# Patient Record
Sex: FEMALE | Race: OTHER | Employment: OTHER | URBAN - NONMETROPOLITAN AREA
[De-identification: names, ages, dates, MRNs, and addresses within clinical notes are randomized per-mention and may not be internally consistent; named-entity substitution may affect disease eponyms.]

---

## 2017-01-24 ENCOUNTER — TELEPHONE (OUTPATIENT)
Dept: FAMILY MEDICINE CLINIC | Age: 57
End: 2017-01-24

## 2017-01-24 DIAGNOSIS — K08.89 TOOTH PAIN: Primary | ICD-10-CM

## 2017-01-24 RX ORDER — HYDROCODONE BITARTRATE AND ACETAMINOPHEN 5; 325 MG/1; MG/1
1 TABLET ORAL EVERY 4 HOURS PRN
Qty: 20 TABLET | Refills: 0 | Status: SHIPPED | OUTPATIENT
Start: 2017-01-24 | End: 2017-06-21 | Stop reason: ALTCHOICE

## 2017-01-25 DIAGNOSIS — K62.89 CHRONIC RECTAL PAIN: ICD-10-CM

## 2017-01-25 DIAGNOSIS — G89.29 CHRONIC RECTAL PAIN: ICD-10-CM

## 2017-01-25 RX ORDER — TRAMADOL HYDROCHLORIDE 50 MG/1
50 TABLET ORAL EVERY 8 HOURS PRN
Qty: 90 TABLET | Refills: 0 | OUTPATIENT
Start: 2017-01-25

## 2017-01-27 DIAGNOSIS — K62.89 CHRONIC RECTAL PAIN: ICD-10-CM

## 2017-01-27 DIAGNOSIS — G89.29 CHRONIC RECTAL PAIN: ICD-10-CM

## 2017-01-27 RX ORDER — TRAMADOL HYDROCHLORIDE 50 MG/1
TABLET ORAL
Qty: 90 TABLET | Refills: 0 | OUTPATIENT
Start: 2017-01-27

## 2017-01-31 ENCOUNTER — OFFICE VISIT (OUTPATIENT)
Dept: FAMILY MEDICINE CLINIC | Age: 57
End: 2017-01-31

## 2017-01-31 VITALS
OXYGEN SATURATION: 16 % | HEIGHT: 62 IN | BODY MASS INDEX: 32.94 KG/M2 | SYSTOLIC BLOOD PRESSURE: 116 MMHG | RESPIRATION RATE: 14 BRPM | WEIGHT: 179 LBS | TEMPERATURE: 99.1 F | HEART RATE: 78 BPM | DIASTOLIC BLOOD PRESSURE: 82 MMHG

## 2017-01-31 DIAGNOSIS — G89.29 CHRONIC RIGHT SHOULDER PAIN: ICD-10-CM

## 2017-01-31 DIAGNOSIS — E11.40 TYPE 2 DIABETES MELLITUS WITH DIABETIC NEUROPATHY, WITH LONG-TERM CURRENT USE OF INSULIN (HCC): Primary | ICD-10-CM

## 2017-01-31 DIAGNOSIS — R21 RASH: ICD-10-CM

## 2017-01-31 DIAGNOSIS — Z87.42 HISTORY OF VAGINITIS: ICD-10-CM

## 2017-01-31 DIAGNOSIS — E78.2 MIXED HYPERLIPIDEMIA: ICD-10-CM

## 2017-01-31 DIAGNOSIS — J44.9 CHRONIC OBSTRUCTIVE PULMONARY DISEASE, UNSPECIFIED COPD TYPE (HCC): ICD-10-CM

## 2017-01-31 DIAGNOSIS — J01.40 ACUTE PANSINUSITIS, RECURRENCE NOT SPECIFIED: ICD-10-CM

## 2017-01-31 DIAGNOSIS — I10 ESSENTIAL HYPERTENSION: ICD-10-CM

## 2017-01-31 DIAGNOSIS — M25.511 CHRONIC RIGHT SHOULDER PAIN: ICD-10-CM

## 2017-01-31 DIAGNOSIS — K62.89 CHRONIC RECTAL PAIN: ICD-10-CM

## 2017-01-31 DIAGNOSIS — G89.29 CHRONIC RECTAL PAIN: ICD-10-CM

## 2017-01-31 DIAGNOSIS — Z79.4 TYPE 2 DIABETES MELLITUS WITH DIABETIC NEUROPATHY, WITH LONG-TERM CURRENT USE OF INSULIN (HCC): Primary | ICD-10-CM

## 2017-01-31 PROCEDURE — 99214 OFFICE O/P EST MOD 30 MIN: CPT | Performed by: FAMILY MEDICINE

## 2017-01-31 RX ORDER — TRAMADOL HYDROCHLORIDE 50 MG/1
50 TABLET ORAL EVERY 8 HOURS PRN
Qty: 90 TABLET | Refills: 0 | Status: SHIPPED | OUTPATIENT
Start: 2017-01-31 | End: 2017-02-28 | Stop reason: SDUPTHER

## 2017-01-31 RX ORDER — FLUCONAZOLE 150 MG/1
TABLET ORAL
Qty: 2 TABLET | Refills: 0 | Status: SHIPPED | OUTPATIENT
Start: 2017-01-31 | End: 2017-06-16 | Stop reason: SDUPTHER

## 2017-01-31 RX ORDER — ALBUTEROL SULFATE 90 UG/1
AEROSOL, METERED RESPIRATORY (INHALATION)
Qty: 1 INHALER | Refills: 6 | Status: SHIPPED | OUTPATIENT
Start: 2017-01-31 | End: 2018-03-23 | Stop reason: SDUPTHER

## 2017-01-31 RX ORDER — CELECOXIB 200 MG/1
CAPSULE ORAL
Qty: 60 CAPSULE | Refills: 5 | Status: SHIPPED | OUTPATIENT
Start: 2017-01-31 | End: 2017-12-18 | Stop reason: SDUPTHER

## 2017-01-31 RX ORDER — AMOXICILLIN 875 MG/1
875 TABLET, COATED ORAL 2 TIMES DAILY
Qty: 20 TABLET | Refills: 0 | Status: SHIPPED | OUTPATIENT
Start: 2017-01-31 | End: 2017-02-10

## 2017-01-31 RX ORDER — MULTIVITAMIN WITH FOLIC ACID 400 MCG
1 TABLET ORAL DAILY
Qty: 30 TABLET | Refills: 6 | Status: SHIPPED | OUTPATIENT
Start: 2017-01-31 | End: 2017-09-05 | Stop reason: SDUPTHER

## 2017-01-31 RX ORDER — TRIAMCINOLONE ACETONIDE 5 MG/G
CREAM TOPICAL
Qty: 1 TUBE | Refills: 0 | Status: SHIPPED | OUTPATIENT
Start: 2017-01-31 | End: 2018-02-08

## 2017-02-08 ENCOUNTER — TELEPHONE (OUTPATIENT)
Dept: FAMILY MEDICINE CLINIC | Age: 57
End: 2017-02-08

## 2017-02-08 DIAGNOSIS — N76.0 ACUTE VAGINITIS: Primary | ICD-10-CM

## 2017-02-08 RX ORDER — CLOTRIMAZOLE 1 %
CREAM WITH APPLICATOR VAGINAL
Qty: 1 TUBE | Refills: 0 | Status: SHIPPED | OUTPATIENT
Start: 2017-02-08 | End: 2017-02-15

## 2017-02-13 RX ORDER — GABAPENTIN 300 MG/1
CAPSULE ORAL
Qty: 120 CAPSULE | Refills: 2 | OUTPATIENT
Start: 2017-02-13

## 2017-02-14 ENCOUNTER — OFFICE VISIT (OUTPATIENT)
Dept: NEUROLOGY | Age: 57
End: 2017-02-14

## 2017-02-14 VITALS
BODY MASS INDEX: 32.94 KG/M2 | HEART RATE: 76 BPM | WEIGHT: 179 LBS | HEIGHT: 62 IN | SYSTOLIC BLOOD PRESSURE: 140 MMHG | DIASTOLIC BLOOD PRESSURE: 78 MMHG

## 2017-02-14 DIAGNOSIS — F25.9 SCHIZOAFFECTIVE DISORDER, UNSPECIFIED TYPE (HCC): ICD-10-CM

## 2017-02-14 DIAGNOSIS — M79.604 PAIN IN BOTH LOWER EXTREMITIES: ICD-10-CM

## 2017-02-14 DIAGNOSIS — E78.5 HYPERLIPIDEMIA, UNSPECIFIED HYPERLIPIDEMIA TYPE: ICD-10-CM

## 2017-02-14 DIAGNOSIS — G56.03 BILATERAL CARPAL TUNNEL SYNDROME: ICD-10-CM

## 2017-02-14 DIAGNOSIS — E11.42 TYPE 2 DIABETES MELLITUS WITH DIABETIC POLYNEUROPATHY, WITHOUT LONG-TERM CURRENT USE OF INSULIN (HCC): Primary | ICD-10-CM

## 2017-02-14 DIAGNOSIS — G89.29 CHRONIC RIGHT SHOULDER PAIN: ICD-10-CM

## 2017-02-14 DIAGNOSIS — E66.9 OBESITY (BMI 30.0-34.9): ICD-10-CM

## 2017-02-14 DIAGNOSIS — G60.9 IDIOPATHIC PERIPHERAL NEUROPATHY: ICD-10-CM

## 2017-02-14 DIAGNOSIS — F41.9 ANXIETY: ICD-10-CM

## 2017-02-14 DIAGNOSIS — F32.A FATIGUE DUE TO DEPRESSION: ICD-10-CM

## 2017-02-14 DIAGNOSIS — M25.511 CHRONIC RIGHT SHOULDER PAIN: ICD-10-CM

## 2017-02-14 DIAGNOSIS — E11.40 TYPE 2 DIABETES MELLITUS WITH DIABETIC NEUROPATHY, WITH LONG-TERM CURRENT USE OF INSULIN (HCC): ICD-10-CM

## 2017-02-14 DIAGNOSIS — J44.9 CHRONIC OBSTRUCTIVE PULMONARY DISEASE, UNSPECIFIED COPD TYPE (HCC): ICD-10-CM

## 2017-02-14 DIAGNOSIS — F31.32 BIPOLAR AFFECTIVE DISORDER, CURRENTLY DEPRESSED, MODERATE (HCC): ICD-10-CM

## 2017-02-14 DIAGNOSIS — G25.81 RESTLESS LEG SYNDROME: ICD-10-CM

## 2017-02-14 DIAGNOSIS — F32.A DEPRESSION, UNSPECIFIED DEPRESSION TYPE: ICD-10-CM

## 2017-02-14 DIAGNOSIS — E78.2 MIXED HYPERLIPIDEMIA: ICD-10-CM

## 2017-02-14 DIAGNOSIS — F50.89 PICA: ICD-10-CM

## 2017-02-14 DIAGNOSIS — R53.83 FATIGUE DUE TO DEPRESSION: ICD-10-CM

## 2017-02-14 DIAGNOSIS — R26.89 BALANCE PROBLEM: ICD-10-CM

## 2017-02-14 DIAGNOSIS — Z79.4 TYPE 2 DIABETES MELLITUS WITH DIABETIC NEUROPATHY, WITH LONG-TERM CURRENT USE OF INSULIN (HCC): ICD-10-CM

## 2017-02-14 DIAGNOSIS — G47.9 SLEEPING DIFFICULTY: ICD-10-CM

## 2017-02-14 DIAGNOSIS — F10.20 ALCOHOLISM (HCC): ICD-10-CM

## 2017-02-14 DIAGNOSIS — M54.16 CHRONIC LUMBAR RADICULOPATHY: ICD-10-CM

## 2017-02-14 DIAGNOSIS — Z72.0 TOBACCO ABUSE: ICD-10-CM

## 2017-02-14 DIAGNOSIS — I10 ESSENTIAL HYPERTENSION: ICD-10-CM

## 2017-02-14 DIAGNOSIS — J42 CHRONIC BRONCHITIS, UNSPECIFIED CHRONIC BRONCHITIS TYPE (HCC): ICD-10-CM

## 2017-02-14 DIAGNOSIS — M79.605 PAIN IN BOTH LOWER EXTREMITIES: ICD-10-CM

## 2017-02-14 DIAGNOSIS — K21.9 GASTROESOPHAGEAL REFLUX DISEASE WITHOUT ESOPHAGITIS: ICD-10-CM

## 2017-02-14 PROCEDURE — 99215 OFFICE O/P EST HI 40 MIN: CPT | Performed by: PSYCHIATRY & NEUROLOGY

## 2017-02-14 RX ORDER — GABAPENTIN 300 MG/1
CAPSULE ORAL
Qty: 120 CAPSULE | Refills: 2 | Status: SHIPPED | OUTPATIENT
Start: 2017-02-14 | End: 2017-05-16 | Stop reason: SDUPTHER

## 2017-02-14 ASSESSMENT — ENCOUNTER SYMPTOMS
EYE ITCHING: 0
DIARRHEA: 0
VOICE CHANGE: 0
CONSTIPATION: 0
EYE DISCHARGE: 0
SHORTNESS OF BREATH: 0
EYE PAIN: 0
CHEST TIGHTNESS: 0
BLOOD IN STOOL: 0
FACIAL SWELLING: 0
CHOKING: 0
EYE REDNESS: 0
APNEA: 0
SINUS PRESSURE: 0
COLOR CHANGE: 0
TROUBLE SWALLOWING: 0
PHOTOPHOBIA: 0
ABDOMINAL DISTENTION: 0
WHEEZING: 0
BACK PAIN: 0

## 2017-02-28 DIAGNOSIS — K62.89 CHRONIC RECTAL PAIN: Primary | ICD-10-CM

## 2017-02-28 DIAGNOSIS — L29.9 PRURITUS: ICD-10-CM

## 2017-02-28 DIAGNOSIS — G89.29 CHRONIC RECTAL PAIN: Primary | ICD-10-CM

## 2017-02-28 RX ORDER — HYDROXYZINE HYDROCHLORIDE 25 MG/1
25 TABLET, FILM COATED ORAL 3 TIMES DAILY PRN
Qty: 30 TABLET | Refills: 0 | Status: SHIPPED | OUTPATIENT
Start: 2017-02-28 | End: 2017-04-26 | Stop reason: SDUPTHER

## 2017-02-28 RX ORDER — TRAMADOL HYDROCHLORIDE 50 MG/1
50 TABLET ORAL EVERY 8 HOURS PRN
Qty: 90 TABLET | Refills: 0 | Status: SHIPPED | OUTPATIENT
Start: 2017-02-28 | End: 2017-03-27 | Stop reason: SDUPTHER

## 2017-03-07 ENCOUNTER — OFFICE VISIT (OUTPATIENT)
Dept: OPTOMETRY | Age: 57
End: 2017-03-07

## 2017-03-07 DIAGNOSIS — E11.9 NON-INSULIN DEPENDENT TYPE 2 DIABETES MELLITUS (HCC): ICD-10-CM

## 2017-03-07 DIAGNOSIS — H52.203 ASTIGMATISM OF BOTH EYES WITH PRESBYOPIA: Primary | ICD-10-CM

## 2017-03-07 DIAGNOSIS — H52.4 ASTIGMATISM OF BOTH EYES WITH PRESBYOPIA: Primary | ICD-10-CM

## 2017-03-07 PROCEDURE — 92014 COMPRE OPH EXAM EST PT 1/>: CPT | Performed by: OPTOMETRIST

## 2017-03-07 PROCEDURE — 2022F DILAT RTA XM EVC RTNOPTHY: CPT | Performed by: OPTOMETRIST

## 2017-03-07 RX ORDER — BENOXINATE HCL/FLUORESCEIN SOD 0.4%-0.25%
1 DROPS OPHTHALMIC (EYE) ONCE
Status: COMPLETED | OUTPATIENT
Start: 2017-03-07 | End: 2017-03-07

## 2017-03-07 RX ORDER — TROPICAMIDE 10 MG/ML
1 SOLUTION/ DROPS OPHTHALMIC ONCE
Status: COMPLETED | OUTPATIENT
Start: 2017-03-07 | End: 2017-03-07

## 2017-03-07 RX ORDER — PHENYLEPHRINE HCL 2.5 %
1 DROPS OPHTHALMIC (EYE) ONCE
Status: COMPLETED | OUTPATIENT
Start: 2017-03-07 | End: 2017-03-07

## 2017-03-07 RX ADMIN — TROPICAMIDE 1 DROP: 10 SOLUTION/ DROPS OPHTHALMIC at 13:15

## 2017-03-07 RX ADMIN — Medication 1 DROP: at 13:15

## 2017-03-07 ASSESSMENT — TONOMETRY
OS_IOP_MMHG: 16
OD_IOP_MMHG: 15
IOP_METHOD: APPLANATION W FLURESS DROP

## 2017-03-07 ASSESSMENT — REFRACTION_WEARINGRX
OD_CYLINDER: -0.25
OS_CYLINDER: -0.25
OS_ADD: +2.25
OD_AXIS: 140
OD_SPHERE: +0.25
OS_AXIS: 150
OS_SPHERE: PLANO
SPECS_TYPE: BIFOCAL
OD_ADD: +2.25

## 2017-03-07 ASSESSMENT — REFRACTION_MANIFEST
OD_SPHERE: PLANO
OS_AXIS: 140
OS_SPHERE: +0.50
OS_CYLINDER: -0.25
OS_ADD: +2.25
OD_AXIS: 140
OD_ADD: +2.25
OD_CYLINDER: -0.25

## 2017-03-07 ASSESSMENT — VISUAL ACUITY
CORRECTION_TYPE: GLASSES
OD_CC: 20/20 OU
OS_CC: 20/20
OS_CC+: -1
METHOD: SNELLEN - LINEAR

## 2017-03-07 ASSESSMENT — SLIT LAMP EXAM - LIDS
COMMENTS: NORMAL
COMMENTS: NORMAL

## 2017-03-27 DIAGNOSIS — G89.29 CHRONIC RECTAL PAIN: ICD-10-CM

## 2017-03-27 DIAGNOSIS — K62.89 CHRONIC RECTAL PAIN: ICD-10-CM

## 2017-03-28 RX ORDER — TRAMADOL HYDROCHLORIDE 50 MG/1
50 TABLET ORAL EVERY 8 HOURS PRN
Qty: 90 TABLET | Refills: 0 | Status: SHIPPED | OUTPATIENT
Start: 2017-03-28 | End: 2017-04-26 | Stop reason: SDUPTHER

## 2017-04-26 ENCOUNTER — OFFICE VISIT (OUTPATIENT)
Dept: FAMILY MEDICINE CLINIC | Age: 57
End: 2017-04-26
Payer: MEDICAID

## 2017-04-26 VITALS
BODY MASS INDEX: 33.31 KG/M2 | RESPIRATION RATE: 16 BRPM | SYSTOLIC BLOOD PRESSURE: 116 MMHG | WEIGHT: 181 LBS | HEIGHT: 62 IN | HEART RATE: 84 BPM | DIASTOLIC BLOOD PRESSURE: 64 MMHG

## 2017-04-26 DIAGNOSIS — G89.29 CHRONIC RECTAL PAIN: ICD-10-CM

## 2017-04-26 DIAGNOSIS — L29.9 PRURITUS: ICD-10-CM

## 2017-04-26 DIAGNOSIS — L30.9 DERMATITIS: Primary | ICD-10-CM

## 2017-04-26 DIAGNOSIS — K62.89 CHRONIC RECTAL PAIN: ICD-10-CM

## 2017-04-26 DIAGNOSIS — E11.40 TYPE 2 DIABETES MELLITUS WITH DIABETIC NEUROPATHY, WITH LONG-TERM CURRENT USE OF INSULIN (HCC): ICD-10-CM

## 2017-04-26 DIAGNOSIS — Z12.11 SCREENING FOR COLON CANCER: ICD-10-CM

## 2017-04-26 DIAGNOSIS — Z79.4 TYPE 2 DIABETES MELLITUS WITH DIABETIC NEUROPATHY, WITH LONG-TERM CURRENT USE OF INSULIN (HCC): ICD-10-CM

## 2017-04-26 PROCEDURE — 99213 OFFICE O/P EST LOW 20 MIN: CPT | Performed by: FAMILY MEDICINE

## 2017-04-26 RX ORDER — TRAMADOL HYDROCHLORIDE 50 MG/1
50 TABLET ORAL EVERY 8 HOURS PRN
Qty: 90 TABLET | Refills: 0 | Status: SHIPPED | OUTPATIENT
Start: 2017-04-26 | End: 2017-05-24 | Stop reason: SDUPTHER

## 2017-04-26 RX ORDER — HYDROXYZINE HYDROCHLORIDE 25 MG/1
25 TABLET, FILM COATED ORAL 3 TIMES DAILY PRN
Qty: 30 TABLET | Refills: 0 | Status: SHIPPED | OUTPATIENT
Start: 2017-04-26 | End: 2017-06-29 | Stop reason: SDUPTHER

## 2017-05-16 ENCOUNTER — OFFICE VISIT (OUTPATIENT)
Dept: NEUROLOGY | Age: 57
End: 2017-05-16
Payer: MEDICAID

## 2017-05-16 VITALS
WEIGHT: 180 LBS | SYSTOLIC BLOOD PRESSURE: 128 MMHG | DIASTOLIC BLOOD PRESSURE: 78 MMHG | HEIGHT: 62 IN | HEART RATE: 76 BPM | BODY MASS INDEX: 33.13 KG/M2

## 2017-05-16 DIAGNOSIS — F25.9 SCHIZOAFFECTIVE DISORDER, UNSPECIFIED TYPE (HCC): ICD-10-CM

## 2017-05-16 DIAGNOSIS — J44.9 CHRONIC OBSTRUCTIVE PULMONARY DISEASE, UNSPECIFIED COPD TYPE (HCC): ICD-10-CM

## 2017-05-16 DIAGNOSIS — F31.32 BIPOLAR AFFECTIVE DISORDER, CURRENTLY DEPRESSED, MODERATE (HCC): ICD-10-CM

## 2017-05-16 DIAGNOSIS — E66.9 OBESITY (BMI 30.0-34.9): ICD-10-CM

## 2017-05-16 DIAGNOSIS — K21.9 GASTROESOPHAGEAL REFLUX DISEASE WITHOUT ESOPHAGITIS: ICD-10-CM

## 2017-05-16 DIAGNOSIS — F41.9 ANXIETY: ICD-10-CM

## 2017-05-16 DIAGNOSIS — G25.81 RESTLESS LEG SYNDROME: ICD-10-CM

## 2017-05-16 DIAGNOSIS — G56.03 BILATERAL CARPAL TUNNEL SYNDROME: ICD-10-CM

## 2017-05-16 DIAGNOSIS — E11.40 TYPE 2 DIABETES MELLITUS WITH DIABETIC NEUROPATHY, WITH LONG-TERM CURRENT USE OF INSULIN (HCC): ICD-10-CM

## 2017-05-16 DIAGNOSIS — G47.9 SLEEPING DIFFICULTY: ICD-10-CM

## 2017-05-16 DIAGNOSIS — R53.83 FATIGUE DUE TO DEPRESSION: ICD-10-CM

## 2017-05-16 DIAGNOSIS — G89.29 CHRONIC RECTAL PAIN: ICD-10-CM

## 2017-05-16 DIAGNOSIS — F32.A FATIGUE DUE TO DEPRESSION: ICD-10-CM

## 2017-05-16 DIAGNOSIS — M54.16 CHRONIC LUMBAR RADICULOPATHY: Primary | ICD-10-CM

## 2017-05-16 DIAGNOSIS — F10.20 ALCOHOLISM (HCC): ICD-10-CM

## 2017-05-16 DIAGNOSIS — Z79.4 TYPE 2 DIABETES MELLITUS WITH DIABETIC NEUROPATHY, WITH LONG-TERM CURRENT USE OF INSULIN (HCC): ICD-10-CM

## 2017-05-16 DIAGNOSIS — R26.89 BALANCE PROBLEM: ICD-10-CM

## 2017-05-16 DIAGNOSIS — E78.2 MIXED HYPERLIPIDEMIA: ICD-10-CM

## 2017-05-16 DIAGNOSIS — K62.89 CHRONIC RECTAL PAIN: ICD-10-CM

## 2017-05-16 DIAGNOSIS — G60.9 IDIOPATHIC PERIPHERAL NEUROPATHY: ICD-10-CM

## 2017-05-16 DIAGNOSIS — G89.29 CHRONIC RIGHT SHOULDER PAIN: ICD-10-CM

## 2017-05-16 DIAGNOSIS — M25.511 CHRONIC RIGHT SHOULDER PAIN: ICD-10-CM

## 2017-05-16 DIAGNOSIS — I10 ESSENTIAL HYPERTENSION: ICD-10-CM

## 2017-05-16 DIAGNOSIS — F32.A DEPRESSION, UNSPECIFIED DEPRESSION TYPE: ICD-10-CM

## 2017-05-16 PROCEDURE — 99214 OFFICE O/P EST MOD 30 MIN: CPT | Performed by: PSYCHIATRY & NEUROLOGY

## 2017-05-16 RX ORDER — MELOXICAM 7.5 MG/1
TABLET ORAL
Refills: 0 | COMMUNITY
Start: 2017-03-22 | End: 2017-06-21 | Stop reason: ALTCHOICE

## 2017-05-16 RX ORDER — GABAPENTIN 300 MG/1
CAPSULE ORAL
Qty: 120 CAPSULE | Refills: 2 | Status: SHIPPED | OUTPATIENT
Start: 2017-05-16 | End: 2017-08-22 | Stop reason: SDUPTHER

## 2017-05-16 ASSESSMENT — ENCOUNTER SYMPTOMS
EYE PAIN: 0
EYE DISCHARGE: 0
SHORTNESS OF BREATH: 0
COLOR CHANGE: 0
CONSTIPATION: 0
TROUBLE SWALLOWING: 0
CHEST TIGHTNESS: 0
ABDOMINAL DISTENTION: 0
APNEA: 0
CHOKING: 0
SINUS PRESSURE: 0
FACIAL SWELLING: 0
BACK PAIN: 0
EYE ITCHING: 0
EYE REDNESS: 0
VOICE CHANGE: 0
BLOOD IN STOOL: 0
WHEEZING: 0
DIARRHEA: 0
PHOTOPHOBIA: 0

## 2017-05-17 ENCOUNTER — TELEPHONE (OUTPATIENT)
Dept: FAMILY MEDICINE CLINIC | Age: 57
End: 2017-05-17

## 2017-05-17 DIAGNOSIS — L30.9 DERMATITIS: Primary | ICD-10-CM

## 2017-05-18 DIAGNOSIS — Z79.4 TYPE 2 DIABETES MELLITUS WITH DIABETIC NEUROPATHY, WITH LONG-TERM CURRENT USE OF INSULIN (HCC): Primary | ICD-10-CM

## 2017-05-18 DIAGNOSIS — E11.40 TYPE 2 DIABETES MELLITUS WITH DIABETIC NEUROPATHY, WITH LONG-TERM CURRENT USE OF INSULIN (HCC): Primary | ICD-10-CM

## 2017-05-18 RX ORDER — GLUCOSAMINE HCL/CHONDROITIN SU 500-400 MG
CAPSULE ORAL
Qty: 100 STRIP | Refills: 6 | Status: SHIPPED | OUTPATIENT
Start: 2017-05-18 | End: 2018-09-06 | Stop reason: SDUPTHER

## 2017-05-24 DIAGNOSIS — Z79.4 TYPE 2 DIABETES MELLITUS WITH DIABETIC NEUROPATHY, WITH LONG-TERM CURRENT USE OF INSULIN (HCC): Primary | ICD-10-CM

## 2017-05-24 DIAGNOSIS — K62.89 CHRONIC RECTAL PAIN: ICD-10-CM

## 2017-05-24 DIAGNOSIS — E11.40 TYPE 2 DIABETES MELLITUS WITH DIABETIC NEUROPATHY, WITH LONG-TERM CURRENT USE OF INSULIN (HCC): Primary | ICD-10-CM

## 2017-05-24 DIAGNOSIS — G89.29 CHRONIC RECTAL PAIN: ICD-10-CM

## 2017-05-24 RX ORDER — TRAMADOL HYDROCHLORIDE 50 MG/1
50 TABLET ORAL EVERY 8 HOURS PRN
Qty: 90 TABLET | Refills: 0 | Status: SHIPPED | OUTPATIENT
Start: 2017-05-24 | End: 2017-06-21 | Stop reason: SDUPTHER

## 2017-06-15 ENCOUNTER — TELEPHONE (OUTPATIENT)
Dept: FAMILY MEDICINE CLINIC | Age: 57
End: 2017-06-15

## 2017-06-15 DIAGNOSIS — R30.0 DYSURIA: Primary | ICD-10-CM

## 2017-06-15 RX ORDER — SULFAMETHOXAZOLE AND TRIMETHOPRIM 800; 160 MG/1; MG/1
1 TABLET ORAL 2 TIMES DAILY
Qty: 6 TABLET | Refills: 0 | Status: SHIPPED | OUTPATIENT
Start: 2017-06-15 | End: 2017-06-18

## 2017-06-16 DIAGNOSIS — Z87.42 HISTORY OF VAGINITIS: ICD-10-CM

## 2017-06-16 RX ORDER — FLUCONAZOLE 150 MG/1
TABLET ORAL
Qty: 2 TABLET | Refills: 0 | Status: SHIPPED | OUTPATIENT
Start: 2017-06-16 | End: 2017-06-19

## 2017-06-20 DIAGNOSIS — K21.9 GASTROESOPHAGEAL REFLUX DISEASE, ESOPHAGITIS PRESENCE NOT SPECIFIED: ICD-10-CM

## 2017-06-21 ENCOUNTER — HOSPITAL ENCOUNTER (OUTPATIENT)
Age: 57
Setting detail: SPECIMEN
Discharge: HOME | End: 2017-06-21
Payer: MEDICAID

## 2017-06-21 ENCOUNTER — OFFICE VISIT (OUTPATIENT)
Dept: FAMILY MEDICINE CLINIC | Age: 57
End: 2017-06-21
Payer: MEDICAID

## 2017-06-21 VITALS
BODY MASS INDEX: 33.05 KG/M2 | HEIGHT: 62 IN | RESPIRATION RATE: 16 BRPM | WEIGHT: 179.6 LBS | HEART RATE: 76 BPM | SYSTOLIC BLOOD PRESSURE: 118 MMHG | DIASTOLIC BLOOD PRESSURE: 72 MMHG

## 2017-06-21 DIAGNOSIS — Z01.419 WELL FEMALE EXAM WITH ROUTINE GYNECOLOGICAL EXAM: Primary | ICD-10-CM

## 2017-06-21 DIAGNOSIS — K21.9 GASTROESOPHAGEAL REFLUX DISEASE, ESOPHAGITIS PRESENCE NOT SPECIFIED: ICD-10-CM

## 2017-06-21 DIAGNOSIS — E11.40 TYPE 2 DIABETES MELLITUS WITH DIABETIC NEUROPATHY, WITHOUT LONG-TERM CURRENT USE OF INSULIN (HCC): ICD-10-CM

## 2017-06-21 DIAGNOSIS — K62.89 CHRONIC RECTAL PAIN: ICD-10-CM

## 2017-06-21 DIAGNOSIS — H61.23 CERUMEN DEBRIS ON TYMPANIC MEMBRANE, BILATERAL: ICD-10-CM

## 2017-06-21 DIAGNOSIS — G89.29 CHRONIC RECTAL PAIN: ICD-10-CM

## 2017-06-21 DIAGNOSIS — E78.2 MIXED HYPERLIPIDEMIA: ICD-10-CM

## 2017-06-21 DIAGNOSIS — E53.8 VITAMIN B12 DEFICIENCY: ICD-10-CM

## 2017-06-21 PROCEDURE — 99396 PREV VISIT EST AGE 40-64: CPT | Performed by: FAMILY MEDICINE

## 2017-06-21 PROCEDURE — 69210 REMOVE IMPACTED EAR WAX UNI: CPT | Performed by: FAMILY MEDICINE

## 2017-06-21 PROCEDURE — 99214 OFFICE O/P EST MOD 30 MIN: CPT | Performed by: FAMILY MEDICINE

## 2017-06-21 RX ORDER — PRAVASTATIN SODIUM 20 MG
20 TABLET ORAL DAILY
Qty: 30 TABLET | Refills: 6 | Status: SHIPPED | OUTPATIENT
Start: 2017-06-21 | End: 2018-01-28 | Stop reason: SDUPTHER

## 2017-06-21 RX ORDER — OMEPRAZOLE 20 MG/1
20 CAPSULE, DELAYED RELEASE ORAL DAILY
Qty: 30 CAPSULE | Refills: 6 | Status: SHIPPED | OUTPATIENT
Start: 2017-06-21 | End: 2018-01-02 | Stop reason: SDUPTHER

## 2017-06-21 RX ORDER — OMEPRAZOLE 20 MG/1
20 CAPSULE, DELAYED RELEASE ORAL DAILY
Qty: 30 CAPSULE | Refills: 0 | OUTPATIENT
Start: 2017-06-21

## 2017-06-21 RX ORDER — ASPIRIN 325 MG
TABLET, DELAYED RELEASE (ENTERIC COATED) ORAL
Qty: 30 TABLET | Refills: 6 | Status: SHIPPED | OUTPATIENT
Start: 2017-06-21 | End: 2017-07-03 | Stop reason: SDUPTHER

## 2017-06-21 RX ORDER — TRAMADOL HYDROCHLORIDE 50 MG/1
50 TABLET ORAL EVERY 8 HOURS PRN
Qty: 90 TABLET | Refills: 0 | Status: SHIPPED | OUTPATIENT
Start: 2017-06-21 | End: 2017-07-17 | Stop reason: SDUPTHER

## 2017-06-22 ENCOUNTER — TELEPHONE (OUTPATIENT)
Dept: FAMILY MEDICINE CLINIC | Age: 57
End: 2017-06-22

## 2017-06-23 LAB — CYTOLOGY REPORT: NORMAL

## 2017-06-29 DIAGNOSIS — L29.9 PRURITUS: ICD-10-CM

## 2017-06-29 RX ORDER — HYDROXYZINE HYDROCHLORIDE 25 MG/1
25 TABLET, FILM COATED ORAL 3 TIMES DAILY PRN
Qty: 30 TABLET | Refills: 3 | Status: SHIPPED | OUTPATIENT
Start: 2017-06-29 | End: 2018-02-08 | Stop reason: SDUPTHER

## 2017-07-03 DIAGNOSIS — I10 ESSENTIAL HYPERTENSION: ICD-10-CM

## 2017-07-03 DIAGNOSIS — E11.40 TYPE 2 DIABETES MELLITUS WITH DIABETIC NEUROPATHY, WITHOUT LONG-TERM CURRENT USE OF INSULIN (HCC): ICD-10-CM

## 2017-07-03 RX ORDER — LOSARTAN POTASSIUM 100 MG/1
TABLET ORAL
Qty: 30 TABLET | Refills: 2 | Status: SHIPPED | OUTPATIENT
Start: 2017-07-03 | End: 2017-09-21 | Stop reason: SDUPTHER

## 2017-07-03 RX ORDER — ASPIRIN 325 MG
TABLET, DELAYED RELEASE (ENTERIC COATED) ORAL
Qty: 30 TABLET | Refills: 2 | Status: SHIPPED | OUTPATIENT
Start: 2017-07-03 | End: 2017-11-22 | Stop reason: SDUPTHER

## 2017-07-17 DIAGNOSIS — K62.89 CHRONIC RECTAL PAIN: Primary | ICD-10-CM

## 2017-07-17 DIAGNOSIS — G89.29 CHRONIC RECTAL PAIN: Primary | ICD-10-CM

## 2017-07-18 RX ORDER — TRAMADOL HYDROCHLORIDE 50 MG/1
50 TABLET ORAL EVERY 8 HOURS PRN
Qty: 90 TABLET | Refills: 0 | Status: SHIPPED | OUTPATIENT
Start: 2017-07-18 | End: 2017-08-15 | Stop reason: SDUPTHER

## 2017-07-31 ENCOUNTER — OFFICE VISIT (OUTPATIENT)
Dept: PRIMARY CARE CLINIC | Age: 57
End: 2017-07-31
Payer: MEDICAID

## 2017-07-31 VITALS
DIASTOLIC BLOOD PRESSURE: 70 MMHG | WEIGHT: 185 LBS | SYSTOLIC BLOOD PRESSURE: 120 MMHG | HEART RATE: 85 BPM | OXYGEN SATURATION: 97 % | BODY MASS INDEX: 33.84 KG/M2 | TEMPERATURE: 98.5 F

## 2017-07-31 DIAGNOSIS — J01.00 ACUTE NON-RECURRENT MAXILLARY SINUSITIS: Primary | ICD-10-CM

## 2017-07-31 DIAGNOSIS — R05.9 COUGH: ICD-10-CM

## 2017-07-31 PROCEDURE — 99213 OFFICE O/P EST LOW 20 MIN: CPT | Performed by: PHYSICIAN ASSISTANT

## 2017-07-31 RX ORDER — FLUCONAZOLE 150 MG/1
TABLET ORAL
Qty: 2 TABLET | Refills: 0 | Status: SHIPPED | OUTPATIENT
Start: 2017-07-31 | End: 2017-08-22 | Stop reason: ALTCHOICE

## 2017-07-31 RX ORDER — ASPIRIN 325 MG
TABLET ORAL
COMMUNITY
Start: 2017-07-18 | End: 2017-08-22 | Stop reason: SDUPTHER

## 2017-07-31 RX ORDER — CEFUROXIME AXETIL 250 MG/1
250 TABLET ORAL 2 TIMES DAILY
Qty: 20 TABLET | Refills: 0 | Status: SHIPPED | OUTPATIENT
Start: 2017-07-31 | End: 2017-08-10

## 2017-07-31 RX ORDER — DEXTROMETHORPHAN HYDROBROMIDE AND PROMETHAZINE HYDROCHLORIDE 15; 6.25 MG/5ML; MG/5ML
5 SYRUP ORAL 4 TIMES DAILY PRN
Qty: 120 ML | Refills: 0 | Status: SHIPPED | OUTPATIENT
Start: 2017-07-31 | End: 2017-08-07

## 2017-07-31 ASSESSMENT — ENCOUNTER SYMPTOMS
SWOLLEN GLANDS: 0
SORE THROAT: 1
COUGH: 1

## 2017-08-15 DIAGNOSIS — G89.29 CHRONIC RECTAL PAIN: ICD-10-CM

## 2017-08-15 DIAGNOSIS — K62.89 CHRONIC RECTAL PAIN: ICD-10-CM

## 2017-08-15 RX ORDER — TRAMADOL HYDROCHLORIDE 50 MG/1
TABLET ORAL
Qty: 90 TABLET | Refills: 1 | Status: SHIPPED | OUTPATIENT
Start: 2017-08-15 | End: 2017-09-21 | Stop reason: SDUPTHER

## 2017-08-22 ENCOUNTER — OFFICE VISIT (OUTPATIENT)
Dept: NEUROLOGY | Age: 57
End: 2017-08-22
Payer: MEDICAID

## 2017-08-22 VITALS
HEART RATE: 84 BPM | SYSTOLIC BLOOD PRESSURE: 122 MMHG | WEIGHT: 181.4 LBS | RESPIRATION RATE: 16 BRPM | DIASTOLIC BLOOD PRESSURE: 70 MMHG | BODY MASS INDEX: 33.38 KG/M2 | HEIGHT: 62 IN

## 2017-08-22 DIAGNOSIS — F32.A DEPRESSION, UNSPECIFIED DEPRESSION TYPE: ICD-10-CM

## 2017-08-22 DIAGNOSIS — F41.9 ANXIETY: ICD-10-CM

## 2017-08-22 DIAGNOSIS — E66.9 OBESITY (BMI 30.0-34.9): ICD-10-CM

## 2017-08-22 DIAGNOSIS — R26.89 BALANCE PROBLEM: ICD-10-CM

## 2017-08-22 DIAGNOSIS — M54.16 CHRONIC LUMBAR RADICULOPATHY: ICD-10-CM

## 2017-08-22 DIAGNOSIS — M79.604 PAIN IN BOTH LOWER EXTREMITIES: ICD-10-CM

## 2017-08-22 DIAGNOSIS — F32.A FATIGUE DUE TO DEPRESSION: ICD-10-CM

## 2017-08-22 DIAGNOSIS — G60.9 IDIOPATHIC PERIPHERAL NEUROPATHY: ICD-10-CM

## 2017-08-22 DIAGNOSIS — G56.03 BILATERAL CARPAL TUNNEL SYNDROME: ICD-10-CM

## 2017-08-22 DIAGNOSIS — G63 POLYNEUROPATHY ASSOCIATED WITH UNDERLYING DISEASE (HCC): ICD-10-CM

## 2017-08-22 DIAGNOSIS — M25.511 CHRONIC RIGHT SHOULDER PAIN: ICD-10-CM

## 2017-08-22 DIAGNOSIS — G89.29 CHRONIC RIGHT SHOULDER PAIN: ICD-10-CM

## 2017-08-22 DIAGNOSIS — F31.0 BIPOLAR AFFECTIVE DISORDER, CURRENT EPISODE HYPOMANIC (HCC): ICD-10-CM

## 2017-08-22 DIAGNOSIS — E78.5 HYPERLIPIDEMIA, UNSPECIFIED HYPERLIPIDEMIA TYPE: ICD-10-CM

## 2017-08-22 DIAGNOSIS — G25.81 RESTLESS LEG SYNDROME: ICD-10-CM

## 2017-08-22 DIAGNOSIS — F10.20 ALCOHOLISM (HCC): ICD-10-CM

## 2017-08-22 DIAGNOSIS — G47.9 SLEEPING DIFFICULTY: ICD-10-CM

## 2017-08-22 DIAGNOSIS — R53.83 FATIGUE DUE TO DEPRESSION: ICD-10-CM

## 2017-08-22 DIAGNOSIS — E11.40 TYPE 2 DIABETES MELLITUS WITH DIABETIC NEUROPATHY, WITHOUT LONG-TERM CURRENT USE OF INSULIN (HCC): Primary | ICD-10-CM

## 2017-08-22 DIAGNOSIS — F50.89 PICA: ICD-10-CM

## 2017-08-22 DIAGNOSIS — Z72.0 TOBACCO ABUSE: ICD-10-CM

## 2017-08-22 DIAGNOSIS — I10 ESSENTIAL HYPERTENSION: ICD-10-CM

## 2017-08-22 DIAGNOSIS — K21.9 GASTROESOPHAGEAL REFLUX DISEASE, ESOPHAGITIS PRESENCE NOT SPECIFIED: ICD-10-CM

## 2017-08-22 DIAGNOSIS — M79.605 PAIN IN BOTH LOWER EXTREMITIES: ICD-10-CM

## 2017-08-22 DIAGNOSIS — G56.00 CARPAL TUNNEL SYNDROME, UNSPECIFIED LATERALITY: ICD-10-CM

## 2017-08-22 DIAGNOSIS — F25.0 SCHIZOAFFECTIVE DISORDER, BIPOLAR TYPE (HCC): ICD-10-CM

## 2017-08-22 PROCEDURE — 99215 OFFICE O/P EST HI 40 MIN: CPT | Performed by: PSYCHIATRY & NEUROLOGY

## 2017-08-22 RX ORDER — GABAPENTIN 300 MG/1
CAPSULE ORAL
Qty: 120 CAPSULE | Refills: 2 | Status: SHIPPED | OUTPATIENT
Start: 2017-08-22 | End: 2017-11-22 | Stop reason: SDUPTHER

## 2017-08-22 ASSESSMENT — ENCOUNTER SYMPTOMS
EYE ITCHING: 0
PHOTOPHOBIA: 0
TROUBLE SWALLOWING: 0
BACK PAIN: 0
ABDOMINAL DISTENTION: 0
SINUS PRESSURE: 0
EYE DISCHARGE: 0
FACIAL SWELLING: 0
COLOR CHANGE: 0
BLOOD IN STOOL: 0
DIARRHEA: 0
EYE PAIN: 0
WHEEZING: 0
EYE REDNESS: 0
VOMITING: 0
NAUSEA: 0
ABDOMINAL PAIN: 0
SHORTNESS OF BREATH: 0
CONSTIPATION: 0
BOWEL INCONTINENCE: 0
APNEA: 0
CHEST TIGHTNESS: 0
CHOKING: 0
VOICE CHANGE: 0

## 2017-09-05 DIAGNOSIS — Z79.4 TYPE 2 DIABETES MELLITUS WITH DIABETIC NEUROPATHY, WITH LONG-TERM CURRENT USE OF INSULIN (HCC): ICD-10-CM

## 2017-09-05 DIAGNOSIS — E11.40 TYPE 2 DIABETES MELLITUS WITH DIABETIC NEUROPATHY, WITH LONG-TERM CURRENT USE OF INSULIN (HCC): ICD-10-CM

## 2017-09-05 RX ORDER — MULTIVITAMIN WITH FOLIC ACID 400 MCG
TABLET ORAL
Qty: 30 TABLET | Refills: 0 | Status: SHIPPED | OUTPATIENT
Start: 2017-09-05 | End: 2017-09-21 | Stop reason: SDUPTHER

## 2017-09-06 ENCOUNTER — OFFICE VISIT (OUTPATIENT)
Dept: PRIMARY CARE CLINIC | Age: 57
End: 2017-09-06
Payer: MEDICAID

## 2017-09-06 VITALS
SYSTOLIC BLOOD PRESSURE: 112 MMHG | HEIGHT: 62 IN | HEART RATE: 84 BPM | DIASTOLIC BLOOD PRESSURE: 62 MMHG | BODY MASS INDEX: 33.86 KG/M2 | WEIGHT: 184 LBS | OXYGEN SATURATION: 90 % | TEMPERATURE: 98.6 F

## 2017-09-06 DIAGNOSIS — J44.1 COPD EXACERBATION (HCC): Primary | ICD-10-CM

## 2017-09-06 PROCEDURE — 99213 OFFICE O/P EST LOW 20 MIN: CPT | Performed by: FAMILY MEDICINE

## 2017-09-06 RX ORDER — PREDNISONE 20 MG/1
20 TABLET ORAL 2 TIMES DAILY
Qty: 6 TABLET | Refills: 0 | Status: SHIPPED | OUTPATIENT
Start: 2017-09-06 | End: 2017-09-09

## 2017-09-06 RX ORDER — DOXYCYCLINE HYCLATE 100 MG
100 TABLET ORAL 2 TIMES DAILY
Qty: 20 TABLET | Refills: 0 | Status: SHIPPED | OUTPATIENT
Start: 2017-09-06 | End: 2017-09-06 | Stop reason: SDUPTHER

## 2017-09-06 RX ORDER — DOXYCYCLINE HYCLATE 100 MG
100 TABLET ORAL 2 TIMES DAILY
Qty: 20 TABLET | Refills: 0 | Status: SHIPPED | OUTPATIENT
Start: 2017-09-06 | End: 2017-09-16

## 2017-09-11 DIAGNOSIS — Z79.4 TYPE 2 DIABETES MELLITUS WITH DIABETIC NEUROPATHY, WITH LONG-TERM CURRENT USE OF INSULIN (HCC): ICD-10-CM

## 2017-09-11 DIAGNOSIS — E11.40 TYPE 2 DIABETES MELLITUS WITH DIABETIC NEUROPATHY, WITH LONG-TERM CURRENT USE OF INSULIN (HCC): ICD-10-CM

## 2017-09-21 ENCOUNTER — OFFICE VISIT (OUTPATIENT)
Dept: FAMILY MEDICINE CLINIC | Age: 57
End: 2017-09-21
Payer: MEDICAID

## 2017-09-21 VITALS
DIASTOLIC BLOOD PRESSURE: 80 MMHG | RESPIRATION RATE: 16 BRPM | BODY MASS INDEX: 33.75 KG/M2 | SYSTOLIC BLOOD PRESSURE: 130 MMHG | HEART RATE: 82 BPM | WEIGHT: 183.4 LBS | HEIGHT: 62 IN

## 2017-09-21 DIAGNOSIS — Z79.4 TYPE 2 DIABETES MELLITUS WITH DIABETIC NEUROPATHY, WITH LONG-TERM CURRENT USE OF INSULIN (HCC): Primary | ICD-10-CM

## 2017-09-21 DIAGNOSIS — R19.7 DIARRHEA, UNSPECIFIED TYPE: ICD-10-CM

## 2017-09-21 DIAGNOSIS — F10.20 ALCOHOLISM (HCC): ICD-10-CM

## 2017-09-21 DIAGNOSIS — Z23 NEED FOR INFLUENZA VACCINATION: ICD-10-CM

## 2017-09-21 DIAGNOSIS — I10 ESSENTIAL HYPERTENSION: ICD-10-CM

## 2017-09-21 DIAGNOSIS — E11.40 TYPE 2 DIABETES MELLITUS WITH DIABETIC NEUROPATHY, WITH LONG-TERM CURRENT USE OF INSULIN (HCC): Primary | ICD-10-CM

## 2017-09-21 DIAGNOSIS — G89.29 CHRONIC RECTAL PAIN: ICD-10-CM

## 2017-09-21 DIAGNOSIS — K62.89 CHRONIC RECTAL PAIN: ICD-10-CM

## 2017-09-21 PROCEDURE — 99213 OFFICE O/P EST LOW 20 MIN: CPT | Performed by: FAMILY MEDICINE

## 2017-09-21 PROCEDURE — 90471 IMMUNIZATION ADMIN: CPT | Performed by: FAMILY MEDICINE

## 2017-09-21 PROCEDURE — 90686 IIV4 VACC NO PRSV 0.5 ML IM: CPT | Performed by: FAMILY MEDICINE

## 2017-09-21 RX ORDER — CALCIUM POLYCARBOPHIL 625 MG
1250 TABLET ORAL 4 TIMES DAILY PRN
Qty: 240 TABLET | Refills: 1 | Status: SHIPPED | OUTPATIENT
Start: 2017-09-21 | End: 2018-09-13

## 2017-09-21 RX ORDER — MULTIVITAMIN WITH FOLIC ACID 400 MCG
1 TABLET ORAL DAILY
Qty: 30 TABLET | Refills: 6 | Status: SHIPPED | OUTPATIENT
Start: 2017-09-21 | End: 2018-02-08 | Stop reason: SDUPTHER

## 2017-09-21 RX ORDER — MULTIVITAMIN WITH FOLIC ACID 400 MCG
TABLET ORAL
Qty: 30 TABLET | Refills: 6 | Status: CANCELLED | OUTPATIENT
Start: 2017-09-21

## 2017-09-21 RX ORDER — LOSARTAN POTASSIUM 100 MG/1
TABLET ORAL
Qty: 30 TABLET | Refills: 6 | Status: SHIPPED | OUTPATIENT
Start: 2017-09-21 | End: 2018-02-08 | Stop reason: SDUPTHER

## 2017-09-21 RX ORDER — TRAMADOL HYDROCHLORIDE 50 MG/1
TABLET ORAL
Qty: 90 TABLET | Refills: 1 | Status: SHIPPED | OUTPATIENT
Start: 2017-09-21 | End: 2017-12-05 | Stop reason: SDUPTHER

## 2017-09-21 ASSESSMENT — PATIENT HEALTH QUESTIONNAIRE - PHQ9
1. LITTLE INTEREST OR PLEASURE IN DOING THINGS: 0
2. FEELING DOWN, DEPRESSED OR HOPELESS: 0
SUM OF ALL RESPONSES TO PHQ QUESTIONS 1-9: 0
SUM OF ALL RESPONSES TO PHQ9 QUESTIONS 1 & 2: 0

## 2017-11-22 DIAGNOSIS — E11.40 TYPE 2 DIABETES MELLITUS WITH DIABETIC NEUROPATHY, WITHOUT LONG-TERM CURRENT USE OF INSULIN (HCC): ICD-10-CM

## 2017-11-22 RX ORDER — GABAPENTIN 300 MG/1
CAPSULE ORAL
Qty: 120 CAPSULE | Refills: 2 | Status: SHIPPED | OUTPATIENT
Start: 2017-11-22 | End: 2018-02-15 | Stop reason: SDUPTHER

## 2017-12-05 DIAGNOSIS — G89.29 CHRONIC RECTAL PAIN: ICD-10-CM

## 2017-12-05 DIAGNOSIS — K62.89 CHRONIC RECTAL PAIN: ICD-10-CM

## 2017-12-05 RX ORDER — TRAMADOL HYDROCHLORIDE 50 MG/1
TABLET ORAL
Qty: 90 TABLET | Refills: 0 | Status: SHIPPED | OUTPATIENT
Start: 2017-12-05 | End: 2018-01-02 | Stop reason: SDUPTHER

## 2017-12-05 NOTE — TELEPHONE ENCOUNTER
[facility] from [place] reviewed, last filled 11/8/17 #90 for a 30 day supply. Report available for your review. Last OV 9/21/17; next OV 1/4/18.

## 2017-12-18 DIAGNOSIS — G89.29 CHRONIC RIGHT SHOULDER PAIN: ICD-10-CM

## 2017-12-18 DIAGNOSIS — M25.511 CHRONIC RIGHT SHOULDER PAIN: ICD-10-CM

## 2017-12-18 RX ORDER — CELECOXIB 200 MG/1
CAPSULE ORAL
Qty: 60 CAPSULE | Refills: 0 | Status: SHIPPED | OUTPATIENT
Start: 2017-12-18 | End: 2018-02-08 | Stop reason: SDUPTHER

## 2017-12-20 ENCOUNTER — OFFICE VISIT (OUTPATIENT)
Dept: PRIMARY CARE CLINIC | Age: 57
End: 2017-12-20
Payer: MEDICAID

## 2017-12-20 VITALS
BODY MASS INDEX: 33.68 KG/M2 | WEIGHT: 183 LBS | SYSTOLIC BLOOD PRESSURE: 130 MMHG | OXYGEN SATURATION: 95 % | HEIGHT: 62 IN | TEMPERATURE: 98.7 F | DIASTOLIC BLOOD PRESSURE: 88 MMHG | HEART RATE: 75 BPM | RESPIRATION RATE: 16 BRPM

## 2017-12-20 DIAGNOSIS — H00.015 HORDEOLUM EXTERNUM OF LEFT LOWER EYELID: Primary | ICD-10-CM

## 2017-12-20 DIAGNOSIS — J40 BRONCHITIS: ICD-10-CM

## 2017-12-20 DIAGNOSIS — B37.31 VAGINAL YEAST INFECTION: ICD-10-CM

## 2017-12-20 PROCEDURE — 3014F SCREEN MAMMO DOC REV: CPT | Performed by: NURSE PRACTITIONER

## 2017-12-20 PROCEDURE — 4004F PT TOBACCO SCREEN RCVD TLK: CPT | Performed by: NURSE PRACTITIONER

## 2017-12-20 PROCEDURE — G8427 DOCREV CUR MEDS BY ELIG CLIN: HCPCS | Performed by: NURSE PRACTITIONER

## 2017-12-20 PROCEDURE — G8417 CALC BMI ABV UP PARAM F/U: HCPCS | Performed by: NURSE PRACTITIONER

## 2017-12-20 PROCEDURE — G8484 FLU IMMUNIZE NO ADMIN: HCPCS | Performed by: NURSE PRACTITIONER

## 2017-12-20 PROCEDURE — 99202 OFFICE O/P NEW SF 15 MIN: CPT | Performed by: NURSE PRACTITIONER

## 2017-12-20 PROCEDURE — 3017F COLORECTAL CA SCREEN DOC REV: CPT | Performed by: NURSE PRACTITIONER

## 2017-12-20 RX ORDER — FLUCONAZOLE 150 MG/1
TABLET ORAL
Qty: 1 TABLET | Refills: 0 | Status: SHIPPED | OUTPATIENT
Start: 2017-12-20 | End: 2018-02-08 | Stop reason: ALTCHOICE

## 2017-12-20 RX ORDER — ERYTHROMYCIN 5 MG/G
OINTMENT OPHTHALMIC
Qty: 1 TUBE | Refills: 0 | Status: SHIPPED | OUTPATIENT
Start: 2017-12-20 | End: 2017-12-30

## 2017-12-20 RX ORDER — AZITHROMYCIN 250 MG/1
TABLET, FILM COATED ORAL
Qty: 1 PACKET | Refills: 0 | Status: SHIPPED | OUTPATIENT
Start: 2017-12-20 | End: 2017-12-30

## 2017-12-20 ASSESSMENT — ENCOUNTER SYMPTOMS
EYE REDNESS: 1
RHINORRHEA: 1
COUGH: 1
SORE THROAT: 1
EYE PAIN: 1

## 2017-12-20 NOTE — PROGRESS NOTES
Subjective:      Patient ID: Sharyle Meadows is a 65 y.o. female. Eye Pain    The left eye is affected. This is a new problem. The current episode started in the past 7 days. The problem occurs constantly. The problem has been unchanged. The pain is at a severity of 4/10. The pain is moderate. There is no known exposure to pink eye. She does not wear contacts. Associated symptoms include eye redness and a recent URI. She has tried nothing for the symptoms. Cough   This is a new problem. The current episode started today. The problem has been unchanged. Associated symptoms include eye redness, nasal congestion, postnasal drip, rhinorrhea and a sore throat. She has tried nothing for the symptoms. The treatment provided no relief. Review of Systems   Constitutional: Negative. HENT: Positive for postnasal drip, rhinorrhea and sore throat. Eyes: Positive for pain and redness. Respiratory: Positive for cough. Cardiovascular: Negative. Musculoskeletal: Negative. Skin: Negative. Objective:   Physical Exam   Constitutional: She is oriented to person, place, and time. She appears well-developed. HENT:   Head: Normocephalic and atraumatic. Right Ear: Tympanic membrane, external ear and ear canal normal.   Left Ear: Tympanic membrane, external ear and ear canal normal.   Nose: Nose normal.   Mouth/Throat: Uvula is midline, oropharynx is clear and moist and mucous membranes are normal.   Eyes: Conjunctivae, EOM and lids are normal. Pupils are equal, round, and reactive to light. Left eye exhibits hordeolum. Neck: Trachea normal and normal range of motion. Cardiovascular: Normal rate, regular rhythm, normal heart sounds and normal pulses. Pulmonary/Chest: Effort normal. She has wheezes in the right upper field, the right middle field, the right lower field, the left upper field and the left lower field. Musculoskeletal: Normal range of motion.    Neurological: She is alert and mouth once daily 30 tablet 5    metFORMIN (GLUCOPHAGE) 1000 MG tablet take 1 tablet by mouth twice a day 60 tablet 6    losartan (COZAAR) 100 MG tablet take 1 tablet by mouth once daily 30 tablet 6    Calcium Polycarbophil (FIBER) 625 MG TABS Take 2 tablets by mouth 4 times daily as needed (diarrhea) 240 tablet 1    Multiple Vitamin (MULTIVITAMIN) tablet Take 1 tablet by mouth daily 30 tablet 6    carbidopa-levodopa (SINEMET)  MG per tablet take 1 tablet by mouth at bedtime 30 tablet 5    ONETOUCH DELICA LANCETS FINE MISC TEST three times a day 100 each 5    hydrOXYzine (ATARAX) 25 MG tablet Take 1 tablet by mouth 3 times daily as needed for Itching 30 tablet 3    pravastatin (PRAVACHOL) 20 MG tablet Take 1 tablet by mouth daily 30 tablet 6    Cyanocobalamin ER (RA VITAMIN B-12 TR) 1000 MCG TBCR Take 1 tablet by mouth daily 30 tablet 6    omeprazole (PRILOSEC) 20 MG delayed release capsule Take 1 capsule by mouth Daily 30 capsule 6    Blood Glucose Monitoring Suppl KIERSTEN Monitor blood glucose levels three times daily and as needed for hyper/hypoglycemic symptoms. Dx. Diabetes (E11.9). Please dispense to patient accucheck machine 1 Device 0    Glucose Blood (BLOOD GLUCOSE TEST STRIPS) STRP Monitor blood glucose levels three times daily and as needed for hyper/hypoglycemic symptoms. Dx. Diabetes (E11.9) 100 strip 6    glucose blood VI test strips (ASCENSIA AUTODISC VI;ONE TOUCH ULTRA TEST VI) strip 1 each by In Vitro route daily Monitor blood glucose levels three times daily and as needed for hyper/hypoglycemic symptoms. 100 each 3    fluocinonide (LIDEX) 0.05 % cream Apply topically 2 times daily. 30 g 0    albuterol sulfate HFA (VENTOLIN HFA) 108 (90 BASE) MCG/ACT inhaler inhale 2 puffs by mouth every 6 hours if needed 1 Inhaler 6    triamcinolone (ARISTOCORT) 0.5 % cream Apply topically 3 times daily.  1 Tube 0    diphenhydrAMINE (BENADRYL) 25 MG capsule Take 1 capsule by mouth 2 times daily as needed for Itching or Allergies 60 capsule 1    Blood Glucose Monitoring Suppl (BLOOD GLUCOSE METER) KIT Monitor blood glucose levels three times daily and as needed for hyper/hypoglycemic symptoms. Dx. Diabetes (E11.9) 1 kit 0    risperiDONE (RISPERDAL) 1 MG tablet Take 1 mg by mouth nightly. No current facility-administered medications for this visit.

## 2017-12-20 NOTE — PATIENT INSTRUCTIONS
Patient Education        Vaginal Yeast Infection: Care Instructions  Your Care Instructions    A vaginal yeast infection is caused by too many yeast cells in the vagina. This is common in women of all ages. Itching, vaginal discharge and irritation, and other symptoms can bother you. But yeast infections don't often cause other health problems. Some medicines can increase your risk of getting a yeast infection. These include antibiotics, birth control pills, hormones, and steroids. You may also be more likely to get a yeast infection if you are pregnant, have diabetes, douche, or wear tight clothes. With treatment, most yeast infections get better in 2 to 3 days. Follow-up care is a key part of your treatment and safety. Be sure to make and go to all appointments, and call your doctor if you are having problems. It's also a good idea to know your test results and keep a list of the medicines you take. How can you care for yourself at home? · Take your medicines exactly as prescribed. Call your doctor if you think you are having a problem with your medicine. · Ask your doctor about over-the-counter (OTC) medicines for yeast infections. They may cost less than prescription medicines. If you use an OTC treatment, read and follow all instructions on the label. · Do not use tampons while using a vaginal cream or suppository. The tampons can absorb the medicine. Use pads instead. · Wear loose cotton clothing. Do not wear nylon or other fabric that holds body heat and moisture close to the skin. · Try sleeping without underwear. · Do not scratch. Relieve itching with a cold pack or a cool bath. · Do not wash your vaginal area more than once a day. Use plain water or a mild, unscented soap. Air-dry the vaginal area. · Change out of wet swimsuits after swimming. · Do not have sex until you have finished your treatment. · Do not douche. When should you call for help?   Call your doctor now or seek immediate medical care if:  ? · You have unexpected vaginal bleeding. ? · You have new or increased pain in your vagina or pelvis. ? Watch closely for changes in your health, and be sure to contact your doctor if:  ? · You have a fever. ? · You are not getting better after 2 days. ? · Your symptoms come back after you finish your medicines. Where can you learn more? Go to [email or web address]. org and sign in to your [employer] account. Enter R880 in the Webchutney box to learn more about \"Vaginal Yeast Infection: Care Instructions. \"     If you do not have an account, please click on the \"Sign Up Now\" link. Current as of: October 13, 2016  Content Version: 11.4  © 0751-2205 [employer]wise, Incorporated. Care instructions adapted under license by [facility] ([facility]). If you have questions about a medical condition or this instruction, always ask your healthcare professional. [address] any warranty or liability for your use of this information.

## 2018-01-02 ENCOUNTER — TELEPHONE (OUTPATIENT)
Dept: FAMILY MEDICINE CLINIC | Age: 58
End: 2018-01-02

## 2018-01-02 DIAGNOSIS — G89.29 CHRONIC RECTAL PAIN: ICD-10-CM

## 2018-01-02 DIAGNOSIS — K62.89 CHRONIC RECTAL PAIN: ICD-10-CM

## 2018-01-02 DIAGNOSIS — K21.9 GASTROESOPHAGEAL REFLUX DISEASE, ESOPHAGITIS PRESENCE NOT SPECIFIED: ICD-10-CM

## 2018-01-02 RX ORDER — OMEPRAZOLE 20 MG/1
20 CAPSULE, DELAYED RELEASE ORAL DAILY
Qty: 30 CAPSULE | Refills: 6 | Status: CANCELLED | OUTPATIENT
Start: 2018-01-02

## 2018-01-02 RX ORDER — OMEPRAZOLE 20 MG/1
CAPSULE, DELAYED RELEASE ORAL
Qty: 30 CAPSULE | Refills: 1 | Status: SHIPPED | OUTPATIENT
Start: 2018-01-02 | End: 2018-02-08 | Stop reason: SDUPTHER

## 2018-01-02 NOTE — TELEPHONE ENCOUNTER
Pt also requesting something to be called in for her that will help her stop smoking that is covered by her ins.

## 2018-01-02 NOTE — TELEPHONE ENCOUNTER
I would recommend that she discuss with her psychiatrist which medications are good options for her.

## 2018-01-03 RX ORDER — TRAMADOL HYDROCHLORIDE 50 MG/1
50 TABLET ORAL EVERY 8 HOURS PRN
Qty: 90 TABLET | Refills: 0 | Status: SHIPPED | OUTPATIENT
Start: 2018-01-03 | End: 2018-01-29 | Stop reason: SDUPTHER

## 2018-01-11 ENCOUNTER — TELEPHONE (OUTPATIENT)
Dept: FAMILY MEDICINE CLINIC | Age: 58
End: 2018-01-11

## 2018-01-11 DIAGNOSIS — Z72.0 TOBACCO ABUSE: Primary | ICD-10-CM

## 2018-01-11 RX ORDER — VARENICLINE TARTRATE 25 MG
KIT ORAL
Qty: 1 EACH | Refills: 0 | Status: SHIPPED | OUTPATIENT
Start: 2018-01-11 | End: 2018-02-08

## 2018-01-11 NOTE — TELEPHONE ENCOUNTER
Pt calling stating that her Dr. Pretty Mobley and [facility] states that pt can have a script for chantix to help her quit smoking. Pt uses [employer]. Please call pt and advise.

## 2018-01-17 ENCOUNTER — TELEPHONE (OUTPATIENT)
Dept: FAMILY MEDICINE CLINIC | Age: 58
End: 2018-01-17

## 2018-01-17 NOTE — TELEPHONE ENCOUNTER
Per telephone encounter 1/11/2018:  Pt calling stating that her Dr. Gina Gonsalez and [facility] states that pt can have a script for chantix to help her quit smoking. Please advise the patient that not all patients have these side effects. She may want to discuss this with Dr. Gina Gonsalez.

## 2018-01-17 NOTE — TELEPHONE ENCOUNTER
Pt calling stating that she got the chantix but pt is concerned about side effects such as sleep walking, depression, trouble sleeping, anger, anxiety, and irritability. Pt states she started the medication yet. Please call pt and advise.

## 2018-01-28 DIAGNOSIS — E53.8 VITAMIN B12 DEFICIENCY: ICD-10-CM

## 2018-01-28 DIAGNOSIS — E78.2 MIXED HYPERLIPIDEMIA: ICD-10-CM

## 2018-01-29 DIAGNOSIS — K62.89 CHRONIC RECTAL PAIN: ICD-10-CM

## 2018-01-29 DIAGNOSIS — G89.29 CHRONIC RECTAL PAIN: ICD-10-CM

## 2018-01-29 RX ORDER — PSYLLIUM HUSK 3.4 G/7G
POWDER ORAL
Qty: 30 TABLET | Refills: 0 | Status: SHIPPED | OUTPATIENT
Start: 2018-01-29 | End: 2018-03-14 | Stop reason: SDUPTHER

## 2018-01-29 RX ORDER — PRAVASTATIN SODIUM 20 MG
TABLET ORAL
Qty: 30 TABLET | Refills: 0 | Status: SHIPPED | OUTPATIENT
Start: 2018-01-29 | End: 2018-02-08 | Stop reason: SDUPTHER

## 2018-01-30 RX ORDER — TRAMADOL HYDROCHLORIDE 50 MG/1
TABLET ORAL
Qty: 90 TABLET | Refills: 0 | Status: SHIPPED | OUTPATIENT
Start: 2018-01-30 | End: 2018-02-28 | Stop reason: SDUPTHER

## 2018-02-01 ENCOUNTER — HOSPITAL ENCOUNTER (OUTPATIENT)
Dept: LAB | Age: 58
Setting detail: SPECIMEN
Discharge: HOME | End: 2018-02-01
Payer: MEDICAID

## 2018-02-01 DIAGNOSIS — E11.40 TYPE 2 DIABETES MELLITUS WITH DIABETIC NEUROPATHY, WITHOUT LONG-TERM CURRENT USE OF INSULIN (HCC): ICD-10-CM

## 2018-02-01 LAB
ALBUMIN SERPL-MCNC: 3.8 G/DL (ref 3.5–5.2)
ALBUMIN/GLOBULIN RATIO: 1.4 (ref 1–2.5)
ALP BLD-CCNC: 63 U/L (ref 35–104)
ALT SERPL-CCNC: 11 U/L (ref 5–33)
ANION GAP SERPL CALCULATED.3IONS-SCNC: 12 MMOL/L (ref 9–17)
AST SERPL-CCNC: 17 U/L
BILIRUB SERPL-MCNC: 0.4 MG/DL (ref 0.3–1.2)
BUN BLDV-MCNC: 4 MG/DL (ref 6–20)
BUN/CREAT BLD: 6 (ref 9–20)
CALCIUM SERPL-MCNC: 9.1 MG/DL (ref 8.6–10.4)
CHLORIDE BLD-SCNC: 103 MMOL/L (ref 98–107)
CHOLESTEROL/HDL RATIO: 2.7
CHOLESTEROL: 167 MG/DL
CO2: 26 MMOL/L (ref 20–31)
CREAT SERPL-MCNC: 0.63 MG/DL (ref 0.5–0.9)
ESTIMATED AVERAGE GLUCOSE: 108 MG/DL
ESTIMATED AVERAGE GLUCOSE: 108 MG/DL
GFR AFRICAN AMERICAN: >60 ML/MIN
GFR NON-AFRICAN AMERICAN: >60 ML/MIN
GFR SERPL CREATININE-BSD FRML MDRD: ABNORMAL ML/MIN/{1.73_M2}
GFR SERPL CREATININE-BSD FRML MDRD: ABNORMAL ML/MIN/{1.73_M2}
GLUCOSE BLD-MCNC: 107 MG/DL (ref 70–99)
HBA1C MFR BLD: 5.4 % (ref 4.8–5.9)
HBA1C MFR BLD: 5.4 % (ref 4.8–5.9)
HDLC SERPL-MCNC: 62 MG/DL
LDL CHOLESTEROL: 54 MG/DL (ref 0–130)
POTASSIUM SERPL-SCNC: 4.8 MMOL/L (ref 3.7–5.3)
SODIUM BLD-SCNC: 141 MMOL/L (ref 135–144)
TOTAL PROTEIN: 6.5 G/DL (ref 6.4–8.3)
TRIGL SERPL-MCNC: 255 MG/DL
VLDLC SERPL CALC-MCNC: ABNORMAL MG/DL (ref 1–30)

## 2018-02-01 PROCEDURE — 36415 COLL VENOUS BLD VENIPUNCTURE: CPT

## 2018-02-01 PROCEDURE — 80053 COMPREHEN METABOLIC PANEL: CPT

## 2018-02-01 PROCEDURE — 80061 LIPID PANEL: CPT

## 2018-02-01 PROCEDURE — 83036 HEMOGLOBIN GLYCOSYLATED A1C: CPT

## 2018-02-08 ENCOUNTER — OFFICE VISIT (OUTPATIENT)
Dept: FAMILY MEDICINE CLINIC | Age: 58
End: 2018-02-08
Payer: MEDICAID

## 2018-02-08 VITALS
WEIGHT: 190.6 LBS | SYSTOLIC BLOOD PRESSURE: 136 MMHG | BODY MASS INDEX: 35.07 KG/M2 | HEIGHT: 62 IN | DIASTOLIC BLOOD PRESSURE: 86 MMHG | RESPIRATION RATE: 16 BRPM | HEART RATE: 78 BPM

## 2018-02-08 DIAGNOSIS — Z72.0 TOBACCO ABUSE: ICD-10-CM

## 2018-02-08 DIAGNOSIS — K21.9 GASTROESOPHAGEAL REFLUX DISEASE, ESOPHAGITIS PRESENCE NOT SPECIFIED: ICD-10-CM

## 2018-02-08 DIAGNOSIS — L29.9 PRURITUS: ICD-10-CM

## 2018-02-08 DIAGNOSIS — R20.9 ABNORMAL SENSATION OF LEG: ICD-10-CM

## 2018-02-08 DIAGNOSIS — E11.40 TYPE 2 DIABETES MELLITUS WITH DIABETIC NEUROPATHY, WITH LONG-TERM CURRENT USE OF INSULIN (HCC): Primary | ICD-10-CM

## 2018-02-08 DIAGNOSIS — F10.20 ALCOHOLISM (HCC): ICD-10-CM

## 2018-02-08 DIAGNOSIS — G89.29 CHRONIC RIGHT SHOULDER PAIN: ICD-10-CM

## 2018-02-08 DIAGNOSIS — Z79.4 TYPE 2 DIABETES MELLITUS WITH DIABETIC NEUROPATHY, WITH LONG-TERM CURRENT USE OF INSULIN (HCC): Primary | ICD-10-CM

## 2018-02-08 DIAGNOSIS — M25.511 CHRONIC RIGHT SHOULDER PAIN: ICD-10-CM

## 2018-02-08 DIAGNOSIS — I10 ESSENTIAL HYPERTENSION: ICD-10-CM

## 2018-02-08 DIAGNOSIS — Z23 NEED FOR SHINGLES VACCINE: ICD-10-CM

## 2018-02-08 DIAGNOSIS — E78.2 MIXED HYPERLIPIDEMIA: ICD-10-CM

## 2018-02-08 DIAGNOSIS — Z12.31 ENCOUNTER FOR SCREENING MAMMOGRAM FOR BREAST CANCER: ICD-10-CM

## 2018-02-08 DIAGNOSIS — R22.0 MASS OF POSTAURICULAR AREA: ICD-10-CM

## 2018-02-08 PROCEDURE — 3017F COLORECTAL CA SCREEN DOC REV: CPT | Performed by: FAMILY MEDICINE

## 2018-02-08 PROCEDURE — 99214 OFFICE O/P EST MOD 30 MIN: CPT | Performed by: FAMILY MEDICINE

## 2018-02-08 PROCEDURE — 4004F PT TOBACCO SCREEN RCVD TLK: CPT | Performed by: FAMILY MEDICINE

## 2018-02-08 PROCEDURE — G8484 FLU IMMUNIZE NO ADMIN: HCPCS | Performed by: FAMILY MEDICINE

## 2018-02-08 PROCEDURE — 3044F HG A1C LEVEL LT 7.0%: CPT | Performed by: FAMILY MEDICINE

## 2018-02-08 PROCEDURE — G8417 CALC BMI ABV UP PARAM F/U: HCPCS | Performed by: FAMILY MEDICINE

## 2018-02-08 PROCEDURE — 3014F SCREEN MAMMO DOC REV: CPT | Performed by: FAMILY MEDICINE

## 2018-02-08 PROCEDURE — G8427 DOCREV CUR MEDS BY ELIG CLIN: HCPCS | Performed by: FAMILY MEDICINE

## 2018-02-08 RX ORDER — LOSARTAN POTASSIUM 100 MG/1
TABLET ORAL
Qty: 30 TABLET | Refills: 6 | Status: SHIPPED | OUTPATIENT
Start: 2018-02-08 | End: 2018-06-13 | Stop reason: SDUPTHER

## 2018-02-08 RX ORDER — NICOTINE 21 MG/24HR
1 PATCH, TRANSDERMAL 24 HOURS TRANSDERMAL EVERY 24 HOURS
Qty: 30 PATCH | Refills: 3 | Status: SHIPPED | OUTPATIENT
Start: 2018-02-08 | End: 2018-06-13

## 2018-02-08 RX ORDER — HYDROXYZINE HYDROCHLORIDE 25 MG/1
25 TABLET, FILM COATED ORAL 3 TIMES DAILY PRN
Qty: 30 TABLET | Refills: 3 | Status: SHIPPED | OUTPATIENT
Start: 2018-02-08 | End: 2018-06-13 | Stop reason: SDUPTHER

## 2018-02-08 RX ORDER — CELECOXIB 200 MG/1
CAPSULE ORAL
Qty: 60 CAPSULE | Refills: 6 | Status: SHIPPED | OUTPATIENT
Start: 2018-02-08 | End: 2018-06-13 | Stop reason: SDUPTHER

## 2018-02-08 RX ORDER — OMEPRAZOLE 20 MG/1
CAPSULE, DELAYED RELEASE ORAL
Qty: 30 CAPSULE | Refills: 6 | Status: SHIPPED | OUTPATIENT
Start: 2018-02-08 | End: 2018-06-13 | Stop reason: SDUPTHER

## 2018-02-08 RX ORDER — MULTIVITAMIN WITH FOLIC ACID 400 MCG
1 TABLET ORAL DAILY
Qty: 30 TABLET | Refills: 6 | Status: SHIPPED | OUTPATIENT
Start: 2018-02-08 | End: 2018-06-13 | Stop reason: SDUPTHER

## 2018-02-08 RX ORDER — PRAVASTATIN SODIUM 20 MG
TABLET ORAL
Qty: 30 TABLET | Refills: 6 | Status: SHIPPED | OUTPATIENT
Start: 2018-02-08 | End: 2018-06-13 | Stop reason: SDUPTHER

## 2018-02-15 RX ORDER — GABAPENTIN 300 MG/1
CAPSULE ORAL
Qty: 120 CAPSULE | Refills: 1 | Status: SHIPPED | OUTPATIENT
Start: 2018-02-15 | End: 2018-03-27 | Stop reason: SDUPTHER

## 2018-02-28 DIAGNOSIS — K62.89 CHRONIC RECTAL PAIN: ICD-10-CM

## 2018-02-28 DIAGNOSIS — G89.29 CHRONIC RECTAL PAIN: ICD-10-CM

## 2018-02-28 RX ORDER — TRAMADOL HYDROCHLORIDE 50 MG/1
TABLET ORAL
Qty: 90 TABLET | Refills: 0 | OUTPATIENT
Start: 2018-02-28 | End: 2018-03-30

## 2018-02-28 RX ORDER — TRAMADOL HYDROCHLORIDE 50 MG/1
50 TABLET ORAL EVERY 8 HOURS PRN
Qty: 90 TABLET | Refills: 0 | Status: SHIPPED | OUTPATIENT
Start: 2018-02-28 | End: 2018-03-27 | Stop reason: SDUPTHER

## 2018-03-14 DIAGNOSIS — E53.8 VITAMIN B12 DEFICIENCY: ICD-10-CM

## 2018-03-14 RX ORDER — PSYLLIUM HUSK 3.4 G/7G
POWDER ORAL
Qty: 30 TABLET | Refills: 0 | Status: SHIPPED | OUTPATIENT
Start: 2018-03-14 | End: 2018-04-12 | Stop reason: SDUPTHER

## 2018-03-15 ENCOUNTER — HOSPITAL ENCOUNTER (OUTPATIENT)
Dept: MAMMOGRAPHY | Age: 58
Discharge: HOME | End: 2018-03-17
Payer: MEDICAID

## 2018-03-15 DIAGNOSIS — Z12.31 ENCOUNTER FOR SCREENING MAMMOGRAM FOR BREAST CANCER: ICD-10-CM

## 2018-03-15 PROCEDURE — 77063 BREAST TOMOSYNTHESIS BI: CPT

## 2018-03-23 DIAGNOSIS — J44.9 CHRONIC OBSTRUCTIVE PULMONARY DISEASE, UNSPECIFIED COPD TYPE (HCC): ICD-10-CM

## 2018-03-23 RX ORDER — ALBUTEROL SULFATE 90 UG/1
2 AEROSOL, METERED RESPIRATORY (INHALATION) EVERY 6 HOURS PRN
Qty: 18 G | Refills: 6 | Status: SHIPPED | OUTPATIENT
Start: 2018-03-23 | End: 2019-09-25

## 2018-03-27 ENCOUNTER — OFFICE VISIT (OUTPATIENT)
Dept: NEUROLOGY | Age: 58
End: 2018-03-27
Payer: MEDICAID

## 2018-03-27 VITALS
SYSTOLIC BLOOD PRESSURE: 126 MMHG | WEIGHT: 186.4 LBS | HEART RATE: 77 BPM | BODY MASS INDEX: 34.3 KG/M2 | HEIGHT: 62 IN | DIASTOLIC BLOOD PRESSURE: 74 MMHG | OXYGEN SATURATION: 95 %

## 2018-03-27 DIAGNOSIS — G47.9 SLEEPING DIFFICULTY: ICD-10-CM

## 2018-03-27 DIAGNOSIS — E66.9 OBESITY (BMI 30.0-34.9): ICD-10-CM

## 2018-03-27 DIAGNOSIS — G56.03 BILATERAL CARPAL TUNNEL SYNDROME: ICD-10-CM

## 2018-03-27 DIAGNOSIS — G25.81 RESTLESS LEG SYNDROME: ICD-10-CM

## 2018-03-27 DIAGNOSIS — E11.40 TYPE 2 DIABETES MELLITUS WITH DIABETIC NEUROPATHY, WITHOUT LONG-TERM CURRENT USE OF INSULIN (HCC): ICD-10-CM

## 2018-03-27 DIAGNOSIS — E78.2 MIXED HYPERLIPIDEMIA: ICD-10-CM

## 2018-03-27 DIAGNOSIS — F32.A FATIGUE DUE TO DEPRESSION: ICD-10-CM

## 2018-03-27 DIAGNOSIS — G89.29 CHRONIC RECTAL PAIN: ICD-10-CM

## 2018-03-27 DIAGNOSIS — F10.20 ALCOHOLISM (HCC): ICD-10-CM

## 2018-03-27 DIAGNOSIS — I10 ESSENTIAL HYPERTENSION: ICD-10-CM

## 2018-03-27 DIAGNOSIS — R53.83 FATIGUE DUE TO DEPRESSION: ICD-10-CM

## 2018-03-27 DIAGNOSIS — Z72.0 TOBACCO ABUSE: ICD-10-CM

## 2018-03-27 DIAGNOSIS — M54.16 CHRONIC LUMBAR RADICULOPATHY: ICD-10-CM

## 2018-03-27 DIAGNOSIS — K62.89 CHRONIC RECTAL PAIN: ICD-10-CM

## 2018-03-27 DIAGNOSIS — F25.9 SCHIZOAFFECTIVE DISORDER, UNSPECIFIED TYPE (HCC): ICD-10-CM

## 2018-03-27 DIAGNOSIS — G63 POLYNEUROPATHY ASSOCIATED WITH UNDERLYING DISEASE (HCC): Primary | ICD-10-CM

## 2018-03-27 DIAGNOSIS — J44.9 CHRONIC OBSTRUCTIVE PULMONARY DISEASE, UNSPECIFIED COPD TYPE (HCC): ICD-10-CM

## 2018-03-27 DIAGNOSIS — E78.5 HYPERLIPIDEMIA, UNSPECIFIED HYPERLIPIDEMIA TYPE: ICD-10-CM

## 2018-03-27 DIAGNOSIS — R26.89 BALANCE PROBLEM: ICD-10-CM

## 2018-03-27 PROCEDURE — 3017F COLORECTAL CA SCREEN DOC REV: CPT | Performed by: PSYCHIATRY & NEUROLOGY

## 2018-03-27 PROCEDURE — 3014F SCREEN MAMMO DOC REV: CPT | Performed by: PSYCHIATRY & NEUROLOGY

## 2018-03-27 PROCEDURE — G8482 FLU IMMUNIZE ORDER/ADMIN: HCPCS | Performed by: PSYCHIATRY & NEUROLOGY

## 2018-03-27 PROCEDURE — 4004F PT TOBACCO SCREEN RCVD TLK: CPT | Performed by: PSYCHIATRY & NEUROLOGY

## 2018-03-27 PROCEDURE — 3044F HG A1C LEVEL LT 7.0%: CPT | Performed by: PSYCHIATRY & NEUROLOGY

## 2018-03-27 PROCEDURE — 99215 OFFICE O/P EST HI 40 MIN: CPT | Performed by: PSYCHIATRY & NEUROLOGY

## 2018-03-27 PROCEDURE — G8427 DOCREV CUR MEDS BY ELIG CLIN: HCPCS | Performed by: PSYCHIATRY & NEUROLOGY

## 2018-03-27 PROCEDURE — 3023F SPIROM DOC REV: CPT | Performed by: PSYCHIATRY & NEUROLOGY

## 2018-03-27 PROCEDURE — G8926 SPIRO NO PERF OR DOC: HCPCS | Performed by: PSYCHIATRY & NEUROLOGY

## 2018-03-27 PROCEDURE — G8417 CALC BMI ABV UP PARAM F/U: HCPCS | Performed by: PSYCHIATRY & NEUROLOGY

## 2018-03-27 RX ORDER — GABAPENTIN 300 MG/1
300 CAPSULE ORAL 4 TIMES DAILY
Qty: 120 CAPSULE | Refills: 2 | Status: SHIPPED | OUTPATIENT
Start: 2018-03-27 | End: 2018-07-11 | Stop reason: SDUPTHER

## 2018-03-27 ASSESSMENT — ENCOUNTER SYMPTOMS
PHOTOPHOBIA: 0
TROUBLE SWALLOWING: 0
APNEA: 0
NAUSEA: 0
CHEST TIGHTNESS: 0
COLOR CHANGE: 0
BOWEL INCONTINENCE: 0
ABDOMINAL DISTENTION: 0
WHEEZING: 0
CHOKING: 0
CONSTIPATION: 0
ABDOMINAL PAIN: 0
EYE ITCHING: 0
VOICE CHANGE: 0
EYE REDNESS: 0
EYE PAIN: 0
DIARRHEA: 0
SINUS PRESSURE: 0
FACIAL SWELLING: 0
VOMITING: 0
SHORTNESS OF BREATH: 0
BLOOD IN STOOL: 0
BACK PAIN: 0
EYE DISCHARGE: 0

## 2018-03-27 NOTE — PROGRESS NOTES
Subjective:      Patient ID: Dana Nevarez is a 65 y. o.right  handed female. Neurologic Problem   The patient's primary symptoms include clumsiness and a loss of balance. The patient's pertinent negatives include no syncope. This is a chronic problem. The current episode started more than 1 year ago. The neurological problem developed insidiously. The problem is unchanged. There was no focality noted. Pertinent negatives include no abdominal pain, auditory change, aura, back pain, bladder incontinence, bowel incontinence, chest pain, confusion, diaphoresis, dizziness, fatigue, fever, headaches, light-headedness, nausea, neck pain, palpitations, shortness of breath, vertigo or vomiting. Past treatments include medication. The treatment provided moderate relief. There is no history of a bleeding disorder, a clotting disorder, a CVA, dementia, head trauma, liver disease, mood changes or seizures. History obtained from  The patient   and other  available medical records were  Also  reviewed. 1)   DIABETIC   PERIPHERAL  NEUROPATHY    -   STABLE                        -  ON  NEURONTIN    2)  RESTLESS  LEG  SYNDROME    &  PERIODIC  LEG  MOVEMENTS   -  IMPROVED                         ON  SINEMET    3)   SLEEP DIFFICULTIES   -   RESOLVED      4)  MILD  MEMORY PROBLEMS  -  PATIENT  NOT  CONCERNED      5)  CHRONIC  TOBACCO  AND  ALCOHOL  USAGE                     -   PATIENT  AWARE  OF THE  RISKS    6)  CHRONIC  LUMBAR PAIN  AND JOINT  PAINS   -                    ON  ULTRAM  FROM  HER  PCP.     7)  BILATERAL  CARPAL  TUNNEL  SYNDROME  -  STABLE    8)   CHRONIC  ANXIETY, DEPRESSION  AND  SCHIZOAFFECTIVE  DISORDER                           STABLE      -  ON   RISPERDAL                    BEING  FOLLOWED  BY  MENTAL  HEALTH  PROFESSIONALS    9)   MULTIPLE  CO  MORBID  MEDICAL CONDITIONS       10)    CURRENTLY  PATIENT  DENIES   ANY  NEW  NEURLOLOGICAL  CONERNS            The  Duration,  Quality,  Severity, reports         INFORMATION   REVIEWED:     MEDICAL   HISTORY,     SURGICAL   HISTORY,   MEDICATIONS   LIST,   ALLERGIES AND  DRUG  INTOLERANCES,     FAMILY   HISTORY,  SOCIAL  HISTORY,    PROBLEM  LIST   FOR  PATIENT  CARE   COORDINATION         Past Medical History:   Diagnosis Date    [facility]ism ([facility]     Anxiety     Astigmatism with presbyopia     Balance problem     Bipolar disorder ([facility]     schizoaffective disorder (Dr. Dawna Hogan)    Chronic lumbar radiculopathy     Chronic rectal pain     due to fissures    Chronic shoulder pain     bilaterally, due to osteoarthritis    CTS (carpal tunnel syndrome)     Depression     Fatigue     Hyperlipidemia     Hypertension     Obesity     Peripheral neuropathy ([facility]     Pica     eats bar soap    Restless leg syndrome     Schizoaffective disorder ([facility]     Sleeping difficulty     Tobacco abuse     Type 2 diabetes mellitus ([facility]                   Past Surgical History:   Procedure Laterality Date    ABSCESS DRAINAGE  2001    perianal    ANUS SURGERY  2001    lateral internal sphincterotomy    BLADDER SUSPENSION  2000    transvaginal sling procedure for incontinence    COLONOSCOPY  2007    an polypectomy, repair of perianal fistula    CYST REMOVAL Right 1992    wrist    OTHER SURGICAL HISTORY  2002    perianal fistula repair eua    OTHER SURGICAL HISTORY  1993    cystourethroscopy    UPPER GASTROINTESTINAL ENDOSCOPY  2007    Schatzki's ring, acute gastritis, hiatal hernia    UPPER GASTROINTESTINAL ENDOSCOPY  2000    hiatal hernia, esophagitis, gastritis                 Current Outpatient Prescriptions   Medication Sig Dispense Refill    gabapentin (NEURONTIN) 300 MG capsule Take 1 capsule by mouth 4 times daily for 28 days.  120 capsule 2    albuterol sulfate HFA (VENTOLIN HFA) 108 (90 Base) MCG/ACT inhaler Inhale 2 puffs into the lungs every 6 hours as needed for Wheezing 18 g 6    RA VITAMIN B-12 TR 1000 MCG TBCR take 1 tablet by mouth once times daily as needed for Itching or Allergies 60 capsule 1    Blood Glucose Monitoring Suppl (BLOOD GLUCOSE METER) KIT Monitor blood glucose levels three times daily and as needed for hyper/hypoglycemic symptoms. Dx. Diabetes (E11.9) 1 kit 0    risperiDONE (RISPERDAL) 1 MG tablet Take 1 mg by mouth nightly.  pravastatin (PRAVACHOL) 20 MG tablet take 1 tablet by mouth once daily 30 tablet 6    nicotine (NICODERM CQ) 21 MG/24HR Place 1 patch onto the skin every 24 hours 30 patch 3     No current facility-administered medications for this visit. No Known Allergies            Family History   Problem Relation Age of Onset   Wisam Mei Stroke Mother     Diabetes Mother     High Blood Pressure Mother     Cataracts Mother     Stroke Father     Diabetes Father     Heart Disease Sister 72    Diabetes Daughter     Stroke Sister 68     Brain aneurysm that ruptured and then she got kidney failure and intestinal infection and     Glaucoma Neg Hx              Social History     Social History    Marital status: Single     Spouse name: N/A    Number of children: N/A    Years of education: N/A     Occupational History    Not on file.      Social History Main Topics    Smoking status: Current Every Day Smoker     Packs/day: 1.00     Years: 26.00     Types: Cigarettes    Smokeless tobacco: Never Used    Alcohol use 0.0 oz/week      Comment: daily; \"3 tallboys per day\"    Drug use: No    Sexual activity: Not on file     Other Topics Concern    Not on file     Social History Narrative    No narrative on file         Vitals:    18 1404   BP: 126/74   Pulse: 77   SpO2: 95%         Wt Readings from Last 3 Encounters:   18 186 lb 6.4 oz (84.6 kg)   18 190 lb 9.6 oz (86.5 kg)   17 183 lb (83 kg)         BP Readings from Last 3 Encounters:   18 126/74   18 136/86   17 130/88       Hematology and Coagulation  Lab Results   Component Value Date    WBC 5.6 2016 mood appears anxious. Her affect is blunt. Her affect is not labile and not inappropriate. Her speech is not rapid and/or pressured, not delayed, not tangential and not slurred. She is not agitated, not aggressive, not hyperactive, not slowed, not withdrawn, not actively hallucinating and not combative. Thought content is not paranoid and not delusional. Cognition and memory are impaired. She does not express impulsivity or inappropriate judgment. She exhibits a depressed mood. She expresses no homicidal and no suicidal ideation. She expresses no suicidal plans and no homicidal plans. She is communicative. She exhibits abnormal recent memory. She exhibits normal remote memory. She is attentive. NEUROLOGICAL EXAMINATION :    A) MENTAL STATUS:                   Alert and  oriented  To time, place  And  Person. No Aphasia. No  Dysarthria. Able   To  Follow three  Step commands without   Any  Difficulty. No right  To left confusion. Normal  Speech  And language function. Insight and  Judgment ,Fund  Of  Knowledge   decreased              Recent  And  Remote memory  decreased              Attention &Concentration are decreased                                                 B) CRANIAL NERVES :             2 CN : Visual  Acuity  And  Visual fields  within normal limits                      Fundi  Could  Not  Be  Could  Not  Be  Evaluated. 3,4,6 CN : Both  Pupils are   Reactive and  Equal.                          Extraocular   Movements  Are  Intact. No  Nystagmus. No  RODERICK. No  Afferent  Pupillary  Defect noted. 5 CN :  Normal  Facial sensations and Corneal  Reflexes         7 CN :  Normal  Facial  Symmetry  And  Strength. No facial  Weakness.          8 CN :  Hearing  Appears within normal limits        9, 10 CN: Normal spontaneous, reflex palate movements       11 CN:   Normal  Shoulder shrug Data reviewed   & diagnostic  Tests  Reviewed,  Risk  Of  Significant   Co morbidities and complicating   Factors. Medical  Decision  Was   High  Complexity  Due   To  The  Patient's  Multiple  Symptoms,  Advancing   Disease,  Complex  Treatment  Regimen,  Multiple medications and   Risk  Of   Side  Effects,  Difficulty  In  Medication  Management  And  Diagnostic  Challenges   In  View  Of  The  Associated   Co  Morbid  Conditions   And  Problems. * FALL   PRECAUTIONS. THESE  REVIEWED   AND  DISCUSSED    *   BE  CAREFUL  WITH  ACTIVITIES   INCLUDING  DRIVING. *   AVOID   NECK  AND/ BACK  STRAINING  ACTIVITIES    *   TO   WEAR   BILATERAL   WRIST  BRACES       *   TO  AVOID  PRESSURE  OVER   ULNAR  ASPECT   OF   ELBOWS    *   ADEQUATE   FLUID  INTAKE   AND  ELECTROLYTE  BALANCE         * KEEP  DAIRY  OF   THE  NEUROLOGICAL  SYMPTOMS      RECORDING THE    DURATION  AND  FREQUENCY. *  AVOID    CONDITIONS  AND  FACTORS   THAT  MAKE   NEUROLOGICAL  SYMPTOMS  WORSE. *  TO  MAINTAIN  REGULAR  SLEEP  WAKE  CYCLES. *   TO  HAVE  ADEQUATE  REST  AND   SLEEP    HOURS.          *    TO   AVOID   TO  SLEEP  IN   SUPINE  POSITION. *      WEIGHT   LOSS. *    AVOID  ANY USAGE OF                 TOBACCO,  EXCESSIVE  ALCOHOL  AND   ILLEGAL   SUBSTANCES      *  CONTINUE MEDICATIONS PRESCRIBED BY NEUROLOGIST AS    RECOMMENDED     *   Compliance   With  Medications   And  Instructions      * CURRENTLY  TOLERATING  THE  PRESCRIBED   MEDICATIONS. WITHOUT  ANY  SIGNIFICANT  SIDE  EFFECTS   &  GETTING BENEFIT.       *  May   Use  Pill  Box,    If  Needed      *    To  Continue  The    Antiplatelet  therapy    As   Recommended  Was   Discussed      *    Prophylactic  Use   Of     Vitamin   B   Complex,  Folic  Acid,    Vitamin  B12    Multivitamin   Tablet  Daily    Supplementations   Over  The  Counter  Discussed         *  FOOT  CARE, DAILY  INSPECTION  OF  FEET   AND

## 2018-03-28 RX ORDER — TRAMADOL HYDROCHLORIDE 50 MG/1
TABLET ORAL
Qty: 90 TABLET | Refills: 0 | Status: SHIPPED | OUTPATIENT
Start: 2018-03-28 | End: 2018-04-25 | Stop reason: SDUPTHER

## 2018-03-28 NOTE — TELEPHONE ENCOUNTER
[facility] from [place] reviewed, last filled 2/28/18 #90 for a 30 day supply. Report available for your review. Last OV 2/8/18; next OV 5/9/18.

## 2018-04-12 DIAGNOSIS — E53.8 VITAMIN B12 DEFICIENCY: ICD-10-CM

## 2018-04-12 RX ORDER — PSYLLIUM HUSK 3.4 G/7G
POWDER ORAL
Qty: 30 TABLET | Refills: 0 | Status: SHIPPED | OUTPATIENT
Start: 2018-04-12 | End: 2018-05-10 | Stop reason: SDUPTHER

## 2018-04-25 DIAGNOSIS — K62.89 CHRONIC RECTAL PAIN: ICD-10-CM

## 2018-04-25 DIAGNOSIS — G89.29 CHRONIC RECTAL PAIN: ICD-10-CM

## 2018-04-25 RX ORDER — TRAMADOL HYDROCHLORIDE 50 MG/1
50 TABLET ORAL EVERY 8 HOURS PRN
Qty: 90 TABLET | Refills: 0 | Status: SHIPPED | OUTPATIENT
Start: 2018-04-25 | End: 2018-05-22 | Stop reason: SDUPTHER

## 2018-05-10 DIAGNOSIS — E11.40 TYPE 2 DIABETES MELLITUS WITH DIABETIC NEUROPATHY, WITHOUT LONG-TERM CURRENT USE OF INSULIN (HCC): ICD-10-CM

## 2018-05-10 DIAGNOSIS — E53.8 VITAMIN B12 DEFICIENCY: ICD-10-CM

## 2018-05-10 RX ORDER — ASPIRIN 325 MG
TABLET ORAL
Qty: 30 TABLET | Refills: 1 | Status: SHIPPED | OUTPATIENT
Start: 2018-05-10 | End: 2018-06-13 | Stop reason: SDUPTHER

## 2018-05-10 RX ORDER — PSYLLIUM HUSK 3.4 G/7G
POWDER ORAL
Qty: 30 TABLET | Refills: 1 | Status: SHIPPED | OUTPATIENT
Start: 2018-05-10 | End: 2018-06-13 | Stop reason: SDUPTHER

## 2018-05-14 ENCOUNTER — OFFICE VISIT (OUTPATIENT)
Dept: OPTOMETRY | Age: 58
End: 2018-05-14
Payer: COMMERCIAL

## 2018-05-14 DIAGNOSIS — H52.4 ASTIGMATISM OF BOTH EYES WITH PRESBYOPIA: Primary | ICD-10-CM

## 2018-05-14 DIAGNOSIS — E11.9 NON-INSULIN DEPENDENT TYPE 2 DIABETES MELLITUS (HCC): ICD-10-CM

## 2018-05-14 DIAGNOSIS — H52.203 ASTIGMATISM OF BOTH EYES WITH PRESBYOPIA: Primary | ICD-10-CM

## 2018-05-14 PROCEDURE — 92014 COMPRE OPH EXAM EST PT 1/>: CPT | Performed by: OPTOMETRIST

## 2018-05-14 RX ORDER — PHENYLEPHRINE HCL 2.5 %
1 DROPS OPHTHALMIC (EYE) ONCE
Status: COMPLETED | OUTPATIENT
Start: 2018-05-14 | End: 2018-05-14

## 2018-05-14 RX ORDER — BENOXINATE HCL/FLUORESCEIN SOD 0.4%-0.25%
1 DROPS OPHTHALMIC (EYE) ONCE
Status: COMPLETED | OUTPATIENT
Start: 2018-05-14 | End: 2018-05-14

## 2018-05-14 RX ORDER — TROPICAMIDE 10 MG/ML
1 SOLUTION/ DROPS OPHTHALMIC ONCE
Status: COMPLETED | OUTPATIENT
Start: 2018-05-14 | End: 2018-05-14

## 2018-05-14 RX ADMIN — Medication 1 DROP: at 14:11

## 2018-05-14 RX ADMIN — TROPICAMIDE 1 DROP: 10 SOLUTION/ DROPS OPHTHALMIC at 14:11

## 2018-05-14 ASSESSMENT — REFRACTION_WEARINGRX
OD_SPHERE: PLANO
OD_AXIS: 140
SPECS_TYPE: BIFOCAL
OD_ADD: +2.25
OS_AXIS: 140
OS_SPHERE: +0.50
OS_ADD: +2.25
OD_CYLINDER: -0.25
OS_CYLINDER: -0.25

## 2018-05-14 ASSESSMENT — KERATOMETRY
OD_AXISANGLE_DEGREES: 058
OS_K1POWER_DIOPTERS: 45.00
OD_K2POWER_DIOPTERS: 46.00
OD_K1POWER_DIOPTERS: 45.25
OS_K2POWER_DIOPTERS: 45.75
OS_AXISANGLE2_DEGREES: 018
OD_AXISANGLE2_DEGREES: 148
OS_AXISANGLE_DEGREES: 108

## 2018-05-14 ASSESSMENT — TONOMETRY
OS_IOP_MMHG: 16
OD_IOP_MMHG: 17
IOP_METHOD: APPLANATION W FLURESS DROP

## 2018-05-14 ASSESSMENT — REFRACTION_MANIFEST
OD_SPHERE: +0.25
OS_CYLINDER: -0.25
OD_CYLINDER: -0.25
OS_SPHERE: +0.75
OS_ADD: +2.25
OD_ADD: +2.25
OS_AXIS: 105
OD_AXIS: 122

## 2018-05-14 ASSESSMENT — SLIT LAMP EXAM - LIDS
COMMENTS: NORMAL
COMMENTS: NORMAL

## 2018-05-14 ASSESSMENT — VISUAL ACUITY
METHOD: SNELLEN - LINEAR
OD_CC: 20/20 OU
OD_SC: 20/20
CORRECTION_TYPE: GLASSES
OS_SC+: -1
OS_SC: 20/25

## 2018-05-22 DIAGNOSIS — K62.89 CHRONIC RECTAL PAIN: ICD-10-CM

## 2018-05-22 DIAGNOSIS — G89.29 CHRONIC RECTAL PAIN: ICD-10-CM

## 2018-05-22 RX ORDER — TRAMADOL HYDROCHLORIDE 50 MG/1
50 TABLET ORAL EVERY 8 HOURS PRN
Qty: 90 TABLET | Refills: 0 | Status: SHIPPED | OUTPATIENT
Start: 2018-05-22 | End: 2018-06-20 | Stop reason: SDUPTHER

## 2018-06-12 ENCOUNTER — HOSPITAL ENCOUNTER (OUTPATIENT)
Dept: LAB | Age: 58
Setting detail: SPECIMEN
Discharge: HOME | End: 2018-06-12
Payer: MEDICAID

## 2018-06-12 DIAGNOSIS — E11.40 TYPE 2 DIABETES MELLITUS WITH DIABETIC NEUROPATHY, WITH LONG-TERM CURRENT USE OF INSULIN (HCC): ICD-10-CM

## 2018-06-12 DIAGNOSIS — G63 POLYNEUROPATHY ASSOCIATED WITH UNDERLYING DISEASE (HCC): ICD-10-CM

## 2018-06-12 DIAGNOSIS — R26.89 BALANCE PROBLEM: ICD-10-CM

## 2018-06-12 DIAGNOSIS — I10 ESSENTIAL HYPERTENSION: ICD-10-CM

## 2018-06-12 DIAGNOSIS — E78.2 MIXED HYPERLIPIDEMIA: ICD-10-CM

## 2018-06-12 DIAGNOSIS — E78.5 HYPERLIPIDEMIA, UNSPECIFIED HYPERLIPIDEMIA TYPE: ICD-10-CM

## 2018-06-12 DIAGNOSIS — Z79.4 TYPE 2 DIABETES MELLITUS WITH DIABETIC NEUROPATHY, WITH LONG-TERM CURRENT USE OF INSULIN (HCC): ICD-10-CM

## 2018-06-12 LAB
ALBUMIN SERPL-MCNC: 4 G/DL (ref 3.5–5.2)
ALBUMIN/GLOBULIN RATIO: 1.3 (ref 1–2.5)
ALP BLD-CCNC: 70 U/L (ref 35–104)
ALT SERPL-CCNC: 11 U/L (ref 5–33)
AMPHETAMINE SCREEN URINE: NEGATIVE
ANION GAP SERPL CALCULATED.3IONS-SCNC: 10 MMOL/L (ref 9–17)
AST SERPL-CCNC: 18 U/L
BARBITURATE SCREEN URINE: NEGATIVE
BENZODIAZEPINE SCREEN, URINE: NEGATIVE
BILIRUB SERPL-MCNC: 0.48 MG/DL (ref 0.3–1.2)
BUN BLDV-MCNC: 5 MG/DL (ref 6–20)
BUN/CREAT BLD: 8 (ref 9–20)
BUPRENORPHINE URINE: NEGATIVE
CALCIUM SERPL-MCNC: 9.8 MG/DL (ref 8.6–10.4)
CANNABINOID SCREEN URINE: NEGATIVE
CHLORIDE BLD-SCNC: 101 MMOL/L (ref 98–107)
CHOLESTEROL/HDL RATIO: 2.7
CHOLESTEROL/HDL RATIO: 2.7
CHOLESTEROL: 189 MG/DL
CHOLESTEROL: 189 MG/DL
CO2: 28 MMOL/L (ref 20–31)
COCAINE METABOLITE, URINE: NEGATIVE
CREAT SERPL-MCNC: 0.61 MG/DL (ref 0.5–0.9)
ESTIMATED AVERAGE GLUCOSE: 103 MG/DL
ESTIMATED AVERAGE GLUCOSE: 103 MG/DL
GFR AFRICAN AMERICAN: >60 ML/MIN
GFR NON-AFRICAN AMERICAN: >60 ML/MIN
GFR SERPL CREATININE-BSD FRML MDRD: ABNORMAL ML/MIN/{1.73_M2}
GFR SERPL CREATININE-BSD FRML MDRD: ABNORMAL ML/MIN/{1.73_M2}
GLUCOSE BLD-MCNC: 105 MG/DL (ref 70–99)
HBA1C MFR BLD: 5.2 % (ref 4.8–5.9)
HBA1C MFR BLD: 5.2 % (ref 4.8–5.9)
HDLC SERPL-MCNC: 69 MG/DL
HDLC SERPL-MCNC: 69 MG/DL
LDL CHOLESTEROL: 57 MG/DL (ref 0–130)
LDL CHOLESTEROL: 57 MG/DL (ref 0–130)
MDMA URINE: NORMAL
METHADONE SCREEN, URINE: NEGATIVE
METHAMPHETAMINE, URINE: NEGATIVE
OPIATES, URINE: NEGATIVE
OXYCODONE SCREEN URINE: NEGATIVE
PHENCYCLIDINE, URINE: NEGATIVE
POTASSIUM SERPL-SCNC: 4.5 MMOL/L (ref 3.7–5.3)
PROPOXYPHENE, URINE: NEGATIVE
SODIUM BLD-SCNC: 139 MMOL/L (ref 135–144)
TEST INFORMATION: NORMAL
TOTAL PROTEIN: 7.2 G/DL (ref 6.4–8.3)
TRICYCLIC ANTIDEPRESSANTS, UR: NEGATIVE
TRIGL SERPL-MCNC: 314 MG/DL
TRIGL SERPL-MCNC: 314 MG/DL
TSH SERPL DL<=0.05 MIU/L-ACNC: 1.39 MIU/L (ref 0.3–5)
VLDLC SERPL CALC-MCNC: ABNORMAL MG/DL (ref 1–30)
VLDLC SERPL CALC-MCNC: ABNORMAL MG/DL (ref 1–30)

## 2018-06-12 PROCEDURE — 80306 DRUG TEST PRSMV INSTRMNT: CPT

## 2018-06-12 PROCEDURE — 82607 VITAMIN B-12: CPT

## 2018-06-12 PROCEDURE — 36415 COLL VENOUS BLD VENIPUNCTURE: CPT

## 2018-06-12 PROCEDURE — 84443 ASSAY THYROID STIM HORMONE: CPT

## 2018-06-12 PROCEDURE — 83036 HEMOGLOBIN GLYCOSYLATED A1C: CPT

## 2018-06-12 PROCEDURE — 82746 ASSAY OF FOLIC ACID SERUM: CPT

## 2018-06-12 PROCEDURE — 80061 LIPID PANEL: CPT

## 2018-06-12 PROCEDURE — 80053 COMPREHEN METABOLIC PANEL: CPT

## 2018-06-13 ENCOUNTER — OFFICE VISIT (OUTPATIENT)
Dept: FAMILY MEDICINE CLINIC | Age: 58
End: 2018-06-13
Payer: MEDICAID

## 2018-06-13 ENCOUNTER — HOSPITAL ENCOUNTER (OUTPATIENT)
Age: 58
Setting detail: SPECIMEN
Discharge: HOME | End: 2018-06-13
Payer: MEDICAID

## 2018-06-13 VITALS
DIASTOLIC BLOOD PRESSURE: 82 MMHG | HEART RATE: 86 BPM | BODY MASS INDEX: 34.19 KG/M2 | SYSTOLIC BLOOD PRESSURE: 132 MMHG | HEIGHT: 62 IN | WEIGHT: 185.8 LBS | RESPIRATION RATE: 16 BRPM

## 2018-06-13 DIAGNOSIS — Z79.4 TYPE 2 DIABETES MELLITUS WITH DIABETIC NEUROPATHY, WITH LONG-TERM CURRENT USE OF INSULIN (HCC): ICD-10-CM

## 2018-06-13 DIAGNOSIS — E11.40 TYPE 2 DIABETES MELLITUS WITH DIABETIC NEUROPATHY, WITHOUT LONG-TERM CURRENT USE OF INSULIN (HCC): Primary | ICD-10-CM

## 2018-06-13 DIAGNOSIS — E11.40 TYPE 2 DIABETES MELLITUS WITH DIABETIC NEUROPATHY, WITH LONG-TERM CURRENT USE OF INSULIN (HCC): ICD-10-CM

## 2018-06-13 DIAGNOSIS — E78.2 MIXED HYPERLIPIDEMIA: ICD-10-CM

## 2018-06-13 DIAGNOSIS — L29.9 PRURITUS: ICD-10-CM

## 2018-06-13 DIAGNOSIS — K62.89 CHRONIC RECTAL PAIN: ICD-10-CM

## 2018-06-13 DIAGNOSIS — F10.20 ALCOHOLISM (HCC): ICD-10-CM

## 2018-06-13 DIAGNOSIS — R35.0 URINARY FREQUENCY: ICD-10-CM

## 2018-06-13 DIAGNOSIS — E11.40 TYPE 2 DIABETES MELLITUS WITH DIABETIC NEUROPATHY, WITHOUT LONG-TERM CURRENT USE OF INSULIN (HCC): ICD-10-CM

## 2018-06-13 DIAGNOSIS — M25.511 CHRONIC RIGHT SHOULDER PAIN: ICD-10-CM

## 2018-06-13 DIAGNOSIS — I10 ESSENTIAL HYPERTENSION: ICD-10-CM

## 2018-06-13 DIAGNOSIS — G89.29 CHRONIC RIGHT SHOULDER PAIN: ICD-10-CM

## 2018-06-13 DIAGNOSIS — K21.9 GASTROESOPHAGEAL REFLUX DISEASE, ESOPHAGITIS PRESENCE NOT SPECIFIED: ICD-10-CM

## 2018-06-13 DIAGNOSIS — G89.29 CHRONIC RECTAL PAIN: ICD-10-CM

## 2018-06-13 DIAGNOSIS — E53.8 VITAMIN B12 DEFICIENCY: ICD-10-CM

## 2018-06-13 DIAGNOSIS — Z12.11 SCREENING FOR COLON CANCER: ICD-10-CM

## 2018-06-13 LAB
-: NORMAL
AMORPHOUS: NORMAL
BACTERIA: NORMAL
BILIRUBIN URINE: NEGATIVE
CASTS UA: NORMAL /LPF (ref 0–2)
COLOR: ABNORMAL
COMMENT UA: ABNORMAL
CREATININE URINE: 40 MG/DL (ref 28–217)
CRYSTALS, UA: NORMAL /HPF
EPITHELIAL CELLS UA: NORMAL /HPF (ref 0–5)
FOLATE: >20 NG/ML
GLUCOSE URINE: NEGATIVE
KETONES, URINE: NEGATIVE
LEUKOCYTE ESTERASE, URINE: ABNORMAL
MICROALBUMIN/CREAT 24H UR: <12 MG/L
MICROALBUMIN/CREAT UR-RTO: NORMAL MCG/MG CREAT
MUCUS: NORMAL
NITRITE, URINE: NEGATIVE
OTHER OBSERVATIONS UA: NORMAL
PH UA: 5.5 (ref 5–6)
PROTEIN UA: NEGATIVE
RBC UA: NORMAL /HPF (ref 0–4)
RENAL EPITHELIAL, UA: NORMAL /HPF
SPECIFIC GRAVITY UA: 1.01 (ref 1.01–1.02)
TRICHOMONAS: NORMAL
TURBIDITY: ABNORMAL
URINE HGB: NEGATIVE
UROBILINOGEN, URINE: NORMAL
VITAMIN B-12: 1000 PG/ML (ref 232–1245)
WBC UA: NORMAL /HPF (ref 0–4)
YEAST: NORMAL

## 2018-06-13 PROCEDURE — 82570 ASSAY OF URINE CREATININE: CPT

## 2018-06-13 PROCEDURE — G8417 CALC BMI ABV UP PARAM F/U: HCPCS | Performed by: FAMILY MEDICINE

## 2018-06-13 PROCEDURE — 99214 OFFICE O/P EST MOD 30 MIN: CPT | Performed by: FAMILY MEDICINE

## 2018-06-13 PROCEDURE — 81001 URINALYSIS AUTO W/SCOPE: CPT

## 2018-06-13 PROCEDURE — 3044F HG A1C LEVEL LT 7.0%: CPT | Performed by: FAMILY MEDICINE

## 2018-06-13 PROCEDURE — 82043 UR ALBUMIN QUANTITATIVE: CPT

## 2018-06-13 PROCEDURE — 4004F PT TOBACCO SCREEN RCVD TLK: CPT | Performed by: FAMILY MEDICINE

## 2018-06-13 PROCEDURE — 3017F COLORECTAL CA SCREEN DOC REV: CPT | Performed by: FAMILY MEDICINE

## 2018-06-13 PROCEDURE — G8427 DOCREV CUR MEDS BY ELIG CLIN: HCPCS | Performed by: FAMILY MEDICINE

## 2018-06-13 PROCEDURE — 2022F DILAT RTA XM EVC RTNOPTHY: CPT | Performed by: FAMILY MEDICINE

## 2018-06-13 RX ORDER — TOLTERODINE 2 MG/1
2 CAPSULE, EXTENDED RELEASE ORAL DAILY
Qty: 30 CAPSULE | Refills: 3 | Status: SHIPPED | OUTPATIENT
Start: 2018-06-13 | End: 2018-06-14 | Stop reason: ALTCHOICE

## 2018-06-13 RX ORDER — PRAVASTATIN SODIUM 20 MG
TABLET ORAL
Qty: 30 TABLET | Refills: 6 | Status: SHIPPED | OUTPATIENT
Start: 2018-06-13 | End: 2019-01-15 | Stop reason: SDUPTHER

## 2018-06-13 RX ORDER — MULTIVITAMIN WITH FOLIC ACID 400 MCG
1 TABLET ORAL DAILY
Qty: 30 TABLET | Refills: 6 | Status: SHIPPED | OUTPATIENT
Start: 2018-06-13 | End: 2019-01-09 | Stop reason: SDUPTHER

## 2018-06-13 RX ORDER — HYDROXYZINE HYDROCHLORIDE 25 MG/1
25 TABLET, FILM COATED ORAL 3 TIMES DAILY PRN
Qty: 30 TABLET | Refills: 3 | Status: SHIPPED | OUTPATIENT
Start: 2018-06-13 | End: 2019-07-10 | Stop reason: SDUPTHER

## 2018-06-13 RX ORDER — OMEPRAZOLE 20 MG/1
CAPSULE, DELAYED RELEASE ORAL
Qty: 30 CAPSULE | Refills: 6 | Status: SHIPPED | OUTPATIENT
Start: 2018-06-13 | End: 2019-01-15 | Stop reason: SDUPTHER

## 2018-06-13 RX ORDER — CELECOXIB 200 MG/1
CAPSULE ORAL
Qty: 60 CAPSULE | Refills: 6 | Status: SHIPPED | OUTPATIENT
Start: 2018-06-13 | End: 2019-01-15 | Stop reason: SDUPTHER

## 2018-06-13 RX ORDER — TRAMADOL HYDROCHLORIDE 50 MG/1
50 TABLET ORAL EVERY 8 HOURS PRN
Qty: 90 TABLET | Refills: 0 | Status: CANCELLED | OUTPATIENT
Start: 2018-06-13 | End: 2018-07-13

## 2018-06-13 RX ORDER — LOSARTAN POTASSIUM 100 MG/1
TABLET ORAL
Qty: 30 TABLET | Refills: 6 | Status: SHIPPED | OUTPATIENT
Start: 2018-06-13 | End: 2019-01-09 | Stop reason: SDUPTHER

## 2018-06-13 RX ORDER — ASPIRIN 325 MG
TABLET ORAL
Qty: 30 TABLET | Refills: 6 | Status: SHIPPED | OUTPATIENT
Start: 2018-06-13 | End: 2019-01-15 | Stop reason: SDUPTHER

## 2018-06-13 ASSESSMENT — PATIENT HEALTH QUESTIONNAIRE - PHQ9
1. LITTLE INTEREST OR PLEASURE IN DOING THINGS: 0
SUM OF ALL RESPONSES TO PHQ9 QUESTIONS 1 & 2: 1
2. FEELING DOWN, DEPRESSED OR HOPELESS: 1
SUM OF ALL RESPONSES TO PHQ QUESTIONS 1-9: 1

## 2018-06-14 ENCOUNTER — TELEPHONE (OUTPATIENT)
Dept: FAMILY MEDICINE CLINIC | Age: 58
End: 2018-06-14

## 2018-06-14 DIAGNOSIS — N32.81 OVERACTIVE BLADDER: Primary | ICD-10-CM

## 2018-06-14 RX ORDER — OXYBUTYNIN CHLORIDE 5 MG/1
5 TABLET ORAL 2 TIMES DAILY
Qty: 60 TABLET | Refills: 3 | Status: SHIPPED | OUTPATIENT
Start: 2018-06-14 | End: 2018-10-13

## 2018-06-20 DIAGNOSIS — K62.89 CHRONIC RECTAL PAIN: ICD-10-CM

## 2018-06-20 DIAGNOSIS — G89.29 CHRONIC RECTAL PAIN: ICD-10-CM

## 2018-06-20 RX ORDER — TRAMADOL HYDROCHLORIDE 50 MG/1
TABLET ORAL
Qty: 90 TABLET | Refills: 0 | OUTPATIENT
Start: 2018-06-20

## 2018-06-21 DIAGNOSIS — K62.89 CHRONIC RECTAL PAIN: ICD-10-CM

## 2018-06-21 DIAGNOSIS — G89.29 CHRONIC RECTAL PAIN: ICD-10-CM

## 2018-06-21 RX ORDER — TRAMADOL HYDROCHLORIDE 50 MG/1
TABLET ORAL
Qty: 90 TABLET | Refills: 0 | OUTPATIENT
Start: 2018-06-21

## 2018-06-21 RX ORDER — TRAMADOL HYDROCHLORIDE 50 MG/1
50 TABLET ORAL EVERY 8 HOURS PRN
Qty: 90 TABLET | Refills: 0 | Status: SHIPPED | OUTPATIENT
Start: 2018-06-21 | End: 2018-07-17 | Stop reason: SDUPTHER

## 2018-07-07 DIAGNOSIS — E53.8 VITAMIN B12 DEFICIENCY: ICD-10-CM

## 2018-07-09 RX ORDER — PSYLLIUM HUSK 3.4 G/7G
POWDER ORAL
Qty: 30 TABLET | Refills: 2 | OUTPATIENT
Start: 2018-07-09

## 2018-07-17 DIAGNOSIS — K62.89 CHRONIC RECTAL PAIN: ICD-10-CM

## 2018-07-17 DIAGNOSIS — G89.29 CHRONIC RECTAL PAIN: ICD-10-CM

## 2018-07-17 RX ORDER — TRAMADOL HYDROCHLORIDE 50 MG/1
50 TABLET ORAL EVERY 8 HOURS PRN
Qty: 90 TABLET | Refills: 0 | Status: SHIPPED | OUTPATIENT
Start: 2018-07-17 | End: 2018-08-15 | Stop reason: SDUPTHER

## 2018-08-02 ENCOUNTER — OFFICE VISIT (OUTPATIENT)
Dept: NEUROLOGY | Age: 58
End: 2018-08-02
Payer: MEDICAID

## 2018-08-02 VITALS
SYSTOLIC BLOOD PRESSURE: 126 MMHG | WEIGHT: 190.4 LBS | BODY MASS INDEX: 35.04 KG/M2 | DIASTOLIC BLOOD PRESSURE: 74 MMHG | OXYGEN SATURATION: 96 % | HEART RATE: 86 BPM | HEIGHT: 62 IN

## 2018-08-02 DIAGNOSIS — I10 ESSENTIAL HYPERTENSION: ICD-10-CM

## 2018-08-02 DIAGNOSIS — Z72.0 TOBACCO ABUSE: ICD-10-CM

## 2018-08-02 DIAGNOSIS — G25.81 RESTLESS LEG SYNDROME: ICD-10-CM

## 2018-08-02 DIAGNOSIS — R26.89 BALANCE PROBLEM: ICD-10-CM

## 2018-08-02 DIAGNOSIS — F50.89 PICA: ICD-10-CM

## 2018-08-02 DIAGNOSIS — F32.A FATIGUE DUE TO DEPRESSION: ICD-10-CM

## 2018-08-02 DIAGNOSIS — E78.2 MIXED HYPERLIPIDEMIA: ICD-10-CM

## 2018-08-02 DIAGNOSIS — F31.0 BIPOLAR AFFECTIVE DISORDER, CURRENT EPISODE HYPOMANIC (HCC): ICD-10-CM

## 2018-08-02 DIAGNOSIS — F25.0 SCHIZOAFFECTIVE DISORDER, BIPOLAR TYPE (HCC): ICD-10-CM

## 2018-08-02 DIAGNOSIS — G63 POLYNEUROPATHY ASSOCIATED WITH UNDERLYING DISEASE (HCC): Primary | ICD-10-CM

## 2018-08-02 DIAGNOSIS — F41.9 ANXIETY: ICD-10-CM

## 2018-08-02 DIAGNOSIS — G47.9 SLEEPING DIFFICULTY: ICD-10-CM

## 2018-08-02 DIAGNOSIS — R53.83 FATIGUE DUE TO DEPRESSION: ICD-10-CM

## 2018-08-02 DIAGNOSIS — F34.1 DYSTHYMIA: ICD-10-CM

## 2018-08-02 DIAGNOSIS — E11.40 TYPE 2 DIABETES MELLITUS WITH DIABETIC NEUROPATHY, WITHOUT LONG-TERM CURRENT USE OF INSULIN (HCC): ICD-10-CM

## 2018-08-02 DIAGNOSIS — E66.9 OBESITY (BMI 30.0-34.9): ICD-10-CM

## 2018-08-02 DIAGNOSIS — F10.20 ALCOHOLISM (HCC): ICD-10-CM

## 2018-08-02 DIAGNOSIS — J42 CHRONIC BRONCHITIS, UNSPECIFIED CHRONIC BRONCHITIS TYPE (HCC): ICD-10-CM

## 2018-08-02 DIAGNOSIS — G56.03 BILATERAL CARPAL TUNNEL SYNDROME: ICD-10-CM

## 2018-08-02 DIAGNOSIS — M54.16 CHRONIC LUMBAR RADICULOPATHY: ICD-10-CM

## 2018-08-02 PROCEDURE — G8417 CALC BMI ABV UP PARAM F/U: HCPCS | Performed by: PSYCHIATRY & NEUROLOGY

## 2018-08-02 PROCEDURE — G8926 SPIRO NO PERF OR DOC: HCPCS | Performed by: PSYCHIATRY & NEUROLOGY

## 2018-08-02 PROCEDURE — 2022F DILAT RTA XM EVC RTNOPTHY: CPT | Performed by: PSYCHIATRY & NEUROLOGY

## 2018-08-02 PROCEDURE — 3017F COLORECTAL CA SCREEN DOC REV: CPT | Performed by: PSYCHIATRY & NEUROLOGY

## 2018-08-02 PROCEDURE — 3023F SPIROM DOC REV: CPT | Performed by: PSYCHIATRY & NEUROLOGY

## 2018-08-02 PROCEDURE — 4004F PT TOBACCO SCREEN RCVD TLK: CPT | Performed by: PSYCHIATRY & NEUROLOGY

## 2018-08-02 PROCEDURE — 3044F HG A1C LEVEL LT 7.0%: CPT | Performed by: PSYCHIATRY & NEUROLOGY

## 2018-08-02 PROCEDURE — 99215 OFFICE O/P EST HI 40 MIN: CPT | Performed by: PSYCHIATRY & NEUROLOGY

## 2018-08-02 PROCEDURE — G8427 DOCREV CUR MEDS BY ELIG CLIN: HCPCS | Performed by: PSYCHIATRY & NEUROLOGY

## 2018-08-02 RX ORDER — GABAPENTIN 300 MG/1
CAPSULE ORAL
Qty: 120 CAPSULE | Refills: 2 | Status: SHIPPED | OUTPATIENT
Start: 2018-08-02 | End: 2018-11-08 | Stop reason: SDUPTHER

## 2018-08-02 ASSESSMENT — ENCOUNTER SYMPTOMS
APNEA: 0
VOMITING: 0
BACK PAIN: 0
SINUS PRESSURE: 0
COLOR CHANGE: 0
CONSTIPATION: 0
ABDOMINAL PAIN: 0
NAUSEA: 0
DIARRHEA: 0
EYE ITCHING: 0
EYE DISCHARGE: 0
SHORTNESS OF BREATH: 0
EYE REDNESS: 0
PHOTOPHOBIA: 0
BOWEL INCONTINENCE: 0
EYE PAIN: 0
VISUAL CHANGE: 0
WHEEZING: 0
VOICE CHANGE: 0
ABDOMINAL DISTENTION: 0
CHEST TIGHTNESS: 0
FACIAL SWELLING: 0
CHOKING: 0
BLOOD IN STOOL: 0
TROUBLE SWALLOWING: 0

## 2018-08-02 NOTE — PROGRESS NOTES
Monitor blood glucose levels three times daily and as needed for hyper/hypoglycemic symptoms. Dx. Diabetes (E11.9) 100 strip 6    glucose blood VI test strips (ASCENSIA AUTODISC VI;ONE TOUCH ULTRA TEST VI) strip 1 each by In Vitro route daily Monitor blood glucose levels three times daily and as needed for hyper/hypoglycemic symptoms. 100 each 3    diphenhydrAMINE (BENADRYL) 25 MG capsule Take 1 capsule by mouth 2 times daily as needed for Itching or Allergies 60 capsule 1    Blood Glucose Monitoring Suppl (BLOOD GLUCOSE METER) KIT Monitor blood glucose levels three times daily and as needed for hyper/hypoglycemic symptoms. Dx. Diabetes (E11.9) 1 kit 0    risperiDONE (RISPERDAL) 1 MG tablet Take 1 mg by mouth nightly. No current facility-administered medications for this visit. No Known Allergies            Family History   Problem Relation Age of Onset   [facility] Stroke Mother     Diabetes Mother     High Blood Pressure Mother     Cataracts Mother     Stroke Father     Diabetes Father     Heart Disease Sister 72    Diabetes Daughter     Stroke Sister 68        Brain aneurysm that ruptured and then she got kidney failure and intestinal infection and     Glaucoma Neg Hx              Social History     Social History    Marital status: Single     Spouse name: N/A    Number of children: N/A    Years of education: N/A     Occupational History    Not on file.      Social History Main Topics    Smoking status: Current Every Day Smoker     Packs/day: 1.00     Years: 26.00     Types: Cigarettes    Smokeless tobacco: Never Used    Alcohol use 0.0 oz/week      Comment: daily; \"3 tallboys per day\"    Drug use: No    Sexual activity: Not on file     Other Topics Concern    Not on file     Social History Narrative    No narrative on file         Vitals:    18 1350   BP: 126/74   Pulse: 86   SpO2: 96%         Wt Readings from Last 3 Encounters:   18 190 lb 6.4 oz (86.4 kg) Negative for abdominal distention, abdominal pain, blood in stool, bowel incontinence, constipation, diarrhea, nausea and vomiting. Endocrine: Negative for cold intolerance, heat intolerance, polydipsia, polyphagia and polyuria. Genitourinary: Negative for bladder incontinence. Musculoskeletal: Positive for gait problem. Negative for back pain, neck pain and neck stiffness. Skin: Negative for color change, pallor and wound. Allergic/Immunologic: Negative for environmental allergies, food allergies and immunocompromised state. Neurological: Positive for focal weakness, weakness and loss of balance. Negative for dizziness, vertigo, tremors, syncope, facial asymmetry, speech difficulty, light-headedness and headaches. Hematological: Negative for adenopathy. Does not bruise/bleed easily. Psychiatric/Behavioral: Positive for decreased concentration and memory loss. Negative for agitation, behavioral problems, confusion, dysphoric mood, hallucinations, self-injury, sleep disturbance and suicidal ideas. The patient is nervous/anxious. The patient is not hyperactive. Objective:   Physical Exam   Constitutional: She appears well-developed and well-nourished. She is cooperative. HENT:   Head: Normocephalic and atraumatic. Head is without raccoon's eyes and without Covington's sign. Right Ear: External ear normal.   Left Ear: External ear normal.   Nose: Nose normal.   Mouth/Throat: Oropharynx is clear and moist.   Eyes: Conjunctivae and EOM are normal. Pupils are equal, round, and reactive to light. Neck: Normal range of motion. Neck supple. No muscular tenderness present. Carotid bruit is not present. No neck rigidity. No tracheal deviation and normal range of motion present. No Brudzinski's sign and no Kernig's sign noted. No thyroid mass and no thyromegaly present. Cardiovascular: Normal rate and regular rhythm. Pulmonary/Chest: Effort normal and breath sounds normal. No respiratory distress. She has no wheezes. She has no rales. Musculoskeletal: Normal range of motion. She exhibits no edema or tenderness. Lymphadenopathy:     She has no cervical adenopathy. Skin: Skin is warm. No rash noted. No cyanosis or erythema. No pallor. Nails show no clubbing. Psychiatric: Thought content normal. Her mood appears anxious. Her affect is blunt. Her affect is not labile and not inappropriate. Her speech is not rapid and/or pressured, not delayed, not tangential and not slurred. She is not agitated, not aggressive, not hyperactive, not slowed, not withdrawn, not actively hallucinating and not combative. Thought content is not paranoid and not delusional. Cognition and memory are impaired. She does not express impulsivity or inappropriate judgment. She exhibits a depressed mood. She expresses no homicidal and no suicidal ideation. She expresses no suicidal plans and no homicidal plans. She is communicative. She exhibits abnormal recent memory. She exhibits normal remote memory. She is attentive. NEUROLOGICAL EXAMINATION :    A) MENTAL STATUS:                   Alert and  oriented  To time, place  And  Person. No Aphasia. No  Dysarthria. Able   To  Follow three  Step commands without   Any  Difficulty. No right  To left confusion. Normal  Speech  And language function. Insight and  Judgment ,Fund  Of  Knowledge   decreased              Recent  And  Remote memory  decreased              Attention &Concentration are decreased                                                 B) CRANIAL NERVES :             2 CN : Visual  Acuity  And  Visual fields  within normal limits                      Fundi  Could  Not  Be  Could  Not  Be  Evaluated. 3,4,6 CN : Both  Pupils are   Reactive and  Equal.                          Extraocular   Movements  Are  Intact. No  Nystagmus. No  RODERICK.   No  Afferent  Pupillary mellitus with diabetic neuropathy, without long-term current use of insulin ([facility])    Pruritus    Obesity (BMI 30.0-34. 9)    Mixed hyperlipidemia    Chronic right shoulder pain    Bilateral carpal tunnel syndrome               Plan:            VISITING DIAGNOSIS:      ICD-10-CM ICD-9-CM    1. Polyneuropathy associated with underlying disease ([facility] G63 357.4    2. Type 2 diabetes mellitus with diabetic neuropathy, without long-term current use of insulin ([facility]) E11.40 250.60      357.2    3. Bilateral carpal tunnel syndrome G56.03 354.0    4. Obesity (BMI 30.0-34. 9) E66.9 278.00    5. Chronic lumbar radiculopathy M54.16 724.4    6. Fatigue due to depression F32.9 311     R53.83 780.79    7. Bipolar affective disorder, current episode hypomanic ([facility] F31.0 296.40    8. Schizoaffective disorder, bipolar type ([facility] F25.0 295.70    9. Dysthymia F34.1 300.4    10. Essential hypertension I10 401.9    11. Anxiety F41.9 300.00    12. Balance problem R26.89 781.99    13. Sleeping difficulty G47.9 780.50    14. Restless leg syndrome G25.81 333.94    15. Tobacco abuse Z72.0 305.1    16. Alcoholism ([facility] F10.20 303.90    17. Mixed hyperlipidemia E78.2 272.2    18. Pica F50.89 307.52    19. Chronic bronchitis, unspecified chronic bronchitis type ([facility]) J42 491.9               CONCERNS   &   INCREASED   RISK   FOR         * TIA,  CEREBRO  VASCULAR  ISCHEMIA, STROKE      *   COGNITIVE  &   MEMORY PROBLEMS  AND  DEMENTIAS       *  MONONEUROPATHIES, RADICULOPATHY  AND  PLEXOPATHY      *   PERIPHERAL  NEUROPATHY,  MUSCULAR CONDITIONS                VARIOUS  RISK   FACTORS   WERE  REVIEWED   AND   DISCUSSED. *  PATIENT   HAS  MULTIPLE   MEDICAL,  NEUROLOGICAL      AND   MENTAL  HEALTH  PROBLEMS . PATIENT'S   MANAGEMENT  IS  CHALLENGING. PLAN:       Cordell Dyer  Of  The  Diagnoses,  The  Management & Treatment  Options           AND    Care  plan  Were        Reviewed and   Discussed   With  patient.          * Goals Opinion  And  Evaluations  In  [facility]  Setting  If  Patient  Is  Interested. *  If  The  Patient remains  Neurologically  Stable   Return   To  [facility] Neurology Department   IN  3 MONTHS  TIME   FOR  FURTHER  FOLLOW UP.                 *  If   There is  Any  Significant  Worsening   Of  Current  Symptoms  And  Or  If patient  Develops   Any additional  New  Neurological  Symptoms  Or  Significant  Concerns   Should  Call  911 or  Go  To  Emergency  Department  For  Further  Immediate  Evaluation. *   The  Neurological   Findings,  Possible  Diagnosis,  Differential diagnoses   And  Options  For    Further   Investigations   And  management   Are  Discussed  Comprehensively. Medications   And  Prescription   Risks  And  Side effects  Are   Also  Discussed. The  Above  Were  Reviewed  With  patient and   questions  Answered  In  Detail. More   Than   50% of face  To face Time   Was  Spent  On  Counseling   And   Coordination  Of  Care   Of   Patient's multiple   Neurological  Problems   And   Comorbid  Medical   Conditions. Electronically signed by Anna Marie Beatty MD   Board Certified in  Neurology &  In  [facility] and Neurology ([employer])      DISCLAIMER:   Although every effort was made to ensure the accuracy of this  electronic transcription, some errors in transcription may have occurred. GENERAL PATIENT INSTRUCTIONS:     A Healthy Lifestyle: Care Instructions  Your Care Instructions  A healthy lifestyle can help you feel good, stay at a healthy weight, and have plenty of energy for both work and play. A healthy lifestyle is something you can share with your whole family. A healthy lifestyle also can lower your risk for serious health problems, such as high blood pressure, heart disease, and diabetes.   You can follow a few steps listed below to improve your health

## 2018-08-15 DIAGNOSIS — G89.29 CHRONIC RECTAL PAIN: ICD-10-CM

## 2018-08-15 DIAGNOSIS — K62.89 CHRONIC RECTAL PAIN: ICD-10-CM

## 2018-08-15 RX ORDER — TRAMADOL HYDROCHLORIDE 50 MG/1
TABLET ORAL
Qty: 90 TABLET | Refills: 0 | Status: SHIPPED | OUTPATIENT
Start: 2018-08-15 | End: 2018-09-09 | Stop reason: SDUPTHER

## 2018-08-30 ENCOUNTER — TELEPHONE (OUTPATIENT)
Dept: FAMILY MEDICINE CLINIC | Age: 58
End: 2018-08-30

## 2018-09-06 ENCOUNTER — TELEPHONE (OUTPATIENT)
Dept: FAMILY MEDICINE CLINIC | Age: 58
End: 2018-09-06

## 2018-09-06 NOTE — TELEPHONE ENCOUNTER
Please advise the patient to check her glucose 2 or 3 times a day. She should write these down and bring them to her office visit with me next week (9/13/2018). I will review the readings and discuss medications with her at that visit. If she is having elevated glucose readings (above 180), please have her call before her visit.

## 2018-09-09 DIAGNOSIS — G89.29 CHRONIC RECTAL PAIN: ICD-10-CM

## 2018-09-09 DIAGNOSIS — K62.89 CHRONIC RECTAL PAIN: ICD-10-CM

## 2018-09-10 RX ORDER — TRAMADOL HYDROCHLORIDE 50 MG/1
TABLET ORAL
Qty: 90 TABLET | Refills: 0 | Status: SHIPPED | OUTPATIENT
Start: 2018-09-10 | End: 2018-10-08 | Stop reason: SDUPTHER

## 2018-09-10 NOTE — TELEPHONE ENCOUNTER
[facility] from [place] reviewed, last filled 8/15/18 #90 for a 30 day supply. Report available for your review. Last OV 6/13/18; next OV 9/13/18.

## 2018-09-11 ENCOUNTER — HOSPITAL ENCOUNTER (OUTPATIENT)
Dept: LAB | Age: 58
Setting detail: SPECIMEN
Discharge: HOME | End: 2018-09-11
Payer: MEDICAID

## 2018-09-11 DIAGNOSIS — I10 ESSENTIAL HYPERTENSION: ICD-10-CM

## 2018-09-11 DIAGNOSIS — Z79.4 TYPE 2 DIABETES MELLITUS WITH DIABETIC NEUROPATHY, WITH LONG-TERM CURRENT USE OF INSULIN (HCC): ICD-10-CM

## 2018-09-11 DIAGNOSIS — E11.40 TYPE 2 DIABETES MELLITUS WITH DIABETIC NEUROPATHY, WITH LONG-TERM CURRENT USE OF INSULIN (HCC): ICD-10-CM

## 2018-09-11 DIAGNOSIS — E11.40 TYPE 2 DIABETES MELLITUS WITH DIABETIC NEUROPATHY, WITHOUT LONG-TERM CURRENT USE OF INSULIN (HCC): ICD-10-CM

## 2018-09-11 LAB
ALBUMIN SERPL-MCNC: 3.8 G/DL (ref 3.5–5.2)
ALBUMIN/GLOBULIN RATIO: 1.2 (ref 1–2.5)
ALP BLD-CCNC: 69 U/L (ref 35–104)
ALT SERPL-CCNC: 10 U/L (ref 5–33)
ANION GAP SERPL CALCULATED.3IONS-SCNC: 10 MMOL/L (ref 9–17)
AST SERPL-CCNC: 19 U/L
BILIRUB SERPL-MCNC: 0.36 MG/DL (ref 0.3–1.2)
BUN BLDV-MCNC: 7 MG/DL (ref 6–20)
BUN/CREAT BLD: 9 (ref 9–20)
CALCIUM SERPL-MCNC: 9.6 MG/DL (ref 8.6–10.4)
CHLORIDE BLD-SCNC: 104 MMOL/L (ref 98–107)
CO2: 27 MMOL/L (ref 20–31)
CREAT SERPL-MCNC: 0.79 MG/DL (ref 0.5–0.9)
ESTIMATED AVERAGE GLUCOSE: 100 MG/DL
GFR AFRICAN AMERICAN: >60 ML/MIN
GFR NON-AFRICAN AMERICAN: >60 ML/MIN
GFR SERPL CREATININE-BSD FRML MDRD: ABNORMAL ML/MIN/{1.73_M2}
GFR SERPL CREATININE-BSD FRML MDRD: ABNORMAL ML/MIN/{1.73_M2}
GLUCOSE BLD-MCNC: 112 MG/DL (ref 70–99)
HBA1C MFR BLD: 5.1 % (ref 4.8–5.9)
POTASSIUM SERPL-SCNC: 4.6 MMOL/L (ref 3.7–5.3)
SODIUM BLD-SCNC: 141 MMOL/L (ref 135–144)
TOTAL PROTEIN: 6.9 G/DL (ref 6.4–8.3)

## 2018-09-11 PROCEDURE — 80053 COMPREHEN METABOLIC PANEL: CPT

## 2018-09-11 PROCEDURE — 36415 COLL VENOUS BLD VENIPUNCTURE: CPT

## 2018-09-11 PROCEDURE — 83036 HEMOGLOBIN GLYCOSYLATED A1C: CPT

## 2018-09-13 ENCOUNTER — OFFICE VISIT (OUTPATIENT)
Dept: FAMILY MEDICINE CLINIC | Age: 58
End: 2018-09-13
Payer: MEDICAID

## 2018-09-13 VITALS
HEART RATE: 74 BPM | SYSTOLIC BLOOD PRESSURE: 136 MMHG | HEIGHT: 62 IN | DIASTOLIC BLOOD PRESSURE: 78 MMHG | RESPIRATION RATE: 16 BRPM | BODY MASS INDEX: 35.19 KG/M2 | WEIGHT: 191.2 LBS

## 2018-09-13 DIAGNOSIS — Z23 NEED FOR SHINGLES VACCINE: ICD-10-CM

## 2018-09-13 DIAGNOSIS — K62.89 CHRONIC RECTAL PAIN: ICD-10-CM

## 2018-09-13 DIAGNOSIS — Z23 NEED FOR INFLUENZA VACCINATION: ICD-10-CM

## 2018-09-13 DIAGNOSIS — I10 ESSENTIAL HYPERTENSION: ICD-10-CM

## 2018-09-13 DIAGNOSIS — Z12.11 SCREENING FOR COLON CANCER: ICD-10-CM

## 2018-09-13 DIAGNOSIS — G89.29 CHRONIC RECTAL PAIN: ICD-10-CM

## 2018-09-13 DIAGNOSIS — E11.40 TYPE 2 DIABETES MELLITUS WITH DIABETIC NEUROPATHY, WITHOUT LONG-TERM CURRENT USE OF INSULIN (HCC): Primary | ICD-10-CM

## 2018-09-13 PROCEDURE — 4004F PT TOBACCO SCREEN RCVD TLK: CPT | Performed by: FAMILY MEDICINE

## 2018-09-13 PROCEDURE — G8417 CALC BMI ABV UP PARAM F/U: HCPCS | Performed by: FAMILY MEDICINE

## 2018-09-13 PROCEDURE — 99213 OFFICE O/P EST LOW 20 MIN: CPT | Performed by: FAMILY MEDICINE

## 2018-09-13 PROCEDURE — 2022F DILAT RTA XM EVC RTNOPTHY: CPT | Performed by: FAMILY MEDICINE

## 2018-09-13 PROCEDURE — 3044F HG A1C LEVEL LT 7.0%: CPT | Performed by: FAMILY MEDICINE

## 2018-09-13 PROCEDURE — G8427 DOCREV CUR MEDS BY ELIG CLIN: HCPCS | Performed by: FAMILY MEDICINE

## 2018-09-13 PROCEDURE — 3017F COLORECTAL CA SCREEN DOC REV: CPT | Performed by: FAMILY MEDICINE

## 2018-09-13 ASSESSMENT — PATIENT HEALTH QUESTIONNAIRE - PHQ9
SUM OF ALL RESPONSES TO PHQ QUESTIONS 1-9: 0
2. FEELING DOWN, DEPRESSED OR HOPELESS: 0
1. LITTLE INTEREST OR PLEASURE IN DOING THINGS: 0
SUM OF ALL RESPONSES TO PHQ QUESTIONS 1-9: 0
SUM OF ALL RESPONSES TO PHQ9 QUESTIONS 1 & 2: 0

## 2018-09-13 NOTE — PROGRESS NOTES
mL/min Final    GFR  09/11/2018 >60  >60 mL/min Final    GFR Comment 09/11/2018        Final    Comment: Average GFR for 52-63 years old:   80 mL/min/1.73sq m  Chronic Kidney Disease:   <60 mL/min/1.73sq m  Kidney failure:   <15 mL/min/1.73sq m              eGFR calculated using average adult body mass. Additional eGFR calculator   available at:        [email or web address]            GFR Staging 09/11/2018 NOT REPORTED   Final    Hemoglobin A1C 09/11/2018 5.1  4.8 - 5.9 % Final    Estimated Avg Glucose 09/11/2018 100  mg/dL Final         Assessment and Plan:    1. Type 2 diabetes mellitus with diabetic neuropathy, without long-term current use of insulin ([facility]CC)  Her HgbA1c was 5.1 %, which is excellent. She is doing well since she discontinued Metformin. Labs were ordered to be done prior to her return visit in 4 months:  - Hemoglobin A1C; Future  - Lipid Panel; Future     2. Essential hypertension  Her blood pressure is adequately-controlled. (BP: 136/78)   She was advised to continue current medications. - Comprehensive Metabolic Panel; Future prior to her return visit in 4 months. 3. Chronic rectal pain  Pain adequately controlled with current Tramadol dose. She does not need a refill today. 4.  Routine health maintenance  Health maintenance was reviewed with the patient. She has received an influenza vaccine this influenza season. Colonoscopy was recommended. She will not agree to schedule a colonoscopy, but she is interested in fecal DNA testing, and a request for a kit was faxed to [facility]. Shingrix was recommended, and a printed prescription for Shingrix was given to the patient. All other health maintenance, including Tdap and Pneumovax, is up to date at this time. There is no indication for Prevnar-13 at this time.      (Please note that portions of this note were completed with a voice-recognition program. Efforts were made to edit the dictation but occasionally words are mis-transcribed.)

## 2018-09-26 DIAGNOSIS — L29.9 PRURITUS: ICD-10-CM

## 2018-09-26 RX ORDER — DIPHENHYDRAMINE HYDROCHLORIDE 12.5 MG/5ML
LIQUID ORAL
Qty: 60 CAPSULE | Refills: 1 | Status: SHIPPED | OUTPATIENT
Start: 2018-09-26 | End: 2018-10-13

## 2018-10-08 DIAGNOSIS — K62.89 CHRONIC RECTAL PAIN: ICD-10-CM

## 2018-10-08 DIAGNOSIS — G89.29 CHRONIC RECTAL PAIN: ICD-10-CM

## 2018-10-09 RX ORDER — TRAMADOL HYDROCHLORIDE 50 MG/1
TABLET ORAL
Qty: 90 TABLET | Refills: 0 | Status: SHIPPED | OUTPATIENT
Start: 2018-10-09 | End: 2018-11-06 | Stop reason: SDUPTHER

## 2018-10-13 ENCOUNTER — OFFICE VISIT (OUTPATIENT)
Dept: PRIMARY CARE CLINIC | Age: 58
End: 2018-10-13
Payer: MEDICAID

## 2018-10-13 ENCOUNTER — HOSPITAL ENCOUNTER (OUTPATIENT)
Age: 58
Setting detail: SPECIMEN
Discharge: HOME | End: 2018-10-13
Payer: MEDICAID

## 2018-10-13 VITALS
WEIGHT: 194.8 LBS | BODY MASS INDEX: 35.85 KG/M2 | TEMPERATURE: 98.8 F | SYSTOLIC BLOOD PRESSURE: 120 MMHG | HEIGHT: 62 IN | OXYGEN SATURATION: 97 % | DIASTOLIC BLOOD PRESSURE: 70 MMHG | HEART RATE: 84 BPM

## 2018-10-13 DIAGNOSIS — R35.0 URINARY FREQUENCY: ICD-10-CM

## 2018-10-13 DIAGNOSIS — R10.9 FLANK PAIN: ICD-10-CM

## 2018-10-13 DIAGNOSIS — R10.9 FLANK PAIN: Primary | ICD-10-CM

## 2018-10-13 LAB
-: NORMAL
AMORPHOUS: NORMAL
BACTERIA: NORMAL
BILIRUBIN URINE: NEGATIVE
CASTS UA: NORMAL /LPF (ref 0–2)
COLOR: ABNORMAL
COMMENT UA: ABNORMAL
CRYSTALS, UA: NORMAL /HPF
EPITHELIAL CELLS UA: NORMAL /HPF (ref 0–5)
GLUCOSE URINE: NEGATIVE
KETONES, URINE: NEGATIVE
LEUKOCYTE ESTERASE, URINE: ABNORMAL
MUCUS: NORMAL
NITRITE, URINE: NEGATIVE
OTHER OBSERVATIONS UA: NORMAL
PH UA: 6 (ref 5–6)
PROTEIN UA: NEGATIVE
RBC UA: NORMAL /HPF (ref 0–4)
RENAL EPITHELIAL, UA: NORMAL /HPF
SPECIFIC GRAVITY UA: 1.01 (ref 1.01–1.02)
TRICHOMONAS: NORMAL
TURBIDITY: ABNORMAL
URINE HGB: NEGATIVE
UROBILINOGEN, URINE: NORMAL
WBC UA: NORMAL /HPF (ref 0–4)
YEAST: NORMAL

## 2018-10-13 PROCEDURE — 4004F PT TOBACCO SCREEN RCVD TLK: CPT | Performed by: FAMILY MEDICINE

## 2018-10-13 PROCEDURE — G8417 CALC BMI ABV UP PARAM F/U: HCPCS | Performed by: FAMILY MEDICINE

## 2018-10-13 PROCEDURE — G8427 DOCREV CUR MEDS BY ELIG CLIN: HCPCS | Performed by: FAMILY MEDICINE

## 2018-10-13 PROCEDURE — 87086 URINE CULTURE/COLONY COUNT: CPT

## 2018-10-13 PROCEDURE — 81001 URINALYSIS AUTO W/SCOPE: CPT

## 2018-10-13 PROCEDURE — 3017F COLORECTAL CA SCREEN DOC REV: CPT | Performed by: FAMILY MEDICINE

## 2018-10-13 PROCEDURE — 99214 OFFICE O/P EST MOD 30 MIN: CPT | Performed by: FAMILY MEDICINE

## 2018-10-13 PROCEDURE — G8484 FLU IMMUNIZE NO ADMIN: HCPCS | Performed by: FAMILY MEDICINE

## 2018-10-13 RX ORDER — DIPHENHYDRAMINE HCL 25 MG
1 CAPSULE ORAL PRN
COMMUNITY
End: 2019-05-15

## 2018-10-13 RX ORDER — PHENAZOPYRIDINE HYDROCHLORIDE 200 MG/1
200 TABLET, FILM COATED ORAL 3 TIMES DAILY PRN
Qty: 6 TABLET | Refills: 0 | Status: SHIPPED | OUTPATIENT
Start: 2018-10-13 | End: 2018-10-15 | Stop reason: SDUPTHER

## 2018-10-13 ASSESSMENT — ENCOUNTER SYMPTOMS
EYES NEGATIVE: 1
CONSTIPATION: 1
BOWEL INCONTINENCE: 0
ABDOMINAL PAIN: 1
VOMITING: 0
RESPIRATORY NEGATIVE: 1
DIARRHEA: 0
BACK PAIN: 1
NAUSEA: 0

## 2018-10-15 LAB
CULTURE: NORMAL
Lab: NORMAL
SPECIMEN DESCRIPTION: NORMAL
STATUS: NORMAL

## 2018-10-15 RX ORDER — PHENAZOPYRIDINE HYDROCHLORIDE 200 MG/1
200 TABLET, FILM COATED ORAL 3 TIMES DAILY PRN
Qty: 6 TABLET | Refills: 0 | Status: SHIPPED | OUTPATIENT
Start: 2018-10-15 | End: 2018-10-19 | Stop reason: SDUPTHER

## 2018-10-17 ENCOUNTER — TELEPHONE (OUTPATIENT)
Dept: FAMILY MEDICINE CLINIC | Age: 58
End: 2018-10-17

## 2018-10-17 DIAGNOSIS — R10.9 FLANK PAIN: Primary | ICD-10-CM

## 2018-10-19 ENCOUNTER — HOSPITAL ENCOUNTER (OUTPATIENT)
Dept: CT IMAGING | Age: 58
Discharge: HOME | End: 2018-10-21
Payer: MEDICAID

## 2018-10-19 DIAGNOSIS — R10.9 FLANK PAIN: ICD-10-CM

## 2018-10-19 PROCEDURE — 74176 CT ABD & PELVIS W/O CONTRAST: CPT

## 2018-10-19 RX ORDER — PHENAZOPYRIDINE HYDROCHLORIDE 200 MG/1
200 TABLET, FILM COATED ORAL 3 TIMES DAILY PRN
Qty: 6 TABLET | Refills: 0 | Status: SHIPPED | OUTPATIENT
Start: 2018-10-19 | End: 2018-10-21

## 2018-10-19 NOTE — TELEPHONE ENCOUNTER
Reviewed CT of abdomen and pelvis. Has a partially calcified splenic artery aneurysm which is not contributing to her pain, but should probably have a vascular referral for opinion. Also has bilateral renal stones, but these are in the kidney and not in the ureter, so not contributing to her pain. Is she having ongoing urinary symptoms? If yes, then would recommend urology referral as well for opinion.   I will send in additional pyridium for the weekend, but not sure that this is going to fix the problem

## 2018-10-22 DIAGNOSIS — I72.8 SPLENIC ARTERY ANEURYSM (HCC): Primary | ICD-10-CM

## 2018-10-22 DIAGNOSIS — N23 KIDNEY PAIN: Primary | ICD-10-CM

## 2018-10-25 ENCOUNTER — TELEPHONE (OUTPATIENT)
Dept: FAMILY MEDICINE CLINIC | Age: 58
End: 2018-10-25

## 2018-10-30 ENCOUNTER — TELEPHONE (OUTPATIENT)
Dept: OCCUPATIONAL MEDICINE | Age: 58
End: 2018-10-30

## 2018-11-06 DIAGNOSIS — G89.29 CHRONIC RECTAL PAIN: ICD-10-CM

## 2018-11-06 DIAGNOSIS — K62.89 CHRONIC RECTAL PAIN: ICD-10-CM

## 2018-11-06 RX ORDER — TRAMADOL HYDROCHLORIDE 50 MG/1
50 TABLET ORAL EVERY 8 HOURS PRN
Qty: 90 TABLET | Refills: 0 | Status: SHIPPED | OUTPATIENT
Start: 2018-11-06 | End: 2018-12-04 | Stop reason: SDUPTHER

## 2018-11-07 DIAGNOSIS — R21 RASH: ICD-10-CM

## 2018-11-07 RX ORDER — TRIAMCINOLONE ACETONIDE 5 MG/G
CREAM TOPICAL
Qty: 1 TUBE | Refills: 0 | Status: SHIPPED | OUTPATIENT
Start: 2018-11-07 | End: 2019-09-25

## 2018-11-07 NOTE — TELEPHONE ENCOUNTER
Fax received from [facility] requesting refill for Triamcinolone cream. Last ov 09/13/18,next ov 01/15/19

## 2018-11-08 ENCOUNTER — OFFICE VISIT (OUTPATIENT)
Dept: NEUROLOGY | Age: 58
End: 2018-11-08
Payer: MEDICAID

## 2018-11-08 VITALS
HEART RATE: 80 BPM | DIASTOLIC BLOOD PRESSURE: 68 MMHG | SYSTOLIC BLOOD PRESSURE: 130 MMHG | BODY MASS INDEX: 36.07 KG/M2 | HEIGHT: 62 IN | WEIGHT: 196 LBS | OXYGEN SATURATION: 96 %

## 2018-11-08 DIAGNOSIS — M54.16 CHRONIC LUMBAR RADICULOPATHY: ICD-10-CM

## 2018-11-08 DIAGNOSIS — F32.2 CURRENT SEVERE EPISODE OF MAJOR DEPRESSIVE DISORDER WITHOUT PSYCHOTIC FEATURES, UNSPECIFIED WHETHER RECURRENT (HCC): ICD-10-CM

## 2018-11-08 DIAGNOSIS — G25.81 RESTLESS LEG SYNDROME: ICD-10-CM

## 2018-11-08 DIAGNOSIS — F25.9 SCHIZOAFFECTIVE DISORDER, UNSPECIFIED TYPE (HCC): ICD-10-CM

## 2018-11-08 DIAGNOSIS — F31.0 BIPOLAR AFFECTIVE DISORDER, CURRENT EPISODE HYPOMANIC (HCC): ICD-10-CM

## 2018-11-08 DIAGNOSIS — F10.20 ALCOHOLISM (HCC): ICD-10-CM

## 2018-11-08 DIAGNOSIS — G56.03 BILATERAL CARPAL TUNNEL SYNDROME: ICD-10-CM

## 2018-11-08 DIAGNOSIS — G60.9 IDIOPATHIC PERIPHERAL NEUROPATHY: ICD-10-CM

## 2018-11-08 DIAGNOSIS — I10 ESSENTIAL HYPERTENSION: ICD-10-CM

## 2018-11-08 DIAGNOSIS — G89.29 CHRONIC RIGHT SHOULDER PAIN: ICD-10-CM

## 2018-11-08 DIAGNOSIS — E66.9 OBESITY (BMI 30.0-34.9): ICD-10-CM

## 2018-11-08 DIAGNOSIS — E11.40 TYPE 2 DIABETES MELLITUS WITH DIABETIC NEUROPATHY, WITHOUT LONG-TERM CURRENT USE OF INSULIN (HCC): Primary | ICD-10-CM

## 2018-11-08 DIAGNOSIS — E78.2 MIXED HYPERLIPIDEMIA: ICD-10-CM

## 2018-11-08 DIAGNOSIS — F31.31 BIPOLAR AFFECTIVE DISORDER, CURRENTLY DEPRESSED, MILD (HCC): ICD-10-CM

## 2018-11-08 DIAGNOSIS — G47.9 SLEEPING DIFFICULTY: ICD-10-CM

## 2018-11-08 DIAGNOSIS — K21.9 GASTROESOPHAGEAL REFLUX DISEASE, ESOPHAGITIS PRESENCE NOT SPECIFIED: ICD-10-CM

## 2018-11-08 DIAGNOSIS — F41.9 ANXIETY: ICD-10-CM

## 2018-11-08 DIAGNOSIS — R26.89 BALANCE PROBLEM: ICD-10-CM

## 2018-11-08 DIAGNOSIS — Z72.0 TOBACCO ABUSE: ICD-10-CM

## 2018-11-08 DIAGNOSIS — M25.511 CHRONIC RIGHT SHOULDER PAIN: ICD-10-CM

## 2018-11-08 PROCEDURE — 2022F DILAT RTA XM EVC RTNOPTHY: CPT | Performed by: PSYCHIATRY & NEUROLOGY

## 2018-11-08 PROCEDURE — 3044F HG A1C LEVEL LT 7.0%: CPT | Performed by: PSYCHIATRY & NEUROLOGY

## 2018-11-08 PROCEDURE — 4004F PT TOBACCO SCREEN RCVD TLK: CPT | Performed by: PSYCHIATRY & NEUROLOGY

## 2018-11-08 PROCEDURE — G8427 DOCREV CUR MEDS BY ELIG CLIN: HCPCS | Performed by: PSYCHIATRY & NEUROLOGY

## 2018-11-08 PROCEDURE — 3017F COLORECTAL CA SCREEN DOC REV: CPT | Performed by: PSYCHIATRY & NEUROLOGY

## 2018-11-08 PROCEDURE — G8484 FLU IMMUNIZE NO ADMIN: HCPCS | Performed by: PSYCHIATRY & NEUROLOGY

## 2018-11-08 PROCEDURE — 99214 OFFICE O/P EST MOD 30 MIN: CPT | Performed by: PSYCHIATRY & NEUROLOGY

## 2018-11-08 PROCEDURE — G8417 CALC BMI ABV UP PARAM F/U: HCPCS | Performed by: PSYCHIATRY & NEUROLOGY

## 2018-11-08 RX ORDER — GABAPENTIN 300 MG/1
CAPSULE ORAL
Qty: 120 CAPSULE | Refills: 2 | Status: SHIPPED | OUTPATIENT
Start: 2018-11-08 | End: 2019-03-06 | Stop reason: SDUPTHER

## 2018-11-08 ASSESSMENT — ENCOUNTER SYMPTOMS
EYE ITCHING: 0
APNEA: 0
BOWEL INCONTINENCE: 0
VOMITING: 0
PHOTOPHOBIA: 0
BLOOD IN STOOL: 0
SHORTNESS OF BREATH: 0
CHOKING: 0
EYE DISCHARGE: 0
BACK PAIN: 0
WHEEZING: 0
TROUBLE SWALLOWING: 0
COLOR CHANGE: 0
ABDOMINAL DISTENTION: 0
CHEST TIGHTNESS: 0
CONSTIPATION: 0
VISUAL CHANGE: 0
VOICE CHANGE: 0
EYE PAIN: 0
EYE REDNESS: 0
FACIAL SWELLING: 0
ABDOMINAL PAIN: 0
SINUS PRESSURE: 0
DIARRHEA: 0
NAUSEA: 0

## 2018-11-08 NOTE — PROGRESS NOTES
anxious. Her affect is blunt. Her affect is not labile and not inappropriate. Her speech is not rapid and/or pressured, not delayed, not tangential and not slurred. She is not agitated, not aggressive, not hyperactive, not slowed, not withdrawn, not actively hallucinating and not combative. Thought content is not paranoid and not delusional. Cognition and memory are impaired. She does not express impulsivity or inappropriate judgment. She exhibits a depressed mood. She expresses no homicidal and no suicidal ideation. She expresses no suicidal plans and no homicidal plans. She is communicative. She exhibits abnormal recent memory. She exhibits normal remote memory. She is attentive. NEUROLOGICAL EXAMINATION :    A) MENTAL STATUS:                   Alert and  oriented  To time, place  And  Person. No Aphasia. No  Dysarthria. Able   To  Follow three  Step commands without   Any  Difficulty. No right  To left confusion. Normal  Speech  And language function. Insight and  Judgment ,Fund  Of  Knowledge   decreased              Recent  And  Remote memory  decreased              Attention &Concentration are decreased                                                 B) CRANIAL NERVES :             2 CN : Visual  Acuity  And  Visual fields  within normal limits                      Fundi  Could  Not  Be  Could  Not  Be  Evaluated. 3,4,6 CN : Both  Pupils are   Reactive and  Equal.                          Extraocular   Movements  Are  Intact. No  Nystagmus. No  RODERICK. No  Afferent  Pupillary  Defect noted. 5 CN :  Normal  Facial sensations and Corneal  Reflexes         7 CN :  Normal  Facial  Symmetry  And  Strength. No facial  Weakness.          8 CN :  Hearing  Appears within normal limits        9, 10 CN: Normal spontaneous, reflex palate movements       11 CN:   Normal  Shoulder shrug and  strength discussed. * Results  Of  The  Previous  Diagnostic tests were reviewed and questions answered. patient  understand the same. Medical  Decision  Making  Was  Made  Based on the   Complexity  Of  Above  Mentioned  Diagnoses,    Data reviewed   & diagnostic  Tests  Reviewed,  Risk  Of  Significant   Co morbidities and complicating   Factors. Medical  Decision  Was   High  Complexity  Due   To  The  Patient's  Multiple  Symptoms,  Advancing   Disease,  Complex  Treatment  Regimen,  Multiple medications and   Risk  Of   Side  Effects,  Difficulty  In  Medication  Management  And  Diagnostic  Challenges   In  View  Of  The  Associated   Co  Morbid  Conditions   And  Problems. * FALL   PRECAUTIONS. THESE  REVIEWED   AND  DISCUSSED    *   BE  CAREFUL  WITH  ACTIVITIES   INCLUDING  DRIVING. *   AVOID   NECK  AND/ BACK  STRAINING  ACTIVITIES    *   TO   WEAR   BILATERAL   WRIST  BRACES       *   TO  AVOID  PRESSURE  OVER   ULNAR  ASPECT   OF   ELBOWS    *   ADEQUATE   FLUID  INTAKE   AND  ELECTROLYTE  BALANCE         * KEEP  DAIRY  OF   THE  NEUROLOGICAL  SYMPTOMS      RECORDING THE    DURATION  AND  FREQUENCY. *  AVOID    CONDITIONS  AND  FACTORS   THAT  MAKE   NEUROLOGICAL  SYMPTOMS  WORSE. *  TO  MAINTAIN  REGULAR  SLEEP  WAKE  CYCLES. *   TO  HAVE  ADEQUATE  REST  AND   SLEEP    HOURS.          *    TO   AVOID   TO  SLEEP  IN   SUPINE  POSITION. *      WEIGHT   LOSS. *    AVOID  ANY USAGE OF                 TOBACCO,  EXCESSIVE  ALCOHOL  AND   ILLEGAL   SUBSTANCES      *  CONTINUE MEDICATIONS PRESCRIBED BY NEUROLOGIST AS    RECOMMENDED     *   Compliance   With  Medications   And  Instructions      * CURRENTLY  TOLERATING  THE  PRESCRIBED   MEDICATIONS. WITHOUT  ANY  SIGNIFICANT  SIDE  EFFECTS   &  GETTING BENEFIT.       *  May   Use  Pill  Box,    If  Needed      *    To  Continue  The    Antiplatelet  therapy    As [facility] Neurology Department   IN  3 MONTHS  TIME   FOR  FURTHER  FOLLOW UP.                 *  If   There is  Any  Significant  Worsening   Of  Current  Symptoms  And  Or  If patient  Develops   Any additional  New  Neurological  Symptoms  Or  Significant  Concerns   Should  Call  911 or  Go  To  Emergency  Department  For  Further  Immediate  Evaluation. *   The  Neurological   Findings,  Possible  Diagnosis,  Differential diagnoses   And  Options  For    Further   Investigations   And  management   Are  Discussed  Comprehensively. Medications   And  Prescription   Risks  And  Side effects  Are   Also  Discussed. The  Above  Were  Reviewed  With  patient and   questions  Answered  In  Detail. More   Than   50% of face  To face Time   Was  Spent  On  Counseling   And   Coordination  Of  Care   Of   Patient's multiple   Neurological  Problems   And   Comorbid  Medical   Conditions. Electronically signed by Wing Watson MD   Board Certified in  Neurology &  In  [facility] of Psychiatry and Neurology ([facility])      DISCLAIMER:   Although every effort was made to ensure the accuracy of this  electronic transcription, some errors in transcription may have occurred. GENERAL PATIENT INSTRUCTIONS:     A Healthy Lifestyle: Care Instructions  Your Care Instructions  A healthy lifestyle can help you feel good, stay at a healthy weight, and have plenty of energy for both work and play. A healthy lifestyle is something you can share with your whole family. A healthy lifestyle also can lower your risk for serious health problems, such as high blood pressure, heart disease, and diabetes. You can follow a few steps listed below to improve your health and the health of your family. Follow-up care is a key part of your treatment and safety. Be sure to make and go to all appointments, and call your doctor if you are having problems. diseases in the future, you will notice some benefits soon after you stop using tobacco. If you have shortness of breath or asthma symptoms, they will likely get better within a few weeks after you quit. Limit how much alcohol you drink. Moderate amounts of alcohol (up to 2 drinks a day for men, 1 drink a day for women) are okay. But drinking too much can lead to liver problems, high blood pressure, and other health problems. Family health  If you have a family, there are many things you can do together to improve your health. Eat meals together as a family as often as possible. Eat healthy foods. This includes fruits, vegetables, lean meats and dairy, and whole grains. Include your family in your fitness plan. Most people think of activities such as jogging or tennis as the way to fitness, but there are many ways you and your family can be more active. Anything that makes you breathe hard and gets your heart pumping is exercise. Here are some tips:  Walk to do errands or to take your child to school or the bus. Go for a family bike ride after dinner instead of watching TV. Where can you learn more? Go to https://[employer]. org and sign in to your [employer] account. Enter M387 in the [place] box to learn more about \"A Healthy Lifestyle: Care Instructions. \"     If you do not have an account, please click on the \"Sign Up Now\" link. Current as of: July 26, 2016  Content Version: 11.2  © 7873-8059 [employer], [employer]. Care instructions adapted under license by [facility] ([facility]). If you have questions about a medical condition or this instruction, always ask your healthcare professional. [place] [address] any warranty or liability for your use of this information.

## 2018-11-29 PROBLEM — I72.8 SPLENIC ARTERY ANEURYSM (HCC): Status: ACTIVE | Noted: 2018-11-29

## 2018-12-04 DIAGNOSIS — G89.29 CHRONIC RECTAL PAIN: ICD-10-CM

## 2018-12-04 DIAGNOSIS — K62.89 CHRONIC RECTAL PAIN: ICD-10-CM

## 2018-12-05 RX ORDER — TRAMADOL HYDROCHLORIDE 50 MG/1
50 TABLET ORAL EVERY 8 HOURS PRN
Qty: 90 TABLET | Refills: 0 | Status: SHIPPED | OUTPATIENT
Start: 2018-12-05 | End: 2019-01-03 | Stop reason: SDUPTHER

## 2018-12-20 ENCOUNTER — TELEPHONE (OUTPATIENT)
Dept: FAMILY MEDICINE CLINIC | Age: 58
End: 2018-12-20

## 2018-12-20 NOTE — TELEPHONE ENCOUNTER
Pt calling stating she is lactose intolerant and RR recommended she take Lactaid OTC, pt states she's been on it a while and states she will take it prior to eating dairy and will still have diarrhea the next day, is this how this is supposed to work, or is they something else she can try, pt uses above loaded pharmacy, please advise at above number. [facility] ambulatory encounter  FAMILY PRACTICE OFFICE VISIT    CHIEF COMPLAINT:    Establish Care and Diabetes  .    HISTORY OF PRESENT ILLNESS:   Bernarda Vance is a 37 year old female history of DM-2, GERD w/erosive esophagitis, MDD/anxiety (f/u psych at [facility]), alcoholic cirrhosis with h/o ascites & bleeding esophageal varices (f/u GI at [facility]), pancreatic pseudocyst, thrombocytopenia, transaminitis, chronic back pain, and tobacco dependencewho presented to clinic to establish care and for the followin. DM-2.  Last A1c on 8/3/18 was 12  Present Treatment: jardiance 10 mg pod with lifestyle modifications, denies any medication side effects. Was previously on metformin but was admitted for multiple issues in 2018 including acidosis which was likely due to etoh +/- metformin thus metformin was d/c. Was then tried on glipizide however did not note any improvement thus it was discontinued and she was started on Jardiance.  Compliant with treatment: Yes    Associated Symptoms Include:  polyuria.   Denies Following Symptoms:foot ulcerations, hypoglycemia , nausea, paresthesia of the feet, polydipsia, vomiting and weight loss.      Last dilated eye exam: due  Last diabetic foot exam: 18    Patient is requesting referral to gynecology she has a history of premature ovarian insufficiency as well as abnormal Pap smears.  Pap: 10/4/17 WNL. Pap 10/3/16 - (+) LGSIL (+) HPV MAAME-1. Colposcopy 16 - mild sq dysplasia ECC neg    Also requesting referral to pain management.    Denies fever, chills, nausea, vomiting, headache, lethargy, chest pain, shortness of breath, palpitations, abdominal pain/swelling, changes in urination or bowel movement, motor/sensory deficits, bleeding from any orifices, jaundice, sclera icteric, malachi-colored stool, dark Coca-Cola colored urine, presyncope, or syncope.      PAST HISTORIES:   I have reviewed the past medical history, family history, social history, medications and allergies  listed in the medical record as obtained by my nursing staff and support staff and agree with their documentation.    Review of systems:   As documented in the history of present illness.    PHYSICAL EXAM:   Vitals:    12/19/18 1320   BP: 98/70   Pulse: 92   Resp: 16   Temp: 97.9 °F (36.6 °C)   TempSrc: Temporal   SpO2: 98%   Weight: 48.9 kg   Height: 5' 3\" (1.6 m)       Constitutional: well nourished well-developed, no acute distress, non-toxic appearing.   Eyes: PERRLA, conjunctiva, lids, & sclera normal  ENT: Hearing intact. Oropharynx normal, no oral ulcers, oral mucus membranes moist.   Neck: supple, trachea midline, full ROM.   Respiratory: Clear to auscultation, breath sounds normal, no wheezes, rhonchi, or rales  Cardio: normal sinus rate & rhythm, no murmurs, gallops, or rubs. Radial pulses 2+. (-) Lower extremity edema.   GI: soft, nontender, non-distended, normal bowel sounds. No masses,or guarding/rebound. (-) Ascites.   Musculoskeletal: No clubbing or cyanosis. Moves all 4 extremities. No abnormal movements noted. Gait normal.   Skin: no jaundice, no rashes/lesions/nodules/ulcers.   Lymphatics: no cervical, preauricular, postauricular, tonsilar, submandibular, or submental lymphadenopathy   Neurological: CN 2-12 grossly normal, sensation intact in all extremities.   Psychiatric: Awake, alert, oriented x 3. Cooperative. Appropriate mood and affect. Appropriate insight & judgment.       ASSESSMENT AND PLAN:  Bernarda was seen today for establish care and diabetes.    Diagnoses and all orders for this visit:    Type 2 diabetes mellitus without complication, without long-term current use of insulin (CMS/HCC)  -     SERVICE TO OPHTHALMOLOGY  -     GLYCOHEMOGLOBIN; Future  -     LIPID PANEL WITH REFLEX; Future  -     MICROALBUMIN URINE RANDOM; Future  -     Continue jaridance as previously prescribed. Pending results will make further recommendations.     Alcoholic cirrhosis of liver with ascites (CMS/HCC)  -      CBC & AUTO DIFFERENTIAL; Future  -     COMPREHENSIVE METABOLIC PANEL; Future  -     PROTHROMBIN TIME; Future  -     AMMONIA PLASMA; Future  -     Continue current medication regiment and follow-up with GI at [facility] as scheduled.     Low grade squamous intraepithelial lesion on cytologic smear of cervix (LGSIL)  -     SERVICE TO OB GYN    Anxiety and depression  -     SERVICE TO BEHAVIORAL HEALTH - requesting to transfer from [facility]. Continue following with behavioral health team at [place] until she establishes with a new psychiatrist. Continue current medication regimen as prescribed by psychiatrist.    Chronic back pain greater than 3 months duration  -     SERVICE TO PAIN MANAGEMENT    Need for vaccination  -     INFLUENZA QUADRIVALENT SPLIT 0.5 ML VACC, IM  -     TD TOXOID ADSORBED PRES FREE VACC, 7+ YRS      Return in about 1 month (around 1/19/2019) for DM f/u.    Treatment options discussed and explained in detail with patient. The risks, benefits and potential side effects of possible medications and treatment options were reviewed. Alternatives were discussed. Medication instructions and consequences of noncompliance with treatment plan was discussed. Patient's understanding was assessed and patient agreed with the plan. Monitoring parameters and expected course outlined. Instructed patient to seek medical attention immediately if any new/worsening or urgent symptoms as discussed during the encounter. Instructions provided as documented in the after visit summary.

## 2019-01-03 DIAGNOSIS — K62.89 CHRONIC RECTAL PAIN: ICD-10-CM

## 2019-01-03 DIAGNOSIS — G89.29 CHRONIC RECTAL PAIN: ICD-10-CM

## 2019-01-03 RX ORDER — TRAMADOL HYDROCHLORIDE 50 MG/1
50 TABLET ORAL EVERY 8 HOURS PRN
Qty: 90 TABLET | Refills: 0 | Status: SHIPPED | OUTPATIENT
Start: 2019-01-03 | End: 2019-02-06 | Stop reason: SDUPTHER

## 2019-01-08 ENCOUNTER — TELEPHONE (OUTPATIENT)
Dept: FAMILY MEDICINE CLINIC | Age: 59
End: 2019-01-08

## 2019-01-08 ENCOUNTER — HOSPITAL ENCOUNTER (OUTPATIENT)
Dept: LAB | Age: 59
Discharge: HOME | End: 2019-01-08
Payer: MEDICAID

## 2019-01-08 DIAGNOSIS — E11.40 TYPE 2 DIABETES MELLITUS WITH DIABETIC NEUROPATHY, WITHOUT LONG-TERM CURRENT USE OF INSULIN (HCC): ICD-10-CM

## 2019-01-08 DIAGNOSIS — I10 ESSENTIAL HYPERTENSION: ICD-10-CM

## 2019-01-08 LAB
ALBUMIN SERPL-MCNC: 4.5 G/DL (ref 3.5–5.2)
ALBUMIN/GLOBULIN RATIO: 1.5 (ref 1–2.5)
ALP BLD-CCNC: 80 U/L (ref 35–104)
ALT SERPL-CCNC: 12 U/L (ref 5–33)
ANION GAP SERPL CALCULATED.3IONS-SCNC: 14 MMOL/L (ref 9–17)
AST SERPL-CCNC: 20 U/L
BILIRUB SERPL-MCNC: 0.55 MG/DL (ref 0.3–1.2)
BUN BLDV-MCNC: 9 MG/DL (ref 6–20)
BUN/CREAT BLD: 10 (ref 9–20)
CALCIUM SERPL-MCNC: 9.9 MG/DL (ref 8.6–10.4)
CHLORIDE BLD-SCNC: 100 MMOL/L (ref 98–107)
CHOLESTEROL/HDL RATIO: 2.3
CHOLESTEROL: 230 MG/DL
CO2: 25 MMOL/L (ref 20–31)
CREAT SERPL-MCNC: 0.94 MG/DL (ref 0.5–0.9)
ESTIMATED AVERAGE GLUCOSE: 114 MG/DL
GFR AFRICAN AMERICAN: >60 ML/MIN
GFR NON-AFRICAN AMERICAN: >60 ML/MIN
GFR SERPL CREATININE-BSD FRML MDRD: ABNORMAL ML/MIN/{1.73_M2}
GFR SERPL CREATININE-BSD FRML MDRD: ABNORMAL ML/MIN/{1.73_M2}
GLUCOSE BLD-MCNC: 102 MG/DL (ref 70–99)
HBA1C MFR BLD: 5.6 % (ref 4.8–5.9)
HDLC SERPL-MCNC: 98 MG/DL
LDL CHOLESTEROL: 81 MG/DL (ref 0–130)
POTASSIUM SERPL-SCNC: 4.6 MMOL/L (ref 3.7–5.3)
SODIUM BLD-SCNC: 139 MMOL/L (ref 135–144)
TOTAL PROTEIN: 7.5 G/DL (ref 6.4–8.3)
TRIGL SERPL-MCNC: 257 MG/DL
VLDLC SERPL CALC-MCNC: ABNORMAL MG/DL (ref 1–30)

## 2019-01-08 PROCEDURE — 80061 LIPID PANEL: CPT

## 2019-01-08 PROCEDURE — 83036 HEMOGLOBIN GLYCOSYLATED A1C: CPT

## 2019-01-08 PROCEDURE — 80053 COMPREHEN METABOLIC PANEL: CPT

## 2019-01-08 PROCEDURE — 36415 COLL VENOUS BLD VENIPUNCTURE: CPT

## 2019-01-09 DIAGNOSIS — F10.20 ALCOHOLISM (HCC): ICD-10-CM

## 2019-01-09 DIAGNOSIS — I10 ESSENTIAL HYPERTENSION: ICD-10-CM

## 2019-01-09 RX ORDER — LOSARTAN POTASSIUM 100 MG/1
TABLET ORAL
Qty: 30 TABLET | Refills: 0 | Status: SHIPPED | OUTPATIENT
Start: 2019-01-09 | End: 2019-01-15 | Stop reason: SDUPTHER

## 2019-01-09 RX ORDER — MULTIVITAMIN WITH FOLIC ACID 400 MCG
1 TABLET ORAL DAILY
Qty: 30 TABLET | Refills: 0 | Status: SHIPPED | OUTPATIENT
Start: 2019-01-09 | End: 2019-01-15 | Stop reason: SDUPTHER

## 2019-01-15 ENCOUNTER — OFFICE VISIT (OUTPATIENT)
Dept: FAMILY MEDICINE CLINIC | Age: 59
End: 2019-01-15
Payer: MEDICAID

## 2019-01-15 ENCOUNTER — HOSPITAL ENCOUNTER (OUTPATIENT)
Dept: LAB | Age: 59
Discharge: HOME | End: 2019-01-15
Payer: MEDICAID

## 2019-01-15 VITALS
WEIGHT: 192.2 LBS | SYSTOLIC BLOOD PRESSURE: 126 MMHG | DIASTOLIC BLOOD PRESSURE: 68 MMHG | BODY MASS INDEX: 35.37 KG/M2 | HEART RATE: 88 BPM | HEIGHT: 62 IN

## 2019-01-15 DIAGNOSIS — Z12.31 ENCOUNTER FOR SCREENING MAMMOGRAM FOR BREAST CANCER: ICD-10-CM

## 2019-01-15 DIAGNOSIS — F25.9 SCHIZOAFFECTIVE DISORDER, UNSPECIFIED TYPE (HCC): ICD-10-CM

## 2019-01-15 DIAGNOSIS — R25.2 LEG CRAMPS: ICD-10-CM

## 2019-01-15 DIAGNOSIS — F31.31 BIPOLAR AFFECTIVE DISORDER, CURRENTLY DEPRESSED, MILD (HCC): ICD-10-CM

## 2019-01-15 DIAGNOSIS — K21.9 GASTROESOPHAGEAL REFLUX DISEASE, ESOPHAGITIS PRESENCE NOT SPECIFIED: ICD-10-CM

## 2019-01-15 DIAGNOSIS — M25.511 CHRONIC RIGHT SHOULDER PAIN: ICD-10-CM

## 2019-01-15 DIAGNOSIS — E78.2 MIXED HYPERLIPIDEMIA: ICD-10-CM

## 2019-01-15 DIAGNOSIS — G89.29 CHRONIC RIGHT SHOULDER PAIN: ICD-10-CM

## 2019-01-15 DIAGNOSIS — F32.2 CURRENT SEVERE EPISODE OF MAJOR DEPRESSIVE DISORDER WITHOUT PSYCHOTIC FEATURES, UNSPECIFIED WHETHER RECURRENT (HCC): ICD-10-CM

## 2019-01-15 DIAGNOSIS — E11.40 TYPE 2 DIABETES MELLITUS WITH DIABETIC NEUROPATHY, WITHOUT LONG-TERM CURRENT USE OF INSULIN (HCC): Primary | ICD-10-CM

## 2019-01-15 DIAGNOSIS — F10.20 ALCOHOLISM (HCC): ICD-10-CM

## 2019-01-15 DIAGNOSIS — J42 CHRONIC BRONCHITIS, UNSPECIFIED CHRONIC BRONCHITIS TYPE (HCC): ICD-10-CM

## 2019-01-15 DIAGNOSIS — I10 ESSENTIAL HYPERTENSION: ICD-10-CM

## 2019-01-15 DIAGNOSIS — E53.8 VITAMIN B12 DEFICIENCY: ICD-10-CM

## 2019-01-15 LAB
ABSOLUTE EOS #: 0.1 K/UL (ref 0–0.4)
ABSOLUTE IMMATURE GRANULOCYTE: NORMAL K/UL (ref 0–0.3)
ABSOLUTE LYMPH #: 1.5 K/UL (ref 1–4.8)
ABSOLUTE MONO #: 0.5 K/UL (ref 0.1–1.2)
BASOPHILS # BLD: 0 % (ref 0–1)
BASOPHILS ABSOLUTE: 0 K/UL (ref 0–0.2)
DIFFERENTIAL TYPE: NORMAL
EOSINOPHILS RELATIVE PERCENT: 1 % (ref 1–7)
HCT VFR BLD CALC: 39 % (ref 36–46)
HEMOGLOBIN: 12.9 G/DL (ref 12–16)
IMMATURE GRANULOCYTES: NORMAL %
LYMPHOCYTES # BLD: 23 % (ref 16–46)
MCH RBC QN AUTO: 30 PG (ref 26–34)
MCHC RBC AUTO-ENTMCNC: 33 G/DL (ref 31–37)
MCV RBC AUTO: 90.9 FL (ref 80–100)
MONOCYTES # BLD: 9 % (ref 4–11)
NRBC AUTOMATED: NORMAL PER 100 WBC
PDW BLD-RTO: 14.5 % (ref 11–14.5)
PLATELET # BLD: 254 K/UL (ref 140–450)
PLATELET ESTIMATE: NORMAL
PMV BLD AUTO: 8.2 FL (ref 6–12)
RBC # BLD: 4.29 M/UL (ref 4–5.2)
RBC # BLD: NORMAL 10*6/UL
SEG NEUTROPHILS: 67 % (ref 43–77)
SEGMENTED NEUTROPHILS ABSOLUTE COUNT: 4.3 K/UL (ref 1.8–7.7)
WBC # BLD: 6.4 K/UL (ref 3.5–11)
WBC # BLD: NORMAL 10*3/UL

## 2019-01-15 PROCEDURE — G8417 CALC BMI ABV UP PARAM F/U: HCPCS | Performed by: FAMILY MEDICINE

## 2019-01-15 PROCEDURE — 3023F SPIROM DOC REV: CPT | Performed by: FAMILY MEDICINE

## 2019-01-15 PROCEDURE — 85025 COMPLETE CBC W/AUTO DIFF WBC: CPT

## 2019-01-15 PROCEDURE — G8427 DOCREV CUR MEDS BY ELIG CLIN: HCPCS | Performed by: FAMILY MEDICINE

## 2019-01-15 PROCEDURE — G8926 SPIRO NO PERF OR DOC: HCPCS | Performed by: FAMILY MEDICINE

## 2019-01-15 PROCEDURE — 1036F TOBACCO NON-USER: CPT | Performed by: FAMILY MEDICINE

## 2019-01-15 PROCEDURE — 3044F HG A1C LEVEL LT 7.0%: CPT | Performed by: FAMILY MEDICINE

## 2019-01-15 PROCEDURE — 3017F COLORECTAL CA SCREEN DOC REV: CPT | Performed by: FAMILY MEDICINE

## 2019-01-15 PROCEDURE — 99214 OFFICE O/P EST MOD 30 MIN: CPT | Performed by: FAMILY MEDICINE

## 2019-01-15 PROCEDURE — 36415 COLL VENOUS BLD VENIPUNCTURE: CPT

## 2019-01-15 PROCEDURE — 2022F DILAT RTA XM EVC RTNOPTHY: CPT | Performed by: FAMILY MEDICINE

## 2019-01-15 PROCEDURE — G8484 FLU IMMUNIZE NO ADMIN: HCPCS | Performed by: FAMILY MEDICINE

## 2019-01-15 RX ORDER — PRAVASTATIN SODIUM 20 MG
TABLET ORAL
Qty: 30 TABLET | Refills: 5 | Status: SHIPPED | OUTPATIENT
Start: 2019-01-15 | End: 2019-07-20 | Stop reason: SDUPTHER

## 2019-01-15 RX ORDER — ASPIRIN 325 MG
TABLET ORAL
Qty: 30 TABLET | Refills: 5 | Status: SHIPPED | OUTPATIENT
Start: 2019-01-15 | End: 2020-02-03 | Stop reason: SDUPTHER

## 2019-01-15 RX ORDER — LOSARTAN POTASSIUM 100 MG/1
TABLET ORAL
Qty: 30 TABLET | Refills: 5 | Status: SHIPPED | OUTPATIENT
Start: 2019-01-15 | End: 2019-08-15 | Stop reason: SDUPTHER

## 2019-01-15 RX ORDER — MULTIVITAMIN WITH FOLIC ACID 400 MCG
1 TABLET ORAL DAILY
Qty: 30 TABLET | Refills: 5 | Status: SHIPPED | OUTPATIENT
Start: 2019-01-15 | End: 2019-08-15 | Stop reason: SDUPTHER

## 2019-01-15 RX ORDER — CELECOXIB 200 MG/1
CAPSULE ORAL
Qty: 60 CAPSULE | Refills: 5 | Status: SHIPPED | OUTPATIENT
Start: 2019-01-15 | End: 2019-09-25 | Stop reason: SDUPTHER

## 2019-01-15 RX ORDER — OMEPRAZOLE 20 MG/1
CAPSULE, DELAYED RELEASE ORAL
Qty: 30 CAPSULE | Refills: 5 | Status: SHIPPED | OUTPATIENT
Start: 2019-01-15 | End: 2019-07-20 | Stop reason: SDUPTHER

## 2019-01-16 DIAGNOSIS — R25.2 LEG CRAMPS: Primary | ICD-10-CM

## 2019-01-23 ENCOUNTER — TELEPHONE (OUTPATIENT)
Dept: FAMILY MEDICINE CLINIC | Age: 59
End: 2019-01-23

## 2019-01-24 ENCOUNTER — TELEPHONE (OUTPATIENT)
Dept: NEUROLOGY | Age: 59
End: 2019-01-24

## 2019-01-24 DIAGNOSIS — F10.20 ALCOHOLISM (HCC): ICD-10-CM

## 2019-01-24 DIAGNOSIS — E11.40 TYPE 2 DIABETES MELLITUS WITH DIABETIC NEUROPATHY, WITHOUT LONG-TERM CURRENT USE OF INSULIN (HCC): ICD-10-CM

## 2019-01-24 DIAGNOSIS — G25.81 RESTLESS LEG SYNDROME: ICD-10-CM

## 2019-01-24 DIAGNOSIS — R26.89 BALANCE PROBLEM: ICD-10-CM

## 2019-01-24 DIAGNOSIS — M79.661 BILATERAL CALF PAIN: ICD-10-CM

## 2019-01-24 DIAGNOSIS — M54.16 CHRONIC LUMBAR RADICULOPATHY: ICD-10-CM

## 2019-01-24 DIAGNOSIS — M79.662 BILATERAL CALF PAIN: ICD-10-CM

## 2019-01-24 DIAGNOSIS — G60.9 IDIOPATHIC PERIPHERAL NEUROPATHY: Primary | ICD-10-CM

## 2019-01-30 ENCOUNTER — TELEPHONE (OUTPATIENT)
Dept: PRIMARY CARE CLINIC | Age: 59
End: 2019-01-30

## 2019-02-06 DIAGNOSIS — K62.89 CHRONIC RECTAL PAIN: ICD-10-CM

## 2019-02-06 DIAGNOSIS — G89.29 CHRONIC RECTAL PAIN: ICD-10-CM

## 2019-02-06 RX ORDER — TRAMADOL HYDROCHLORIDE 50 MG/1
50 TABLET ORAL EVERY 8 HOURS PRN
Qty: 90 TABLET | Refills: 0 | Status: SHIPPED | OUTPATIENT
Start: 2019-02-06 | End: 2019-03-05 | Stop reason: SDUPTHER

## 2019-02-07 ENCOUNTER — TELEPHONE (OUTPATIENT)
Dept: FAMILY MEDICINE CLINIC | Age: 59
End: 2019-02-07

## 2019-02-07 DIAGNOSIS — L81.9 DISCOLORATION OF SKIN OF FACE: Primary | ICD-10-CM

## 2019-02-12 DIAGNOSIS — Z72.0 TOBACCO ABUSE: ICD-10-CM

## 2019-02-13 RX ORDER — NICOTINE 21 MG/24HR
1 PATCH, TRANSDERMAL 24 HOURS TRANSDERMAL EVERY 24 HOURS
Qty: 30 PATCH | Refills: 3 | OUTPATIENT
Start: 2019-02-13 | End: 2020-02-13

## 2019-02-19 DIAGNOSIS — N32.81 OVERACTIVE BLADDER: ICD-10-CM

## 2019-02-19 RX ORDER — OXYBUTYNIN CHLORIDE 5 MG/1
5 TABLET ORAL 2 TIMES DAILY
Qty: 60 TABLET | Refills: 3 | Status: SHIPPED | OUTPATIENT
Start: 2019-02-19 | End: 2019-06-20 | Stop reason: ALTCHOICE

## 2019-02-22 DIAGNOSIS — Z72.0 TOBACCO ABUSE: ICD-10-CM

## 2019-02-26 RX ORDER — NICOTINE 21 MG/24HR
1 PATCH, TRANSDERMAL 24 HOURS TRANSDERMAL EVERY 24 HOURS
Qty: 30 PATCH | Refills: 3 | Status: SHIPPED | OUTPATIENT
Start: 2019-02-26 | End: 2019-03-01 | Stop reason: DRUGHIGH

## 2019-02-27 ENCOUNTER — OFFICE VISIT (OUTPATIENT)
Dept: OTOLARYNGOLOGY | Age: 59
End: 2019-02-27
Payer: MEDICAID

## 2019-02-27 VITALS
BODY MASS INDEX: 35.33 KG/M2 | WEIGHT: 192 LBS | DIASTOLIC BLOOD PRESSURE: 78 MMHG | HEIGHT: 62 IN | HEART RATE: 72 BPM | RESPIRATION RATE: 14 BRPM | SYSTOLIC BLOOD PRESSURE: 124 MMHG

## 2019-02-27 DIAGNOSIS — H93.19 TINNITUS, UNSPECIFIED LATERALITY: Primary | ICD-10-CM

## 2019-02-27 PROCEDURE — G8484 FLU IMMUNIZE NO ADMIN: HCPCS | Performed by: NURSE PRACTITIONER

## 2019-02-27 PROCEDURE — G8427 DOCREV CUR MEDS BY ELIG CLIN: HCPCS | Performed by: NURSE PRACTITIONER

## 2019-02-27 PROCEDURE — 99203 OFFICE O/P NEW LOW 30 MIN: CPT | Performed by: NURSE PRACTITIONER

## 2019-02-27 PROCEDURE — G8417 CALC BMI ABV UP PARAM F/U: HCPCS | Performed by: NURSE PRACTITIONER

## 2019-02-27 PROCEDURE — 1036F TOBACCO NON-USER: CPT | Performed by: NURSE PRACTITIONER

## 2019-02-27 PROCEDURE — 3017F COLORECTAL CA SCREEN DOC REV: CPT | Performed by: NURSE PRACTITIONER

## 2019-02-27 ASSESSMENT — ENCOUNTER SYMPTOMS: RESPIRATORY NEGATIVE: 1

## 2019-02-28 ENCOUNTER — TELEPHONE (OUTPATIENT)
Dept: FAMILY MEDICINE CLINIC | Age: 59
End: 2019-02-28

## 2019-02-28 DIAGNOSIS — F17.219 CIGARETTE NICOTINE DEPENDENCE WITH NICOTINE-INDUCED DISORDER: Primary | ICD-10-CM

## 2019-02-28 DIAGNOSIS — Z72.0 TOBACCO ABUSE: ICD-10-CM

## 2019-03-01 RX ORDER — NICOTINE 21 MG/24HR
1 PATCH, TRANSDERMAL 24 HOURS TRANSDERMAL DAILY
Qty: 30 PATCH | Refills: 2 | Status: SHIPPED | OUTPATIENT
Start: 2019-03-01 | End: 2019-06-11 | Stop reason: SDUPTHER

## 2019-03-05 DIAGNOSIS — G89.29 CHRONIC RECTAL PAIN: ICD-10-CM

## 2019-03-05 DIAGNOSIS — K62.89 CHRONIC RECTAL PAIN: ICD-10-CM

## 2019-03-05 RX ORDER — TRAMADOL HYDROCHLORIDE 50 MG/1
50 TABLET ORAL EVERY 8 HOURS PRN
Qty: 90 TABLET | Refills: 0 | Status: SHIPPED | OUTPATIENT
Start: 2019-03-05 | End: 2019-04-02 | Stop reason: SDUPTHER

## 2019-03-06 RX ORDER — GABAPENTIN 300 MG/1
CAPSULE ORAL
Qty: 120 CAPSULE | Refills: 0 | Status: SHIPPED | OUTPATIENT
Start: 2019-03-06 | End: 2019-03-14 | Stop reason: SDUPTHER

## 2019-03-13 ENCOUNTER — HOSPITAL ENCOUNTER (OUTPATIENT)
Dept: NEUROLOGY | Age: 59
Discharge: HOME | End: 2019-03-13
Payer: MEDICAID

## 2019-03-13 DIAGNOSIS — R26.89 BALANCE PROBLEM: ICD-10-CM

## 2019-03-13 DIAGNOSIS — G60.9 IDIOPATHIC PERIPHERAL NEUROPATHY: ICD-10-CM

## 2019-03-13 DIAGNOSIS — M79.662 BILATERAL CALF PAIN: ICD-10-CM

## 2019-03-13 DIAGNOSIS — G25.81 RESTLESS LEG SYNDROME: ICD-10-CM

## 2019-03-13 DIAGNOSIS — F10.20 ALCOHOLISM (HCC): ICD-10-CM

## 2019-03-13 DIAGNOSIS — E11.40 TYPE 2 DIABETES MELLITUS WITH DIABETIC NEUROPATHY, WITHOUT LONG-TERM CURRENT USE OF INSULIN (HCC): ICD-10-CM

## 2019-03-13 DIAGNOSIS — M54.16 CHRONIC LUMBAR RADICULOPATHY: ICD-10-CM

## 2019-03-13 DIAGNOSIS — M79.661 BILATERAL CALF PAIN: ICD-10-CM

## 2019-03-13 PROCEDURE — 95886 MUSC TEST DONE W/N TEST COMP: CPT

## 2019-03-13 PROCEDURE — 95911 NRV CNDJ TEST 9-10 STUDIES: CPT

## 2019-03-14 ENCOUNTER — OFFICE VISIT (OUTPATIENT)
Dept: NEUROLOGY | Age: 59
End: 2019-03-14
Payer: MEDICAID

## 2019-03-14 VITALS
WEIGHT: 192.02 LBS | BODY MASS INDEX: 35.34 KG/M2 | OXYGEN SATURATION: 98 % | HEART RATE: 91 BPM | SYSTOLIC BLOOD PRESSURE: 132 MMHG | HEIGHT: 62 IN | DIASTOLIC BLOOD PRESSURE: 68 MMHG

## 2019-03-14 DIAGNOSIS — G47.9 SLEEPING DIFFICULTY: ICD-10-CM

## 2019-03-14 DIAGNOSIS — R53.83 FATIGUE DUE TO DEPRESSION: ICD-10-CM

## 2019-03-14 DIAGNOSIS — F31.31 BIPOLAR AFFECTIVE DISORDER, CURRENTLY DEPRESSED, MILD (HCC): ICD-10-CM

## 2019-03-14 DIAGNOSIS — G89.29 CHRONIC RIGHT SHOULDER PAIN: ICD-10-CM

## 2019-03-14 DIAGNOSIS — M25.511 CHRONIC RIGHT SHOULDER PAIN: ICD-10-CM

## 2019-03-14 DIAGNOSIS — G63 POLYNEUROPATHY ASSOCIATED WITH UNDERLYING DISEASE (HCC): ICD-10-CM

## 2019-03-14 DIAGNOSIS — G56.03 BILATERAL CARPAL TUNNEL SYNDROME: ICD-10-CM

## 2019-03-14 DIAGNOSIS — G25.81 RESTLESS LEG SYNDROME: ICD-10-CM

## 2019-03-14 DIAGNOSIS — F10.20 ALCOHOLISM (HCC): ICD-10-CM

## 2019-03-14 DIAGNOSIS — F50.89 PICA: ICD-10-CM

## 2019-03-14 DIAGNOSIS — G60.9 IDIOPATHIC PERIPHERAL NEUROPATHY: Primary | ICD-10-CM

## 2019-03-14 DIAGNOSIS — Z72.0 TOBACCO ABUSE: ICD-10-CM

## 2019-03-14 DIAGNOSIS — F41.9 ANXIETY: ICD-10-CM

## 2019-03-14 DIAGNOSIS — F32.2 CURRENT SEVERE EPISODE OF MAJOR DEPRESSIVE DISORDER WITHOUT PSYCHOTIC FEATURES, UNSPECIFIED WHETHER RECURRENT (HCC): ICD-10-CM

## 2019-03-14 DIAGNOSIS — F25.0 SCHIZOAFFECTIVE DISORDER, BIPOLAR TYPE (HCC): ICD-10-CM

## 2019-03-14 DIAGNOSIS — R26.89 BALANCE PROBLEM: ICD-10-CM

## 2019-03-14 DIAGNOSIS — M54.16 CHRONIC LUMBAR RADICULOPATHY: ICD-10-CM

## 2019-03-14 DIAGNOSIS — F25.9 SCHIZOAFFECTIVE DISORDER, UNSPECIFIED TYPE (HCC): ICD-10-CM

## 2019-03-14 DIAGNOSIS — F32.A FATIGUE DUE TO DEPRESSION: ICD-10-CM

## 2019-03-14 DIAGNOSIS — E11.40 TYPE 2 DIABETES MELLITUS WITH DIABETIC NEUROPATHY, WITHOUT LONG-TERM CURRENT USE OF INSULIN (HCC): ICD-10-CM

## 2019-03-14 DIAGNOSIS — E66.9 OBESITY (BMI 30.0-34.9): ICD-10-CM

## 2019-03-14 DIAGNOSIS — E78.2 MIXED HYPERLIPIDEMIA: ICD-10-CM

## 2019-03-14 DIAGNOSIS — F34.1 DYSTHYMIA: ICD-10-CM

## 2019-03-14 DIAGNOSIS — I10 ESSENTIAL HYPERTENSION: ICD-10-CM

## 2019-03-14 PROCEDURE — G8427 DOCREV CUR MEDS BY ELIG CLIN: HCPCS | Performed by: PSYCHIATRY & NEUROLOGY

## 2019-03-14 PROCEDURE — 99215 OFFICE O/P EST HI 40 MIN: CPT | Performed by: PSYCHIATRY & NEUROLOGY

## 2019-03-14 PROCEDURE — G8417 CALC BMI ABV UP PARAM F/U: HCPCS | Performed by: PSYCHIATRY & NEUROLOGY

## 2019-03-14 PROCEDURE — 3044F HG A1C LEVEL LT 7.0%: CPT | Performed by: PSYCHIATRY & NEUROLOGY

## 2019-03-14 PROCEDURE — 1036F TOBACCO NON-USER: CPT | Performed by: PSYCHIATRY & NEUROLOGY

## 2019-03-14 PROCEDURE — G8484 FLU IMMUNIZE NO ADMIN: HCPCS | Performed by: PSYCHIATRY & NEUROLOGY

## 2019-03-14 PROCEDURE — 3017F COLORECTAL CA SCREEN DOC REV: CPT | Performed by: PSYCHIATRY & NEUROLOGY

## 2019-03-14 PROCEDURE — 2022F DILAT RTA XM EVC RTNOPTHY: CPT | Performed by: PSYCHIATRY & NEUROLOGY

## 2019-03-14 RX ORDER — AMMONIUM LACTATE 12 G/100G
LOTION TOPICAL
Qty: 396 G | Refills: 1 | Status: SHIPPED | OUTPATIENT
Start: 2019-03-14 | End: 2019-09-25

## 2019-03-14 RX ORDER — FLUOXETINE 10 MG/1
10 CAPSULE ORAL DAILY
COMMUNITY
End: 2019-06-20 | Stop reason: ALTCHOICE

## 2019-03-14 RX ORDER — GABAPENTIN 300 MG/1
CAPSULE ORAL
Qty: 120 CAPSULE | Refills: 2 | Status: SHIPPED | OUTPATIENT
Start: 2019-03-14 | End: 2019-06-20 | Stop reason: SDUPTHER

## 2019-03-14 ASSESSMENT — ENCOUNTER SYMPTOMS
CONSTIPATION: 0
PHOTOPHOBIA: 0
EYE DISCHARGE: 0
VOICE CHANGE: 0
BOWEL INCONTINENCE: 0
EYE ITCHING: 0
BACK PAIN: 0
VISUAL CHANGE: 0
SINUS PRESSURE: 0
CHOKING: 0
TROUBLE SWALLOWING: 0
EYE PAIN: 0
BLOOD IN STOOL: 0
DIARRHEA: 0
CHEST TIGHTNESS: 0
ABDOMINAL DISTENTION: 0
VOMITING: 0
NAUSEA: 0
EYE REDNESS: 0
SHORTNESS OF BREATH: 0
ABDOMINAL PAIN: 0
WHEEZING: 0
APNEA: 0
COLOR CHANGE: 0
FACIAL SWELLING: 0

## 2019-03-26 ENCOUNTER — TELEPHONE (OUTPATIENT)
Dept: FAMILY MEDICINE CLINIC | Age: 59
End: 2019-03-26

## 2019-03-26 DIAGNOSIS — R19.7 DIARRHEA, UNSPECIFIED TYPE: Primary | ICD-10-CM

## 2019-04-02 ENCOUNTER — TELEPHONE (OUTPATIENT)
Dept: PRIMARY CARE CLINIC | Age: 59
End: 2019-04-02

## 2019-04-02 DIAGNOSIS — K62.89 CHRONIC RECTAL PAIN: ICD-10-CM

## 2019-04-02 DIAGNOSIS — G89.29 CHRONIC RECTAL PAIN: ICD-10-CM

## 2019-04-02 RX ORDER — TRAMADOL HYDROCHLORIDE 50 MG/1
50 TABLET ORAL EVERY 8 HOURS PRN
Qty: 90 TABLET | Refills: 0 | Status: SHIPPED | OUTPATIENT
Start: 2019-04-02 | End: 2019-04-27 | Stop reason: SDUPTHER

## 2019-04-02 NOTE — TELEPHONE ENCOUNTER
Last ov 01/15/19,next ov 05/15/19. Per [facility] requested medication last filled 03/05/19 #90,30 day supply.

## 2019-04-26 ENCOUNTER — HOSPITAL ENCOUNTER (OUTPATIENT)
Dept: GENERAL RADIOLOGY | Age: 59
Discharge: HOME | End: 2019-04-28
Payer: MEDICAID

## 2019-04-26 ENCOUNTER — OFFICE VISIT (OUTPATIENT)
Dept: PRIMARY CARE CLINIC | Age: 59
End: 2019-04-26
Payer: MEDICAID

## 2019-04-26 VITALS
HEART RATE: 77 BPM | WEIGHT: 193 LBS | OXYGEN SATURATION: 98 % | BODY MASS INDEX: 35.51 KG/M2 | RESPIRATION RATE: 14 BRPM | HEIGHT: 62 IN | TEMPERATURE: 98.9 F | SYSTOLIC BLOOD PRESSURE: 136 MMHG | DIASTOLIC BLOOD PRESSURE: 72 MMHG

## 2019-04-26 DIAGNOSIS — S83.411A SPRAIN OF MEDIAL COLLATERAL LIGAMENT OF RIGHT KNEE, INITIAL ENCOUNTER: ICD-10-CM

## 2019-04-26 DIAGNOSIS — M25.561 ACUTE PAIN OF RIGHT KNEE: Primary | ICD-10-CM

## 2019-04-26 DIAGNOSIS — M25.561 ACUTE PAIN OF RIGHT KNEE: ICD-10-CM

## 2019-04-26 PROCEDURE — 1036F TOBACCO NON-USER: CPT | Performed by: PHYSICIAN ASSISTANT

## 2019-04-26 PROCEDURE — 99213 OFFICE O/P EST LOW 20 MIN: CPT | Performed by: PHYSICIAN ASSISTANT

## 2019-04-26 PROCEDURE — G8427 DOCREV CUR MEDS BY ELIG CLIN: HCPCS | Performed by: PHYSICIAN ASSISTANT

## 2019-04-26 PROCEDURE — 3017F COLORECTAL CA SCREEN DOC REV: CPT | Performed by: PHYSICIAN ASSISTANT

## 2019-04-26 PROCEDURE — G8417 CALC BMI ABV UP PARAM F/U: HCPCS | Performed by: PHYSICIAN ASSISTANT

## 2019-04-26 PROCEDURE — 73562 X-RAY EXAM OF KNEE 3: CPT

## 2019-04-26 RX ORDER — METHYLPREDNISOLONE 4 MG/1
TABLET ORAL
Qty: 1 KIT | Refills: 0 | Status: SHIPPED | OUTPATIENT
Start: 2019-04-26 | End: 2019-05-02

## 2019-04-26 ASSESSMENT — PATIENT HEALTH QUESTIONNAIRE - PHQ9
SUM OF ALL RESPONSES TO PHQ QUESTIONS 1-9: 0
SUM OF ALL RESPONSES TO PHQ QUESTIONS 1-9: 0
1. LITTLE INTEREST OR PLEASURE IN DOING THINGS: 0
SUM OF ALL RESPONSES TO PHQ9 QUESTIONS 1 & 2: 0
2. FEELING DOWN, DEPRESSED OR HOPELESS: 0

## 2019-04-26 ASSESSMENT — ENCOUNTER SYMPTOMS: NAUSEA: 0

## 2019-04-26 NOTE — PROGRESS NOTES
Subjective:      Patient ID: Fred Ambrose is a 63 y.o. female. Patient is seen due to a painful right knee especially medially. It is bruised laterally. Noted this the past 3 days. Yesterday swelling started. Has a high bed and uses her knee to get into bed. Can not do that now due to pain. No treatment. No ice or heat. No falls or trauma. Review of Systems   Gastrointestinal: Negative for nausea. Musculoskeletal: Positive for arthralgias, gait problem and joint swelling. Does not always use cane is now. Feels like it could give out. Has not yet. Neurological: Positive for weakness. Psychiatric/Behavioral: Positive for sleep disturbance. Maybe feels if rolls over in bed does not keep her awake.      Past Medical History:   Diagnosis Date    Alcoholism ([address]     Anxiety     Astigmatism with presbyopia     Balance problem     Bipolar disorder ([facility])     schizoaffective disorder (Dr. Verna Henao)    Chronic lumbar radiculopathy     Chronic rectal pain     due to fissures    Chronic shoulder pain     bilaterally, due to osteoarthritis    CTS (carpal tunnel syndrome)     Depression     Fatigue     Hyperlipidemia     Hypertension     Obesity     Peripheral neuropathy     Pica     eats bar soap    Restless leg syndrome     Schizoaffective disorder ([address]     Sleeping difficulty     Tobacco abuse     Type 2 diabetes mellitus ([address]      Past Surgical History:   Procedure Laterality Date    ABSCESS DRAINAGE  2001    perianal    ANUS SURGERY  2001    lateral internal sphincterotomy    BLADDER SUSPENSION  2000    transvaginal sling procedure for incontinence    COLONOSCOPY  2007    polypectomy x 1 (pathology: hyperplastic polyp), repair of perianal fistula    CYST REMOVAL Right 1992    wrist    OTHER SURGICAL HISTORY  2002    perianal fistula repair eua    OTHER SURGICAL HISTORY  1993    cystourethroscopy    UPPER GASTROINTESTINAL ENDOSCOPY  2007    Schatzki's ring, acute gastritis, hiatal hernia    UPPER GASTROINTESTINAL ENDOSCOPY      hiatal hernia, esophagitis, gastritis     Social History     Socioeconomic History    Marital status: Single     Spouse name: Not on file    Number of children: Not on file    Years of education: Not on file    Highest education level: Not on file   Occupational History    Not on file   Social Needs    Financial resource strain: Not on file    Food insecurity:     Worry: Not on file     Inability: Not on file    Transportation needs:     Medical: Not on file     Non-medical: Not on file   Tobacco Use    Smoking status: Former Smoker     Packs/day: 1.00     Years: 26.00     Pack years: 26.00     Types: Cigarettes     Last attempt to quit: 2018     Years since quittin.3    Smokeless tobacco: Never Used   Substance and Sexual Activity    Alcohol use:  Yes     Alcohol/week: 0.0 oz     Comment: daily; \"2 tallboys per day\"    Drug use: No    Sexual activity: Not on file   Lifestyle    Physical activity:     Days per week: Not on file     Minutes per session: Not on file    Stress: Not on file   Relationships    Social connections:     Talks on phone: Not on file     Gets together: Not on file     Attends Zoroastrian service: Not on file     Active member of club or organization: Not on file     Attends meetings of clubs or organizations: Not on file     Relationship status: Not on file    Intimate partner violence:     Fear of current or ex partner: Not on file     Emotionally abused: Not on file     Physically abused: Not on file     Forced sexual activity: Not on file   Other Topics Concern    Not on file   Social History Narrative    Not on file     Family History   Problem Relation Age of Onset    Stroke Mother     Diabetes Mother     High Blood Pressure Mother     Cataracts Mother     Stroke Father     Diabetes Father     Heart Disease Sister 72    Diabetes Daughter     Stroke Sister 68        Brain aneurysm that ruptured and then she got kidney failure and intestinal infection and     Glaucoma Neg Hx        No Known Allergies    /72   Pulse 77   Temp 98.9 °F (37.2 °C)   Resp 14   Ht 5' 2\" (1.575 m)   Wt 193 lb (87.5 kg)   LMP 2011   SpO2 98%   BMI 35.30 kg/m²     Objective:   Physical Exam   Constitutional: She is oriented to person, place, and time. She appears well-developed and well-nourished. No distress. HENT:   Head: Normocephalic and atraumatic. Eyes: No scleral icterus. Cardiovascular: Intact distal pulses. Pulmonary/Chest: Effort normal.   Musculoskeletal: Normal range of motion. She exhibits tenderness. She has a lot of pain in the medial right knee. Some pain over the anterior knee with palpation not like the medial joint line. Mild swelling noted in the medial knee. No redness but warmth noted. No significant crepitus noted. Some pain with varus valgus maneuvers but no laxity. Drawer negative. Some pain with flexion extension of the knee. Negative grind test.  No significant fluid noted in the knee with ballottment. Neurological: She is alert and oriented to person, place, and time. She has a nl gait. Nl strength and sensation lower extremities bilaterally. Skin: Skin is warm and dry. No rash noted. No erythema. Psychiatric: She has a normal mood and affect. Nursing note and vitals reviewed. Assessment:       Diagnosis Orders   1. Acute pain of right knee  XR KNEE RIGHT (3 VIEWS)    Elastic Bandages & Supports (KNEE BRACE) MISC    methylPREDNISolone (MEDROL DOSEPACK) 4 MG tablet   2.  Sprain of medial collateral ligament of right knee, initial encounter  XR KNEE RIGHT (3 VIEWS)    Elastic Bandages & Supports (KNEE BRACE) MISC    methylPREDNISolone (MEDROL DOSEPACK) 4 MG tablet           Plan:      Ice heat to the knee  Answered her questions  She will be notified of xray findings  Follow up not improving or worsens  Fall prevention  Script for knee brace given to patient I think this will help a lot          ANABEL Rios

## 2019-04-27 DIAGNOSIS — K62.89 CHRONIC RECTAL PAIN: ICD-10-CM

## 2019-04-27 DIAGNOSIS — G89.29 CHRONIC RECTAL PAIN: ICD-10-CM

## 2019-04-30 DIAGNOSIS — G89.29 CHRONIC RECTAL PAIN: ICD-10-CM

## 2019-04-30 DIAGNOSIS — K62.89 CHRONIC RECTAL PAIN: ICD-10-CM

## 2019-04-30 RX ORDER — TRAMADOL HYDROCHLORIDE 50 MG/1
TABLET ORAL
Qty: 90 TABLET | Refills: 0 | Status: SHIPPED | OUTPATIENT
Start: 2019-04-30 | End: 2019-05-28 | Stop reason: SDUPTHER

## 2019-04-30 NOTE — TELEPHONE ENCOUNTER
Last ov 01/15/19,next ov 05/15/19. Per [facility] requested medication last filled 04/02/19 #90,23 day supply.

## 2019-05-01 RX ORDER — TRAMADOL HYDROCHLORIDE 50 MG/1
TABLET ORAL
Qty: 90 TABLET | Refills: 0 | OUTPATIENT
Start: 2019-05-01

## 2019-05-15 ENCOUNTER — OFFICE VISIT (OUTPATIENT)
Dept: FAMILY MEDICINE CLINIC | Age: 59
End: 2019-05-15
Payer: MEDICAID

## 2019-05-15 VITALS
RESPIRATION RATE: 16 BRPM | DIASTOLIC BLOOD PRESSURE: 84 MMHG | BODY MASS INDEX: 35.85 KG/M2 | HEIGHT: 62 IN | HEART RATE: 72 BPM | WEIGHT: 194.8 LBS | SYSTOLIC BLOOD PRESSURE: 128 MMHG

## 2019-05-15 DIAGNOSIS — E11.40 TYPE 2 DIABETES MELLITUS WITH DIABETIC NEUROPATHY, WITHOUT LONG-TERM CURRENT USE OF INSULIN (HCC): Primary | ICD-10-CM

## 2019-05-15 DIAGNOSIS — M25.561 RIGHT KNEE PAIN, UNSPECIFIED CHRONICITY: ICD-10-CM

## 2019-05-15 PROCEDURE — 1036F TOBACCO NON-USER: CPT | Performed by: FAMILY MEDICINE

## 2019-05-15 PROCEDURE — 3017F COLORECTAL CA SCREEN DOC REV: CPT | Performed by: FAMILY MEDICINE

## 2019-05-15 PROCEDURE — 2022F DILAT RTA XM EVC RTNOPTHY: CPT | Performed by: FAMILY MEDICINE

## 2019-05-15 PROCEDURE — G8427 DOCREV CUR MEDS BY ELIG CLIN: HCPCS | Performed by: FAMILY MEDICINE

## 2019-05-15 PROCEDURE — 99213 OFFICE O/P EST LOW 20 MIN: CPT | Performed by: FAMILY MEDICINE

## 2019-05-15 PROCEDURE — 3044F HG A1C LEVEL LT 7.0%: CPT | Performed by: FAMILY MEDICINE

## 2019-05-15 PROCEDURE — G8417 CALC BMI ABV UP PARAM F/U: HCPCS | Performed by: FAMILY MEDICINE

## 2019-05-15 SDOH — HEALTH STABILITY: MENTAL HEALTH: HOW MANY STANDARD DRINKS CONTAINING ALCOHOL DO YOU HAVE ON A TYPICAL DAY?: 5 OR 6

## 2019-05-15 SDOH — HEALTH STABILITY: MENTAL HEALTH: HOW OFTEN DO YOU HAVE A DRINK CONTAINING ALCOHOL?: 4 OR MORE TIMES A WEEK

## 2019-05-15 ASSESSMENT — PATIENT HEALTH QUESTIONNAIRE - PHQ9
SUM OF ALL RESPONSES TO PHQ QUESTIONS 1-9: 1
SUM OF ALL RESPONSES TO PHQ9 QUESTIONS 1 & 2: 1
SUM OF ALL RESPONSES TO PHQ QUESTIONS 1-9: 1
2. FEELING DOWN, DEPRESSED OR HOPELESS: 1
1. LITTLE INTEREST OR PLEASURE IN DOING THINGS: 0

## 2019-05-15 NOTE — PROGRESS NOTES
Dcobert Farah received counseling on the following healthy behaviors: medication adherence  Reviewed prior labs and health maintenance  Continue current medications, diet and exercise. Discussed use, benefit, and side effects of prescribed medications. Barriers to medication compliance addressed. Patient given educational materials - see patient instructions  Was a self-tracking handout given in paper form or via [employer]? Yes    Requested Prescriptions      No prescriptions requested or ordered in this encounter       All patient questions answered. Patient voiced understanding. Quality Measures    Body mass index is 35.63 kg/m². Elevated. Weight control planned discussed Healthy diet and regular exercise. BP: 128/84 Blood pressure is normal. Treatment plan consists of No treatment change needed.     Lab Results   Component Value Date    LDLCHOLESTEROL 81 01/08/2019    (goal LDL reduction with dx if diabetes is 50% LDL reduction)      PHQ Scores 5/15/2019 4/26/2019 9/13/2018 6/13/2018 9/21/2017   PHQ2 Score 1 0 0 1 0   PHQ9 Score 1 0 0 1 0     Interpretation of Total Score Depression Severity: 1-4 = Minimal depression, 5-9 = Mild depression, 10-14 = Moderate depression, 15-19 = Moderately severe depression, 20-27 = Severe depression

## 2019-05-15 NOTE — PROGRESS NOTES
psychotic features ([facility]    Splenic artery aneurysm ([facility]        Current medications are:  Outpatient Medications Marked as Taking for the 5/15/19 encounter (Office Visit) with Beckie Rader MD   Medication Sig Dispense Refill    traMADol (ULTRAM) 50 MG tablet take 1 tablet by mouth every 8 hours if needed for pain for up to 30 DAYS 90 tablet 0    Elastic Bandages & Supports (KNEE BRACE) MISC Neoprene brace use as directed right knee 1 each 0    gabapentin (NEURONTIN) 300 MG capsule ONE    CAPSULE  IN  MORNING,    NOON ,   EVENING  AND  BED  TIME   DAILY   AS  NEEDED 120 capsule 2    ammonium lactate (LAC-HYDRIN) 12 % lotion Apply topically daily. 396 g 1    carbidopa-levodopa (SINEMET)  MG per tablet TAKE 1 TABLET EVERY EVENING AND 1 TABLET AT BEDTIME AS NEEDED 60 tablet 2    nicotine (NICODERM CQ) 14 MG/24HR Place 1 patch onto the skin daily 30 patch 2    diphenhydrAMINE (BENYLIN) 12.5 MG/5ML syrup Take 5 mLs by mouth nightly as needed (leg cramps) 237 mL 1    Multiple Vitamin (MULTIVITAMIN) tablet Take 1 tablet by mouth daily 30 tablet 5    losartan (COZAAR) 100 MG tablet take 1 tablet by mouth once daily 30 tablet 5    aspirin (RA ASPIRIN) 325 MG tablet take 1 tablet by mouth once daily 30 tablet 5    Cyanocobalamin ER (RA VITAMIN B-12 TR) 1000 MCG TBCR take 1 tablet by mouth once daily 30 tablet 5    omeprazole (PRILOSEC) 20 MG delayed release capsule take 1 capsule by mouth once daily 30 capsule 5    celecoxib (CELEBREX) 200 MG capsule take 1 capsule by mouth twice a day 60 capsule 5    pravastatin (PRAVACHOL) 20 MG tablet take 1 tablet by mouth once daily 30 tablet 5    triamcinolone (ARISTOCORT) 0.5 % cream Apply topically 3 times daily. 1 Tube 0    blood glucose test strips (ONE TOUCH ULTRA TEST) strip Monitor blood glucose levels three times daily and as needed for hyper/hypoglycemic symptoms.   Dx. Diabetes (E11.9) 100 strip 6    hydrOXYzine (ATARAX) 25 MG tablet Take 1 tablet by mouth 3 times daily as needed for Itching 30 tablet 3    albuterol sulfate HFA (VENTOLIN HFA) 108 (90 Base) MCG/ACT inhaler Inhale 2 puffs into the lungs every 6 hours as needed for Wheezing 18 g 6    ONETOUCH DELICA LANCETS FINE MISC TEST three times a day 100 each 5    Blood Glucose Monitoring Suppl (BLOOD GLUCOSE METER) KIT Monitor blood glucose levels three times daily and as needed for hyper/hypoglycemic symptoms. Dx. Diabetes (E11.9) 1 kit 0    risperiDONE (RISPERDAL) 1 MG tablet Take 1 mg by mouth nightly. She has No Known Allergies. She is not currently a smoker. She  reports that she quit smoking about 4 months ago. Her smoking use included cigarettes. She started smoking about 44 years ago. She has a 26.00 pack-year smoking history. She has never used smokeless tobacco.      Objective:    Vitals:    05/15/19 1502   BP: 128/84   Site: Left Upper Arm   Position: Sitting   Cuff Size: Large Adult   Pulse: 72   Resp: 16   Weight: 194 lb 12.8 oz (88.4 kg)   Height: 5' 2\" (1.575 m)     Body mass index is 35.63 kg/m². Obese  female, alert, cooperative and in no distress. Neck supple. No adenopathy. Thyroid symmetric, normal size. Chest:  Normal expansion. Clear to auscultation. No rales, rhonchi, or wheezing. Heart sounds are normal.  Regular rate and rhythm without murmur, gallop or rub. Lower extremities have no edema. There is good range of motion of the right knee. There is no significant swelling or deformity of the right knee. There are no skin changes or increased warmth of the right knee. There is tenderness to palpation of the right knee medially. Her feet were examined. There were no areas of redness, ulceration, or skin breakdown. There was decreased sensation to light touch of the feet bilaterally. The pulses in the feet and ankles were normal.     She has had no recent labs.   Results of the right knee x-ray done 4/26/2019 were reviewed with the patient:  Impression   1. No acute osseous abnormality seen of the right knee. 2. Minimal degenerative change of the patellofemoral compartment. 3. No joint effusion. Assessment and Plan:    1. Type 2 diabetes mellitus with diabetic neuropathy, without long-term current use of insulin (HCC)  Her HgbA1c was 5.6 % on 1/8/2019, which is excellent. She was advised to continue her current regimen. She was referred for an annual diabetic eye exam:  - Priscila Lama, OD, Optometry, [place]    2. Right knee pain, unspecified chronicity  She was referred to orthopedic surgery:  - [facility] Orthopedic Surgery  - Ambulatory referral to Orthopedic Surgery    3. Routine health maintenance  Health maintenance was reviewed with the patient. Colonoscopy was recommended. She declined. She was encouraged to schedule a mammogram.   All other health maintenance, including Tdap and Pneumovax, is up to date at this time. There is no indication for Prevnar-13 at this time.      (Please note that portions of this note were completed with a voice-recognition program. Efforts were made to edit the dictation but occasionally words are mis-transcribed.)

## 2019-05-22 ENCOUNTER — OFFICE VISIT (OUTPATIENT)
Dept: ORTHOPEDIC SURGERY | Age: 59
End: 2019-05-22
Payer: MEDICAID

## 2019-05-22 VITALS
BODY MASS INDEX: 35.7 KG/M2 | SYSTOLIC BLOOD PRESSURE: 130 MMHG | HEART RATE: 62 BPM | DIASTOLIC BLOOD PRESSURE: 66 MMHG | WEIGHT: 194 LBS | HEIGHT: 62 IN

## 2019-05-22 DIAGNOSIS — M25.561 PAIN OF PATELLOFEMORAL JOINT, RIGHT: Primary | ICD-10-CM

## 2019-05-22 PROCEDURE — G8417 CALC BMI ABV UP PARAM F/U: HCPCS | Performed by: FAMILY MEDICINE

## 2019-05-22 PROCEDURE — 99213 OFFICE O/P EST LOW 20 MIN: CPT | Performed by: FAMILY MEDICINE

## 2019-05-22 PROCEDURE — G8427 DOCREV CUR MEDS BY ELIG CLIN: HCPCS | Performed by: FAMILY MEDICINE

## 2019-05-22 PROCEDURE — 3017F COLORECTAL CA SCREEN DOC REV: CPT | Performed by: FAMILY MEDICINE

## 2019-05-22 PROCEDURE — L1810 KO ELASTIC WITH JOINTS: HCPCS | Performed by: FAMILY MEDICINE

## 2019-05-22 PROCEDURE — 1036F TOBACCO NON-USER: CPT | Performed by: FAMILY MEDICINE

## 2019-05-22 NOTE — PROGRESS NOTES
 ammonium lactate (LAC-HYDRIN) 12 % lotion Apply topically daily. 396 g 1    carbidopa-levodopa (SINEMET)  MG per tablet TAKE 1 TABLET EVERY EVENING AND 1 TABLET AT BEDTIME AS NEEDED 60 tablet 2    nicotine (NICODERM CQ) 14 MG/24HR Place 1 patch onto the skin daily 30 patch 2    oxybutynin (DITROPAN) 5 MG tablet Take 1 tablet by mouth 2 times daily 60 tablet 3    nicotine polacrilex (NICORETTE) 2 MG gum Take 1 each by mouth as needed for Smoking cessation 110 each 3    diphenhydrAMINE (BENYLIN) 12.5 MG/5ML syrup Take 5 mLs by mouth nightly as needed (leg cramps) 237 mL 1    Multiple Vitamin (MULTIVITAMIN) tablet Take 1 tablet by mouth daily 30 tablet 5    losartan (COZAAR) 100 MG tablet take 1 tablet by mouth once daily 30 tablet 5    aspirin (RA ASPIRIN) 325 MG tablet take 1 tablet by mouth once daily 30 tablet 5    Cyanocobalamin ER (RA VITAMIN B-12 TR) 1000 MCG TBCR take 1 tablet by mouth once daily 30 tablet 5    omeprazole (PRILOSEC) 20 MG delayed release capsule take 1 capsule by mouth once daily 30 capsule 5    celecoxib (CELEBREX) 200 MG capsule take 1 capsule by mouth twice a day 60 capsule 5    pravastatin (PRAVACHOL) 20 MG tablet take 1 tablet by mouth once daily 30 tablet 5    triamcinolone (ARISTOCORT) 0.5 % cream Apply topically 3 times daily. 1 Tube 0    blood glucose test strips (ONE TOUCH ULTRA TEST) strip Monitor blood glucose levels three times daily and as needed for hyper/hypoglycemic symptoms.   Dx. Diabetes (E11.9) 100 strip 6    hydrOXYzine (ATARAX) 25 MG tablet Take 1 tablet by mouth 3 times daily as needed for Itching 30 tablet 3    albuterol sulfate HFA (VENTOLIN HFA) 108 (90 Base) MCG/ACT inhaler Inhale 2 puffs into the lungs every 6 hours as needed for Wheezing 18 g 6    ONETOUCH DELICA LANCETS FINE MISC TEST three times a day 100 each 5    Blood Glucose Monitoring Suppl (BLOOD GLUCOSE METER) KIT Monitor blood glucose levels three times daily and as needed for demonstrate minimal degenerative changes primarily in the patellofemoral compartment with no acute osseous abnormalities    IMPRESSION:     1. Pain of patellofemoral joint, right          PLAN:   We discussed some of the etiologies and natural histories of     ICD-10-CM    1. Pain of patellofemoral joint, right M25.561 Amb External Referral To Physical Therapy     OTS PTO Breg/True Pull DJO   . We discussed the various treatment alternatives including anti-inflammatory medications, physical therapy, injections, further imaging studies and as a last resort surgery. This point her pain is patellofemoral in nature we will have her trial a course of formal physical therapy working on her mechanics she'll follow-up with me otherwise as needed. She was understanding and agreement with plan if she feels improvement in the same weeks we will consider cortisone injection    Return to clinic in No follow-ups on file. Bernice Fagan Please be aware portions of this note were completed using voice recognition software and unforeseen errors may have occurred    Electronically signed by Juliette Reeves DO, FAOASM  on 5/22/19 at 10:19 AM            Procedures    OTS PTO Breg/True Pull DJO     Patient was prescribed a Laura Johnson Umesh-Pull knee brace. The right knee will require stabilization / immobilization from this semi-rigid / rigid orthosis to improve their function. The orthosis will assist in protecting the affected area, provide functional support and facilitate healing. The patient was educated and fit by a healthcare professional with expert knowledge and specialization in brace application while under the direct supervision of the physician. Verbal and written instructions for the use of and application of this item were provided. They were instructed to contact the office immediately should the brace result in increased pain, decreased sensation, increased swelling or worsening of the condition.

## 2019-05-23 ENCOUNTER — NURSE ONLY (OUTPATIENT)
Dept: ORTHOPEDIC SURGERY | Age: 59
End: 2019-05-23

## 2019-05-23 DIAGNOSIS — M25.561 PAIN OF PATELLOFEMORAL JOINT, RIGHT: Primary | ICD-10-CM

## 2019-05-23 NOTE — PROGRESS NOTES
Patient came in for a brace check. She was given a true pull brace on 5-22-19, and once she got home she stated it felt tight. So we did exchange the large out for an extra large. Patient stated that it felt better.

## 2019-05-28 DIAGNOSIS — G89.29 CHRONIC RECTAL PAIN: ICD-10-CM

## 2019-05-28 DIAGNOSIS — K62.89 CHRONIC RECTAL PAIN: ICD-10-CM

## 2019-05-28 RX ORDER — TRAMADOL HYDROCHLORIDE 50 MG/1
50 TABLET ORAL EVERY 8 HOURS PRN
Qty: 90 TABLET | Refills: 0 | Status: SHIPPED | OUTPATIENT
Start: 2019-05-28 | End: 2019-06-27 | Stop reason: SDUPTHER

## 2019-06-11 DIAGNOSIS — F17.219 CIGARETTE NICOTINE DEPENDENCE WITH NICOTINE-INDUCED DISORDER: ICD-10-CM

## 2019-06-11 DIAGNOSIS — Z72.0 TOBACCO ABUSE: ICD-10-CM

## 2019-06-11 RX ORDER — NICOTINE 21 MG/24HR
1 PATCH, TRANSDERMAL 24 HOURS TRANSDERMAL DAILY
Qty: 30 PATCH | Refills: 2 | Status: SHIPPED | OUTPATIENT
Start: 2019-06-11 | End: 2019-08-27 | Stop reason: SDUPTHER

## 2019-06-11 NOTE — TELEPHONE ENCOUNTER
Kathy Lane called requesting a refill of the below medication which has been pended for you:     Requested Prescriptions     Pending Prescriptions Disp Refills    nicotine (NICODERM CQ) 14 MG/24HR 30 patch 2     Sig: Place 1 patch onto the skin daily       Last Appointment Date: 5/15/2019  Next Appointment Date: 9/25/2019    No Known Allergies

## 2019-06-20 ENCOUNTER — OFFICE VISIT (OUTPATIENT)
Dept: NEUROLOGY | Age: 59
End: 2019-06-20
Payer: MEDICAID

## 2019-06-20 VITALS
BODY MASS INDEX: 35.7 KG/M2 | SYSTOLIC BLOOD PRESSURE: 138 MMHG | HEIGHT: 62 IN | OXYGEN SATURATION: 98 % | DIASTOLIC BLOOD PRESSURE: 70 MMHG | HEART RATE: 84 BPM | WEIGHT: 194 LBS

## 2019-06-20 DIAGNOSIS — F50.89 PICA: ICD-10-CM

## 2019-06-20 DIAGNOSIS — Z72.0 TOBACCO ABUSE: ICD-10-CM

## 2019-06-20 DIAGNOSIS — E11.40 TYPE 2 DIABETES MELLITUS WITH DIABETIC NEUROPATHY, WITHOUT LONG-TERM CURRENT USE OF INSULIN (HCC): ICD-10-CM

## 2019-06-20 DIAGNOSIS — R53.83 FATIGUE DUE TO DEPRESSION: ICD-10-CM

## 2019-06-20 DIAGNOSIS — F32.A FATIGUE DUE TO DEPRESSION: ICD-10-CM

## 2019-06-20 DIAGNOSIS — G63 POLYNEUROPATHY ASSOCIATED WITH UNDERLYING DISEASE (HCC): Primary | ICD-10-CM

## 2019-06-20 DIAGNOSIS — I10 ESSENTIAL HYPERTENSION: ICD-10-CM

## 2019-06-20 DIAGNOSIS — G60.9 IDIOPATHIC PERIPHERAL NEUROPATHY: ICD-10-CM

## 2019-06-20 DIAGNOSIS — F32.2 CURRENT SEVERE EPISODE OF MAJOR DEPRESSIVE DISORDER WITHOUT PSYCHOTIC FEATURES, UNSPECIFIED WHETHER RECURRENT (HCC): ICD-10-CM

## 2019-06-20 DIAGNOSIS — F31.31 BIPOLAR AFFECTIVE DISORDER, CURRENTLY DEPRESSED, MILD (HCC): ICD-10-CM

## 2019-06-20 DIAGNOSIS — R26.89 BALANCE PROBLEM: ICD-10-CM

## 2019-06-20 DIAGNOSIS — E78.2 MIXED HYPERLIPIDEMIA: ICD-10-CM

## 2019-06-20 DIAGNOSIS — G25.81 RESTLESS LEG SYNDROME: ICD-10-CM

## 2019-06-20 DIAGNOSIS — F10.20 ALCOHOLISM (HCC): ICD-10-CM

## 2019-06-20 DIAGNOSIS — G47.9 SLEEPING DIFFICULTY: ICD-10-CM

## 2019-06-20 DIAGNOSIS — F34.1 DYSTHYMIA: ICD-10-CM

## 2019-06-20 DIAGNOSIS — E66.9 OBESITY (BMI 30.0-34.9): ICD-10-CM

## 2019-06-20 DIAGNOSIS — F25.0 SCHIZOAFFECTIVE DISORDER, BIPOLAR TYPE (HCC): ICD-10-CM

## 2019-06-20 DIAGNOSIS — F41.9 ANXIETY: ICD-10-CM

## 2019-06-20 DIAGNOSIS — M54.16 CHRONIC LUMBAR RADICULOPATHY: ICD-10-CM

## 2019-06-20 DIAGNOSIS — F25.9 SCHIZOAFFECTIVE DISORDER, UNSPECIFIED TYPE (HCC): ICD-10-CM

## 2019-06-20 DIAGNOSIS — G89.29 CHRONIC RIGHT SHOULDER PAIN: ICD-10-CM

## 2019-06-20 DIAGNOSIS — M25.511 CHRONIC RIGHT SHOULDER PAIN: ICD-10-CM

## 2019-06-20 DIAGNOSIS — G56.03 BILATERAL CARPAL TUNNEL SYNDROME: ICD-10-CM

## 2019-06-20 PROCEDURE — 1036F TOBACCO NON-USER: CPT | Performed by: PSYCHIATRY & NEUROLOGY

## 2019-06-20 PROCEDURE — 99214 OFFICE O/P EST MOD 30 MIN: CPT | Performed by: PSYCHIATRY & NEUROLOGY

## 2019-06-20 PROCEDURE — 2022F DILAT RTA XM EVC RTNOPTHY: CPT | Performed by: PSYCHIATRY & NEUROLOGY

## 2019-06-20 PROCEDURE — 3044F HG A1C LEVEL LT 7.0%: CPT | Performed by: PSYCHIATRY & NEUROLOGY

## 2019-06-20 PROCEDURE — G8417 CALC BMI ABV UP PARAM F/U: HCPCS | Performed by: PSYCHIATRY & NEUROLOGY

## 2019-06-20 PROCEDURE — 3017F COLORECTAL CA SCREEN DOC REV: CPT | Performed by: PSYCHIATRY & NEUROLOGY

## 2019-06-20 PROCEDURE — G8427 DOCREV CUR MEDS BY ELIG CLIN: HCPCS | Performed by: PSYCHIATRY & NEUROLOGY

## 2019-06-20 RX ORDER — GABAPENTIN 300 MG/1
CAPSULE ORAL
Qty: 120 CAPSULE | Refills: 2 | Status: SHIPPED | OUTPATIENT
Start: 2019-06-20 | End: 2019-09-11 | Stop reason: SDUPTHER

## 2019-06-20 ASSESSMENT — ENCOUNTER SYMPTOMS
CHOKING: 0
BLOOD IN STOOL: 0
WHEEZING: 0
ABDOMINAL PAIN: 0
FACIAL SWELLING: 0
EYE ITCHING: 0
EYE DISCHARGE: 0
SINUS PRESSURE: 0
BOWEL INCONTINENCE: 0
COLOR CHANGE: 0
VISUAL CHANGE: 0
APNEA: 0
BACK PAIN: 0
CHEST TIGHTNESS: 0
EYE REDNESS: 0
VOICE CHANGE: 0
DIARRHEA: 0
TROUBLE SWALLOWING: 0
NAUSEA: 0
CONSTIPATION: 0
VOMITING: 0
EYE PAIN: 0
SHORTNESS OF BREATH: 0
ABDOMINAL DISTENTION: 0
PHOTOPHOBIA: 0

## 2019-06-20 NOTE — PROGRESS NOTES
Subjective:      Patient ID: Elton Barron is a 63 y. o.right  handed female. Neurologic Problem   The patient's primary symptoms include clumsiness, focal sensory loss, focal weakness, a loss of balance, memory loss and weakness. The patient's pertinent negatives include no altered mental status, near-syncope, slurred speech, syncope or visual change. Primary symptoms comment: RESTLESS LEG  SYNDROME. This is a chronic problem. Episode onset: MORE   THAN    5-6 YEARS. The neurological problem developed insidiously. The problem is unchanged. There was left-sided, lower extremity and right-sided focality noted. Pertinent negatives include no abdominal pain, auditory change, aura, back pain, bladder incontinence, bowel incontinence, chest pain, confusion, diaphoresis, dizziness, fatigue, fever, headaches, light-headedness, nausea, neck pain, palpitations, shortness of breath, vertigo or vomiting. Past treatments include medication. The treatment provided moderate relief. Her past medical history is significant for mood changes. There is no history of a bleeding disorder, a clotting disorder, a CVA, dementia, head trauma, liver disease or seizures. History obtained from  The patient     and other  available medical records were  Also  reviewed.         1)   H/O    CHRONIC    DIABETIC   PERIPHERAL  NEUROPATHY                                 -   STABLE                        -  ON  NEURONTIN                          TOLERATING  THE  SAME     &   GETTING  HELP                      2)  RESTLESS  LEG  SYNDROME                 &  PERIODIC  LEG  MOVEMENTS                              -  IMPROVED                            -       ON  SINEMET          3)    H/O    CHRONIC     SLEEP DIFFICULTIES                        -   RESOLVED          4)   H/O   CHRONIC   MILD  MEMORY PROBLEMS                     -    STABLE      -  PATIENT  NOT  CONCERNED            5)  CHRONIC  TOBACCO  AND  ALCOHOL  USAGE -   PATIENT  AWARE  OF THE  RISKS               CURRENTLY    ON     NICOTINE  PATCHES                         AND   STOPPED  SMOKING   SINCE  JAN 2019                6)  CHRONIC  LUMBAR PAIN  AND JOINT  PAINS   -                           -    STABLE                      ON  ULTRAM  FROM  HER  PCP. 7)    H/O    BILATERAL  CARPAL  TUNNEL  SYNDROME                               -  STABLE            8)       CHRONIC  ANXIETY, DEPRESSION                         AND  SCHIZOAFFECTIVE  DISORDER                           STABLE      -  ON   RISPERDAL                      BEING  FOLLOWED  BY  MENTAL  HEALTH  PROFESSIONALS              9)      MULTIPLE  CO  MORBID  MEDICAL CONDITIONS                    BEING  FOLLOWED  BY   HER  PCP. 10)      PATIENT  DENIES    ANY  SEIZURE  ACTIVITY  OR   MIGRAINES . 11)      VARIOUS  RISK   FACTORS   WERE  REVIEWED   AND   DISCUSSED. PATIENT   HAS  MULTIPLE   MEDICAL,  NEUROLOGICAL                               AND   MENTAL  HEALTH  PROBLEMS . PATIENT'S   MANAGEMENT  IS  CHALLENGING.                             12)    CURRENTLY  PATIENT  DENIES   ANY  NEW  NEURLOLOGICAL  CONCERNS                    PATIENT  REQUEST  REFILLS  FOR  HER  MEDICATION. The  Duration,  Quality,  Severity,  Location,  Timing,  Context,  Modifying  Factors   Of   The   Chief   Complaint   And  Present  Illness   Was   Reviewed   In   Chronological   [facility].              Patient   Indicates   The  Presence   And  The  Absence  Of  The  Following  Associated  And   Additional  Neurological    Symptoms:                                Balance  And coordination problems  present           Gait problems     present            Headaches      absent              Migraines           absent           Memory problems        present           Confusion        absent            Paresthesia numbness          present Seizures  And  Starring  Episodes           absent           Syncope,  Near  syncopal episodes         absent           Speech problems           absent             Swallowing  Problems      absent            Dizziness,  Light headedness           present                        Vertigo        absent             Generalized   Weakness    absent              focal  Weakness     absent             Tremors         absent              Sleep  Problems     present             History  Of   Recent   Head  Injury     absent             History  Of   Recent  TIA     absent             History  Of   Recent    Stroke     absent             Neck  Pain and  Neck muscle  Spasms  present             Radiating  down   And   Weakness           absent            Lower back   Pain  And     Spasms  present            Radiating    Down   And   Weakness          absent                H/O   FALLS        absent               History  Of   Visual  Symptoms    absent                Associated   Diplopia       absent                                                              Also   Additional   Symptoms   Present    As  Documented    In   The detailed    Review  Of  Systems   And    Please   Refer   To    Them for   Additional  Information.       RECORDS   REVIEWED:  historical medical records, lab reports, office notes and radiology reports         INFORMATION   REVIEWED:     MEDICAL   HISTORY,     SURGICAL   HISTORY,   MEDICATIONS   LIST,   ALLERGIES AND  DRUG  INTOLERANCES,     FAMILY   HISTORY,  SOCIAL  HISTORY,    PROBLEM  LIST   FOR  PATIENT  CARE   COORDINATION         Past Medical History:   Diagnosis Date    [facility]     Anxiety     Astigmatism with presbyopia     Balance problem     Bipolar disorder ([facility]     schizoaffective disorder (Dr. Addie Marinelli)    Chronic lumbar radiculopathy     Chronic rectal pain     due to fissures    Chronic shoulder pain     bilaterally, due to osteoarthritis    CTS (carpal tunnel syndrome) 30 tablet 5    aspirin (RA ASPIRIN) 325 MG tablet take 1 tablet by mouth once daily 30 tablet 5    Cyanocobalamin ER (RA VITAMIN B-12 TR) 1000 MCG TBCR take 1 tablet by mouth once daily 30 tablet 5    omeprazole (PRILOSEC) 20 MG delayed release capsule take 1 capsule by mouth once daily 30 capsule 5    celecoxib (CELEBREX) 200 MG capsule take 1 capsule by mouth twice a day 60 capsule 5    pravastatin (PRAVACHOL) 20 MG tablet take 1 tablet by mouth once daily 30 tablet 5    triamcinolone (ARISTOCORT) 0.5 % cream Apply topically 3 times daily. 1 Tube 0    blood glucose test strips (ONE TOUCH ULTRA TEST) strip Monitor blood glucose levels three times daily and as needed for hyper/hypoglycemic symptoms. Dx. Diabetes (E11.9) 100 strip 6    hydrOXYzine (ATARAX) 25 MG tablet Take 1 tablet by mouth 3 times daily as needed for Itching 30 tablet 3    albuterol sulfate HFA (VENTOLIN HFA) 108 (90 Base) MCG/ACT inhaler Inhale 2 puffs into the lungs every 6 hours as needed for Wheezing 18 g 6    ONETOUCH DELICA LANCETS FINE MISC TEST three times a day 100 each 5    Blood Glucose Monitoring Suppl (BLOOD GLUCOSE METER) KIT Monitor blood glucose levels three times daily and as needed for hyper/hypoglycemic symptoms. Dx. Diabetes (E11.9) 1 kit 0    risperiDONE (RISPERDAL) 1 MG tablet Take 1 mg by mouth nightly.  nicotine polacrilex (NICORETTE) 2 MG gum Take 1 each by mouth as needed for Smoking cessation 110 each 3     No current facility-administered medications for this visit.             No Known Allergies            Family History   Problem Relation Age of Onset   Susan Lynn Stroke Mother     Diabetes Mother     High Blood Pressure Mother     Cataracts Mother     Stroke Father     Diabetes Father     Heart Disease Sister 72    Diabetes Daughter     Stroke Sister 68        Brain aneurysm that ruptured and then she got kidney failure and intestinal infection and     Glaucoma Neg Hx              Social History Socioeconomic History    Marital status: Single     Spouse name: Not on file    Number of children: Not on file    Years of education: Not on file    Highest education level: Not on file   Occupational History    Not on file   Social Needs    Financial resource strain: Not on file    Food insecurity:     Worry: Not on file     Inability: Not on file    Transportation needs:     Medical: Not on file     Non-medical: Not on file   Tobacco Use    Smoking status: Former Smoker     Packs/day: 1.00     Years: 26.00     Pack years: 26.00     Types: Cigarettes     Start date: 1975     Last attempt to quit: 2018     Years since quittin.5    Smokeless tobacco: Never Used   Substance and Sexual Activity    Alcohol use:  Yes     Alcohol/week: 0.0 oz     Frequency: 4 or more times a week     Drinks per session: 5 or 6     Binge frequency: Never     Comment: daily; \"2 tallboys per day\"    Drug use: No    Sexual activity: Not on file   Lifestyle    Physical activity:     Days per week: Not on file     Minutes per session: Not on file    Stress: Not on file   Relationships    Social connections:     Talks on phone: Not on file     Gets together: Not on file     Attends Shinto service: Not on file     Active member of club or organization: Not on file     Attends meetings of clubs or organizations: Not on file     Relationship status: Not on file    Intimate partner violence:     Fear of current or ex partner: Not on file     Emotionally abused: Not on file     Physically abused: Not on file     Forced sexual activity: Not on file   Other Topics Concern    Not on file   Social History Narrative    Not on file         Vitals:    19 1446   BP: 138/70   Pulse: 84   SpO2: 98%         Wt Readings from Last 3 Encounters:   19 194 lb 0.1 oz (88 kg)   19 194 lb (88 kg)   05/15/19 194 lb 12.8 oz (88.4 kg)         BP Readings from Last 3 Encounters:   19 138/70   19 130/66 05/15/19 128/84       Hematology and Coagulation  Lab Results   Component Value Date    WBC 6.4 01/15/2019    RBC 4.29 01/15/2019    HGB 12.9 01/15/2019    HCT 39.0 01/15/2019    MCV 90.9 01/15/2019    MCH 30.0 01/15/2019    MCHC 33.0 01/15/2019    RDW 14.5 01/15/2019     01/15/2019    MPV 8.2 01/15/2019       Chemistries  Lab Results   Component Value Date     01/08/2019    K 4.6 01/08/2019     01/08/2019    CO2 25 01/08/2019    BUN 9 01/08/2019    CREATININE 0.94 01/08/2019    CALCIUM 9.9 01/08/2019    PROT 7.5 01/08/2019    LABALBU 4.5 01/08/2019    BILITOT 0.55 01/08/2019    ALKPHOS 80 01/08/2019    AST 20 01/08/2019    ALT 12 01/08/2019     Lab Results   Component Value Date    ALKPHOS 80 01/08/2019    ALT 12 01/08/2019    AST 20 01/08/2019    PROT 7.5 01/08/2019    BILITOT 0.55 01/08/2019    LABALBU 4.5 01/08/2019     Lab Results   Component Value Date    BUN 9 01/08/2019    CREATININE 0.94 01/08/2019     Lab Results   Component Value Date    CALCIUM 9.9 01/08/2019     Lab Results   Component Value Date    AST 20 01/08/2019    ALT 12 01/08/2019       Lab Results   Component Value Date    FGAXLYTD29 1000 06/12/2018             Review of Systems   Constitutional: Negative for appetite change, diaphoresis, fatigue, fever and unexpected weight change. HENT: Negative for dental problem, drooling, ear discharge, ear pain, facial swelling, hearing loss, mouth sores, nosebleeds, postnasal drip, sinus pressure, tinnitus, trouble swallowing and voice change. Eyes: Negative for photophobia, pain, discharge, redness, itching and visual disturbance. Respiratory: Negative for apnea, choking, chest tightness, shortness of breath and wheezing. Cardiovascular: Negative for chest pain, palpitations, leg swelling and near-syncope. Gastrointestinal: Negative for abdominal distention, abdominal pain, blood in stool, bowel incontinence, constipation, diarrhea, nausea and vomiting.    Endocrine: Negative for cold intolerance, heat intolerance, polydipsia, polyphagia and polyuria. Genitourinary: Negative for bladder incontinence. Musculoskeletal: Positive for gait problem. Negative for back pain, neck pain and neck stiffness. Skin: Negative for color change, pallor and wound. Allergic/Immunologic: Negative for environmental allergies, food allergies and immunocompromised state. Neurological: Positive for focal weakness, weakness and loss of balance. Negative for dizziness, vertigo, tremors, syncope, facial asymmetry, speech difficulty, light-headedness and headaches. Hematological: Negative for adenopathy. Does not bruise/bleed easily. Psychiatric/Behavioral: Positive for decreased concentration and memory loss. Negative for agitation, behavioral problems, confusion, dysphoric mood, hallucinations, self-injury, sleep disturbance and suicidal ideas. The patient is nervous/anxious. The patient is not hyperactive. Objective:   Physical Exam   Constitutional: She appears well-developed and well-nourished. She is cooperative. HENT:   Head: Normocephalic and atraumatic. Head is without raccoon's eyes and without Covington's sign. Right Ear: External ear normal.   Left Ear: External ear normal.   Nose: Nose normal.   Mouth/Throat: Oropharynx is clear and moist.   Eyes: Pupils are equal, round, and reactive to light. Conjunctivae and EOM are normal.   Neck: Normal range of motion. Neck supple. No muscular tenderness present. Carotid bruit is not present. No neck rigidity. No tracheal deviation and normal range of motion present. No Brudzinski's sign and no Kernig's sign noted. No thyroid mass and no thyromegaly present. Cardiovascular: Normal rate and regular rhythm. Pulmonary/Chest: Effort normal and breath sounds normal. No respiratory distress. She has no wheezes. She has no rales. Musculoskeletal: Normal range of motion. She exhibits no edema or tenderness.    Lymphadenopathy:     She has no cervical adenopathy. Skin: Skin is warm. No rash noted. No cyanosis or erythema. No pallor. Nails show no clubbing. Psychiatric: Thought content normal. Her mood appears anxious. Her affect is blunt. Her affect is not labile and not inappropriate. Her speech is not rapid and/or pressured, not delayed, not tangential and not slurred. She is not agitated, not aggressive, not hyperactive, not slowed, not withdrawn, not actively hallucinating and not combative. Thought content is not paranoid and not delusional. Cognition and memory are impaired. She does not express impulsivity or inappropriate judgment. She exhibits a depressed mood. She expresses no homicidal and no suicidal ideation. She expresses no suicidal plans and no homicidal plans. She is communicative. She exhibits abnormal recent memory. She exhibits normal remote memory. She is attentive. NEUROLOGICAL EXAMINATION :    A) MENTAL STATUS:                   Alert and  oriented  To time, place  And  Person. No Aphasia. No  Dysarthria. Able   To  Follow three  Step commands without   Any  Difficulty. No right  To left confusion. Normal  Speech  And language function. Insight and  Judgment ,Fund  Of  Knowledge   Decreased                Recent  And  Remote memory  Decreased                Attention &Concentration are decreased                                                 B) CRANIAL NERVES :             2 CN : Visual  Acuity  And  Visual fields  within normal limits                      Fundi  Could  Not  Be  Could  Not  Be  Evaluated. 3,4,6 CN : Both  Pupils are   Reactive and  Equal.                          Extraocular   Movements  Are  Intact. No  Nystagmus. No  RODERICK. No  Afferent  Pupillary  Defect noted. 5 CN :  Normal  Facial sensations and Corneal  Reflexes         7 CN :  Normal  Facial  Symmetry  And  Strength. No facial  Weakness. 8 CN :  Hearing  Appears within normal limits        9, 10 CN: Normal spontaneous, reflex palate movements       11 CN:   Normal  Shoulder shrug and  strength       12 CN :   Normal  Tongue movements and  Tongue  In midline                      No tongue   Fasciculations or atrophy         C) MOTOR  EXAM:                 Strength  In upper  And  Lower extremities   within normal limits             No  Drift. No  Atrophy             Rapid alternating  And  repetitions  Movements  within normal limits               Muscle  Tone  In upper  And  Lower  Extremities  within normal limits              No rigidity. No  Spasticity. Bradykinesia   absent               No  Asterixis. Sustention  Tremor , Resting  Tremor   absent                  No other  Abnormal  Movements noted             D) SENSORY :               light touch, pinprick, position  And  Vibration  decreased        E) REFLEXES:                   Deep  Tendon  Reflexes decreased                    No pathological  Reflexes  Bilaterally.                                   F) COORDINATION  AND  GAIT :                                Station and  Gait   SLOW                                    Romberg's test positive                          Ataxia negative      Assessment:       Patient Active Problem List   Diagnosis    Anxiety    Depression    Schizoaffective disorder ([address]    Tobacco abuse    Restless leg syndrome    Pica    Essential hypertension    B[facility]    Alcoholism ([facility]    Sleeping difficulty    Peripheral neuropathy    Balance problem    CTS (carpal tunnel syndrome)    Chronic lumbar radiculopathy    GERD (gastroesophageal reflux disease)    [facility]ch[facility] obstructive [facility]) [facility]    Fatigue    Chronic rectal pain    Type 2 diabetes mellitus with diabetic neuropathy, without long-term current use of insulin ([facility])    Pruritus    Obesity (BMI 30.0-34. 9)    Mixed hyperlipidemia    Chronic right shoulder pain    Bilateral carpal tunnel syndrome    Current severe episode of major depressive disorder without psychotic features ([facility]    Splenic artery aneurysm ([facility]               Plan:            VISITING DIAGNOSIS:      ICD-10-CM    1. Polyneuropathy associated with underlying disease ([facility] G63    2. Restless leg syndrome G25.81 carbidopa-levodopa (SINEMET)  MG per tablet   3. Idiopathic peripheral neuropathy G60.9 gabapentin (NEURONTIN) 300 MG capsule   4. Bilateral carpal tunnel syndrome G56.03 gabapentin (NEURONTIN) 300 MG capsule   5. Type 2 diabetes mellitus with diabetic neuropathy, without long-term current use of insulin ([facility]) E11.40    6. Chronic lumbar radiculopathy M54.16    7. Anxiety F41.9    8. Tobacco abuse Z72.0    9. Essential hypertension I10    10. Sleeping difficulty G47.9    11. Chronic right shoulder pain M25.511     G89.29    12. Mixed hyperlipidemia E78.2    13. Current severe episode of major depressive disorder without psychotic features, unspecified whether recurrent ([place] [address] F32.2    14. Obesity (BMI 30.0-34. 9) E66.9    15. Balance problem R26.89    16. Bipolar affective disorder, currently depressed, mild ([facility]) F31.31    17. Schizoaffective disorder, bipolar type ([facility] F25.0    18. Pica F50.89    19. Alcoholism ([place] [address] F10.20    20. Schizoaffective disorder, unspecified type ([place] [address] F25.9    21. Fatigue due to depression F32.9     R53.83    22. Dysthymia F34.1               CONCERNS   &   INCREASED   RISK   FOR         * TIA,  CEREBRO  VASCULAR  ISCHEMIA, STROKE      *   COGNITIVE  &   MEMORY PROBLEMS  AND  DEMENTIAS       *  MONONEUROPATHIES, RADICULOPATHY  AND  PLEXOPATHY      *   PERIPHERAL  NEUROPATHY,  MUSCULAR CONDITIONS                      VARIOUS  RISK   FACTORS   WERE  REVIEWED   AND   DISCUSSED. *  PATIENT   HAS  MULTIPLE   MEDICAL,  NEUROLOGICAL      AND   MENTAL  HEALTH  PROBLEMS .           PATIENT'S MANAGEMENT  IS  CHALLENGING. PLAN:       [place]  Of  The  Diagnoses,  The  Management & Treatment  Options           AND    Care  plan  Were        Reviewed and   Discussed   With  patient. * Goals  And  Expectations  Of  The  Therapy  Discussed   And  Reviewed. *   Benefits   And   Side  Effect  Profile  Of  Medication/s   Were   Discussed             * Need   For  Further   Follow up For  The  Various  Problems  Were discussed. * Results  Of  The  Previous  Diagnostic tests were reviewed and questions answered. patient  understand the same. Medical  Decision  Making  Was  Made  Based on the   Complexity  Of  Above  Mentioned  Diagnoses,    Data reviewed   & diagnostic  Tests  Reviewed,  Risk  Of  Significant   Co morbidities and complicating   Factors. Medical  Decision  Was   High  Complexity  Due   To  The  Patient's  Multiple  Symptoms,  Advancing   Disease,  Complex  Treatment  Regimen,  Multiple medications and   Risk  Of   Side  Effects,  Difficulty  In  Medication  Management  And  Diagnostic  Challenges   In  View  Of  The  Associated   Co  Morbid  Conditions   And  Problems. * FALL   PRECAUTIONS. THESE  REVIEWED   AND  DISCUSSED    *   BE  CAREFUL  WITH  ACTIVITIES   INCLUDING  DRIVING. *   AVOID   NECK  AND/ BACK  STRAINING  ACTIVITIES      *   TO   WEAR   BILATERAL   WRIST  BRACES       *   TO  AVOID  PRESSURE  OVER   ULNAR  ASPECT   OF   ELBOWS        *   ADEQUATE   FLUID  INTAKE   AND  ELECTROLYTE  BALANCE           * KEEP  DAIRY  OF   THE  NEUROLOGICAL  SYMPTOMS        RECORDING THE    DURATION  AND  FREQUENCY. *  AVOID    CONDITIONS  AND  FACTORS   THAT  MAKE   NEUROLOGICAL  SYMPTOMS  WORSE. *  TO  MAINTAIN  REGULAR  SLEEP  WAKE  CYCLES. *   TO  HAVE  ADEQUATE  REST  AND   SLEEP    HOURS.          *    TO   AVOID   TO  SLEEP  IN   SUPINE  POSITION. *      WEIGHT   LOSS.              *    AVOID ANY USAGE OF                   TOBACCO,  EXCESSIVE  ALCOHOL  AND   ILLEGAL   SUBSTANCES          *  CONTINUE MEDICATIONS PRESCRIBED BY NEUROLOGIST AS    RECOMMENDED     *   Compliance   With  Medications   And  Instructions        * CURRENTLY  TOLERATING  THE  PRESCRIBED   MEDICATIONS. WITHOUT  ANY  SIGNIFICANT  SIDE  EFFECTS   &  GETTING BENEFIT. *  May   Use  Pill  Box,    If  Needed      *    To  Continue  The    Antiplatelet  therapy    As   Recommended  Was   Discussed        *    Prophylactic  Use   Of     Vitamin   B   Complex,  Folic  Acid,    Vitamin  B12        Multivitamin   Tablet  Daily    Supplementations   Over  The  Counter  Discussed           *  FOOT  CARE, DAILY  INSPECTION  OF  FEET   AND         PERIODIC  PODIATRY EVALUATIONS . *  PATIENT  IS  ALSO   COUNSELED   TO  KEEP    ACTIVITIES         A)   SIMPLE      B)  ORGANIZED      C)  WRITE   DOWN                Controlled Substances Monitoring: Periodic Controlled Substance Monitoring: Possible medication side effects, risk of tolerance/dependence & alternative treatments discussed. , Assessed functional status. , No signs of potential drug abuse or diversion identified. Brandy Roman MD)              Orders Placed This Encounter   Medications    gabapentin (NEURONTIN) 300 MG capsule     Sig: ONE    CAPSULE  IN  MORNING,    NOON ,   EVENING  AND  BED  TIME   DAILY   AS  NEEDED     Dispense:  120 capsule     Refill:  2    carbidopa-levodopa (SINEMET)  MG per tablet     Sig: TAKE 1 TABLET EVERY EVENING AND 1 TABLET AT BEDTIME AS NEEDED     Dispense:  60 tablet     Refill:  2                            *    EXPECTATIONS,   GOALS   AND  SIDE  EFFECTS  MEDICATIONS    WERE                                 REVIEWED     AND   DISCUSSED    IN    DETAIL.                       *PATIENT   TO  FOLLOW  UP  WITH   PRIMARY  CARE   AND   OTHER  CONSULTANTS  AS  BEFORE.               *TO  [facility] PROFESSIONALS ,  INCLUDING            PSYCHOLOGICAL  COUNSELING   AND  PSYCHIATRIC  EVALUTIONS        *  Maintain   Healthy  Life Style    With   Periodic  Monitoring  Of    Any  Medical  Conditions  Including   Elevated  Blood  Pressure,  Lipid  Profile,   Blood  Sugar levels  And   Heart  Disease. *   Period   Screening  For  Cancers  Involving  Breast,  Colon,  Prostate, lungs  And  Other  Organs  As  Applicable,  In consultation   With  Your  Primary Care Providers. * Second  Neurological  Opinion  And  Evaluations  In  [facility]  Setting  If  Patient  Is  Interested. *  If  The  Patient remains  Neurologically  Stable   Return   To  [facility] [facility] Neurology Department       IN   3 MONTHS  TIME   FOR  FURTHER  FOLLOW UP.                 *  If   There is  Any  Significant  Worsening   Of  Current  Symptoms  And  Or  If patient  Develops   Any additional  New      Neurological  Symptoms  Or  Significant  Concerns   Should  Call  911 or  Go  To  Emergency  Department  For  Further      Immediate  Evaluation. *   The  Neurological   Findings,  Possible  Diagnosis,  Differential diagnoses   And  Options  For    Further   Investigations   And  management   Are  Discussed  Comprehensively. Medications   And  Prescription   Risks  And  Side effects  Are   Also  Discussed. The  Above  Were  Reviewed  With  patient and   questions  Answered  In  Detail. More   Than   50% of face  To face Time   Was  Spent  On  Counseling   And   Coordination  Of  Care   Of   Patient's multiple   Neurological  Problems   And   Comorbid  Medical   Conditions.         Electronically signed by Keira Chatman MD     Board Certified in  Neurology &  In  [facility] and Neurology (ABPN)      DISCLAIMER:   Although every effort was made to ensure the accuracy of this  electronic transcription, to do all 30 to 60 minutes all at once. For example, you can exercise 3 times a day for 10 or 20 minutes. Moderate exercise is safe for most people, but it is always a good idea to talk to your doctor before starting an exercise program.  Keep moving. Robinson Minor the lawn, work in the garden, or Contrib. Take the stairs instead of the elevator at work. If you smoke, quit. People who smoke have an increased risk for heart attack, stroke, cancer, and other lung illnesses. Quitting is hard, but there are ways to boost your chance of quitting tobacco for good. Use nicotine gum, patches, or lozenges. Ask your doctor about stop-smoking programs and medicines. Keep trying. In addition to reducing your risk of diseases in the future, you will notice some benefits soon after you stop using tobacco. If you have shortness of breath or asthma symptoms, they will likely get better within a few weeks after you quit. Limit how much alcohol you drink. Moderate amounts of alcohol (up to 2 drinks a day for men, 1 drink a day for women) are okay. But drinking too much can lead to liver problems, high blood pressure, and other health problems. Family health  If you have a family, there are many things you can do together to improve your health. Eat meals together as a family as often as possible. Eat healthy foods. This includes fruits, vegetables, lean meats and dairy, and whole grains. Include your family in your fitness plan. Most people think of activities such as jogging or tennis as the way to fitness, but there are many ways you and your family can be more active. Anything that makes you breathe hard and gets your heart pumping is exercise. Here are some tips:  Walk to do errands or to take your child to school or the bus. Go for a family bike ride after dinner instead of watching TV. Where can you learn more? Go to [email or web address] and sign in to your ShutterCal account.  Enter W712 in the Search Health Information box to learn more about \"A Healthy Lifestyle: Care Instructions. \"     If you do not have an account, please click on the \"Sign Up Now\" link. Current as of: July 26, 2016  Content Version: 11.2  © 3742-3537 [employer]orporated. Care instructions adapted under license by [facility] ([facility]). If you have questions about a medical condition or this instruction, always ask your healthcare professional. [address] any warranty or liability for your use of this information.

## 2019-06-20 NOTE — PATIENT INSTRUCTIONS
[facility] NEUROLOGY    Due to the complex nature of our neurological testing, It is the policy of the Neurology Department not to release the results of your testing over the phone. Once all testing is completed and we have all the results back, Dr. Rashida Jorgensen will then personally go over all the results with you and answer any questions that you may have during a follow up appointment. If you are unable to keep this appointment, please notify the [address] [place] [address] St @ [phone], as soon as possible. Please bring your prescription bottles to all appointments. * FALL   PRECAUTIONS. *  USE   WALKING  ASSISTANCE  DEVICES     QUAD  CANE        *      WEIGHT   LOSS. *   ADEQUATE   FLUID  INTAKE   AND  ELECTROLYTE  BALANCE           * KEEP  DAIRY  OF   THE  NEUROLOGICAL  SYMPTOMS          *  TO  MAINTAIN  REGULAR  SLEEP  WAKE  CYCLES. *   TO  HAVE  ADEQUATE  REST  AND   SLEEP    HOURS.        *    AVOID  USAGE OF   TOBACCO,  EXCESSIVE  ALCOHOL                AND   ILLEGAL   SUBSTANCES,  IF  ANY          *  Maintain   Healthy  Life Style    With   Periodic  Monitoring  Of      Any  Medical  Conditions  Including   Elevated  Blood  Pressure,  Lipid  Profile,     Blood  Sugar levels  And   Heart  Disease. *   Period   Screening  For  Cancers  Involving  Breast,  Colon,   lungs  And  Other  Organs  As  Applicable,  In consultation   With  Your  Primary Care Providers. *  If   There is  Any  Significant  Worsening   Of  Current  Symptoms  And  Or  If    Any additional  New  Neurological  Symptoms                 Or  Significant  Concerns   Should  Call  911 or  Go  To  Emergency  Department  For  Further  Immediate  Evaluation.

## 2019-06-24 ENCOUNTER — OFFICE VISIT (OUTPATIENT)
Dept: DERMATOLOGY | Age: 59
End: 2019-06-24
Payer: MEDICAID

## 2019-06-24 VITALS
HEART RATE: 82 BPM | BODY MASS INDEX: 35.55 KG/M2 | WEIGHT: 193.2 LBS | DIASTOLIC BLOOD PRESSURE: 66 MMHG | SYSTOLIC BLOOD PRESSURE: 130 MMHG | HEIGHT: 62 IN

## 2019-06-24 DIAGNOSIS — L81.9 HYPERPIGMENTATION: Primary | ICD-10-CM

## 2019-06-24 DIAGNOSIS — L21.9 SEBORRHEIC DERMATITIS: ICD-10-CM

## 2019-06-24 PROCEDURE — G8427 DOCREV CUR MEDS BY ELIG CLIN: HCPCS | Performed by: DERMATOLOGY

## 2019-06-24 PROCEDURE — 99202 OFFICE O/P NEW SF 15 MIN: CPT | Performed by: DERMATOLOGY

## 2019-06-24 PROCEDURE — G8417 CALC BMI ABV UP PARAM F/U: HCPCS | Performed by: DERMATOLOGY

## 2019-06-24 PROCEDURE — 3017F COLORECTAL CA SCREEN DOC REV: CPT | Performed by: DERMATOLOGY

## 2019-06-24 PROCEDURE — 1036F TOBACCO NON-USER: CPT | Performed by: DERMATOLOGY

## 2019-06-24 RX ORDER — KETOCONAZOLE 20 MG/ML
SHAMPOO TOPICAL
Qty: 120 ML | Refills: 2 | Status: SHIPPED | OUTPATIENT
Start: 2019-06-24 | End: 2020-02-20

## 2019-06-24 RX ORDER — HYDROQUINONE 40 MG/G
CREAM TOPICAL
Qty: 28.35 G | Refills: 5 | Status: SHIPPED | OUTPATIENT
Start: 2019-06-24 | End: 2020-06-17

## 2019-06-24 RX ORDER — PIMECROLIMUS 10 MG/G
CREAM TOPICAL
Qty: 30 G | Refills: 5 | Status: SHIPPED | OUTPATIENT
Start: 2019-06-24 | End: 2020-07-15 | Stop reason: SDUPTHER

## 2019-06-24 NOTE — PROGRESS NOTES
Dermatology Patient Note  [address]  [address] [address]  Dept: [phone]  Dept Fax: [phone]: [phone]      VISITDATE: 6/24/2019   REFERRING PROVIDER: Sabine Walters MD      Jessica Bob is a 63 y.o. female  who presents today in the office for:    Other (dark circles); Acne; and Eczema      HISTORY OF PRESENT ILLNESS:  63 y.o. female presenting for dark circles  Location: under eyes  Duration: years  Symptoms: none  Course: improving since she quit smoking  Exacerbating factors: smoking and rubbing her eyes  Prior treatments: none  Also complains of itchy rash of scalp, sides of nose, and forehead. Uses triamcinolone 0.5 which is only briefly effective (and too strong for face)      CURRENT MEDICATIONS:   Current Outpatient Medications   Medication Sig Dispense Refill    hydroquinone 4 % cream Apply once daily to dark spots 28.35 g 5    ketoconazole (NIZORAL) 2 % shampoo Apply 3-4 times weekly to scalp, leave on for five minutes prior to washing off 120 mL 2    pimecrolimus (ELIDEL) 1 % cream Apply topically 2 times daily. 30 g 5    gabapentin (NEURONTIN) 300 MG capsule ONE    CAPSULE  IN  MORNING,    NOON ,   EVENING  AND  BED  TIME   DAILY   AS  NEEDED 120 capsule 2    carbidopa-levodopa (SINEMET)  MG per tablet TAKE 1 TABLET EVERY EVENING AND 1 TABLET AT BEDTIME AS NEEDED 60 tablet 2    nicotine (NICODERM CQ) 14 MG/24HR Place 1 patch onto the skin daily 30 patch 2    traMADol (ULTRAM) 50 MG tablet Take 1 tablet by mouth every 8 hours as needed for Pain for up to 30 days.  90 tablet 0    Elastic Bandages & Supports (KNEE BRACE) MISC Neoprene brace use as directed right knee 1 each 0    nicotine polacrilex (NICORETTE) 2 MG gum Take 1 each by mouth as needed for Smoking cessation 110 each 3    diphenhydrAMINE (BENYLIN) 12.5 MG/5ML syrup Take 5 mLs by mouth nightly as needed (leg cramps) 237 mL 1    Multiple Vitamin (MULTIVITAMIN) tablet Take 1 tablet by mouth daily 30 tablet 5    losartan (COZAAR) 100 MG tablet take 1 tablet by mouth once daily 30 tablet 5    aspirin (RA ASPIRIN) 325 MG tablet take 1 tablet by mouth once daily 30 tablet 5    Cyanocobalamin ER (RA VITAMIN B-12 TR) 1000 MCG TBCR take 1 tablet by mouth once daily 30 tablet 5    omeprazole (PRILOSEC) 20 MG delayed release capsule take 1 capsule by mouth once daily 30 capsule 5    celecoxib (CELEBREX) 200 MG capsule take 1 capsule by mouth twice a day 60 capsule 5    pravastatin (PRAVACHOL) 20 MG tablet take 1 tablet by mouth once daily 30 tablet 5    triamcinolone (ARISTOCORT) 0.5 % cream Apply topically 3 times daily. 1 Tube 0    blood glucose test strips (ONE TOUCH ULTRA TEST) strip Monitor blood glucose levels three times daily and as needed for hyper/hypoglycemic symptoms. Dx. Diabetes (E11.9) 100 strip 6    ONETOUCH DELICA LANCETS FINE MISC TEST three times a day 100 each 5    Blood Glucose Monitoring Suppl (BLOOD GLUCOSE METER) KIT Monitor blood glucose levels three times daily and as needed for hyper/hypoglycemic symptoms. Dx. Diabetes (E11.9) 1 kit 0    risperiDONE (RISPERDAL) 1 MG tablet Take 1 mg by mouth nightly.  ammonium lactate (LAC-HYDRIN) 12 % lotion Apply topically daily. 396 g 1    hydrOXYzine (ATARAX) 25 MG tablet Take 1 tablet by mouth 3 times daily as needed for Itching 30 tablet 3    albuterol sulfate HFA (VENTOLIN HFA) 108 (90 Base) MCG/ACT inhaler Inhale 2 puffs into the lungs every 6 hours as needed for Wheezing 18 g 6     No current facility-administered medications for this visit.         ALLERGIES:   No Known Allergies    SOCIAL HISTORY:  Social History     Tobacco Use    Smoking status: Former Smoker     Packs/day: 1.00     Years: 26.00     Pack years: 26.00     Types: Cigarettes     Start date: 1975     Last attempt to quit: 2018     Years since quittin.5    Smokeless tobacco: Never Used   Substance Use Topics    Alcohol use: Yes     Alcohol/week: 0.0 oz     Frequency: 4 or more times a week     Drinks per session: 5 or 6     Binge frequency: Never     Comment: daily; \"2 tallboys per day\"       REVIEW OF SYSTEMS:  Review of Systems  Skin: Denies any new changing, growing orbleeding lesions or rashes except as described in the HPI   Constitutional: Denies fevers, chills, and malaise. PHYSICAL EXAM:   /66   Pulse 82   Ht 5' 2.01\" (1.575 m)   Wt 193 lb 3.2 oz (87.6 kg)   LMP 06/06/2011   BMI 35.33 kg/m²     General Exam:  General Appearance: No acute distress, Well nourished     Neuro: Alert and oriented to person, place and time  Psych: Normal affect   Lymph Node: Not performed    Cutaneous Exam: Performed as documented in clinic note below. Sun-exposed skin, which includes the head/face, neck, both arms, digits and/or nails was examined. Pertinent Physical Exam Findings:  Physical Exam  Thin scaly pink plaques on scalp, forehead and in nasal crease  Lower lids with redundant skin and hyperpigmented patches    Photo surveillance performed: No    Medical Necessity of Exam Performed:   Distribution of patient concerns    Additional Diagnostic Testing performed during exam: Not performed ,  Not performed    ASSESSMENT:   Diagnosis Orders   1. Hyperpigmentation  hydroquinone 4 % cream   2. Seborrheic dermatitis  ketoconazole (NIZORAL) 2 % shampoo    pimecrolimus (ELIDEL) 1 % cream       Plan of Action is as Follows:  Assessment   1. Hyperpigmentation under eyes  - discussed that hydroquinone not covered by insurance  - hydroquinone 4 % cream; Apply once daily to dark spots  Dispense: 28.35 g; Refill: 5    2. Seborrheic dermatitis  - discussed diagnosis, etiology, natural course, and treatment options  - has failed triamcinolone 0.5% cream (also too strong for face)  - ketoconazole (NIZORAL) 2 % shampoo;  Apply 3-4 times weekly to scalp, leave on for five minutes prior to washing off  Dispense: 120 mL; Refill: 2  - pimecrolimus (ELIDEL) 1 % cream; Apply topically 2 times daily. Dispense: 30 g; Refill: 5            There are no Patient Instructions on file for this visit. Follow-up: No follow-ups on file. This note was created with the assistance of a speech-recognition program.  Although the intention is to generate a document that actually reflects the content of the visit, no guarantees can be provided that every mistake has been identified and corrected byediting.     Electronically signed by Omar Schwartz MD on 6/24/19 at 3:00 PM

## 2019-06-26 DIAGNOSIS — K62.89 CHRONIC RECTAL PAIN: ICD-10-CM

## 2019-06-26 DIAGNOSIS — G89.29 CHRONIC RECTAL PAIN: ICD-10-CM

## 2019-06-26 NOTE — TELEPHONE ENCOUNTER
Chayito Brizuela called requesting a refill of the below medication which has been pended for you: [place] from [place], [place], and [address] [place] [address] reviewed. Tramadol 50mg last filled 05/28/19 #90/30days. Report available for your review    Requested Prescriptions     Pending Prescriptions Disp Refills    traMADol (ULTRAM) 50 MG tablet 90 tablet 0     Sig: Take 1 tablet by mouth every 8 hours as needed for Pain for up to 30 days.        Last Appointment Date: 5/15/2019  Next Appointment Date: 9/25/2019    No Known Allergies

## 2019-06-27 DIAGNOSIS — M79.644 PAIN OF RIGHT THUMB: Primary | ICD-10-CM

## 2019-06-27 RX ORDER — TRAMADOL HYDROCHLORIDE 50 MG/1
50 TABLET ORAL EVERY 8 HOURS PRN
Qty: 90 TABLET | Refills: 0 | Status: SHIPPED | OUTPATIENT
Start: 2019-06-27 | End: 2019-07-21 | Stop reason: SDUPTHER

## 2019-07-01 ENCOUNTER — TELEPHONE (OUTPATIENT)
Dept: DERMATOLOGY | Age: 59
End: 2019-07-01

## 2019-07-02 ENCOUNTER — OFFICE VISIT (OUTPATIENT)
Dept: ORTHOPEDIC SURGERY | Age: 59
End: 2019-07-02
Payer: MEDICAID

## 2019-07-02 VITALS
HEART RATE: 84 BPM | HEIGHT: 62 IN | DIASTOLIC BLOOD PRESSURE: 80 MMHG | OXYGEN SATURATION: 98 % | SYSTOLIC BLOOD PRESSURE: 138 MMHG | BODY MASS INDEX: 35.51 KG/M2 | WEIGHT: 193 LBS

## 2019-07-02 DIAGNOSIS — M65.311 TRIGGER THUMB, RIGHT THUMB: Primary | ICD-10-CM

## 2019-07-02 PROCEDURE — L3908 WHO COCK-UP NONMOLDE PRE OTS: HCPCS | Performed by: PHYSICIAN ASSISTANT

## 2019-07-02 PROCEDURE — 1036F TOBACCO NON-USER: CPT | Performed by: PHYSICIAN ASSISTANT

## 2019-07-02 PROCEDURE — G8427 DOCREV CUR MEDS BY ELIG CLIN: HCPCS | Performed by: PHYSICIAN ASSISTANT

## 2019-07-02 PROCEDURE — G8417 CALC BMI ABV UP PARAM F/U: HCPCS | Performed by: PHYSICIAN ASSISTANT

## 2019-07-02 PROCEDURE — 99212 OFFICE O/P EST SF 10 MIN: CPT | Performed by: PHYSICIAN ASSISTANT

## 2019-07-02 PROCEDURE — 3017F COLORECTAL CA SCREEN DOC REV: CPT | Performed by: PHYSICIAN ASSISTANT

## 2019-07-03 DIAGNOSIS — M79.644 PAIN OF RIGHT THUMB: ICD-10-CM

## 2019-07-03 DIAGNOSIS — M65.311 TRIGGER THUMB, RIGHT THUMB: Primary | ICD-10-CM

## 2019-07-03 DIAGNOSIS — Z72.0 TOBACCO ABUSE: ICD-10-CM

## 2019-07-03 DIAGNOSIS — F17.219 CIGARETTE NICOTINE DEPENDENCE WITH NICOTINE-INDUCED DISORDER: ICD-10-CM

## 2019-07-09 DIAGNOSIS — R25.2 LEG CRAMPS: ICD-10-CM

## 2019-07-09 NOTE — TELEPHONE ENCOUNTER
Dimitri rPoctor called requesting a refill of the below medication which has been pended for you:     Requested Prescriptions     Pending Prescriptions Disp Refills    diphenhydrAMINE (BENYLIN) 12.5 MG/5ML syrup 237 mL 1     Sig: Take 5 mLs by mouth nightly as needed (leg cramps)       Last Appointment Date: 5/15/2019  Next Appointment Date: 9/25/2019    No Known Allergies

## 2019-07-10 DIAGNOSIS — L29.9 PRURITUS: ICD-10-CM

## 2019-07-10 RX ORDER — HYDROXYZINE HYDROCHLORIDE 25 MG/1
25 TABLET, FILM COATED ORAL EVERY 8 HOURS PRN
Qty: 30 TABLET | Refills: 3 | Status: SHIPPED | OUTPATIENT
Start: 2019-07-10 | End: 2019-10-21 | Stop reason: SDUPTHER

## 2019-07-12 ENCOUNTER — TELEPHONE (OUTPATIENT)
Dept: DERMATOLOGY | Age: 59
End: 2019-07-12

## 2019-07-12 NOTE — TELEPHONE ENCOUNTER
Patient called. States the Elidel cream that was prescribed by Dr. Nathanael Sanches was unavailable at the pharmacy and is requesting an alternative. Please address. Patient uses [facility].

## 2019-07-20 DIAGNOSIS — K21.9 GASTROESOPHAGEAL REFLUX DISEASE, ESOPHAGITIS PRESENCE NOT SPECIFIED: ICD-10-CM

## 2019-07-20 DIAGNOSIS — E78.2 MIXED HYPERLIPIDEMIA: ICD-10-CM

## 2019-07-20 DIAGNOSIS — E53.8 VITAMIN B12 DEFICIENCY: ICD-10-CM

## 2019-07-21 DIAGNOSIS — G89.29 CHRONIC RECTAL PAIN: Primary | ICD-10-CM

## 2019-07-21 DIAGNOSIS — K62.89 CHRONIC RECTAL PAIN: Primary | ICD-10-CM

## 2019-07-22 ENCOUNTER — TELEPHONE (OUTPATIENT)
Dept: FAMILY MEDICINE CLINIC | Age: 59
End: 2019-07-22

## 2019-07-22 RX ORDER — OMEPRAZOLE 20 MG/1
CAPSULE, DELAYED RELEASE ORAL
Qty: 30 CAPSULE | Refills: 1 | Status: SHIPPED | OUTPATIENT
Start: 2019-07-22 | End: 2019-09-25 | Stop reason: SDUPTHER

## 2019-07-22 RX ORDER — PRAVASTATIN SODIUM 20 MG
TABLET ORAL
Qty: 30 TABLET | Refills: 1 | Status: SHIPPED | OUTPATIENT
Start: 2019-07-22 | End: 2019-09-25 | Stop reason: SDUPTHER

## 2019-07-22 RX ORDER — TRAMADOL HYDROCHLORIDE 50 MG/1
TABLET ORAL
Qty: 90 TABLET | Refills: 0 | Status: SHIPPED | OUTPATIENT
Start: 2019-07-22 | End: 2019-08-19 | Stop reason: SDUPTHER

## 2019-07-23 DIAGNOSIS — K62.89 CHRONIC RECTAL PAIN: ICD-10-CM

## 2019-07-23 DIAGNOSIS — G89.29 CHRONIC RECTAL PAIN: ICD-10-CM

## 2019-07-23 RX ORDER — TRAMADOL HYDROCHLORIDE 50 MG/1
TABLET ORAL
Qty: 90 TABLET | Refills: 0 | OUTPATIENT
Start: 2019-07-23 | End: 2019-08-22

## 2019-08-15 DIAGNOSIS — I10 ESSENTIAL HYPERTENSION: ICD-10-CM

## 2019-08-15 DIAGNOSIS — F10.20 ALCOHOLISM (HCC): ICD-10-CM

## 2019-08-15 RX ORDER — MULTIVITAMIN WITH FOLIC ACID 400 MCG
TABLET ORAL
Qty: 30 TABLET | Refills: 0 | Status: SHIPPED | OUTPATIENT
Start: 2019-08-15 | End: 2019-09-12 | Stop reason: SDUPTHER

## 2019-08-15 RX ORDER — LOSARTAN POTASSIUM 100 MG/1
TABLET ORAL
Qty: 30 TABLET | Refills: 0 | Status: SHIPPED | OUTPATIENT
Start: 2019-08-15 | End: 2019-09-12 | Stop reason: SDUPTHER

## 2019-08-15 NOTE — TELEPHONE ENCOUNTER
Shane Sanchez called requesting a refill of the below medication which has been pended for you:     Requested Prescriptions     Pending Prescriptions Disp Refills    Multiple Vitamin (MULTIVITAMIN) tablet [Pharmacy Med Name: TAB-A-BETTY TABLET] 30 tablet 0     Sig: take 1 tablet by mouth once daily    losartan (COZAAR) 100 MG tablet [Pharmacy Med Name: LOSARTAN POTASSIUM 100 MG TAB] 30 tablet 0     Sig: take 1 tablet by mouth once daily       Last Appointment Date: 5/15/2019  Next Appointment Date: 9/25/2019    No Known Allergies

## 2019-08-19 DIAGNOSIS — K62.89 CHRONIC RECTAL PAIN: ICD-10-CM

## 2019-08-19 DIAGNOSIS — G89.29 CHRONIC RECTAL PAIN: ICD-10-CM

## 2019-08-20 ENCOUNTER — OFFICE VISIT (OUTPATIENT)
Dept: OPTOMETRY | Age: 59
End: 2019-08-20
Payer: MEDICAID

## 2019-08-20 DIAGNOSIS — K62.89 CHRONIC RECTAL PAIN: ICD-10-CM

## 2019-08-20 DIAGNOSIS — H25.13 NUCLEAR SCLEROSIS OF BOTH EYES: ICD-10-CM

## 2019-08-20 DIAGNOSIS — H52.203 HYPEROPIA OF BOTH EYES WITH ASTIGMATISM AND PRESBYOPIA: ICD-10-CM

## 2019-08-20 DIAGNOSIS — E11.9 NON-INSULIN DEPENDENT TYPE 2 DIABETES MELLITUS (HCC): Primary | ICD-10-CM

## 2019-08-20 DIAGNOSIS — H52.4 HYPEROPIA OF BOTH EYES WITH ASTIGMATISM AND PRESBYOPIA: ICD-10-CM

## 2019-08-20 DIAGNOSIS — H53.8 BLURRED VISION, BILATERAL: ICD-10-CM

## 2019-08-20 DIAGNOSIS — G89.29 CHRONIC RECTAL PAIN: ICD-10-CM

## 2019-08-20 DIAGNOSIS — H52.03 HYPEROPIA OF BOTH EYES WITH ASTIGMATISM AND PRESBYOPIA: ICD-10-CM

## 2019-08-20 PROCEDURE — 3017F COLORECTAL CA SCREEN DOC REV: CPT | Performed by: OPTOMETRIST

## 2019-08-20 PROCEDURE — G8417 CALC BMI ABV UP PARAM F/U: HCPCS | Performed by: OPTOMETRIST

## 2019-08-20 PROCEDURE — 3044F HG A1C LEVEL LT 7.0%: CPT | Performed by: OPTOMETRIST

## 2019-08-20 PROCEDURE — 92250 FUNDUS PHOTOGRAPHY W/I&R: CPT | Performed by: OPTOMETRIST

## 2019-08-20 PROCEDURE — G8427 DOCREV CUR MEDS BY ELIG CLIN: HCPCS | Performed by: OPTOMETRIST

## 2019-08-20 PROCEDURE — 1036F TOBACCO NON-USER: CPT | Performed by: OPTOMETRIST

## 2019-08-20 PROCEDURE — 2024F 7 FLD RTA PHOTO EVC RTNOPTHY: CPT | Performed by: OPTOMETRIST

## 2019-08-20 PROCEDURE — 99214 OFFICE O/P EST MOD 30 MIN: CPT | Performed by: OPTOMETRIST

## 2019-08-20 RX ORDER — TRAMADOL HYDROCHLORIDE 50 MG/1
50 TABLET ORAL
Qty: 90 TABLET | Refills: 0 | Status: CANCELLED | OUTPATIENT
Start: 2019-08-20 | End: 2019-09-19

## 2019-08-20 RX ORDER — TRAMADOL HYDROCHLORIDE 50 MG/1
50 TABLET ORAL EVERY 8 HOURS PRN
Qty: 90 TABLET | Refills: 0 | Status: SHIPPED | OUTPATIENT
Start: 2019-08-20 | End: 2019-09-18 | Stop reason: SDUPTHER

## 2019-08-20 RX ORDER — TROPICAMIDE 10 MG/ML
1 SOLUTION/ DROPS OPHTHALMIC ONCE
Status: COMPLETED | OUTPATIENT
Start: 2019-08-20 | End: 2019-08-20

## 2019-08-20 RX ORDER — PHENYLEPHRINE HCL 2.5 %
1 DROPS OPHTHALMIC (EYE) ONCE
Status: COMPLETED | OUTPATIENT
Start: 2019-08-20 | End: 2019-08-20

## 2019-08-20 RX ADMIN — TROPICAMIDE 1 DROP: 10 SOLUTION/ DROPS OPHTHALMIC at 13:51

## 2019-08-20 RX ADMIN — Medication 1 DROP: at 13:51

## 2019-08-20 ASSESSMENT — REFRACTION_WEARINGRX
OD_CYLINDER: -0.25
OS_AXIS: 105
OS_ADD: +2.25
OD_ADD: +2.25
OD_SPHERE: +0.25
SPECS_TYPE: BIFOCAL
OS_SPHERE: +0.75
OS_CYLINDER: -0.25
OD_AXIS: 122

## 2019-08-20 ASSESSMENT — VISUAL ACUITY
CORRECTION_TYPE: GLASSES
OD_CC: 20/20 OU
METHOD: SNELLEN - LINEAR
OS_CC: 20/20

## 2019-08-20 ASSESSMENT — SLIT LAMP EXAM - LIDS
COMMENTS: NORMAL
COMMENTS: NORMAL

## 2019-08-20 ASSESSMENT — ENCOUNTER SYMPTOMS
ALLERGIC/IMMUNOLOGIC NEGATIVE: 0
RESPIRATORY NEGATIVE: 0
EYES NEGATIVE: 0
GASTROINTESTINAL NEGATIVE: 0

## 2019-08-20 ASSESSMENT — REFRACTION_MANIFEST
OS_ADD: +2.25
OD_CYLINDER: -0.50
OD_ADD: +2.25
OD_SPHERE: +0.50
OS_AXIS: 103
OS_SPHERE: +0.75
OD_AXIS: 095
OS_CYLINDER: -0.25

## 2019-08-20 ASSESSMENT — KERATOMETRY
OS_AXISANGLE_DEGREES: 108
OS_AXISANGLE2_DEGREES: 018
OS_K2POWER_DIOPTERS: 45.75
OS_K1POWER_DIOPTERS: 45.00

## 2019-08-20 ASSESSMENT — TONOMETRY
OD_IOP_MMHG: 17
IOP_METHOD: NON-CONTACT AIR PUFF
OS_IOP_MMHG: 19

## 2019-08-20 NOTE — PROGRESS NOTES
that ruptured and then she got kidney failure and intestinal infection and     Glaucoma Neg Hx      Social History     Socioeconomic History    Marital status: Single     Spouse name: None    Number of children: None    Years of education: None    Highest education level: None   Occupational History    None   Social Needs    Financial resource strain: None    Food insecurity:     Worry: None     Inability: None    Transportation needs:     Medical: None     Non-medical: None   Tobacco Use    Smoking status: Former Smoker     Packs/day: 1.00     Years: 26.00     Pack years: 26.00     Types: Cigarettes     Start date: 1975     Last attempt to quit: 2018     Years since quittin.6    Smokeless tobacco: Never Used   Substance and Sexual Activity    Alcohol use:  Yes     Alcohol/week: 0.0 standard drinks     Frequency: 4 or more times a week     Drinks per session: 5 or 6     Binge frequency: Never     Comment: daily; \"2 tallboys per day\"    Drug use: No    Sexual activity: None   Lifestyle    Physical activity:     Days per week: None     Minutes per session: None    Stress: None   Relationships    Social connections:     Talks on phone: None     Gets together: None     Attends Oriental orthodox service: None     Active member of club or organization: None     Attends meetings of clubs or organizations: None     Relationship status: None    Intimate partner violence:     Fear of current or ex partner: None     Emotionally abused: None     Physically abused: None     Forced sexual activity: None   Other Topics Concern    None   Social History Narrative    None       Past Medical History:   Diagnosis Date    [facility]     Anxiety     Astigmatism with presbyopia     Balance problem     Bipolar disorder ([facility]     schizoaffective disorder (Dr. Aguilar Murillo)    Chronic lumbar radiculopathy     Chronic rectal pain     due to fissures    Chronic shoulder pain     bilaterally, due to osteoarthritis    CTS (carpal tunnel syndrome)     Depression     Fatigue     Hyperlipidemia     Hypertension     Obesity     Peripheral neuropathy     Pica     eats bar soap    Restless leg syndrome     Schizoaffective disorder (HCC)     Sleeping difficulty     Tobacco abuse     Type 2 diabetes mellitus ([address]          Main Ophthalmology Exam     External Exam       Right Left    External Normal Normal          Slit Lamp Exam       Right Left    Lids/Lashes Normal Normal    Conjunctiva/Sclera White and quiet White and quiet    Cornea Clear Clear    Anterior Chamber Deep and quiet Deep and quiet    Iris Round and reactive Round and reactive    Lens 1+ Nuclear sclerosis 1+ Nuclear sclerosis    Vitreous Normal Normal          Fundus Exam       Right Left    Disc Normal Normal    C/D Ratio 0.25-.3 0.25-.3    Macula Normal Normal    Vessels Normal Normal    Periphery Normal Normal    NO diabetic retinopathy                   Tonometry     Tonometry (Non-contact air puff, 1:43 PM)       Right Left    Pressure 17 19   IOP.0             16.7  CH:  10.3          8.9  WS: 8.8          5.5                   Visual Acuity (Snellen - Linear)       Right Left    Dist cc 20/20 20/20    Near cc 20/20 OU     Correction:  Glasses        Keratometry     Keratometry       K1 Axis K2 Axis    Right        Left 45.00 018 45.75 108              Pupils     Pupils       Pupils    Right PERRL    Left PERRL               Not recorded         Not recorded          Ophthalmology Exam     Wearing Rx       Sphere Cylinder Axis Add    Right +0.25 -0.25 122 +2.25    Left +0.75 -0.25 105 +2.25    Age:  1yr    Type:  Bifocal                Manifest Refraction     Manifest Refraction       Sphere Cylinder Axis Dist VA Add    Right +0.50 -0.50 095 20/20 +2.25    Left +0.75 -0.25 103 20/20 +2.25          Manifest Refraction #2 (Auto)       Sphere Cylinder Axis Dist VA Add    Right +0.50 -0.50 092      Left +1.00 -0.50 090

## 2019-08-20 NOTE — TELEPHONE ENCOUNTER
Rosalba Carrion called requesting a refill of the below medication which has been pended for you: OARRS from [facility], [address] [place] [address], and [place] reviewed. Tramadol 50 mg last filled 07/23/19 #120/30days. Report available for your review. Requested Prescriptions     Pending Prescriptions Disp Refills    traMADol (ULTRAM) 50 MG tablet 90 tablet 0     Sig: Take 1 tablet by mouth every 8 hours as needed for Pain for up to 30 days.        Last Appointment Date: 5/15/2019  Next Appointment Date: 9/25/2019    No Known Allergies

## 2019-08-20 NOTE — TELEPHONE ENCOUNTER
Lily Mcguire is calling to request a refill on the following medication(s):  Requested Prescriptions      No prescriptions requested or ordered in this encounter       Last Visit Date (If Applicable):  7/47/7539    Next Visit Date:    9/25/2019

## 2019-08-27 ENCOUNTER — OFFICE VISIT (OUTPATIENT)
Dept: ORTHOPEDIC SURGERY | Age: 59
End: 2019-08-27
Payer: MEDICAID

## 2019-08-27 VITALS
WEIGHT: 193 LBS | HEIGHT: 62 IN | DIASTOLIC BLOOD PRESSURE: 68 MMHG | SYSTOLIC BLOOD PRESSURE: 116 MMHG | BODY MASS INDEX: 35.51 KG/M2 | HEART RATE: 86 BPM | OXYGEN SATURATION: 97 %

## 2019-08-27 DIAGNOSIS — M65.311 TRIGGER THUMB, RIGHT THUMB: Primary | ICD-10-CM

## 2019-08-27 DIAGNOSIS — F17.219 CIGARETTE NICOTINE DEPENDENCE WITH NICOTINE-INDUCED DISORDER: ICD-10-CM

## 2019-08-27 DIAGNOSIS — Z72.0 TOBACCO ABUSE: ICD-10-CM

## 2019-08-27 PROCEDURE — G8427 DOCREV CUR MEDS BY ELIG CLIN: HCPCS | Performed by: PHYSICIAN ASSISTANT

## 2019-08-27 PROCEDURE — 1036F TOBACCO NON-USER: CPT | Performed by: PHYSICIAN ASSISTANT

## 2019-08-27 PROCEDURE — 99212 OFFICE O/P EST SF 10 MIN: CPT | Performed by: PHYSICIAN ASSISTANT

## 2019-08-27 PROCEDURE — G8417 CALC BMI ABV UP PARAM F/U: HCPCS | Performed by: PHYSICIAN ASSISTANT

## 2019-08-27 PROCEDURE — 3017F COLORECTAL CA SCREEN DOC REV: CPT | Performed by: PHYSICIAN ASSISTANT

## 2019-08-27 PROCEDURE — 20550 NJX 1 TENDON SHEATH/LIGAMENT: CPT | Performed by: PHYSICIAN ASSISTANT

## 2019-08-27 RX ORDER — NICOTINE 21 MG/24HR
1 PATCH, TRANSDERMAL 24 HOURS TRANSDERMAL DAILY
Qty: 30 PATCH | Refills: 1 | Status: SHIPPED | OUTPATIENT
Start: 2019-08-27 | End: 2019-11-04 | Stop reason: SDUPTHER

## 2019-08-27 NOTE — PROGRESS NOTES
[facility] [address]  [address]  [place] [address] [place] Agu[address]era  Dept: Jameel Villanueva: [phone]    Date of Service:  8/27/2019    Moe Mishra  YOB: 1960  MRN: [id number]      SUBJECTIVE:  Moe Mishra is a 63 y.o. who comes to us today for followup of right thumb pain. I have seen her for this problem several months ago. We actually talked about conservative versus surgical intervention. She did not want to proceed with any surgery. She did not want an injection at that time either, so we put her into therapy. We put her in a thumb spica brace to help with this triggering at night which she still reports. She comes in today stating that she still has pain in that thumb. It still triggers at night. It gets locked up. She needs to use her other hand to reduce it. She is still not interested in any type of surgery at this point but here today for repeat evaluation. On exam, this is a 63 y.o. in no acute distress, alert, oriented, pleasant, cooperative. Right thumb was inspected. Skin is in good repair. She does have tenderness to palpation over the A1 pulley. There is active triggering in that right thumb here today. It is spontaneously reducible, however. X-RAYS:  None obtained today. IMPRESSION:  Right thumb trigger thumb. PLAN:  I talked to her about conservative versus surgical intervention again today. She would like to proceed with an injection. I think that is reasonable. PROCEDURE NOTE:  After verbal consent was obtained, utilizing sterile technique, the volar aspect of the right thumb was washed free with an alcohol pad. A 25-gauge needle was used to introduce 0.5 mL of 0.5% Marcaine and 0.5 mL of 40 mg/mL of Kenalog into and around the left thumb trigger thumb over the A1 pulley. This was well tolerated. Good hemostasis noted. Dry sterile bandage was applied. Activities as tolerated. Weightbearing as tolerated.

## 2019-08-28 RX ORDER — BUPIVACAINE HYDROCHLORIDE 5 MG/ML
0.5 INJECTION, SOLUTION PERINEURAL ONCE
Status: COMPLETED | OUTPATIENT
Start: 2019-08-28 | End: 2019-08-29

## 2019-08-28 RX ORDER — TRIAMCINOLONE ACETONIDE 40 MG/ML
20 INJECTION, SUSPENSION INTRA-ARTICULAR; INTRAMUSCULAR ONCE
Status: COMPLETED | OUTPATIENT
Start: 2019-08-28 | End: 2019-08-29

## 2019-08-29 RX ADMIN — TRIAMCINOLONE ACETONIDE 20 MG: 40 INJECTION, SUSPENSION INTRA-ARTICULAR; INTRAMUSCULAR at 14:53

## 2019-08-29 RX ADMIN — BUPIVACAINE HYDROCHLORIDE 2.5 MG: 5 INJECTION, SOLUTION PERINEURAL at 14:53

## 2019-09-11 DIAGNOSIS — G56.03 BILATERAL CARPAL TUNNEL SYNDROME: ICD-10-CM

## 2019-09-11 DIAGNOSIS — G60.9 IDIOPATHIC PERIPHERAL NEUROPATHY: ICD-10-CM

## 2019-09-11 RX ORDER — GABAPENTIN 300 MG/1
CAPSULE ORAL
Qty: 120 CAPSULE | Refills: 2 | Status: SHIPPED | OUTPATIENT
Start: 2019-09-11 | End: 2019-09-23 | Stop reason: SDUPTHER

## 2019-09-12 DIAGNOSIS — I10 ESSENTIAL HYPERTENSION: ICD-10-CM

## 2019-09-12 DIAGNOSIS — F10.20 ALCOHOLISM (HCC): ICD-10-CM

## 2019-09-13 RX ORDER — MULTIVITAMIN WITH FOLIC ACID 400 MCG
TABLET ORAL
Qty: 30 TABLET | Refills: 0 | Status: SHIPPED | OUTPATIENT
Start: 2019-09-13 | End: 2020-02-12

## 2019-09-13 RX ORDER — LOSARTAN POTASSIUM 100 MG/1
TABLET ORAL
Qty: 30 TABLET | Refills: 0 | Status: SHIPPED | OUTPATIENT
Start: 2019-09-13 | End: 2019-09-25 | Stop reason: SDUPTHER

## 2019-09-13 NOTE — TELEPHONE ENCOUNTER
Joyce Robinson called requesting a refill of the below medication which has been pended for you:     Requested Prescriptions     Pending Prescriptions Disp Refills    losartan (COZAAR) 100 MG tablet [Pharmacy Med Name: LOSARTAN POTASSIUM 100 MG TAB] 30 tablet 0     Sig: take 1 tablet by mouth once daily    Multiple Vitamin (MULTIVITAMIN) tablet [Pharmacy Med Name: TAB-A-BETTY TABLET] 30 tablet 0     Sig: take 1 tablet by mouth once daily       Last Appointment Date: Visit date not found  Next Appointment Date: Visit date not found    No Known Allergies

## 2019-09-16 ENCOUNTER — OFFICE VISIT (OUTPATIENT)
Dept: PRIMARY CARE CLINIC | Age: 59
End: 2019-09-16
Payer: MEDICAID

## 2019-09-16 VITALS
TEMPERATURE: 99 F | OXYGEN SATURATION: 97 % | DIASTOLIC BLOOD PRESSURE: 80 MMHG | SYSTOLIC BLOOD PRESSURE: 158 MMHG | BODY MASS INDEX: 35.3 KG/M2 | WEIGHT: 193 LBS | HEART RATE: 96 BPM

## 2019-09-16 DIAGNOSIS — S91.102A OPEN WOUND OF GREAT TOE, LEFT, INITIAL ENCOUNTER: Primary | ICD-10-CM

## 2019-09-16 DIAGNOSIS — G25.81 RESTLESS LEG SYNDROME: ICD-10-CM

## 2019-09-16 PROCEDURE — G8417 CALC BMI ABV UP PARAM F/U: HCPCS | Performed by: NURSE PRACTITIONER

## 2019-09-16 PROCEDURE — G8427 DOCREV CUR MEDS BY ELIG CLIN: HCPCS | Performed by: NURSE PRACTITIONER

## 2019-09-16 PROCEDURE — 1036F TOBACCO NON-USER: CPT | Performed by: NURSE PRACTITIONER

## 2019-09-16 PROCEDURE — 3017F COLORECTAL CA SCREEN DOC REV: CPT | Performed by: NURSE PRACTITIONER

## 2019-09-16 PROCEDURE — 99213 OFFICE O/P EST LOW 20 MIN: CPT | Performed by: NURSE PRACTITIONER

## 2019-09-16 NOTE — TELEPHONE ENCOUNTER
Last Appt:  6/20/2019  Next Appt:   9/23/2019  Med verified in Epic    Pt needs refill on Carbidopa-Levodopa

## 2019-09-17 DIAGNOSIS — K62.89 CHRONIC RECTAL PAIN: ICD-10-CM

## 2019-09-17 DIAGNOSIS — G89.29 CHRONIC RECTAL PAIN: ICD-10-CM

## 2019-09-18 RX ORDER — TRAMADOL HYDROCHLORIDE 50 MG/1
50 TABLET ORAL EVERY 8 HOURS PRN
Qty: 90 TABLET | Refills: 0 | Status: SHIPPED | OUTPATIENT
Start: 2019-09-18 | End: 2019-10-17 | Stop reason: SDUPTHER

## 2019-09-23 ENCOUNTER — OFFICE VISIT (OUTPATIENT)
Dept: NEUROLOGY | Age: 59
End: 2019-09-23
Payer: MEDICAID

## 2019-09-23 VITALS
WEIGHT: 194.4 LBS | BODY MASS INDEX: 35.77 KG/M2 | HEART RATE: 84 BPM | HEIGHT: 62 IN | DIASTOLIC BLOOD PRESSURE: 66 MMHG | SYSTOLIC BLOOD PRESSURE: 128 MMHG | RESPIRATION RATE: 18 BRPM

## 2019-09-23 DIAGNOSIS — M79.662 BILATERAL CALF PAIN: ICD-10-CM

## 2019-09-23 DIAGNOSIS — G25.81 RESTLESS LEG SYNDROME: ICD-10-CM

## 2019-09-23 DIAGNOSIS — G47.9 SLEEPING DIFFICULTY: ICD-10-CM

## 2019-09-23 DIAGNOSIS — I72.8 SPLENIC ARTERY ANEURYSM (HCC): ICD-10-CM

## 2019-09-23 DIAGNOSIS — R53.83 FATIGUE DUE TO DEPRESSION: ICD-10-CM

## 2019-09-23 DIAGNOSIS — M54.16 CHRONIC LUMBAR RADICULOPATHY: ICD-10-CM

## 2019-09-23 DIAGNOSIS — E11.40 TYPE 2 DIABETES MELLITUS WITH DIABETIC NEUROPATHY, WITHOUT LONG-TERM CURRENT USE OF INSULIN (HCC): Primary | ICD-10-CM

## 2019-09-23 DIAGNOSIS — F10.20 ALCOHOLISM (HCC): ICD-10-CM

## 2019-09-23 DIAGNOSIS — E66.9 OBESITY (BMI 30.0-34.9): ICD-10-CM

## 2019-09-23 DIAGNOSIS — F31.31 BIPOLAR AFFECTIVE DISORDER, CURRENTLY DEPRESSED, MILD (HCC): ICD-10-CM

## 2019-09-23 DIAGNOSIS — E78.2 MIXED HYPERLIPIDEMIA: ICD-10-CM

## 2019-09-23 DIAGNOSIS — R26.89 BALANCE PROBLEM: ICD-10-CM

## 2019-09-23 DIAGNOSIS — I10 ESSENTIAL HYPERTENSION: ICD-10-CM

## 2019-09-23 DIAGNOSIS — G60.9 IDIOPATHIC PERIPHERAL NEUROPATHY: ICD-10-CM

## 2019-09-23 DIAGNOSIS — F34.1 DYSTHYMIA: ICD-10-CM

## 2019-09-23 DIAGNOSIS — M25.511 CHRONIC RIGHT SHOULDER PAIN: ICD-10-CM

## 2019-09-23 DIAGNOSIS — M79.661 BILATERAL CALF PAIN: ICD-10-CM

## 2019-09-23 DIAGNOSIS — F50.89 PICA: ICD-10-CM

## 2019-09-23 DIAGNOSIS — Z72.0 TOBACCO ABUSE: ICD-10-CM

## 2019-09-23 DIAGNOSIS — G56.03 BILATERAL CARPAL TUNNEL SYNDROME: ICD-10-CM

## 2019-09-23 DIAGNOSIS — F41.9 ANXIETY: ICD-10-CM

## 2019-09-23 DIAGNOSIS — F25.9 SCHIZOAFFECTIVE DISORDER, UNSPECIFIED TYPE (HCC): ICD-10-CM

## 2019-09-23 DIAGNOSIS — G89.29 CHRONIC RIGHT SHOULDER PAIN: ICD-10-CM

## 2019-09-23 DIAGNOSIS — F25.0 SCHIZOAFFECTIVE DISORDER, BIPOLAR TYPE (HCC): ICD-10-CM

## 2019-09-23 DIAGNOSIS — F32.A FATIGUE DUE TO DEPRESSION: ICD-10-CM

## 2019-09-23 PROCEDURE — G8417 CALC BMI ABV UP PARAM F/U: HCPCS | Performed by: PSYCHIATRY & NEUROLOGY

## 2019-09-23 PROCEDURE — 99214 OFFICE O/P EST MOD 30 MIN: CPT | Performed by: PSYCHIATRY & NEUROLOGY

## 2019-09-23 PROCEDURE — G8427 DOCREV CUR MEDS BY ELIG CLIN: HCPCS | Performed by: PSYCHIATRY & NEUROLOGY

## 2019-09-23 PROCEDURE — 3044F HG A1C LEVEL LT 7.0%: CPT | Performed by: PSYCHIATRY & NEUROLOGY

## 2019-09-23 PROCEDURE — 2024F 7 FLD RTA PHOTO EVC RTNOPTHY: CPT | Performed by: PSYCHIATRY & NEUROLOGY

## 2019-09-23 PROCEDURE — 1036F TOBACCO NON-USER: CPT | Performed by: PSYCHIATRY & NEUROLOGY

## 2019-09-23 PROCEDURE — 3017F COLORECTAL CA SCREEN DOC REV: CPT | Performed by: PSYCHIATRY & NEUROLOGY

## 2019-09-23 RX ORDER — GABAPENTIN 300 MG/1
CAPSULE ORAL
Qty: 120 CAPSULE | Refills: 2 | Status: SHIPPED | OUTPATIENT
Start: 2019-09-23 | End: 2020-01-09 | Stop reason: SDUPTHER

## 2019-09-23 ASSESSMENT — ENCOUNTER SYMPTOMS
ABDOMINAL PAIN: 0
EYE DISCHARGE: 0
BOWEL INCONTINENCE: 0
FACIAL SWELLING: 0
BACK PAIN: 0
VOICE CHANGE: 0
CHEST TIGHTNESS: 0
EYE REDNESS: 0
TROUBLE SWALLOWING: 0
VISUAL CHANGE: 0
EYE ITCHING: 0
NAUSEA: 0
CHOKING: 0
PHOTOPHOBIA: 0
BLOOD IN STOOL: 0
COLOR CHANGE: 0
ABDOMINAL DISTENTION: 0
WHEEZING: 0
SINUS PRESSURE: 0
VOMITING: 0
CONSTIPATION: 0
EYE PAIN: 0
SHORTNESS OF BREATH: 0
APNEA: 0
DIARRHEA: 0

## 2019-09-23 NOTE — PROGRESS NOTES
Subjective:      Patient ID: Jennifer Dillard is a 63 y. o.right  handed female. Neurologic Problem   The patient's primary symptoms include clumsiness, focal sensory loss, focal weakness, a loss of balance, memory loss and weakness. The patient's pertinent negatives include no altered mental status, near-syncope, slurred speech, syncope or visual change. Primary symptoms comment: RESTLESS LEG  SYNDROME. This is a chronic problem. Episode onset: MORE   THAN    5-6 YEARS. The neurological problem developed insidiously. The problem is unchanged. There was left-sided, lower extremity and right-sided focality noted. Pertinent negatives include no abdominal pain, auditory change, aura, back pain, bladder incontinence, bowel incontinence, chest pain, confusion, diaphoresis, dizziness, fatigue, fever, headaches, light-headedness, nausea, neck pain, palpitations, shortness of breath, vertigo or vomiting. Past treatments include medication. The treatment provided moderate relief. Her past medical history is significant for mood changes. There is no history of a bleeding disorder, a clotting disorder, a CVA, dementia, head trauma, liver disease or seizures. History obtained from  The patient     and other  available medical records were  Also  reviewed.                 1)   H/O    CHRONIC    DIABETIC   PERIPHERAL  NEUROPATHY                                 -   STABLE                        -  ON  NEURONTIN                          TOLERATING  THE  SAME     &   GETTING  HELP                      2)     H/O      RESTLESS  LEG  SYNDROME                 &  PERIODIC  LEG  MOVEMENTS                              -  IMPROVED                            -       ON  SINEMET              3)    H/O    CHRONIC     SLEEP DIFFICULTIES                        -   RESOLVED              4)   H/O   CHRONIC   MILD  MEMORY PROBLEMS                     -    STABLE                   -  PATIENT  NOT  CONCERNED            5) learn more? Go to [email or web address] and sign in to your Jixee account. Enter F991 in the [facility] box to learn more about \"A Healthy Lifestyle: Care Instructions. \"     If you do not have an account, please click on the \"Sign Up Now\" link. Current as of: July 26, 2016  Content Version: 11.2  © 0554-3925 [employer]. Care instructions adapted under license by [facility] ([facility]). If you have questions about a medical condition or this instruction, always ask your healthcare professional. [address] any warranty or liability for your use of this information.

## 2019-09-25 ENCOUNTER — HOSPITAL ENCOUNTER (OUTPATIENT)
Dept: MAMMOGRAPHY | Age: 59
Discharge: HOME | End: 2019-09-27
Payer: MEDICAID

## 2019-09-25 ENCOUNTER — OFFICE VISIT (OUTPATIENT)
Dept: FAMILY MEDICINE CLINIC | Age: 59
End: 2019-09-25
Payer: MEDICAID

## 2019-09-25 VITALS
WEIGHT: 192.8 LBS | SYSTOLIC BLOOD PRESSURE: 132 MMHG | BODY MASS INDEX: 35.48 KG/M2 | HEIGHT: 62 IN | DIASTOLIC BLOOD PRESSURE: 84 MMHG | RESPIRATION RATE: 16 BRPM | HEART RATE: 84 BPM

## 2019-09-25 DIAGNOSIS — G89.29 CHRONIC RIGHT SHOULDER PAIN: ICD-10-CM

## 2019-09-25 DIAGNOSIS — Z12.11 SCREENING FOR COLON CANCER: ICD-10-CM

## 2019-09-25 DIAGNOSIS — M25.511 CHRONIC RIGHT SHOULDER PAIN: ICD-10-CM

## 2019-09-25 DIAGNOSIS — Z23 NEED FOR INFLUENZA VACCINATION: ICD-10-CM

## 2019-09-25 DIAGNOSIS — E53.8 VITAMIN B12 DEFICIENCY: ICD-10-CM

## 2019-09-25 DIAGNOSIS — Z12.31 ENCOUNTER FOR SCREENING MAMMOGRAM FOR BREAST CANCER: ICD-10-CM

## 2019-09-25 DIAGNOSIS — K21.9 GASTROESOPHAGEAL REFLUX DISEASE, ESOPHAGITIS PRESENCE NOT SPECIFIED: ICD-10-CM

## 2019-09-25 DIAGNOSIS — E78.2 MIXED HYPERLIPIDEMIA: ICD-10-CM

## 2019-09-25 DIAGNOSIS — E11.40 TYPE 2 DIABETES MELLITUS WITH DIABETIC NEUROPATHY, WITHOUT LONG-TERM CURRENT USE OF INSULIN (HCC): Primary | ICD-10-CM

## 2019-09-25 DIAGNOSIS — I10 ESSENTIAL HYPERTENSION: ICD-10-CM

## 2019-09-25 PROCEDURE — G8417 CALC BMI ABV UP PARAM F/U: HCPCS | Performed by: FAMILY MEDICINE

## 2019-09-25 PROCEDURE — 90472 IMMUNIZATION ADMIN EACH ADD: CPT | Performed by: FAMILY MEDICINE

## 2019-09-25 PROCEDURE — 1036F TOBACCO NON-USER: CPT | Performed by: FAMILY MEDICINE

## 2019-09-25 PROCEDURE — 77063 BREAST TOMOSYNTHESIS BI: CPT

## 2019-09-25 PROCEDURE — 99214 OFFICE O/P EST MOD 30 MIN: CPT | Performed by: FAMILY MEDICINE

## 2019-09-25 PROCEDURE — G8427 DOCREV CUR MEDS BY ELIG CLIN: HCPCS | Performed by: FAMILY MEDICINE

## 2019-09-25 PROCEDURE — 3044F HG A1C LEVEL LT 7.0%: CPT | Performed by: FAMILY MEDICINE

## 2019-09-25 PROCEDURE — 2024F 7 FLD RTA PHOTO EVC RTNOPTHY: CPT | Performed by: FAMILY MEDICINE

## 2019-09-25 PROCEDURE — 90471 IMMUNIZATION ADMIN: CPT | Performed by: FAMILY MEDICINE

## 2019-09-25 PROCEDURE — 90750 HZV VACC RECOMBINANT IM: CPT | Performed by: FAMILY MEDICINE

## 2019-09-25 PROCEDURE — 90686 IIV4 VACC NO PRSV 0.5 ML IM: CPT | Performed by: FAMILY MEDICINE

## 2019-09-25 PROCEDURE — 3017F COLORECTAL CA SCREEN DOC REV: CPT | Performed by: FAMILY MEDICINE

## 2019-09-25 RX ORDER — LOSARTAN POTASSIUM 100 MG/1
TABLET ORAL
Qty: 30 TABLET | Refills: 6 | Status: SHIPPED | OUTPATIENT
Start: 2019-09-25 | End: 2020-02-12 | Stop reason: SDUPTHER

## 2019-09-25 RX ORDER — OMEPRAZOLE 20 MG/1
CAPSULE, DELAYED RELEASE ORAL
Qty: 30 CAPSULE | Refills: 6 | Status: SHIPPED | OUTPATIENT
Start: 2019-09-25 | End: 2020-02-12 | Stop reason: SDUPTHER

## 2019-09-25 RX ORDER — CELECOXIB 200 MG/1
CAPSULE ORAL
Qty: 60 CAPSULE | Refills: 6 | Status: SHIPPED | OUTPATIENT
Start: 2019-09-25 | End: 2020-02-12 | Stop reason: SDUPTHER

## 2019-09-25 RX ORDER — PRAVASTATIN SODIUM 20 MG
TABLET ORAL
Qty: 30 TABLET | Refills: 6 | Status: SHIPPED | OUTPATIENT
Start: 2019-09-25 | End: 2020-02-12 | Stop reason: SDUPTHER

## 2019-09-25 ASSESSMENT — PATIENT HEALTH QUESTIONNAIRE - PHQ9
1. LITTLE INTEREST OR PLEASURE IN DOING THINGS: 0
SUM OF ALL RESPONSES TO PHQ QUESTIONS 1-9: 0
SUM OF ALL RESPONSES TO PHQ QUESTIONS 1-9: 0
2. FEELING DOWN, DEPRESSED OR HOPELESS: 0
SUM OF ALL RESPONSES TO PHQ9 QUESTIONS 1 & 2: 0

## 2019-09-26 ENCOUNTER — TELEPHONE (OUTPATIENT)
Dept: SURGERY | Age: 59
End: 2019-09-26

## 2019-10-01 DIAGNOSIS — S91.102A OPEN WOUND OF GREAT TOE, LEFT, INITIAL ENCOUNTER: ICD-10-CM

## 2019-10-01 DIAGNOSIS — L29.9 PRURITUS: ICD-10-CM

## 2019-10-02 RX ORDER — DIPHENHYDRAMINE HYDROCHLORIDE 12.5 MG/5ML
LIQUID ORAL
Qty: 60 CAPSULE | Refills: 1 | Status: SHIPPED | OUTPATIENT
Start: 2019-10-02 | End: 2019-12-27

## 2019-10-15 DIAGNOSIS — G89.29 CHRONIC RECTAL PAIN: ICD-10-CM

## 2019-10-15 DIAGNOSIS — K62.89 CHRONIC RECTAL PAIN: ICD-10-CM

## 2019-10-17 RX ORDER — TRAMADOL HYDROCHLORIDE 50 MG/1
50 TABLET ORAL EVERY 8 HOURS PRN
Qty: 90 TABLET | Refills: 0 | Status: SHIPPED | OUTPATIENT
Start: 2019-10-18 | End: 2019-11-15 | Stop reason: SDUPTHER

## 2019-10-21 DIAGNOSIS — L29.9 PRURITUS: ICD-10-CM

## 2019-10-21 RX ORDER — HYDROXYZINE HYDROCHLORIDE 25 MG/1
25 TABLET, FILM COATED ORAL EVERY 8 HOURS PRN
Qty: 30 TABLET | Refills: 3 | Status: SHIPPED | OUTPATIENT
Start: 2019-10-21 | End: 2019-12-27

## 2019-11-04 DIAGNOSIS — F17.219 CIGARETTE NICOTINE DEPENDENCE WITH NICOTINE-INDUCED DISORDER: ICD-10-CM

## 2019-11-04 DIAGNOSIS — Z72.0 TOBACCO ABUSE: ICD-10-CM

## 2019-11-07 RX ORDER — AMMONIUM LACTATE 12 G/100G
LOTION TOPICAL
Qty: 396 G | Refills: 1 | Status: SHIPPED | OUTPATIENT
Start: 2019-11-07 | End: 2020-02-03

## 2019-11-10 DIAGNOSIS — F10.20 ALCOHOLISM (HCC): ICD-10-CM

## 2019-11-11 DIAGNOSIS — G89.29 CHRONIC RECTAL PAIN: ICD-10-CM

## 2019-11-11 DIAGNOSIS — K62.89 CHRONIC RECTAL PAIN: ICD-10-CM

## 2019-11-11 RX ORDER — TRAMADOL HYDROCHLORIDE 50 MG/1
50 TABLET ORAL EVERY 8 HOURS PRN
Qty: 90 TABLET | Refills: 0 | OUTPATIENT
Start: 2019-11-11 | End: 2019-12-11

## 2019-11-11 RX ORDER — MULTIVITAMIN WITH FOLIC ACID 400 MCG
TABLET ORAL
Qty: 30 TABLET | Refills: 0 | Status: SHIPPED | OUTPATIENT
Start: 2019-11-11 | End: 2019-12-08 | Stop reason: SDUPTHER

## 2019-11-14 DIAGNOSIS — K62.89 CHRONIC RECTAL PAIN: ICD-10-CM

## 2019-11-14 DIAGNOSIS — G89.29 CHRONIC RECTAL PAIN: ICD-10-CM

## 2019-11-15 DIAGNOSIS — K62.89 CHRONIC RECTAL PAIN: ICD-10-CM

## 2019-11-15 DIAGNOSIS — G89.29 CHRONIC RECTAL PAIN: ICD-10-CM

## 2019-11-15 RX ORDER — TRAMADOL HYDROCHLORIDE 50 MG/1
TABLET ORAL
Qty: 90 TABLET | Refills: 0 | OUTPATIENT
Start: 2019-11-15 | End: 2019-12-15

## 2019-11-15 RX ORDER — TRAMADOL HYDROCHLORIDE 50 MG/1
50 TABLET ORAL EVERY 8 HOURS PRN
Qty: 90 TABLET | Refills: 0 | Status: SHIPPED | OUTPATIENT
Start: 2019-11-15 | End: 2019-12-12 | Stop reason: SDUPTHER

## 2019-11-25 ENCOUNTER — NURSE ONLY (OUTPATIENT)
Dept: LAB | Age: 59
End: 2019-11-25
Payer: MEDICAID

## 2019-11-25 DIAGNOSIS — Z23 NEED FOR VACCINATION: Primary | ICD-10-CM

## 2019-11-25 PROCEDURE — 90750 HZV VACC RECOMBINANT IM: CPT | Performed by: FAMILY MEDICINE

## 2019-11-25 PROCEDURE — 90471 IMMUNIZATION ADMIN: CPT | Performed by: FAMILY MEDICINE

## 2019-12-04 DIAGNOSIS — E11.40 TYPE 2 DIABETES MELLITUS WITH DIABETIC NEUROPATHY, WITHOUT LONG-TERM CURRENT USE OF INSULIN (HCC): Primary | ICD-10-CM

## 2019-12-04 RX ORDER — BLOOD-GLUCOSE METER
EACH MISCELLANEOUS
Qty: 1 KIT | Refills: 0 | Status: SHIPPED | OUTPATIENT
Start: 2019-12-04 | End: 2020-02-19

## 2019-12-08 DIAGNOSIS — F10.20 ALCOHOLISM (HCC): ICD-10-CM

## 2019-12-09 DIAGNOSIS — G25.81 RESTLESS LEG SYNDROME: ICD-10-CM

## 2019-12-09 RX ORDER — DIPHENHYDRAMINE HCL 25 MG
CAPSULE ORAL
Qty: 60 CAPSULE | Refills: 1 | Status: CANCELLED | OUTPATIENT
Start: 2019-12-09

## 2019-12-09 RX ORDER — MULTIVITAMIN WITH FOLIC ACID 400 MCG
TABLET ORAL
Qty: 30 TABLET | Refills: 0 | Status: SHIPPED | OUTPATIENT
Start: 2019-12-09 | End: 2020-01-06

## 2019-12-12 DIAGNOSIS — K62.89 CHRONIC RECTAL PAIN: ICD-10-CM

## 2019-12-12 DIAGNOSIS — G89.29 CHRONIC RECTAL PAIN: ICD-10-CM

## 2019-12-12 RX ORDER — TRAMADOL HYDROCHLORIDE 50 MG/1
50 TABLET ORAL EVERY 8 HOURS PRN
Qty: 90 TABLET | Refills: 0 | Status: SHIPPED | OUTPATIENT
Start: 2019-12-12 | End: 2020-01-06

## 2019-12-27 DIAGNOSIS — L29.9 PRURITUS: ICD-10-CM

## 2019-12-27 DIAGNOSIS — F17.219 CIGARETTE NICOTINE DEPENDENCE WITH NICOTINE-INDUCED DISORDER: ICD-10-CM

## 2019-12-27 DIAGNOSIS — Z72.0 TOBACCO ABUSE: ICD-10-CM

## 2019-12-27 RX ORDER — NICOTINE 21 MG/24HR
PATCH, TRANSDERMAL 24 HOURS TRANSDERMAL
Qty: 28 PATCH | Refills: 0 | Status: SHIPPED | OUTPATIENT
Start: 2019-12-27 | End: 2020-01-27

## 2019-12-27 RX ORDER — DIPHENHYDRAMINE HCL 25 MG/1
TABLET ORAL
Qty: 60 TABLET | Refills: 0 | Status: SHIPPED | OUTPATIENT
Start: 2019-12-27 | End: 2020-01-22

## 2019-12-27 RX ORDER — HYDROXYZINE HYDROCHLORIDE 25 MG/1
TABLET, FILM COATED ORAL
Qty: 30 TABLET | Refills: 0 | Status: SHIPPED | OUTPATIENT
Start: 2019-12-27 | End: 2019-12-31

## 2019-12-30 DIAGNOSIS — L29.9 PRURITUS: ICD-10-CM

## 2019-12-31 RX ORDER — HYDROXYZINE HYDROCHLORIDE 25 MG/1
TABLET, FILM COATED ORAL
Qty: 30 TABLET | Refills: 0 | Status: SHIPPED | OUTPATIENT
Start: 2019-12-31 | End: 2020-01-27

## 2020-01-06 RX ORDER — TRAMADOL HYDROCHLORIDE 50 MG/1
TABLET ORAL
Qty: 90 TABLET | Refills: 0 | Status: SHIPPED | OUTPATIENT
Start: 2020-01-06 | End: 2020-02-05

## 2020-01-06 RX ORDER — DIPHENHYDRAMINE HCL 25 MG
25 CAPSULE ORAL 2 TIMES DAILY PRN
Qty: 60 CAPSULE | Refills: 0 | Status: SHIPPED | OUTPATIENT
Start: 2020-01-06 | End: 2020-05-11 | Stop reason: SDUPTHER

## 2020-01-06 RX ORDER — MULTIVITAMIN WITH FOLIC ACID 400 MCG
TABLET ORAL
Qty: 30 TABLET | Refills: 0 | Status: SHIPPED | OUTPATIENT
Start: 2020-01-06 | End: 2020-02-17

## 2020-01-06 NOTE — TELEPHONE ENCOUNTER
Patient last refilled on 12/13/2020 with 90 capsules.        Alejo Neumann is requesting a refill on the following medication(s):  Requested Prescriptions     Pending Prescriptions Disp Refills    traMADol (ULTRAM) 50 MG tablet [Pharmacy Med Name: TRAMADOL HCL 50 MG TABLET] 90 tablet      Sig: take 1 tablet by mouth every 8 hours if needed       Last Visit Date (If Applicable):  2/49/9397    Next Visit Date:    1/29/2020

## 2020-01-06 NOTE — TELEPHONE ENCOUNTER
Alejo Neumann is requesting a refill on the following medication(s):  Requested Prescriptions     Pending Prescriptions Disp Refills    diphenhydrAMINE (BENADRYL) 25 MG capsule 60 capsule 1     Sig: Take 1 capsule by mouth 2 times daily as needed for Itching or Allergies       Last Visit Date (If Applicable):  0/73/2297    Next Visit Date:    1/29/2020

## 2020-01-09 ENCOUNTER — OFFICE VISIT (OUTPATIENT)
Dept: NEUROLOGY | Age: 60
End: 2020-01-09
Payer: MEDICAID

## 2020-01-09 VITALS
OXYGEN SATURATION: 97 % | SYSTOLIC BLOOD PRESSURE: 138 MMHG | BODY MASS INDEX: 35.74 KG/M2 | DIASTOLIC BLOOD PRESSURE: 82 MMHG | WEIGHT: 194.2 LBS | HEIGHT: 62 IN | HEART RATE: 94 BPM

## 2020-01-09 PROBLEM — F31.31 BIPOLAR AFFECTIVE DISORDER, CURRENTLY DEPRESSED, MILD (HCC): Status: ACTIVE | Noted: 2020-01-09

## 2020-01-09 PROCEDURE — 3017F COLORECTAL CA SCREEN DOC REV: CPT | Performed by: PSYCHIATRY & NEUROLOGY

## 2020-01-09 PROCEDURE — G8427 DOCREV CUR MEDS BY ELIG CLIN: HCPCS | Performed by: PSYCHIATRY & NEUROLOGY

## 2020-01-09 PROCEDURE — G8417 CALC BMI ABV UP PARAM F/U: HCPCS | Performed by: PSYCHIATRY & NEUROLOGY

## 2020-01-09 PROCEDURE — 1036F TOBACCO NON-USER: CPT | Performed by: PSYCHIATRY & NEUROLOGY

## 2020-01-09 PROCEDURE — 3046F HEMOGLOBIN A1C LEVEL >9.0%: CPT | Performed by: PSYCHIATRY & NEUROLOGY

## 2020-01-09 PROCEDURE — 2022F DILAT RTA XM EVC RTNOPTHY: CPT | Performed by: PSYCHIATRY & NEUROLOGY

## 2020-01-09 PROCEDURE — 99214 OFFICE O/P EST MOD 30 MIN: CPT | Performed by: PSYCHIATRY & NEUROLOGY

## 2020-01-09 PROCEDURE — G8482 FLU IMMUNIZE ORDER/ADMIN: HCPCS | Performed by: PSYCHIATRY & NEUROLOGY

## 2020-01-09 RX ORDER — GABAPENTIN 300 MG/1
CAPSULE ORAL
Qty: 120 CAPSULE | Refills: 2 | Status: SHIPPED | OUTPATIENT
Start: 2020-01-09 | End: 2020-05-28 | Stop reason: SDUPTHER

## 2020-01-09 ASSESSMENT — ENCOUNTER SYMPTOMS
ABDOMINAL DISTENTION: 0
PHOTOPHOBIA: 0
BOWEL INCONTINENCE: 0
COLOR CHANGE: 0
TROUBLE SWALLOWING: 0
BACK PAIN: 0
EYE DISCHARGE: 0
CONSTIPATION: 0
CHOKING: 0
FACIAL SWELLING: 0
CHEST TIGHTNESS: 0
VOMITING: 0
ABDOMINAL PAIN: 0
VISUAL CHANGE: 0
NAUSEA: 0
DIARRHEA: 0
EYE REDNESS: 0
VOICE CHANGE: 0
APNEA: 0
EYE PAIN: 0
WHEEZING: 0
EYE ITCHING: 0
SHORTNESS OF BREATH: 0
BLOOD IN STOOL: 0
SINUS PRESSURE: 0

## 2020-01-09 NOTE — PROGRESS NOTES
Subjective:      Patient ID: Alysia Wallace is a 63 y. o.right  handed female. Neurologic Problem   The patient's primary symptoms include clumsiness, focal sensory loss, focal weakness, a loss of balance, memory loss and weakness. The patient's pertinent negatives include no altered mental status, near-syncope, slurred speech, syncope or visual change. Primary symptoms comment: RESTLESS LEG  SYNDROME. This is a chronic problem. Episode onset: MORE   THAN    5-6 YEARS. The neurological problem developed insidiously. The problem is unchanged. There was left-sided, lower extremity and right-sided focality noted. Pertinent negatives include no abdominal pain, auditory change, aura, back pain, bladder incontinence, bowel incontinence, chest pain, confusion, diaphoresis, dizziness, fatigue, fever, headaches, light-headedness, nausea, neck pain, palpitations, shortness of breath, vertigo or vomiting. Past treatments include medication. The treatment provided moderate relief. Her past medical history is significant for mood changes. There is no history of a bleeding disorder, a clotting disorder, a CVA, dementia, head trauma, liver disease or seizures. History obtained from  The patient     and other  available medical records were  Also  reviewed.                 1)   H/O    CHRONIC    DIABETIC   PERIPHERAL  NEUROPATHY                                 -   STABLE                        -  ON  NEURONTIN                          TOLERATING  THE  SAME     &   GETTING  HELP                      2)     H/O      RESTLESS  LEG  SYNDROME                 &  PERIODIC  LEG  MOVEMENTS                              -  IMPROVED                            -       ON  SINEMET              3)    H/O    CHRONIC     SLEEP DIFFICULTIES                        -   RESOLVED              4)   H/O   CHRONIC   MILD  MEMORY PROBLEMS                     -    STABLE                   -  PATIENT  NOT  CONCERNED            5) CHRONIC  TOBACCO  AND  ALCOHOL  USAGE                     -   PATIENT  AWARE  OF THE  RISKS               CURRENTLY    ON     NICOTINE  PATCHES                         AND   STOPPED  SMOKING   SINCE  JAN 2019                6)         CHRONIC  LUMBAR PAIN  AND JOINT  PAINS   -                           -    STABLE                       ON  ULTRAM  FROM  HER  PCP. 7)    H/O    BILATERAL  CARPAL  TUNNEL  SYNDROME                               -  STABLE            8)          CHRONIC  ANXIETY, DEPRESSION                         AND  SCHIZOAFFECTIVE  DISORDER                           STABLE      -  ON   RISPERDAL                      BEING  FOLLOWED  BY  MENTAL  HEALTH  PROFESSIONALS              9)         MULTIPLE  CO  MORBID  MEDICAL CONDITIONS                    BEING  FOLLOWED  BY   HER  PCP. 10)      PATIENT  DENIES    ANY  SEIZURE  ACTIVITY  OR   MIGRAINES . 11)      VARIOUS  RISK   FACTORS   WERE  REVIEWED   AND   DISCUSSED. PATIENT   HAS  MULTIPLE   MEDICAL,  NEUROLOGICAL                               AND   MENTAL  HEALTH  PROBLEMS . PATIENT'S   MANAGEMENT  IS  CHALLENGING.                             12)    CURRENTLY  PATIENT  DENIES   ANY  NEW  NEURLOLOGICAL  CONCERNS                    PATIENT  REQUESTS  REFILLS  FOR  HER  MEDICATION. The  Duration,  Quality,  Severity,  Location,  Timing,  Context,  Modifying  Factors   Of   The   Chief   Complaint       And  Present  Illness   Was   Reviewed   In   Chronological   [place].              Patient   Indicates   The  Presence   And  The  Absence  Of  The  Following  Associated  And   Additional  Neurological    Symptoms:                                Balance  And coordination problems  present           Gait problems     present            Headaches      absent              Migraines           absent           Memory problems        Present Chronic shoulder pain     bilaterally, due to osteoarthritis    CTS (carpal tunnel syndrome)     Depression     Fatigue     Hyperlipidemia     Hypertension     Obesity     Peripheral neuropathy     Pica     eats bar soap    Restless leg syndrome     [facility]     Sleeping difficulty     Tobacco abuse     Type 2 diabetes mellitus ([facility]                   Past Surgical History:   Procedure Laterality Date    ABSCESS DRAINAGE  2001    perianal    ANUS SURGERY  2001    lateral internal sphincterotomy    BLADDER SUSPENSION  2000    transvaginal sling procedure for incontinence    COLONOSCOPY  2007    polypectomy x 1 (pathology: hyperplastic polyp), repair of perianal fistula    CYST REMOVAL Right 1992    wrist    OTHER SURGICAL HISTORY  2002    perianal fistula repair eua    OTHER SURGICAL HISTORY  1993    cystourethroscopy    UPPER GASTROINTESTINAL ENDOSCOPY  2007    Schatzki's ring, acute gastritis, hiatal hernia    UPPER GASTROINTESTINAL ENDOSCOPY  2000    hiatal hernia, esophagitis, gastritis                 Current Outpatient Medications   Medication Sig Dispense Refill    gabapentin (NEURONTIN) 300 MG capsule ONE    CAPSULE  IN  MORNING,    NOON ,   EVENING  AND  BED  TIME   DAILY   AS  NEEDED 120 capsule 2    traMADol (ULTRAM) 50 MG tablet take 1 tablet by mouth every 8 hours if needed 90 tablet 0    diphenhydrAMINE (BENADRYL) 25 MG capsule Take 1 capsule by mouth 2 times daily as needed for Itching or Allergies 60 capsule 0    hydrOXYzine (ATARAX) 25 MG tablet take 1 tablet by mouth every 8 hours if needed for itching 30 tablet 0    nicotine (NICODERM CQ) 14 MG/24HR apply 1 patch TO CLEAN, DRY, AND INTACT SKIN REMOVE AND REPLACE once daily 28 patch 0    BANOPHEN 25 MG tablet take 1 tablet by mouth twice a day if needed for allergies or itching 60 tablet 0    carbidopa-levodopa (SINEMET)  MG per tablet TAKE 1 TABLET EVERY EVENING AND 1 TABLET AT BEDTIME AS for this visit. No Known Allergies            Family History   Problem Relation Age of Onset   [facility] Stroke Mother     Diabetes Mother     High Blood Pressure Mother     Cataracts Mother     Stroke Father     Diabetes Father     Heart Disease Sister 72    Diabetes Daughter     Stroke Sister 68        Brain aneurysm that ruptured and then she got kidney failure and intestinal infection and     Glaucoma Neg Hx              Social History     Socioeconomic History    Marital status: Single     Spouse name: Not on file    Number of children: Not on file    Years of education: Not on file    Highest education level: Not on file   Occupational History    Not on file   Social Needs    Financial resource strain: Not on file    Food insecurity:     Worry: Not on file     Inability: Not on file    Transportation needs:     Medical: Not on file     Non-medical: Not on file   Tobacco Use    Smoking status: Former Smoker     Packs/day: 1.00     Years: 26.00     Pack years: 26.00     Types: Cigarettes     Start date: 1975     Last attempt to quit: 2018     Years since quittin.0    Smokeless tobacco: Never Used   Substance and Sexual Activity    Alcohol use:  Yes     Alcohol/week: 0.0 standard drinks     Frequency: 4 or more times a week     Drinks per session: 5 or 6     Binge frequency: Never     Comment: daily; \"2 tallboys per day\"    Drug use: No    Sexual activity: Not on file   Lifestyle    Physical activity:     Days per week: Not on file     Minutes per session: Not on file    Stress: Not on file   Relationships    Social connections:     Talks on phone: Not on file     Gets together: Not on file     Attends Yazidism service: Not on file     Active member of club or organization: Not on file     Attends meetings of clubs or organizations: Not on file     Relationship status: Not on file    Intimate partner violence:     Fear of current or ex partner: Not on file Emotionally abused: Not on file     Physically abused: Not on file     Forced sexual activity: Not on file   Other Topics Concern    Not on file   Social History Narrative    Not on file         Vitals:    01/09/20 1350   BP: 138/82   Pulse: 94   SpO2: 97%         Wt Readings from Last 3 Encounters:   01/09/20 194 lb 3.2 oz (88.1 kg)   09/25/19 192 lb 12.8 oz (87.5 kg)   09/23/19 194 lb 6.4 oz (88.2 kg)         BP Readings from Last 3 Encounters:   01/09/20 138/82   09/25/19 132/84   09/23/19 128/66       Hematology and Coagulation  Lab Results   Component Value Date    WBC 6.4 01/15/2019    RBC 4.29 01/15/2019    HGB 12.9 01/15/2019    HCT 39.0 01/15/2019    MCV 90.9 01/15/2019    MCH 30.0 01/15/2019    MCHC 33.0 01/15/2019    RDW 14.5 01/15/2019     01/15/2019    MPV 8.2 01/15/2019       Chemistries  Lab Results   Component Value Date     01/08/2019    K 4.6 01/08/2019     01/08/2019    CO2 25 01/08/2019    BUN 9 01/08/2019    CREATININE 0.94 01/08/2019    CALCIUM 9.9 01/08/2019    PROT 7.5 01/08/2019    LABALBU 4.5 01/08/2019    BILITOT 0.55 01/08/2019    ALKPHOS 80 01/08/2019    AST 20 01/08/2019    ALT 12 01/08/2019     Lab Results   Component Value Date    ALKPHOS 80 01/08/2019    ALT 12 01/08/2019    AST 20 01/08/2019    PROT 7.5 01/08/2019    BILITOT 0.55 01/08/2019    LABALBU 4.5 01/08/2019     Lab Results   Component Value Date    BUN 9 01/08/2019    CREATININE 0.94 01/08/2019     Lab Results   Component Value Date    CALCIUM 9.9 01/08/2019     Lab Results   Component Value Date    AST 20 01/08/2019    ALT 12 01/08/2019       Lab Results   Component Value Date    GUHQCITS56 1000 06/12/2018             Review of Systems   Constitutional: Negative for appetite change, diaphoresis, fatigue, fever and unexpected weight change.    HENT: Negative for dental problem, drooling, ear discharge, ear pain, facial swelling, hearing loss, mouth sores, nosebleeds, postnasal drip, sinus pressure, tinnitus, trouble swallowing and voice change. Eyes: Negative for photophobia, pain, discharge, redness, itching and visual disturbance. Respiratory: Negative for apnea, choking, chest tightness, shortness of breath and wheezing. Cardiovascular: Negative for chest pain, palpitations, leg swelling and near-syncope. Gastrointestinal: Negative for abdominal distention, abdominal pain, blood in stool, bowel incontinence, constipation, diarrhea, nausea and vomiting. Endocrine: Negative for cold intolerance, heat intolerance, polydipsia, polyphagia and polyuria. Genitourinary: Negative for bladder incontinence. Musculoskeletal: Positive for gait problem. Negative for back pain, neck pain and neck stiffness. Skin: Negative for color change, pallor and wound. Allergic/Immunologic: Negative for environmental allergies, food allergies and immunocompromised state. Neurological: Positive for focal weakness, weakness and loss of balance. Negative for dizziness, vertigo, tremors, syncope, facial asymmetry, speech difficulty, light-headedness and headaches. Hematological: Negative for adenopathy. Does not bruise/bleed easily. Psychiatric/Behavioral: Positive for decreased concentration and memory loss. Negative for agitation, behavioral problems, confusion, dysphoric mood, hallucinations, self-injury, sleep disturbance and suicidal ideas. The patient is nervous/anxious. The patient is not hyperactive. Objective:   Physical Exam  Constitutional:       Appearance: She is well-developed. HENT:      Head: Normocephalic and atraumatic. No raccoon eyes or Covington's sign. Right Ear: External ear normal.      Left Ear: External ear normal.      Nose: Nose normal.   Eyes:      Conjunctiva/sclera: Conjunctivae normal.      Pupils: Pupils are equal, round, and reactive to light. Neck:      Musculoskeletal: Normal range of motion and neck supple. Normal range of motion.  No neck rigidity or muscular tenderness. Thyroid: No thyroid mass or thyromegaly. Vascular: No carotid bruit. Trachea: No tracheal deviation. Meningeal: Brudzinski's sign and Kernig's sign absent. Cardiovascular:      Rate and Rhythm: Normal rate and regular rhythm. Pulmonary:      Effort: Pulmonary effort is normal. No respiratory distress. Breath sounds: Normal breath sounds. No wheezing or rales. Musculoskeletal: Normal range of motion. General: No tenderness. Lymphadenopathy:      Cervical: No cervical adenopathy. Skin:     General: Skin is warm. Coloration: Skin is not pale. Findings: No erythema or rash. Nails: There is no clubbing. Psychiatric:         Attention and Perception: She is attentive. Mood and Affect: Mood is anxious and depressed. Affect is blunt. Affect is not labile or inappropriate. Speech: She is communicative. Speech is not rapid and pressured, delayed, slurred or tangential.         Behavior: Behavior is not agitated, slowed, aggressive, withdrawn, hyperactive or combative. Behavior is cooperative. Thought Content: Thought content normal. Thought content is not paranoid or delusional. Thought content does not include homicidal or suicidal ideation. Thought content does not include homicidal or suicidal plan. Cognition and Memory: Cognition is impaired. Memory is impaired. She exhibits impaired recent memory. She does not exhibit impaired remote memory. Judgment: Judgment is not impulsive or inappropriate. NEUROLOGICAL EXAMINATION :    A) MENTAL STATUS:                   Alert and  oriented  To time, place  And  Person. No Aphasia. No  Dysarthria. Able   To  Follow   SIMPLE    commands without   Any  Difficulty. No right  To left confusion. Normal  Speech  And language function.                    Insight and  Judgment ,Fund  Of  Knowledge Decreased                Recent  And  Remote memory  Decreased                Attention &Concentration are decreased                                                   B) CRANIAL NERVES :             2 CN : Visual  Acuity  And  Visual fields  within normal limits                      Fundi  Could  Not  Be  Could  Not  Be  Evaluated. 3,4,6 CN : Both  Pupils are   Reactive and  Equal.                          Extraocular   Movements  Are  Intact. No  Nystagmus. No  RODERICK. No  Afferent  Pupillary  Defect noted. 5 CN :  Normal  Facial sensations and Corneal  Reflexes         7 CN :  Normal  Facial  Symmetry  And  Strength. No facial  Weakness. 8 CN :  Hearing  Appears within normal limits        9, 10 CN: Normal spontaneous, reflex palate movements       11 CN:   Normal  Shoulder shrug and  strength       12 CN :   Normal  Tongue movements and  Tongue  In midline                      No tongue   Fasciculations or atrophy         C) MOTOR  EXAM:                 Strength  In upper  And  Lower extremities   within normal limits               No  Drift. No  Atrophy               Rapid alternating  And  repetitions  Movements  within normal limits                 Muscle  Tone  In upper  And  Lower  Extremities  within normal limits                No rigidity. No  Spasticity. Bradykinesia   absent                  No  Asterixis. Sustention  Tremor , Resting  Tremor   absent                    No other  Abnormal  Movements noted             D) SENSORY :               light touch, pinprick, position  And  Vibration  decreased          E) REFLEXES:                   Deep  Tendon  Reflexes decreased                    No pathological  Reflexes  Bilaterally.                                     F) COORDINATION  AND  GAIT :                                Station and  Gait   SLOW                                    Romberg's test positive Ataxia negative      Assessment:       Patient Active Problem List   Diagnosis    Anxiety    Depression    [facility]    History of tobacco use    Restless leg syndrome    Pica    Essential hypertension    Bipolar disorder ([facility]    Alcoholism ([facility]    Sleeping difficulty    Peripheral neuropathy    Balance problem    CTS (carpal tunnel syndrome)    Chronic lumbar radiculopathy    GERD (gastroesophageal reflux disease)    COPD [facility]    Fatigue    Chronic rectal pain    Type 2 diabetes mellitus with diabetic neuropathy, without long-term current use of insulin ([facility])    Pruritus    Obesity (BMI 30.0-34. 9)    Mixed hyperlipidemia    Chronic right shoulder pain    Bilateral carpal tunnel syndrome    Current severe episode of major depressive disorder without psychotic features ([facility]    Splenic artery aneurysm ([facility]    Bipolar affective disorder, currently depressed, mild ([facility]               Plan:            VISITING DIAGNOSIS:      ICD-10-CM    1. Idiopathic peripheral neuropathy G60.9 gabapentin (NEURONTIN) 300 MG capsule   2. Bilateral carpal tunnel syndrome G56.03 gabapentin (NEURONTIN) 300 MG capsule   3. Schizoaffective disorder, unspecified type ([facility] F25.9    4. Bipolar affective disorder, currently depressed, mild ([facility] F31.31    5. Restless leg syndrome G25.81    6. Chronic lumbar radiculopathy M54.16    7. Balance problem R26.89    8. Essential hypertension I10    9. Anxiety F41.9    10. Dysthymia F34.1    11. Obesity (BMI 30.0-34. 9) E66.9    12. Pica F50.89    13. Mixed hyperlipidemia E78.2    14. Schizoaffective disorder, bipolar type ([facility] F25.0    15. Chronic right shoulder pain M25.511     G89.29    16. Splenic artery aneurysm ([facility]) I72.8    17. Fatigue due to depression F32.9     R53.83    18. Sleeping difficulty G47.9    19. Polyneuropathy associated with underlying disease ([facility] G63    20.  Type 2 diabetes WRIST  BRACES       *   TO  AVOID  PRESSURE  OVER   ULNAR  ASPECT   OF   ELBOWS        *   ADEQUATE   FLUID  INTAKE   AND  ELECTROLYTE  BALANCE           * KEEP  DAIRY  OF   THE  NEUROLOGICAL  SYMPTOMS        RECORDING THE    DURATION  AND  FREQUENCY. *  AVOID    CONDITIONS  AND  FACTORS   THAT  MAKE   NEUROLOGICAL  SYMPTOMS  WORSE. *  TO  MAINTAIN  REGULAR  SLEEP  WAKE  CYCLES. *   TO  HAVE  ADEQUATE  REST  AND   SLEEP    HOURS.          *    TO   AVOID   TO  SLEEP  IN   SUPINE  POSITION. *      WEIGHT   LOSS. *    AVOID  ANY USAGE OF                   TOBACCO,  EXCESSIVE  ALCOHOL  AND   ILLEGAL   SUBSTANCES          *  CONTINUE MEDICATIONS PRESCRIBED BY NEUROLOGIST AS    RECOMMENDED     *   Compliance   With  Medications   And  Instructions        * CURRENTLY  TOLERATING  THE  PRESCRIBED   MEDICATIONS. WITHOUT  ANY  SIGNIFICANT  SIDE  EFFECTS   &  GETTING BENEFIT. *  May   Use  Pill  Box,    If  Needed          *        Antiplatelet  therapy    As   Recommended  Was   Discussed        *    Prophylactic  Use   Of     Vitamin   B   Complex,  Folic  Acid,    Vitamin  B12        Multivitamin   Tablet  Daily    Supplementations   Over  The  Counter  Discussed               *  FOOT  CARE, DAILY  INSPECTION  OF  FEET   AND         PERIODIC  PODIATRY EVALUATIONS . *  PATIENT  IS  ALSO   COUNSELED   TO  KEEP    ACTIVITIES         A)   SIMPLE      B)  ORGANIZED      C)  WRITE   DOWN                  Controlled Substances Monitoring: Periodic Controlled Substance Monitoring: Possible medication side effects, risk of tolerance/dependence & alternative treatments discussed. , Assessed functional status.  Angelina Teixeira MD)              Orders Placed This Encounter   Medications    gabapentin (NEURONTIN) 300 MG capsule     Sig: ONE    CAPSULE  IN  MORNING,    NOON ,   EVENING  AND  BED  TIME   DAILY   AS  NEEDED     Dispense:  120 capsule     Refill:  2 *    EXPECTATIONS,   GOALS   AND  SIDE  EFFECTS  MEDICATIONS    WERE                                 REVIEWED     AND   DISCUSSED    IN    DETAIL. *PATIENT   TO  FOLLOW  UP  WITH   PRIMARY  CARE   AND   OTHER  CONSULTANTS  AS  BEFORE.               *TO  FOLLOW  WITH   MENTAL  HEALTH  PROFESSIONALS ,  INCLUDING            PSYCHOLOGICAL  COUNSELING   AND  PSYCHIATRIC  EVALUTIONS              *  Maintain   Healthy  Life Style    With   Periodic  Monitoring  Of      Any  Medical  Conditions  Including   Elevated  Blood  Pressure,  Lipid  Profile,     Blood  Sugar levels  And   Heart  Disease. *   Period   Screening  For  Cancers  Involving  Breast,  Colon,     lungs  And  Other  Organs  As  Applicable,  In consultation   With  Your  Primary Care Providers. * Second  Neurological  Opinion  And  Evaluations  In  [facility]  Setting  If  Patient  Is  Interested. *  If  The  Patient remains  Neurologically  Stable   Return   To  [facility] Neurology Department       IN   3 MONTHS  TIME   FOR  FURTHER  FOLLOW UP.                   *  If   There is  Any  Significant  Worsening   Of  Current  Symptoms  And  Or  If patient  Develops   Any additional  New      Neurological  Symptoms  Or  Significant  Concerns   Should  Call  911 or  Go  To  Emergency  Department  For  Further      Immediate  Evaluation. *   The  Neurological   Findings,  Possible  Diagnosis,  Differential diagnoses   And  Options      For    Further   Investigations   And  management   Are  Discussed  Comprehensively. Medications   And  Prescription   Risks  And  Side effects  Are   Also  Discussed. The  Above  Were  Reviewed  With  patient and     questions  Answered  In  Detail.                   More   Than   50% of face  To face Time   Was  Spent  On  Counseling   And   Coordination  Of  Care       Of   Patient's multiple   Neurological  Problems   And   Comorbid  Medical   Conditions. Electronically signed by Jany Sanford MD     Board Certified in  Neurology &  In  [facility] ([facility])      DISCLAIMER:   Although every effort was made to ensure the accuracy of this  electronic transcription, some errors in transcription may have occurred. GENERAL PATIENT INSTRUCTIONS:     A Healthy Lifestyle: Care Instructions  Your Care Instructions  A healthy lifestyle can help you feel good, stay at a healthy weight, and have plenty of energy for both work and play. A healthy lifestyle is something you can share with your whole family. A healthy lifestyle also can lower your risk for serious health problems, such as high blood pressure, heart disease, and diabetes. You can follow a few steps listed below to improve your health and the health of your family. Follow-up care is a key part of your treatment and safety. Be sure to make and go to all appointments, and call your doctor if you are having problems. Its also a good idea to know your test results and keep a list of the medicines you take. How can you care for yourself at home? Do not eat too much sugar, fat, or fast foods. You can still have dessert and treats now and then. The goal is moderation. Start small to improve your eating habits. Pay attention to portion sizes, drink less juice and soda pop, and eat more fruits and vegetables. Eat a healthy amount of food. A 3-ounce serving of meat, for example, is about the size of a deck of cards. Fill the rest of your plate with vegetables and whole grains. Limit the amount of soda and sports drinks you have every day. Drink more water when you are thirsty. Eat at least 5 servings of fruits and vegetables every day.  It may seem like a lot, but it is not hard to reach this goal. A serving or helping is 1 piece of fruit, 1 cup of vegetables, or 2 cups of leafy, raw vegetables. Have an apple or some carrot sticks as an afternoon snack instead of a candy bar. Try to have fruits and/or vegetables at every meal.  Make exercise part of your daily routine. You may want to start with simple activities, such as walking, bicycling, or slow swimming. Try to be active 30 to 60 minutes every day. You do not need to do all 30 to 60 minutes all at once. For example, you can exercise 3 times a day for 10 or 20 minutes. Moderate exercise is safe for most people, but it is always a good idea to talk to your doctor before starting an exercise program.  Keep moving. [employer] the lawn, work in the garden, or [employer]. Take the stairs instead of the elevator at work. If you smoke, quit. People who smoke have an increased risk for heart attack, stroke, cancer, and other lung illnesses. Quitting is hard, but there are ways to boost your chance of quitting tobacco for good. Use nicotine gum, patches, or lozenges. Ask your doctor about stop-smoking programs and medicines. Keep trying. In addition to reducing your risk of diseases in the future, you will notice some benefits soon after you stop using tobacco. If you have shortness of breath or asthma symptoms, they will likely get better within a few weeks after you quit. Limit how much alcohol you drink. Moderate amounts of alcohol (up to 2 drinks a day for men, 1 drink a day for women) are okay. But drinking too much can lead to liver problems, high blood pressure, and other health problems. Family health  If you have a family, there are many things you can do together to improve your health. Eat meals together as a family as often as possible. Eat healthy foods. This includes fruits, vegetables, lean meats and dairy, and whole grains. Include your family in your fitness plan. Most people think of activities such as jogging or tennis as the way to fitness, but there are many ways you and your family can be more active. Anything that makes you breathe hard and gets your heart pumping is exercise. Here are some tips:  Walk to do errands or to take your child to school or the bus. Go for a family bike ride after dinner instead of watching TV. Where can you learn more? Go to https://chpepiceweb.healthRethink Autism. org and sign in to your [employer] account. Enter X494 in the Blog Talk [employer] box to learn more about \"A Healthy Lifestyle: Care Instructions. \"     If you do not have an account, please click on the \"Sign Up Now\" link. Current as of: July 26, 2016  Content Version: 11.2  © 7836-7321 [employer]. Care instructions adapted under license by [facility] ([facility]). If you have questions about a medical condition or this instruction, always ask your healthcare professional. [address] any warranty or liability for your use of this information.

## 2020-01-09 NOTE — PATIENT INSTRUCTIONS
[facility] NEUROLOGY    Due to the complex nature of our neurological testing, It is the policy of the Neurology Department not to release the results of your testing over the phone. Once all testing is completed and we have all the results back, Dr. Dhaval Hugo will then personally go over all the results with you and answer any questions that you may have during a follow up appointment. If you are unable to keep this appointment, please notify the [address] @ [phone], as soon as possible. Please bring your prescription bottles to all appointments. * FALL   PRECAUTIONS. *      WEIGHT   LOSS. *   ADEQUATE   FLUID  INTAKE   AND  ELECTROLYTE  BALANCE           * KEEP  DAIRY  OF   THE  NEUROLOGICAL  SYMPTOMS          *  TO  MAINTAIN  REGULAR  SLEEP  WAKE  CYCLES. *   TO  HAVE  ADEQUATE  REST  AND   SLEEP    HOURS.        *    AVOID  USAGE OF   TOBACCO,  EXCESSIVE  ALCOHOL                AND   ILLEGAL   SUBSTANCES,  IF  ANY          *  Maintain   Healthy  Life Style    With   Periodic  Monitoring  Of      Any  Medical  Conditions  Including   Elevated  Blood  Pressure,  Lipid  Profile,     Blood  Sugar levels  And   Heart  Disease. *   Period   Screening  For  Cancers  Involving  Breast,  Colon,   lungs  And  Other  Organs  As  Applicable,  In consultation   With  Your  Primary Care Providers. *  If   There is  Any  Significant  Worsening   Of  Current  Symptoms  And  Or  If    Any additional  New  Neurological  Symptoms                 Or  Significant  Concerns   Should  Call  911 or  Go  To  Emergency  Department  For  Further  Immediate  Evaluation.

## 2020-01-15 NOTE — TELEPHONE ENCOUNTER
Pt requesting Siladryl liquid 12.5mg to be sent to [facility] daina Ziegler pt states she's been on this since November.

## 2020-01-22 RX ORDER — DIPHENHYDRAMINE HCL 25 MG/1
TABLET ORAL
Qty: 60 TABLET | Refills: 0 | OUTPATIENT
Start: 2020-01-22

## 2020-01-22 RX ORDER — DIPHENHYDRAMINE HCL 25 MG/1
TABLET ORAL
Qty: 60 TABLET | Refills: 0 | Status: SHIPPED | OUTPATIENT
Start: 2020-01-22 | End: 2020-02-18

## 2020-01-22 NOTE — TELEPHONE ENCOUNTER
Shreya Yañez called requesting a refill of the below medication which has been pended for you:     Requested Prescriptions     Pending Prescriptions Disp Refills    BANOPHEN 25 MG tablet [Pharmacy Med Name: [address] 25 MG TABLET] 60 tablet 0     Sig: take 1 tablet by mouth twice a day if needed for allergies or itching       Last Appointment Date: 9/25/19  Next Appointment Date: 1/29/20    No Known Allergies

## 2020-01-24 ENCOUNTER — HOSPITAL ENCOUNTER (OUTPATIENT)
Dept: LAB | Age: 60
Discharge: HOME | End: 2020-01-24
Payer: MEDICAID

## 2020-01-24 LAB
ALBUMIN SERPL-MCNC: 4.3 G/DL (ref 3.5–5.2)
ALBUMIN/GLOBULIN RATIO: 1.4 (ref 1–2.5)
ALP BLD-CCNC: 73 U/L (ref 35–104)
ALT SERPL-CCNC: <5 U/L (ref 5–33)
ANION GAP SERPL CALCULATED.3IONS-SCNC: 12 MMOL/L (ref 9–17)
AST SERPL-CCNC: 25 U/L
BILIRUB SERPL-MCNC: 0.41 MG/DL (ref 0.3–1.2)
BUN BLDV-MCNC: 11 MG/DL (ref 6–20)
BUN/CREAT BLD: 12 (ref 9–20)
CALCIUM SERPL-MCNC: 10.1 MG/DL (ref 8.6–10.4)
CHLORIDE BLD-SCNC: 101 MMOL/L (ref 98–107)
CHOLESTEROL/HDL RATIO: 2.8
CHOLESTEROL: 191 MG/DL
CO2: 27 MMOL/L (ref 20–31)
CREAT SERPL-MCNC: 0.93 MG/DL (ref 0.5–0.9)
CREATININE URINE: 80.7 MG/DL (ref 28–217)
ESTIMATED AVERAGE GLUCOSE: 117 MG/DL
GFR AFRICAN AMERICAN: >60 ML/MIN
GFR NON-AFRICAN AMERICAN: >60 ML/MIN
GFR SERPL CREATININE-BSD FRML MDRD: ABNORMAL ML/MIN/{1.73_M2}
GFR SERPL CREATININE-BSD FRML MDRD: ABNORMAL ML/MIN/{1.73_M2}
GLUCOSE BLD-MCNC: 121 MG/DL (ref 70–99)
HBA1C MFR BLD: 5.7 % (ref 4.8–5.9)
HDLC SERPL-MCNC: 68 MG/DL
LDL CHOLESTEROL: 56 MG/DL (ref 0–130)
MICROALBUMIN/CREAT 24H UR: <12 MG/L
MICROALBUMIN/CREAT UR-RTO: NORMAL MCG/MG CREAT
POTASSIUM SERPL-SCNC: 5.1 MMOL/L (ref 3.7–5.3)
SODIUM BLD-SCNC: 140 MMOL/L (ref 135–144)
TOTAL PROTEIN: 7.4 G/DL (ref 6.4–8.3)
TRIGL SERPL-MCNC: 336 MG/DL
VLDLC SERPL CALC-MCNC: ABNORMAL MG/DL (ref 1–30)

## 2020-01-24 PROCEDURE — 82043 UR ALBUMIN QUANTITATIVE: CPT

## 2020-01-24 PROCEDURE — 80061 LIPID PANEL: CPT

## 2020-01-24 PROCEDURE — 80053 COMPREHEN METABOLIC PANEL: CPT

## 2020-01-24 PROCEDURE — 82570 ASSAY OF URINE CREATININE: CPT

## 2020-01-24 PROCEDURE — 36415 COLL VENOUS BLD VENIPUNCTURE: CPT

## 2020-01-24 PROCEDURE — 83036 HEMOGLOBIN GLYCOSYLATED A1C: CPT

## 2020-01-27 RX ORDER — NICOTINE 21 MG/24HR
PATCH, TRANSDERMAL 24 HOURS TRANSDERMAL
Qty: 28 PATCH | Refills: 0 | Status: SHIPPED | OUTPATIENT
Start: 2020-01-27 | End: 2020-02-27 | Stop reason: SDUPTHER

## 2020-01-27 RX ORDER — HYDROXYZINE HYDROCHLORIDE 25 MG/1
TABLET, FILM COATED ORAL
Qty: 30 TABLET | Refills: 0 | Status: SHIPPED | OUTPATIENT
Start: 2020-01-27 | End: 2020-02-19

## 2020-01-27 NOTE — TELEPHONE ENCOUNTER
Maria Del Carmen Damon called requesting a refill of the below medication which has been pended for you:     Requested Prescriptions     Pending Prescriptions Disp Refills    hydrOXYzine (ATARAX) 25 MG tablet [Pharmacy Med Name: HYDROXYZINE HCL 25 MG TABLET] 30 tablet 0     Sig: take 1 tablet by mouth every 8 hours if needed for itching       Last Appointment Date: 9/25/2019  Next Appointment Date: 2/12/2020    No Known Allergies

## 2020-01-31 NOTE — TELEPHONE ENCOUNTER
Rachna Solano called requesting a refill of the below medication which has been pended for you:     Requested Prescriptions     Pending Prescriptions Disp Refills    aspirin 325 MG EC tablet [Pharmacy Med Name: ASPIRIN  MG TABLET] 30 tablet 0     Sig: take 1 tablet by mouth once daily       Last Appointment Date: 9/25/2019  Next Appointment Date: 2/12/2020    No Known Allergies

## 2020-02-03 RX ORDER — ASPIRIN 325 MG
TABLET, DELAYED RELEASE (ENTERIC COATED) ORAL
Qty: 30 TABLET | Refills: 0 | Status: SHIPPED | OUTPATIENT
Start: 2020-02-03 | End: 2020-03-10 | Stop reason: SDUPTHER

## 2020-02-03 RX ORDER — AMMONIUM LACTATE 12 G/100G
LOTION TOPICAL
Qty: 400 G | Refills: 1 | Status: SHIPPED | OUTPATIENT
Start: 2020-02-03 | End: 2021-05-07 | Stop reason: SDUPTHER

## 2020-02-05 RX ORDER — TRAMADOL HYDROCHLORIDE 50 MG/1
TABLET ORAL
Qty: 90 TABLET | Refills: 0 | Status: SHIPPED | OUTPATIENT
Start: 2020-02-05 | End: 2020-03-02

## 2020-02-12 ENCOUNTER — OFFICE VISIT (OUTPATIENT)
Dept: FAMILY MEDICINE CLINIC | Age: 60
End: 2020-02-12
Payer: MEDICAID

## 2020-02-12 VITALS
HEART RATE: 86 BPM | BODY MASS INDEX: 36.31 KG/M2 | HEIGHT: 62 IN | WEIGHT: 197.3 LBS | DIASTOLIC BLOOD PRESSURE: 82 MMHG | SYSTOLIC BLOOD PRESSURE: 134 MMHG | RESPIRATION RATE: 16 BRPM

## 2020-02-12 PROCEDURE — 99212 OFFICE O/P EST SF 10 MIN: CPT

## 2020-02-12 PROCEDURE — 2022F DILAT RTA XM EVC RTNOPTHY: CPT | Performed by: FAMILY MEDICINE

## 2020-02-12 PROCEDURE — 99214 OFFICE O/P EST MOD 30 MIN: CPT | Performed by: FAMILY MEDICINE

## 2020-02-12 PROCEDURE — 1036F TOBACCO NON-USER: CPT | Performed by: FAMILY MEDICINE

## 2020-02-12 PROCEDURE — 3044F HG A1C LEVEL LT 7.0%: CPT | Performed by: FAMILY MEDICINE

## 2020-02-12 PROCEDURE — 3017F COLORECTAL CA SCREEN DOC REV: CPT | Performed by: FAMILY MEDICINE

## 2020-02-12 PROCEDURE — G8427 DOCREV CUR MEDS BY ELIG CLIN: HCPCS | Performed by: FAMILY MEDICINE

## 2020-02-12 PROCEDURE — G8482 FLU IMMUNIZE ORDER/ADMIN: HCPCS | Performed by: FAMILY MEDICINE

## 2020-02-12 PROCEDURE — G8417 CALC BMI ABV UP PARAM F/U: HCPCS | Performed by: FAMILY MEDICINE

## 2020-02-12 RX ORDER — OMEPRAZOLE 20 MG/1
CAPSULE, DELAYED RELEASE ORAL
Qty: 30 CAPSULE | Refills: 6 | Status: SHIPPED | OUTPATIENT
Start: 2020-02-12 | End: 2020-08-31

## 2020-02-12 RX ORDER — CELECOXIB 200 MG/1
CAPSULE ORAL
Qty: 60 CAPSULE | Refills: 6 | Status: SHIPPED | OUTPATIENT
Start: 2020-02-12 | End: 2020-10-30 | Stop reason: ALTCHOICE

## 2020-02-12 RX ORDER — LOSARTAN POTASSIUM 100 MG/1
TABLET ORAL
Qty: 30 TABLET | Refills: 6 | Status: SHIPPED | OUTPATIENT
Start: 2020-02-12 | End: 2020-09-21

## 2020-02-12 RX ORDER — PRAVASTATIN SODIUM 20 MG
TABLET ORAL
Qty: 30 TABLET | Refills: 6 | Status: SHIPPED | OUTPATIENT
Start: 2020-02-12 | End: 2020-07-23

## 2020-02-12 ASSESSMENT — PATIENT HEALTH QUESTIONNAIRE - PHQ9
2. FEELING DOWN, DEPRESSED OR HOPELESS: 0
SUM OF ALL RESPONSES TO PHQ9 QUESTIONS 1 & 2: 0
SUM OF ALL RESPONSES TO PHQ QUESTIONS 1-9: 0
1. LITTLE INTEREST OR PLEASURE IN DOING THINGS: 0
SUM OF ALL RESPONSES TO PHQ QUESTIONS 1-9: 0

## 2020-02-12 NOTE — PROGRESS NOTES
[address]                        Telephone [phone]             Fax [phone]     Emmett Hernandez  1960  MRN:  [id number]  Date of visit:  2/12/2020    Subjective:    Emmett Hernandez is a 63 y.o.  female who presents to [facility] today (2/12/2020) for follow up/evaluation of:  Diabetes; Hypertension; and Hyperlipidemia      She states that she has been feeling well. She is tolerating her medications well. She states that she has not been exercising regularly, but she is active with housework, etc.  She reports occasional chest tightness. She has been successful in quitting smoking. She has concerns about a skin lesion on the right lower leg for the past month. She reports that it continues to scab over, but it will not heal.        She has the following problem list:  Patient Active Problem List   Diagnosis    Anxiety    Depression    Schizoaffective disorder ([address]    History of tobacco use    Restless leg syndrome    Pica    Essential hypertension    Bipolar disorder ([address]    Alcoholism ([address]    Sleeping difficulty    Peripheral neuropathy    Balance problem    CTS (carpal tunnel syndrome)    Chronic lumbar radiculopathy    GERD (gastroesophageal reflux disease)    COPD (chronic obstructive pulmonary disease) ([facility])    Fatigue    Chronic rectal pain    Type 2 diabetes mellitus with diabetic neuropathy, without long-term current use of insulin ([facility])    Pruritus    Obesity (BMI 30.0-34. 9)    Mixed hyperlipidemia    Chronic right shoulder pain    Bilateral carpal tunnel syndrome    Current severe episode of major depressive disorder without psychotic features ([address]    Splenic artery aneurysm ([address]    Bipolar affective disorder, currently depressed, mild ([address]        Current medications are:  Outpatient Medications Marked as Taking for the 2/12/20 encounter (Office Visit) with Ileana Jimenez MD   Medication Sig Dispense Refill    traMADol (ULTRAM) 50 MG tablet take 1 tablet by mouth every 8 hours AS NEEDED FOR PAIN 90 tablet 0    ammonium lactate (LAC-HYDRIN) 12 % lotion apply to affected area once daily 400 g 1    aspirin 325 MG EC tablet take 1 tablet by mouth once daily 30 tablet 0    hydrOXYzine (ATARAX) 25 MG tablet take 1 tablet by mouth every 8 hours if needed for itching 30 tablet 0    nicotine (NICODERM CQ) 14 MG/24HR apply 1 patch TO CLEAN, DRY, AND INTACT SKIN REMOVE AND REPLACE once daily 28 patch 0    BANOPHEN 25 MG tablet take 1 tablet by mouth twice a day if needed for allergies or itching 60 tablet 0    diphenhydrAMINE (BENYLIN) 12.5 MG/5ML syrup Take 5 mLs by mouth nightly as needed (leg cramps) 237 mL 1    gabapentin (NEURONTIN) 300 MG capsule ONE    CAPSULE  IN  MORNING,    NOON ,   EVENING  AND  BED  TIME   DAILY   AS  NEEDED 120 capsule 2    Multiple Vitamin (MULTIVITAMIN) tablet take 1 tablet by mouth once daily 30 tablet 0    diphenhydrAMINE (BENADRYL) 25 MG capsule Take 1 capsule by mouth 2 times daily as needed for Itching or Allergies 60 capsule 0    carbidopa-levodopa (SINEMET)  MG per tablet TAKE 1 TABLET EVERY EVENING AND 1 TABLET AT BEDTIME AS NEEDED 60 tablet 2    Blood Glucose Monitoring Suppl (ONE TOUCH ULTRA 2) w/Device KIT MONITOR BLOOD GLUCOSE LEVELS THREE TIMES DAILY AS NEEDED FOR HYPER/HYPOGLYCEMIC SYMPTOMS 1 kit 0    blood glucose test strips (ONE TOUCH ULTRA TEST) strip TEST three times a day 100 strip 6    losartan (COZAAR) 100 MG tablet Take one tablet by mouth daily 30 tablet 6    omeprazole (PRILOSEC) 20 MG delayed release capsule Take one tablet daily 30 capsule 6    Cyanocobalamin ER (RA VITAMIN B-12 TR) 1000 MCG TBCR take 1 tablet by mouth once daily 30 tablet 6    pravastatin (PRAVACHOL) 20 MG tablet Take one tablet daily 30 tablet 6    celecoxib (CELEBREX) 200 MG fasting triglyceride, October 2012.  VLDL 01/24/2020 NOT REPORTED* 1 - 30 mg/dL Final    Microalb, Ur 01/24/2020 <12  <21 mg/L Final    Creatinine, Ur 01/24/2020 80.7  28.0 - 217.0 mg/dL Final    Microalb/Crt. Ratio 01/24/2020 CANNOT BE CALCULATED  <25 mcg/mg creat Final    Hemoglobin A1C 01/24/2020 5.7  4.8 - 5.9 % Final    Estimated Avg Glucose 01/24/2020 117  mg/dL Final         Assessment and Plan:    1. Type 2 diabetes mellitus with diabetic neuropathy, without long-term current use of insulin (HCC)  Her HgbA1c was 5.7  %, which is excellent. She was advised to continue her current regimen. - Hemoglobin A1C; Future prior to her return visit in 6 months. 2. Essential hypertension  Her blood pressure is well-controlled. (BP: 134/82)   She was advised to continue current medications. Losartan was refilled:   - losartan (COZAAR) 100 MG tablet; Take one tablet by mouth daily  Dispense: 30 tablet; Refill: 6    - Comprehensive Metabolic Panel; Future prior to her return visit in 6 months. 3. Mixed hyperlipidemia  Her lipid profile was very good except for elevated triglycerides on her recent lab work. She is tolerating Pravastatin well; this was refilled:   - pravastatin (PRAVACHOL) 20 MG tablet; Take one tablet daily  Dispense: 30 tablet; Refill: 6    - Lipid, Fasting; Future prior to her return visit in 6 months. She was advised to decrease processed foods in her diet. Printed information regarding Learning About the Mediterranean Diet was provided to the patient with her after visit summary. Printed information regarding Learning About High Cholesterol was provided to the patient with her after visit summary. 4. Gastroesophageal reflux disease, esophagitis presence not specified  She is doing well with Omeprazole; this was refilled:  - omeprazole (PRILOSEC) 20 MG delayed release capsule; Take one tablet daily  Dispense: 30 capsule; Refill: 6    5.  Vitamin B12 deficiency  Vitamin

## 2020-02-12 NOTE — PATIENT INSTRUCTIONS
Patient Education        Learning About the Mediterranean Diet  What is the [address]? The Mediterranean diet is a style of eating rather than a diet plan. It features foods eaten in [place], [place], [place] and [place], and other countries along the . It emphasizes eating foods like fish, fruits, vegetables, beans, high-fiber breads and whole grains, nuts, and olive oil. This style of eating includes limited red meat, cheese, and sweets. Why choose the Mediterranean diet? A Mediterranean-style diet may improve heart health. It contains more fat than other heart-healthy diets. But the fats are mainly from nuts, unsaturated oils (such as fish oils and olive oil), and certain nut or seed oils (such as canola, soybean, or flaxseed oil). These fats may help protect the heart and blood vessels. How can you get started on the Mediterranean diet? Here are some things you can do to switch to a more Mediterranean way of eating. What to eat  · Eat a variety of fruits and vegetables each day, such as grapes, blueberries, tomatoes, broccoli, peppers, figs, olives, spinach, eggplant, beans, lentils, and chickpeas. · Eat a variety of whole-grain foods each day, such as oats, brown rice, and whole wheat bread, pasta, and couscous. · Eat fish at least 2 times a week. Try tuna, salmon, mackerel, lake trout, herring, or sardines. · Eat moderate amounts of low-fat dairy products, such as milk, cheese, or yogurt. · Eat moderate amounts of poultry and eggs. · Choose healthy (unsaturated) fats, such as nuts, olive oil, and certain nut or seed oils like canola, soybean, and flaxseed. · Limit unhealthy (saturated) fats, such as butter, palm oil, and coconut oil. And limit fats found in animal products, such as meat and dairy products made with whole milk. Try to eat red meat only a few times a month in very small amounts. · Limit sweets and desserts to only a few times a week.  This includes sugar-sweetened drinks like soda. The Mediterranean diet may also include red wine with your meal--1 glass each day for women and up to 2 glasses a day for men. Tips for eating at home  · Use herbs, spices, garlic, lemon zest, and citrus juice instead of salt to add flavor to foods. · Add avocado slices to your sandwich instead of calero. · Have fish for lunch or dinner instead of red meat. Brush the fish with olive oil, and broil or grill it. · Sprinkle your salad with seeds or nuts instead of cheese. · Cook with olive or canola oil instead of butter or oils that are high in saturated fat. · Switch from 2% milk or whole milk to 1% or fat-free milk. · Dip raw vegetables in a vinaigrette dressing or hummus instead of dips made from mayonnaise or sour cream.  · Have a piece of fruit for dessert instead of a piece of cake. Try baked apples, or have some dried fruit. Tips for eating out  · Try broiled, grilled, baked, or poached fish instead of having it fried or breaded. · Ask your  to have your meals prepared with olive oil instead of butter. · Order dishes made with marinara sauce or sauces made from olive oil. Avoid sauces made from cream or mayonnaise. · Choose whole-grain breads, whole wheat pasta and pizza crust, brown rice, beans, and lentils. · Cut back on butter or margarine on bread. Instead, you can dip your bread in a small amount of olive oil. · Ask for a side salad or grilled vegetables instead of french fries or chips. Where can you learn more? Go to https://Sagencepekishoreweb.healthHOMEOSTASIS LABS. org and sign in to your nodishes.co.uk account. Enter [phone] in the [facility] box to learn more about \"Learning About the Mediterranean Diet. \"     If you do not have an account, please click on the \"Sign Up Now\" link. Current as of: August 21, 2019  Content Version: 12.3  © 6455-4100 [employer]. Care instructions adapted under license by [facility] ([facility]).  If you have questions about a medical condition or this instruction, always ask your healthcare professional. [address] any warranty or liability for your use of this information. Patient Education        Learning About High Cholesterol  What is high cholesterol? Cholesterol is a type of fat in your blood. It is needed for many body functions, such as making new cells. Cholesterol is made by your body. It also comes from food you eat. If you have too much cholesterol, it starts to build up in your arteries. This is called hardening of the arteries, or atherosclerosis. High cholesterol raises your risk of a heart attack and stroke. There are different types of cholesterol. LDL is the \"bad\" cholesterol. High LDL can raise your risk for heart disease, heart attack, and stroke. HDL is the \"good\" cholesterol. High HDL is linked with a lower risk for heart disease, heart attack, and stroke. Your cholesterol levels help your doctor find out your risk for having a heart attack or stroke. How can you prevent high cholesterol? A heart-healthy lifestyle can help you prevent high cholesterol. This lifestyle helps lower your risk for a heart attack and stroke. · Eat heart-healthy foods. ? Eat fruits, vegetables, whole grains (like oatmeal), dried beans and peas, nuts and seeds, soy products (like tofu), and fat-free or low-fat dairy products. ? Replace butter, margarine, and hydrogenated or partially hydrogenated oils with olive and canola oils. (Canola oil margarine without trans fat is fine.)  ? Replace red meat with fish, poultry, and soy protein (like tofu). ? Limit processed and packaged foods like chips, crackers, and cookies. · Be active. Exercise can improve your cholesterol level. Get at least 30 minutes of exercise on most days of the week. Walking is a good choice. You also may want to do other activities, such as running, swimming, cycling, or playing tennis or team sports.   · Stay at a healthy

## 2020-02-17 RX ORDER — MULTIVITAMIN WITH FOLIC ACID 400 MCG
TABLET ORAL
Qty: 30 TABLET | Refills: 5 | Status: SHIPPED | OUTPATIENT
Start: 2020-02-17 | End: 2020-08-18

## 2020-02-18 RX ORDER — MULTIVITAMIN WITH FOLIC ACID 400 MCG
TABLET ORAL
Qty: 30 TABLET | Refills: 0 | OUTPATIENT
Start: 2020-02-18

## 2020-02-19 RX ORDER — BLOOD-GLUCOSE METER
EACH MISCELLANEOUS
Qty: 1 KIT | Refills: 0 | Status: SHIPPED | OUTPATIENT
Start: 2020-02-19

## 2020-02-19 NOTE — TELEPHONE ENCOUNTER
Raymundo Partida called requesting a refill of the below medication which has been pended for you:     Requested Prescriptions     Pending Prescriptions Disp Refills    Blood Glucose Monitoring Suppl (ONE TOUCH ULTRA 2) w/Device KIT [Pharmacy Med Name: Prashanth Echavarria GLUCOSE SYST] 1 kit 0     Sig: use as directed       Last Appointment Date: 2/12/2020  Next Appointment Date: 6/17/2020    No Known Allergies

## 2020-02-20 RX ORDER — KETOCONAZOLE 20 MG/ML
SHAMPOO TOPICAL
Qty: 120 ML | Refills: 2 | Status: SHIPPED | OUTPATIENT
Start: 2020-02-20 | End: 2020-07-27 | Stop reason: SDUPTHER

## 2020-02-27 RX ORDER — NICOTINE 21 MG/24HR
PATCH, TRANSDERMAL 24 HOURS TRANSDERMAL
Qty: 28 PATCH | Refills: 1 | Status: SHIPPED | OUTPATIENT
Start: 2020-02-27 | End: 2020-04-22

## 2020-03-03 ENCOUNTER — TELEPHONE (OUTPATIENT)
Dept: FAMILY MEDICINE CLINIC | Age: 60
End: 2020-03-03

## 2020-03-03 RX ORDER — TRAMADOL HYDROCHLORIDE 50 MG/1
50 TABLET ORAL EVERY 8 HOURS PRN
Qty: 90 TABLET | Refills: 0 | OUTPATIENT
Start: 2020-03-03 | End: 2020-04-02

## 2020-03-03 RX ORDER — TRAMADOL HYDROCHLORIDE 50 MG/1
TABLET ORAL
Qty: 90 TABLET | OUTPATIENT
Start: 2020-03-03 | End: 2020-04-02

## 2020-03-03 RX ORDER — NALOXONE HYDROCHLORIDE 4 MG/.1ML
1 SPRAY NASAL PRN
Qty: 1 EACH | Refills: 5 | Status: SHIPPED | OUTPATIENT
Start: 2020-03-03

## 2020-03-03 RX ORDER — TRAMADOL HYDROCHLORIDE 50 MG/1
TABLET ORAL
Qty: 90 TABLET | Refills: 1 | Status: SHIPPED | OUTPATIENT
Start: 2020-03-03 | End: 2020-04-28 | Stop reason: SDUPTHER

## 2020-03-03 NOTE — TELEPHONE ENCOUNTER
Pt calling stating pharmacy contacted her that we had called in naloxone nasal spray for opioid reversal and pt is unaware of why we would have called this to pharmacy for her, please advise at above number.

## 2020-03-10 RX ORDER — ASPIRIN 325 MG
TABLET, DELAYED RELEASE (ENTERIC COATED) ORAL
Qty: 30 TABLET | Refills: 12 | Status: SHIPPED | OUTPATIENT
Start: 2020-03-10 | End: 2021-04-08

## 2020-04-22 RX ORDER — NICOTINE 21 MG/24HR
PATCH, TRANSDERMAL 24 HOURS TRANSDERMAL
Qty: 28 PATCH | Refills: 1 | Status: SHIPPED | OUTPATIENT
Start: 2020-04-22 | End: 2020-06-16 | Stop reason: SDUPTHER

## 2020-04-22 NOTE — TELEPHONE ENCOUNTER
Heraclio Merrill called requesting a refill of the below medication which has been pended for you:     Requested Prescriptions     Pending Prescriptions Disp Refills    nicotine (Foster Sour) 14 MG/24HR [Pharmacy Med Name: NICOTINE 14 MG/24HR PATCH] 28 patch 1     Sig: apply 1 patch TO CLEAN, DRY, AND INTACT SKIN REMOVE AND REPLACE once daily       Last Appointment Date: 2/12/2020  Next Appointment Date: 6/17/2020    No Known Allergies

## 2020-04-29 RX ORDER — TRAMADOL HYDROCHLORIDE 50 MG/1
50 TABLET ORAL EVERY 8 HOURS PRN
Qty: 90 TABLET | Refills: 0 | Status: SHIPPED | OUTPATIENT
Start: 2020-04-29 | End: 2020-05-26

## 2020-04-29 NOTE — TELEPHONE ENCOUNTER
Last ov 02/12/2020,next ov 06/17/2020. Per [facility] requested medication last filled 04/01/2020 #90,30 day supply.

## 2020-05-06 RX ORDER — HYDROXYZINE HYDROCHLORIDE 25 MG/1
TABLET, FILM COATED ORAL
Qty: 30 TABLET | Refills: 2 | Status: SHIPPED | OUTPATIENT
Start: 2020-05-06 | End: 2020-05-27 | Stop reason: SDUPTHER

## 2020-05-06 NOTE — TELEPHONE ENCOUNTER
Per fax from [facility] patient needs a refill   Requested Prescriptions     Pending Prescriptions Disp Refills    hydrOXYzine (ATARAX) 25 MG tablet 30 tablet 2     Sig: take 1 tablet by mouth every 8 hours if needed for itching     Last Appt:  2/12/2020  Next Appt:   6/17/2020  Med verified in [address] [place] [address] Rd

## 2020-05-11 RX ORDER — DIPHENHYDRAMINE HCL 25 MG
25 CAPSULE ORAL 2 TIMES DAILY PRN
Qty: 60 CAPSULE | Refills: 0 | Status: SHIPPED | OUTPATIENT
Start: 2020-05-11 | End: 2020-06-23 | Stop reason: SDUPTHER

## 2020-05-26 NOTE — TELEPHONE ENCOUNTER
Last ov 02/12/2020,next ov 06/17/2020. Per [facility] requested medication last filled 04/29/2020 #90,30 day supply.

## 2020-05-27 DIAGNOSIS — L29.9 PRURITUS: ICD-10-CM

## 2020-05-27 RX ORDER — HYDROXYZINE HYDROCHLORIDE 25 MG/1
TABLET, FILM COATED ORAL
Qty: 30 TABLET | Refills: 0 | Status: SHIPPED | OUTPATIENT
Start: 2020-05-27 | End: 2020-06-23 | Stop reason: SDUPTHER

## 2020-05-27 RX ORDER — TRAMADOL HYDROCHLORIDE 50 MG/1
TABLET ORAL
Qty: 90 TABLET | Refills: 0 | Status: SHIPPED | OUTPATIENT
Start: 2020-05-27 | End: 2020-06-23 | Stop reason: SDUPTHER

## 2020-05-28 ENCOUNTER — OFFICE VISIT (OUTPATIENT)
Dept: NEUROLOGY | Age: 60
End: 2020-05-28
Payer: MEDICAID

## 2020-05-28 VITALS
SYSTOLIC BLOOD PRESSURE: 128 MMHG | HEIGHT: 62 IN | HEART RATE: 91 BPM | WEIGHT: 202.8 LBS | TEMPERATURE: 97.2 F | BODY MASS INDEX: 37.32 KG/M2 | DIASTOLIC BLOOD PRESSURE: 76 MMHG | OXYGEN SATURATION: 97 %

## 2020-05-28 PROCEDURE — G8427 DOCREV CUR MEDS BY ELIG CLIN: HCPCS | Performed by: PSYCHIATRY & NEUROLOGY

## 2020-05-28 PROCEDURE — 1036F TOBACCO NON-USER: CPT | Performed by: PSYCHIATRY & NEUROLOGY

## 2020-05-28 PROCEDURE — 99213 OFFICE O/P EST LOW 20 MIN: CPT

## 2020-05-28 PROCEDURE — G8417 CALC BMI ABV UP PARAM F/U: HCPCS | Performed by: PSYCHIATRY & NEUROLOGY

## 2020-05-28 PROCEDURE — 3017F COLORECTAL CA SCREEN DOC REV: CPT | Performed by: PSYCHIATRY & NEUROLOGY

## 2020-05-28 PROCEDURE — 2022F DILAT RTA XM EVC RTNOPTHY: CPT | Performed by: PSYCHIATRY & NEUROLOGY

## 2020-05-28 PROCEDURE — G8926 SPIRO NO PERF OR DOC: HCPCS | Performed by: PSYCHIATRY & NEUROLOGY

## 2020-05-28 PROCEDURE — 3023F SPIROM DOC REV: CPT | Performed by: PSYCHIATRY & NEUROLOGY

## 2020-05-28 PROCEDURE — 3044F HG A1C LEVEL LT 7.0%: CPT | Performed by: PSYCHIATRY & NEUROLOGY

## 2020-05-28 PROCEDURE — 99215 OFFICE O/P EST HI 40 MIN: CPT | Performed by: PSYCHIATRY & NEUROLOGY

## 2020-05-28 RX ORDER — GABAPENTIN 300 MG/1
CAPSULE ORAL
Qty: 120 CAPSULE | Refills: 2 | Status: SHIPPED | OUTPATIENT
Start: 2020-05-28 | End: 2020-09-03 | Stop reason: SDUPTHER

## 2020-05-28 ASSESSMENT — ENCOUNTER SYMPTOMS
VISUAL CHANGE: 0
APNEA: 0
CHOKING: 0
TROUBLE SWALLOWING: 0
NAUSEA: 0
COLOR CHANGE: 0
BOWEL INCONTINENCE: 0
EYE ITCHING: 0
EYE DISCHARGE: 0
SHORTNESS OF BREATH: 0
BACK PAIN: 0
SINUS PRESSURE: 0
CONSTIPATION: 0
VOICE CHANGE: 0
PHOTOPHOBIA: 0
DIARRHEA: 0
EYE REDNESS: 0
ABDOMINAL PAIN: 0
CHEST TIGHTNESS: 0
ABDOMINAL DISTENTION: 0
EYE PAIN: 0
WHEEZING: 0
VOMITING: 0
BLOOD IN STOOL: 0
FACIAL SWELLING: 0

## 2020-05-28 NOTE — PROGRESS NOTES
REVIEWED:    historical medical records, lab reports, office notes and radiology reports         INFORMATION   REVIEWED:     MEDICAL   HISTORY,     SURGICAL   HISTORY,   MEDICATIONS   LIST,   ALLERGIES AND  DRUG  INTOLERANCES,     FAMILY   HISTORY,  SOCIAL  HISTORY,    PROBLEM  LIST   FOR  PATIENT  CARE   COORDINATION         Past Medical History:   Diagnosis Date    [facility]     Anxiety     Astigmatism with presbyopia     Balance problem     Bipolar disorder ([facility]     schizoaffective disorder (Dr. Steven Brown)    Chronic lumbar radiculopathy     Chronic rectal pain     due to fissures    Chronic shoulder pain     bilaterally, due to osteoarthritis    CTS (carpal tunnel syndrome)     Depression     Fatigue     Hyperlipidemia     Hypertension     Obesity     Peripheral neuropathy     Pica     eats bar soap    Restless leg syndrome     [facility] ([facility]     Sleeping difficulty     Tobacco abuse     Type 2 diabetes mellitus ([facility]                   Past Surgical History:   Procedure Laterality Date    ABSCESS DRAINAGE  2001    perianal    ANUS SURGERY  2001    lateral internal sphincterotomy    BLADDER SUSPENSION  2000    transvaginal sling procedure for incontinence    COLONOSCOPY  2007    polypectomy x 1 (pathology: hyperplastic polyp), repair of perianal fistula    CYST REMOVAL Right 1992    wrist    OTHER SURGICAL HISTORY  2002    perianal fistula repair eua    OTHER SURGICAL HISTORY  1993    cystourethroscopy    UPPER GASTROINTESTINAL ENDOSCOPY  2007    Schatzki's ring, acute gastritis, hiatal hernia    UPPER GASTROINTESTINAL ENDOSCOPY  2000    hiatal hernia, esophagitis, gastritis                 Current Outpatient Medications   Medication Sig Dispense Refill    gabapentin (NEURONTIN) 300 MG capsule ONE    CAPSULE  IN  MORNING,    NOON ,   EVENING  AND  BED  TIME   DAILY   AS  NEEDED 120 capsule 2    traMADol (ULTRAM) 50 MG tablet take 1 tablet by mouth every 8 hours if needed for pain 90 tablet 0    hydrOXYzine (ATARAX) 25 MG tablet take 1 tablet by mouth every 8 hours if needed for itching 30 tablet 0    diphenhydrAMINE (BENYLIN) 12.5 MG/5ML syrup Take 5 mLs by mouth nightly as needed (leg cramps) 237 mL 1    diphenhydrAMINE (BENADRYL) 25 MG capsule Take 1 capsule by mouth 2 times daily as needed for Itching or Allergies 60 capsule 0    nicotine (NICODERM CQ) 14 MG/24HR apply 1 patch TO CLEAN, DRY, AND INTACT SKIN REMOVE AND REPLACE once daily 28 patch 1    aspirin 325 MG EC tablet take 1 tablet by mouth once daily 30 tablet 12    ketoconazole (NIZORAL) 2 % shampoo APPLY TO AFFECTED AREA ON SCALP 3 TO 4 TIMES A WEEK LEAVE ON FOR 5 MINUTES THEN WASH  mL 2    carbidopa-levodopa (SINEMET)  MG per tablet take 1 tablet by mouth every evening and 1 tablet at bedtime if needed 180 tablet 2    Blood Glucose Monitoring Suppl (ONE TOUCH ULTRA 2) w/Device KIT use as directed 1 kit 0    BANOPHEN 25 MG tablet take 1 tablet by mouth twice a day if needed for allergies or itching 60 tablet 2    Multiple Vitamin (MULTIVITAMIN) tablet take 1 tablet by mouth once daily 30 tablet 5    losartan (COZAAR) 100 MG tablet Take one tablet by mouth daily 30 tablet 6    omeprazole (PRILOSEC) 20 MG delayed release capsule Take one tablet daily 30 capsule 6    Cyanocobalamin ER (RA VITAMIN B-12 TR) 1000 MCG TBCR take 1 tablet by mouth once daily 30 tablet 6    pravastatin (PRAVACHOL) 20 MG tablet Take one tablet daily 30 tablet 6    celecoxib (CELEBREX) 200 MG capsule take 1 capsule by mouth twice a day 60 capsule 6    blood glucose test strips (ONE TOUCH ULTRA TEST) strip TEST three times a day 100 strip 6    hydroquinone 4 % cream Apply once daily to dark spots 28.35 g 5    pimecrolimus (ELIDEL) 1 % cream Apply topically 2 times daily.  30 g 5    ONETOUCH DELICA LANCETS FINE MISC TEST three times a day 100 each 5    risperiDONE (RISPERDAL) 1 MG tablet Take 1 mg by mouth day\"    Drug use: No    Sexual activity: Not on file   Lifestyle    Physical activity     Days per week: Not on file     Minutes per session: Not on file    Stress: Not on file   Relationships    Social connections     Talks on phone: Not on file     Gets together: Not on file     Attends Confucianism service: Not on file     Active member of club or organization: Not on file     Attends meetings of clubs or organizations: Not on file     Relationship status: Not on file    Intimate partner violence     Fear of current or ex partner: Not on file     Emotionally abused: Not on file     Physically abused: Not on file     Forced sexual activity: Not on file   Other Topics Concern    Not on file   Social History Narrative    Not on file         Vitals:    05/28/20 1442   BP: 128/76   Pulse: 91   Temp: 97.2 °F (36.2 °C)   SpO2: 97%         Wt Readings from Last 3 Encounters:   05/28/20 202 lb 12.8 oz (92 kg)   02/12/20 197 lb 4.8 oz (89.5 kg)   01/09/20 194 lb 3.2 oz (88.1 kg)         BP Readings from Last 3 Encounters:   05/28/20 128/76   02/12/20 134/82   01/09/20 138/82       Hematology and Coagulation  Lab Results   Component Value Date    WBC 6.4 01/15/2019    RBC 4.29 01/15/2019    HGB 12.9 01/15/2019    HCT 39.0 01/15/2019    MCV 90.9 01/15/2019    MCH 30.0 01/15/2019    MCHC 33.0 01/15/2019    RDW 14.5 01/15/2019     01/15/2019    MPV 8.2 01/15/2019       Chemistries  Lab Results   Component Value Date     01/24/2020    K 5.1 01/24/2020     01/24/2020    CO2 27 01/24/2020    BUN 11 01/24/2020    CREATININE 0.93 01/24/2020    CALCIUM 10.1 01/24/2020    PROT 7.4 01/24/2020    LABALBU 4.3 01/24/2020    BILITOT 0.41 01/24/2020    ALKPHOS 73 01/24/2020    AST 25 01/24/2020    ALT <5 01/24/2020     Lab Results   Component Value Date    ALKPHOS 73 01/24/2020    ALT <5 01/24/2020    AST 25 01/24/2020    PROT 7.4 01/24/2020    BILITOT 0.41 01/24/2020    LABALBU 4.3 01/24/2020     Lab Results neuropathy, without long-term current use of insulin (HCC) E11.40    5. Current severe episode of major depressive disorder without psychotic features, unspecified whether recurrent ([facility] F32.2    6. Chronic right shoulder pain M25.511     G89.29    7. Chronic lumbar radiculopathy M54.16    8. Mixed hyperlipidemia E78.2    9. History of tobacco use Z87.891    10. Alcoholism ([facility] F10.20    11. Schizoaffective disorder, unspecified type ([facility] F25.9    12. Fatigue due to depression F32.9     R53.83    13. Essential hypertension I10    14. Anxiety F41.9    15. Obesity (BMI 30.0-34. 9) E66.9    16. Balance problem R26.89    17. Sleeping difficulty G47.9    18. Restless leg syndrome G25.81    19. Chronic bronchitis, unspecified chronic bronchitis type ([facility] J42    20. Neuropathic pain M79.2               CONCERNS   &   INCREASED   RISK   FOR         * TIA,  CEREBRO  VASCULAR  ISCHEMIA, STROKE      *   COGNITIVE  &   MEMORY PROBLEMS  AND  DEMENTIAS       *  MONONEUROPATHIES, RADICULOPATHY  AND  PLEXOPATHY      *   PERIPHERAL  NEUROPATHY                      VARIOUS  RISK   FACTORS   WERE  REVIEWED   AND   DISCUSSED. *  PATIENT   HAS  MULTIPLE   MEDICAL,  NEUROLOGICAL      AND   MENTAL  HEALTH  PROBLEMS . PATIENT'S   MANAGEMENT  IS  CHALLENGING. PLAN:       [place]  Of  The  Diagnoses,  The  Management & Treatment  Options           AND    Care  plan  Were        Reviewed and   Discussed   With  patient. * Goals  And  Expectations  Of  The  Therapy  Discussed   And  Reviewed. *   Benefits   And   Side  Effect  Profile  Of  Medication/s   Were   Discussed             * Need   For  Further   Follow up For  The  Various  Problems  Were discussed. * Results  Of  The  Previous  Diagnostic tests were reviewed and questions answered. patient  understand the same.              Medical  Decision  Making  Was  Made  Based on the   Complexity  Of  Above  Mentioned  Diagnoses, FOOT  CARE, DAILY  INSPECTION  OF  FEET   AND         PERIODIC  PODIATRY EVALUATIONS . *  PATIENT  IS  ALSO   COUNSELED   TO  KEEP    ACTIVITIES         A)   SIMPLE      B)  ORGANIZED      C)  WRITE   DOWN                  *         PATIENT      FEEL     WORSENING  OF                     NEUROPATHIC  SYMPTOMS  BOTH  FEET     SINCE    FEB. 2020                        -   MOSTLY   DUE   TO    HER  TYPE  II    AND                                 ALCOHOL  ABUSE/  USAGE     REGULARLY                       *        PATIENT  ALREADY  ON  NEURONTIN        AND                               ALSO  ULTRAM  FROM     HER PCP     FOR    SYMPTOMATIC   RELIEF                         -    PATIENT    ADVISED                                     A)     TO   AVOID    ALCOHOL                                    B)   CLOSE    FOLLOW     FOR   ADEQUATE   DIABETIC  CONTROL                    Controlled Substances Monitoring: Periodic Controlled Substance Monitoring: Possible medication side effects, risk of tolerance/dependence & alternative treatments discussed. , Assessed functional status. Ariana Duenas MD)              Orders Placed This Encounter   Medications    gabapentin (NEURONTIN) 300 MG capsule     Sig: ONE    CAPSULE  IN  MORNING,    NOON ,   EVENING  AND  BED  TIME   DAILY   AS  NEEDED     Dispense:  120 capsule     Refill:  2                              *    EXPECTATIONS,   GOALS   AND  SIDE  EFFECTS  MEDICATIONS    WERE                                 REVIEWED     AND   DISCUSSED    IN    DETAIL.                       *PATIENT   TO  FOLLOW  UP  WITH   PRIMARY  CARE   AND   OTHER  CONSULTANTS  AS  BEFORE.               *TO  FOLLOW  WITH   MENTAL  HEALTH  PROFESSIONALS ,  INCLUDING            PSYCHOLOGICAL  COUNSELING   AND  PSYCHIATRIC  EVALUTIONS              *  Maintain   Healthy  Life Style    With   Periodic  Monitoring  Of      Any  Medical  Conditions  Including   Elevated  Blood  Pressure,  Lipid exercise program.  Keep moving. Adriana Brie the lawn, work in the garden, or [place]. Take the stairs instead of the elevator at work. If you smoke, quit. People who smoke have an increased risk for heart attack, stroke, cancer, and other lung illnesses. Quitting is hard, but there are ways to boost your chance of quitting tobacco for good. Use nicotine gum, patches, or lozenges. Ask your doctor about stop-smoking programs and medicines. Keep trying. In addition to reducing your risk of diseases in the future, you will notice some benefits soon after you stop using tobacco. If you have shortness of breath or asthma symptoms, they will likely get better within a few weeks after you quit. Limit how much alcohol you drink. Moderate amounts of alcohol (up to 2 drinks a day for men, 1 drink a day for women) are okay. But drinking too much can lead to liver problems, high blood pressure, and other health problems. Family health  If you have a family, there are many things you can do together to improve your health. Eat meals together as a family as often as possible. Eat healthy foods. This includes fruits, vegetables, lean meats and dairy, and whole grains. Include your family in your fitness plan. Most people think of activities such as jogging or tennis as the way to fitness, but there are many ways you and your family can be more active. Anything that makes you breathe hard and gets your heart pumping is exercise. Here are some tips:  Walk to do errands or to take your child to school or the bus. Go for a family bike ride after dinner instead of watching TV. Where can you learn more? Go to [email or web address] org and sign in to your [employer] account. Enter D000 in the [facility] box to learn more about \"A Healthy Lifestyle: Care Instructions. \"     If you do not have an account, please click on the \"Sign Up Now\" link.   Current as of: July 26, 2016  Content Version: 11.2  ©

## 2020-05-28 NOTE — PATIENT INSTRUCTIONS
[facility] NEUROLOGY    Due to the complex nature of our neurological testing, It is the policy of the Neurology Department not to release the results of your testing over the phone. Once all testing is completed and we have all the results back, Dr. Farhat Zaragoza will then personally go over all the results with you and answer any questions that you may have during a follow up appointment. If you are unable to keep this appointment, please notify the [address] @ [phone], as soon as possible. Please bring your prescription bottles to all appointments. Advance Care Planning  People with COVID-19 may have no symptoms, mild symptoms, such as fever, cough, and shortness of breath or they may have more severe illness, developing severe and fatal pneumonia. As a result, Advance Care Planning with attention to naming a health care decision maker (someone you trust to make healthcare decisions for you if you could not speak for yourself) and sharing other health care preferences is important BEFORE a possible health crisis. Please contact your Primary Care Provider to discuss Advance Care Planning. Preventing the Spread of Coronavirus Disease 2019 in Homes and Residential Communities  For the most recent information go to [email or web address]    Prevention steps for People with confirmed or suspected COVID-19 (including persons under investigation) who do not need to be hospitalized  and   People with confirmed COVID-19 who were hospitalized and determined to be medically stable to go home    Your healthcare provider and public health staff will evaluate whether you can be cared for at home. If it is determined that you do not need to be hospitalized and can be isolated at home, you will be monitored by staff from your local or state health department.  You should follow the prevention steps below until a healthcare provider or local or you, or they should wear a facemask if they enter your room. Cover your coughs and sneezes  Cover your mouth and nose with a tissue when you cough or sneeze. Throw used tissues in a lined trash can. Immediately wash your hands with soap and water for at least 20 seconds or, if soap and water are not available, clean your hands with an alcohol-based hand  that contains at least 60% alcohol. Clean your hands often  Wash your hands often with soap and water for at least 20 seconds, especially after blowing your nose, coughing, or sneezing; going to the bathroom; and before eating or preparing food. If soap and water are not readily available, use an alcohol-based hand  with at least 60% alcohol, covering all surfaces of your hands and rubbing them together until they feel dry. Soap and water are the best option if hands are visibly dirty. Avoid touching your eyes, nose, and mouth with unwashed hands. Avoid sharing personal household items  You should not share dishes, drinking glasses, cups, eating utensils, towels, or bedding with other people or pets in your home. After using these items, they should be washed thoroughly with soap and water. Clean all high-touch surfaces everyday  High touch surfaces include counters, tabletops, doorknobs, bathroom fixtures, toilets, phones, keyboards, tablets, and bedside tables. Also, clean any surfaces that may have blood, stool, or body fluids on them. Use a household cleaning spray or wipe, according to the label instructions. Labels contain instructions for safe and effective use of the cleaning product including precautions you should take when applying the product, such as wearing gloves and making sure you have good ventilation during use of the product. Monitor your symptoms  Seek prompt medical attention if your illness is worsening (e.g., difficulty breathing).  Before seeking care, call your healthcare provider and tell them that you have, or are

## 2020-06-16 RX ORDER — NICOTINE 21 MG/24HR
PATCH, TRANSDERMAL 24 HOURS TRANSDERMAL
Qty: 28 PATCH | Refills: 1 | Status: SHIPPED | OUTPATIENT
Start: 2020-06-16 | End: 2020-08-03 | Stop reason: SDUPTHER

## 2020-06-16 NOTE — TELEPHONE ENCOUNTER
Maile Montoya called requesting a refill of the below medication which has been pended for you:     Requested Prescriptions     Pending Prescriptions Disp Refills    nicotine (NICODERM CQ) 14 MG/24HR 28 patch 1     Sig: apply 1 patch TO CLEAN, DRY, AND INTACT SKIN REMOVE AND REPLACE once daily       Last Appointment Date: 2/12/2020  Next Appointment Date: 6/17/2020    No Known Allergies

## 2020-06-17 ENCOUNTER — VIRTUAL VISIT (OUTPATIENT)
Dept: FAMILY MEDICINE CLINIC | Age: 60
End: 2020-06-17
Payer: MEDICAID

## 2020-06-17 PROCEDURE — 99213 OFFICE O/P EST LOW 20 MIN: CPT | Performed by: FAMILY MEDICINE

## 2020-06-17 NOTE — PROGRESS NOTES
has the following problem list:  Patient Active Problem List   Diagnosis    Anxiety    Depression    Sch[facility]oaffective disorder ([facility]    History of tobacco use    Restless leg syndrome    Pica    Essential hypertension    Bipolar disorder ([address]    Alcoholism ([facility]    Sleeping difficulty    Peripheral neuropathy    Balance problem    CTS (carpal tunnel syndrome)    Chronic lumbar radiculopathy    GERD (gastroesophageal reflux disease)    [facility] disease[facility]    Fatigue    Chronic rectal pain    Type 2 diabetes mellitus with diabetic neuropathy, without long-term current use of insulin ([facility])    Pruritus    Obesity (BMI 30.0-34. 9)    Mixed hyperlipidemia    Chronic right shoulder pain    Bilateral carpal tunnel syndrome    Current severe episode of major depressive disorder without psychotic features ([facility]    Splenic artery aneurysm ([facility]    Bipolar affective disorder, currently depressed, mild ([facility]         PHYSICAL EXAMINATION:    Vital Signs: (As obtained by patient/caregiver or practitioner observation)    Patient-Reported Vitals 6/17/2020   Patient-Reported Weight (No Data)   Patient-Reported Height (No Data)   Patient-Reported Systolic (No Data)   Patient-Reported Diastolic (No Data)   Patient-Reported Pulse (No Data)         Other pertinent observable physical exam findings:  She responds to questions appropriately. Speech is clear. There is no observed dyspnea with conversation. She has had no recent labs. ASSESSMENT/PLAN:  1. Type 2 diabetes mellitus with diabetic neuropathy, without long-term current use of insulin ([facility]  She has not had any recent labs. She has orders for a HgbA1c when she is able to come to the lab. She will be contacted when the results are available. Her HgbA1c was 5.7% on 1/24/2020.    2. Essential hypertension  She has had no recent blood pressure readings. She was advised to continue current medications.   She states authenticate this note.

## 2020-06-23 RX ORDER — HYDROXYZINE HYDROCHLORIDE 25 MG/1
TABLET, FILM COATED ORAL
Qty: 30 TABLET | Refills: 1 | Status: SHIPPED | OUTPATIENT
Start: 2020-06-23 | End: 2020-07-14 | Stop reason: SDUPTHER

## 2020-06-23 RX ORDER — DIPHENHYDRAMINE HCL 25 MG
25 CAPSULE ORAL 2 TIMES DAILY PRN
Qty: 60 CAPSULE | Refills: 1 | Status: SHIPPED | OUTPATIENT
Start: 2020-06-23 | End: 2020-09-03 | Stop reason: SDUPTHER

## 2020-06-23 NOTE — TELEPHONE ENCOUNTER
Kenny Arroyo called requesting a refill of the below medication which has been pended for you:     Requested Prescriptions     Pending Prescriptions Disp Refills    hydrOXYzine (ATARAX) 25 MG tablet 30 tablet 1     Sig: take 1 tablet by mouth every 8 hours if needed for itching    diphenhydrAMINE (BENADRYL) 25 MG capsule 60 capsule 1     Sig: Take 1 capsule by mouth 2 times daily as needed for Itching or Allergies       Last Appointment Date: 6/17/2020  Next Appointment Date: 10/21/2020    No Known Allergies

## 2020-06-23 NOTE — TELEPHONE ENCOUNTER
Maile Montoya called requesting a refill of the below medication which has been pended for you: [place] from [address] [place] [address] [place], and [place] reviewed. Tramadol 50 mg last filled 5/27/20 #90/30 days. Report available for your review. Requested Prescriptions     Pending Prescriptions Disp Refills    traMADol (ULTRAM) 50 MG tablet 90 tablet 0     Sig: Take 1 tablet by mouth every 8 hours as needed for Pain for up to 30 days.        Last Appointment Date: 6/17/2020  Next Appointment Date: 10/21/2020    No Known Allergies

## 2020-06-24 RX ORDER — TRAMADOL HYDROCHLORIDE 50 MG/1
50 TABLET ORAL EVERY 8 HOURS PRN
Qty: 90 TABLET | Refills: 0 | Status: SHIPPED | OUTPATIENT
Start: 2020-06-24 | End: 2020-07-22 | Stop reason: SDUPTHER

## 2020-06-24 NOTE — TELEPHONE ENCOUNTER
Controlled substances monitoring: No signs of potential drug abuse or diversion identified when the [place] report from [place], [place], and [place] was reviewed today. The activity on the report was consistent with the treatment plan. Tramadol was refilled. Please clarify. Is she taking Benadryl tablets and Benadryl syrup to help with restless legs? Is she also taking Sinemet? What is her dose of Sinemet?

## 2020-07-07 RX ORDER — DIPHENHYDRAMINE HCL 25 MG
TABLET ORAL
Qty: 60 TABLET | Refills: 2 | OUTPATIENT
Start: 2020-07-07

## 2020-07-14 RX ORDER — HYDROXYZINE HYDROCHLORIDE 25 MG/1
TABLET, FILM COATED ORAL
Qty: 30 TABLET | Refills: 0 | Status: SHIPPED | OUTPATIENT
Start: 2020-07-14 | End: 2020-08-03 | Stop reason: SDUPTHER

## 2020-07-15 RX ORDER — PIMECROLIMUS 10 MG/G
CREAM TOPICAL
Qty: 30 G | Refills: 5 | Status: SHIPPED | OUTPATIENT
Start: 2020-07-15 | End: 2020-07-27 | Stop reason: SDUPTHER

## 2020-07-22 ENCOUNTER — HOSPITAL ENCOUNTER (OUTPATIENT)
Dept: LAB | Age: 60
Discharge: HOME | End: 2020-07-22
Payer: MEDICAID

## 2020-07-22 LAB
ALBUMIN SERPL-MCNC: 4 G/DL (ref 3.5–5.2)
ALBUMIN/GLOBULIN RATIO: 1.3 (ref 1–2.5)
ALP BLD-CCNC: 76 U/L (ref 35–104)
ALT SERPL-CCNC: 13 U/L (ref 5–33)
ANION GAP SERPL CALCULATED.3IONS-SCNC: 13 MMOL/L (ref 9–17)
AST SERPL-CCNC: 26 U/L
BILIRUB SERPL-MCNC: 0.45 MG/DL (ref 0.3–1.2)
BUN BLDV-MCNC: 10 MG/DL (ref 6–20)
BUN/CREAT BLD: 11 (ref 9–20)
CALCIUM SERPL-MCNC: 9.5 MG/DL (ref 8.6–10.4)
CHLORIDE BLD-SCNC: 100 MMOL/L (ref 98–107)
CHOLESTEROL, FASTING: 212 MG/DL
CHOLESTEROL/HDL RATIO: 3.6
CO2: 27 MMOL/L (ref 20–31)
CREAT SERPL-MCNC: 0.91 MG/DL (ref 0.5–0.9)
ESTIMATED AVERAGE GLUCOSE: 117 MG/DL
GFR AFRICAN AMERICAN: >60 ML/MIN
GFR NON-AFRICAN AMERICAN: >60 ML/MIN
GFR SERPL CREATININE-BSD FRML MDRD: ABNORMAL ML/MIN/{1.73_M2}
GFR SERPL CREATININE-BSD FRML MDRD: ABNORMAL ML/MIN/{1.73_M2}
GLUCOSE BLD-MCNC: 146 MG/DL (ref 70–99)
HBA1C MFR BLD: 5.7 % (ref 4.8–5.9)
HDLC SERPL-MCNC: 59 MG/DL
LDL CHOLESTEROL DIRECT: 88 MG/DL
LDL CHOLESTEROL: ABNORMAL MG/DL (ref 0–130)
MAGNESIUM: 1.5 MG/DL (ref 1.6–2.6)
POTASSIUM SERPL-SCNC: 4.2 MMOL/L (ref 3.7–5.3)
SODIUM BLD-SCNC: 140 MMOL/L (ref 135–144)
TOTAL PROTEIN: 7.1 G/DL (ref 6.4–8.3)
TRIGLYCERIDE, FASTING: 570 MG/DL
VLDLC SERPL CALC-MCNC: ABNORMAL MG/DL (ref 1–30)

## 2020-07-22 PROCEDURE — 83721 ASSAY OF BLOOD LIPOPROTEIN: CPT

## 2020-07-22 PROCEDURE — 80061 LIPID PANEL: CPT

## 2020-07-22 PROCEDURE — 83036 HEMOGLOBIN GLYCOSYLATED A1C: CPT

## 2020-07-22 PROCEDURE — 83735 ASSAY OF MAGNESIUM: CPT

## 2020-07-22 PROCEDURE — 80053 COMPREHEN METABOLIC PANEL: CPT

## 2020-07-22 PROCEDURE — 36415 COLL VENOUS BLD VENIPUNCTURE: CPT

## 2020-07-22 RX ORDER — TRAMADOL HYDROCHLORIDE 50 MG/1
TABLET ORAL
Qty: 90 TABLET | OUTPATIENT
Start: 2020-07-22

## 2020-07-22 RX ORDER — TRAMADOL HYDROCHLORIDE 50 MG/1
50 TABLET ORAL EVERY 8 HOURS PRN
Qty: 90 TABLET | Refills: 0 | Status: SHIPPED | OUTPATIENT
Start: 2020-07-22 | End: 2020-08-18 | Stop reason: SDUPTHER

## 2020-07-22 NOTE — TELEPHONE ENCOUNTER
Jose Corey called requesting a refill of the below medication which has been pended for you: [place] from [place], [place], and [place] reviewed. Tramadol 50 mg last filled 6/24/20 #90/30 days. Report available for your review. Requested Prescriptions     Pending Prescriptions Disp Refills    traMADol (ULTRAM) 50 MG tablet 90 tablet 0     Sig: Take 1 tablet by mouth every 8 hours as needed for Pain for up to 30 days.        Last Appointment Date: 2/12/2020  Next Appointment Date: 10/21/2020    No Known Allergies

## 2020-07-22 NOTE — TELEPHONE ENCOUNTER
She had a virtual visit with me 6/17/2020. Controlled substances monitoring: No signs of potential drug abuse or diversion identified when the [place] report from [place], [place], and [place] was reviewed today. The activity on the report was consistent with the treatment plan.

## 2020-07-23 RX ORDER — PRAVASTATIN SODIUM 40 MG
40 TABLET ORAL DAILY
Qty: 30 TABLET | Refills: 5 | Status: SHIPPED | OUTPATIENT
Start: 2020-07-23 | End: 2020-10-30 | Stop reason: SDUPTHER

## 2020-07-23 RX ORDER — LANOLIN ALCOHOL/MO/W.PET/CERES
400 CREAM (GRAM) TOPICAL DAILY
Qty: 30 TABLET | Refills: 0 | Status: SHIPPED | OUTPATIENT
Start: 2020-07-23 | End: 2020-08-18 | Stop reason: SDUPTHER

## 2020-07-23 NOTE — TELEPHONE ENCOUNTER
Basic metabolic panel and magnesium level ordered to be done in 2 weeks. Please mail lab orders to the patient.

## 2020-07-23 NOTE — TELEPHONE ENCOUNTER
----- Message from Kody Sanabria MD sent at 7/22/2020  5:03 PM EDT -----  Please advise Tommy Villar that her triglycerides are elevated. I recommend that she increase her dose of Pravachol from 20 mg daily to 40 mg daily. Please pend this to her pharmacy. Please order a lipid panel to be done in 6-8 weeks. Also, her magnesium level is decreased. If she taking a magnesium supplement?

## 2020-07-23 NOTE — TELEPHONE ENCOUNTER
Patient notified of results. She is not taking a separate magnesium supplement.  She would like the script sent to [facility].

## 2020-07-27 ENCOUNTER — OFFICE VISIT (OUTPATIENT)
Dept: DERMATOLOGY | Age: 60
End: 2020-07-27
Payer: MEDICAID

## 2020-07-27 VITALS
BODY MASS INDEX: 37.17 KG/M2 | WEIGHT: 202 LBS | SYSTOLIC BLOOD PRESSURE: 130 MMHG | DIASTOLIC BLOOD PRESSURE: 84 MMHG | HEART RATE: 90 BPM | OXYGEN SATURATION: 96 % | HEIGHT: 62 IN

## 2020-07-27 PROCEDURE — 99214 OFFICE O/P EST MOD 30 MIN: CPT

## 2020-07-27 PROCEDURE — G8417 CALC BMI ABV UP PARAM F/U: HCPCS | Performed by: DERMATOLOGY

## 2020-07-27 PROCEDURE — 3017F COLORECTAL CA SCREEN DOC REV: CPT | Performed by: DERMATOLOGY

## 2020-07-27 PROCEDURE — G8427 DOCREV CUR MEDS BY ELIG CLIN: HCPCS | Performed by: DERMATOLOGY

## 2020-07-27 PROCEDURE — 99213 OFFICE O/P EST LOW 20 MIN: CPT | Performed by: DERMATOLOGY

## 2020-07-27 PROCEDURE — 1036F TOBACCO NON-USER: CPT | Performed by: DERMATOLOGY

## 2020-07-27 RX ORDER — KETOCONAZOLE 20 MG/ML
SHAMPOO TOPICAL
Qty: 120 ML | Refills: 11 | Status: SHIPPED | OUTPATIENT
Start: 2020-07-27

## 2020-07-27 RX ORDER — PIMECROLIMUS 10 MG/G
CREAM TOPICAL
Qty: 30 G | Refills: 11 | Status: SHIPPED | OUTPATIENT
Start: 2020-07-27

## 2020-07-27 RX ORDER — TRIAMCINOLONE ACETONIDE 1 MG/G
CREAM TOPICAL
Qty: 80 G | Refills: 1 | Status: SHIPPED | OUTPATIENT
Start: 2020-07-27 | End: 2022-03-31

## 2020-07-27 RX ORDER — HYDROQUINONE 40 MG/G
CREAM TOPICAL
Qty: 28 G | Refills: 2 | Status: SHIPPED | OUTPATIENT
Start: 2020-07-27 | End: 2021-11-11

## 2020-07-27 NOTE — PROGRESS NOTES
(NEURONTIN) 300 MG capsule ONE    CAPSULE  IN  MORNING,    NOON ,   EVENING  AND  BED  TIME   DAILY   AS  NEEDED 120 capsule 2    diphenhydrAMINE (BENYLIN) 12.5 MG/5ML syrup Take 5 mLs by mouth nightly as needed (leg cramps) 237 mL 1    aspirin 325 MG EC tablet take 1 tablet by mouth once daily 30 tablet 12    naloxone 4 MG/0.1ML LIQD nasal spray 1 spray by Nasal route as needed for Opioid Reversal 1 each 5    carbidopa-levodopa (SINEMET)  MG per tablet take 1 tablet by mouth every evening and 1 tablet at bedtime if needed 180 tablet 2    Blood Glucose Monitoring Suppl (ONE TOUCH ULTRA 2) w/Device KIT use as directed 1 kit 0    BANOPHEN 25 MG tablet take 1 tablet by mouth twice a day if needed for allergies or itching 60 tablet 2    Multiple Vitamin (MULTIVITAMIN) tablet take 1 tablet by mouth once daily 30 tablet 5    losartan (COZAAR) 100 MG tablet Take one tablet by mouth daily 30 tablet 6    omeprazole (PRILOSEC) 20 MG delayed release capsule Take one tablet daily 30 capsule 6    Cyanocobalamin ER (RA VITAMIN B-12 TR) 1000 MCG TBCR take 1 tablet by mouth once daily 30 tablet 6    celecoxib (CELEBREX) 200 MG capsule take 1 capsule by mouth twice a day 60 capsule 6    ammonium lactate (LAC-HYDRIN) 12 % lotion apply to affected area once daily 400 g 1    blood glucose test strips (ONE TOUCH ULTRA TEST) strip TEST three times a day 100 strip 6    Elastic Bandages & Supports (KNEE BRACE) MISC Neoprene brace use as directed right knee 1 each 0    ONETOUCH DELICA LANCETS FINE MISC TEST three times a day 100 each 5    risperiDONE (RISPERDAL) 1 MG tablet Take 1 mg by mouth nightly. No current facility-administered medications for this visit.         ALLERGIES:   No Known Allergies    SOCIAL HISTORY:  Social History     Tobacco Use    Smoking status: Former Smoker     Packs/day: 1.00     Years: 26.00     Pack years: 26.00     Types: Cigarettes     Start date: 1/1/1975     Last attempt to quit: 2018     Years since quittin.6    Smokeless tobacco: Never Used   Substance Use Topics    Alcohol use: Yes     Alcohol/week: 0.0 standard drinks     Frequency: 4 or more times a week     Drinks per session: 5 or 6     Binge frequency: Never     Comment: daily; \"2 tallboys per day\"       REVIEW OF SYSTEMS:  Review of Systems  Skin: Denies any new changing, growing orbleeding lesions or rashes except as described in the HPI   Constitutional: Denies fevers, chills, and malaise. PHYSICAL EXAM:   /84 (Site: Left Upper Arm, Position: Sitting, Cuff Size: Medium Adult)   Pulse 90   Ht 5' 2\" (1.575 m)   Wt 202 lb (91.6 kg)   LMP 2011   SpO2 96%   BMI 36.95 kg/m²     General Exam:  General Appearance: No acute distress, Well nourished     Neuro: Alert and oriented to person, place and time  Psych: Normal affect   Lymph Node: Not performed    Cutaneous Exam: Performed as documented in clinic note below. Head/face,neck, both arms, digits and/or nails, and limited lower extremities (that which is visible with pants/shorts and shoes/socks on) was examined. Pertinent Physical Exam Findings:  Physical Exam  Scalp and forehead clear  Hyperpigmented patches lower lids  Right wrist and right leg with thin pink scaly plaques    Photo surveillance performed: No    Medical Necessity of Exam Performed:   Distribution of patient concerns    Additional Diagnostic Testing performed during exam: Not performed ,  Not performed    ASSESSMENT:   Diagnosis Orders   1. Other specified dermatitis  triamcinolone (KENALOG) 0.1 % cream   2. Seborrheic dermatitis  pimecrolimus (ELIDEL) 1 % cream    ketoconazole (NIZORAL) 2 % shampoo   3. Hyperpigmentation  hydroquinone 4 % cream       Plan of Action is as Follows:  Assessment   1. Seborrheic dermatitis  - pimecrolimus (ELIDEL) 1 % cream; Apply topically to rash on face 2 times daily.   Dispense: 30 g; Refill: 11  - ketoconazole (NIZORAL) 2 % shampoo; APPLY TO AFFECTED AREA ON SCALP 3 TO 4 TIMES A WEEK LEAVE ON FOR 5 MINUTES THEN [address] OFF  Dispense: 120 mL; Refill: 11    2. Rhus dermatitis vs. other  - triamcinolone (KENALOG) 0.1 % cream; Apply to rash on body twice daily (not face, armpit or groin)  Dispense: 80 g; Refill: 1    3. Hyperpigmentation  - hydroquinone 4 % cream; Apply topically daily to dark spots  Dispense: 28 g; Refill: 2    RTC prn            There are no Patient Instructions on file for this visit. Follow-up: No follow-ups on file. This note was created with the assistance of a speech-recognition program.  Although the intention is to generate a document that actually reflects the content of the visit, no guarantees can be provided that every mistake has been identified and corrected byediting.     Electronically signed by Kar Carlson MD on 7/27/20 at 2:17 PM EDT

## 2020-08-03 RX ORDER — NICOTINE 21 MG/24HR
PATCH, TRANSDERMAL 24 HOURS TRANSDERMAL
Qty: 28 PATCH | Refills: 0 | Status: SHIPPED | OUTPATIENT
Start: 2020-08-03 | End: 2020-09-04

## 2020-08-03 RX ORDER — HYDROXYZINE HYDROCHLORIDE 25 MG/1
TABLET, FILM COATED ORAL
Qty: 30 TABLET | Refills: 0 | Status: SHIPPED | OUTPATIENT
Start: 2020-08-03 | End: 2020-08-18

## 2020-08-05 ENCOUNTER — TELEPHONE (OUTPATIENT)
Dept: FAMILY MEDICINE CLINIC | Age: 60
End: 2020-08-05

## 2020-08-05 NOTE — TELEPHONE ENCOUNTER
Pt had some questions regarding her liver and kidney functions. Please call at above number. Thank you!

## 2020-08-17 NOTE — TELEPHONE ENCOUNTER
Nesha Easton called requesting a refill of the below medication which has been pended for you:     Requested Prescriptions     Pending Prescriptions Disp Refills    hydrOXYzine (ATARAX) 25 MG tablet [Pharmacy Med Name: HYDROXYZINE HCL 25 MG TABLET] 30 tablet 0     Sig: take 1 tablet by mouth every 8 hours if needed for itching       Last Appointment Date: 6/17/2020  Next Appointment Date: 10/21/2020    No Known Allergies

## 2020-08-17 NOTE — TELEPHONE ENCOUNTER
Jaz Ware called requesting a refill of the below medication which has been pended for you:     Requested Prescriptions     Pending Prescriptions Disp Refills    Multiple Vitamins-Minerals (MULTIVITAMIN) tablet [Pharmacy Med Name: TAB-A-BETTY TABLET] 30 tablet 5     Sig: take 1 tablet by mouth once daily       Last Appointment Date: 6/17/2020  Next Appointment Date: 10/21/2020    No Known Allergies

## 2020-08-18 RX ORDER — HYDROXYZINE HYDROCHLORIDE 25 MG/1
TABLET, FILM COATED ORAL
Qty: 30 TABLET | Refills: 0 | Status: SHIPPED | OUTPATIENT
Start: 2020-08-18 | End: 2020-09-30 | Stop reason: SDUPTHER

## 2020-08-18 RX ORDER — TRAMADOL HYDROCHLORIDE 50 MG/1
50 TABLET ORAL EVERY 8 HOURS PRN
Qty: 90 TABLET | Refills: 0 | Status: SHIPPED | OUTPATIENT
Start: 2020-08-18 | End: 2020-09-15 | Stop reason: SDUPTHER

## 2020-08-18 RX ORDER — MULTIVITAMIN WITH FOLIC ACID 400 MCG
TABLET ORAL
Qty: 30 TABLET | Refills: 5 | Status: SHIPPED | OUTPATIENT
Start: 2020-08-18 | End: 2021-01-21 | Stop reason: SDUPTHER

## 2020-08-18 RX ORDER — LANOLIN ALCOHOL/MO/W.PET/CERES
400 CREAM (GRAM) TOPICAL DAILY
Qty: 30 TABLET | Refills: 1 | Status: SHIPPED | OUTPATIENT
Start: 2020-08-18 | End: 2020-10-22

## 2020-08-18 NOTE — TELEPHONE ENCOUNTER
Last Vv 06/17/2020,next ov 10/21/2020. Per [facility] requested medication last filled 07/22/2020 #90,30 day supply.

## 2020-08-20 ENCOUNTER — TELEPHONE (OUTPATIENT)
Dept: DERMATOLOGY | Age: 60
End: 2020-08-20

## 2020-08-20 NOTE — TELEPHONE ENCOUNTER
Pt called and has been using hydroquinone once daily since Aug 9th and not seeing any results. The pamphlet that comes with the medication says it can be used twice daily. Patient wants to know how long til she noticed any results and if she should be taking it once or twice daily. Please advise.

## 2020-08-20 NOTE — TELEPHONE ENCOUNTER
Informed patient that she may take the medication twice a day and reminded her to continue the other creams also. Pt verbalized understanding.

## 2020-08-31 NOTE — TELEPHONE ENCOUNTER
Dalia Grove called requesting a refill of the below medication which has been pended for you:     Requested Prescriptions     Pending Prescriptions Disp Refills    omeprazole (PRILOSEC) 20 MG delayed release capsule [Pharmacy Med Name: OMEPRAZOLE DR 20 MG CAPSULE] 90 capsule 0     Sig: take 1 tablet by mouth daily       Last Appointment Date: 6/17/2020  Next Appointment Date: 10/21/2020    No Known Allergies

## 2020-09-01 RX ORDER — OMEPRAZOLE 20 MG/1
CAPSULE, DELAYED RELEASE ORAL
Qty: 90 CAPSULE | Refills: 0 | Status: SHIPPED | OUTPATIENT
Start: 2020-09-01 | End: 2020-10-30 | Stop reason: SDUPTHER

## 2020-09-02 NOTE — TELEPHONE ENCOUNTER
Last Appt:  5/28/2020  Next Appt:   9/25/2020  Med verified in [facility]    Patient need refill on gabapentin

## 2020-09-03 DIAGNOSIS — F31.9 BIPOLAR AFFECTIVE DISORDER (HCC): ICD-10-CM

## 2020-09-03 DIAGNOSIS — G25.81 RESTLESS LEG: ICD-10-CM

## 2020-09-03 RX ORDER — GABAPENTIN 300 MG/1
CAPSULE ORAL
Qty: 120 CAPSULE | Refills: 0 | Status: SHIPPED | OUTPATIENT
Start: 2020-09-03 | End: 2020-10-01 | Stop reason: SDUPTHER

## 2020-09-03 RX ORDER — DIPHENHYDRAMINE HCL 25 MG
25 CAPSULE ORAL 2 TIMES DAILY PRN
Qty: 60 CAPSULE | Refills: 0 | Status: SHIPPED | OUTPATIENT
Start: 2020-09-03 | End: 2020-09-30 | Stop reason: SDUPTHER

## 2020-09-03 NOTE — TELEPHONE ENCOUNTER
Quincy Woods called requesting a refill of the below medication which has been pended for you:     Requested Prescriptions     Pending Prescriptions Disp Refills    diphenhydrAMINE (BENADRYL) 25 MG capsule 60 capsule 1     Sig: Take 1 capsule by mouth 2 times daily as needed for Itching or Allergies       Last Appointment Date: 6/17/2020  Next Appointment Date: 10/21/2020    No Known Allergies

## 2020-09-15 RX ORDER — TRAMADOL HYDROCHLORIDE 50 MG/1
50 TABLET ORAL EVERY 8 HOURS PRN
Qty: 90 TABLET | Refills: 0 | Status: SHIPPED | OUTPATIENT
Start: 2020-09-15 | End: 2020-10-12

## 2020-09-15 NOTE — TELEPHONE ENCOUNTER
Manjinder Romero called requesting a refill of the below medication which has been pended for you: [place] reviewed. Tramadol 50 mg last filled 8/19/20 #90/30 days. Report available for your review. Requested Prescriptions     Pending Prescriptions Disp Refills    traMADol (ULTRAM) 50 MG tablet 90 tablet 0     Sig: Take 1 tablet by mouth every 8 hours as needed for Pain for up to 30 days.        Last Appointment Date: 6/17/2020  Next Appointment Date: 10/21/2020    No Known Allergies

## 2020-09-21 NOTE — TELEPHONE ENCOUNTER
Talita Flores called requesting a refill of the below medication which has been pended for you:     Requested Prescriptions     Pending Prescriptions Disp Refills    losartan (COZAAR) 100 MG tablet [Pharmacy Med Name: LOSARTAN POTASSIUM 100 MG TAB] 90 tablet 0     Sig: take 1 tablet by mouth daily       Last Appointment Date: 6/17/2020  Next Appointment Date: 10/21/2020    No Known Allergies

## 2020-09-22 DIAGNOSIS — R25.2 LEG CRAMPS: ICD-10-CM

## 2020-09-22 RX ORDER — LOSARTAN POTASSIUM 100 MG/1
TABLET ORAL
Qty: 90 TABLET | Refills: 0 | Status: SHIPPED | OUTPATIENT
Start: 2020-09-22 | End: 2020-10-30 | Stop reason: SDUPTHER

## 2020-09-30 RX ORDER — DIPHENHYDRAMINE HCL 25 MG
25 CAPSULE ORAL 2 TIMES DAILY PRN
Qty: 60 CAPSULE | Refills: 0 | Status: SHIPPED | OUTPATIENT
Start: 2020-09-30 | End: 2020-10-30 | Stop reason: SDUPTHER

## 2020-09-30 RX ORDER — HYDROXYZINE HYDROCHLORIDE 25 MG/1
TABLET, FILM COATED ORAL
Qty: 30 TABLET | Refills: 0 | Status: SHIPPED | OUTPATIENT
Start: 2020-09-30 | End: 2020-10-30

## 2020-09-30 NOTE — TELEPHONE ENCOUNTER
Marcelino Sarmiento called requesting a refill of the below medication which has been pended for you:     Requested Prescriptions     Pending Prescriptions Disp Refills    diphenhydrAMINE (BENADRYL) 25 MG capsule 60 capsule 0     Sig: Take 1 capsule by mouth 2 times daily as needed for Itching or Allergies    hydrOXYzine (ATARAX) 25 MG tablet 30 tablet 0     Sig: take 1 tablet by mouth every 8 hours if needed for itching       Last Appointment Date: 6/17/2020  Next Appointment Date: 10/21/2020    No Known Allergies

## 2020-10-01 RX ORDER — GABAPENTIN 300 MG/1
CAPSULE ORAL
Qty: 120 CAPSULE | Refills: 0 | Status: SHIPPED | OUTPATIENT
Start: 2020-10-01 | End: 2020-10-16 | Stop reason: SDUPTHER

## 2020-10-01 NOTE — TELEPHONE ENCOUNTER
Last Appt:  5/28/2020  Next Appt:   10/16/2020  Med verified in Epic    Patient needs a refill on Gabapentin    [address] [place]

## 2020-10-05 RX ORDER — BLOOD SUGAR DIAGNOSTIC
STRIP MISCELLANEOUS
Qty: 300 STRIP | Refills: 1 | Status: SHIPPED | OUTPATIENT
Start: 2020-10-05 | End: 2021-05-27

## 2020-10-05 NOTE — TELEPHONE ENCOUNTER
Luciana Bennett called requesting a refill of the below medication which has been pended for you:     Requested Prescriptions     Pending Prescriptions Disp Refills    blood glucose test strips (ONETOUCH ULTRA) strip [Pharmacy Med Name: Jason Rodriguez TEST STRP] 300 strip 1     Sig: use 1 TEST STRIP to TEST BLOOD SUGAR three times a day       Last Appointment Date: 6/17/2020  Next Appointment Date: 10/21/2020    No Known Allergies

## 2020-10-12 NOTE — TELEPHONE ENCOUNTER
Christian Barnett called requesting a refill of the below medication which has been pended for you: [facility] from [place] and [place] reviewed. Tramadol 50 mg last filled 9/16/20 #90/30 days. Report available for your review.      Requested Prescriptions     Pending Prescriptions Disp Refills    traMADol (ULTRAM) 50 MG tablet [Pharmacy Med Name: TRAMADOL HCL 50 MG TABLET] 90 tablet 0     Sig: take 1 tablet by mouth every 8 hours AS NEEDED FOR PAIN       Last Appointment Date: 6/17/2020  Next Appointment Date: 10/21/2020    No Known Allergies

## 2020-10-13 RX ORDER — TRAMADOL HYDROCHLORIDE 50 MG/1
TABLET ORAL
Qty: 90 TABLET | Refills: 0 | Status: SHIPPED | OUTPATIENT
Start: 2020-10-13 | End: 2020-11-10

## 2020-10-16 ENCOUNTER — OFFICE VISIT (OUTPATIENT)
Dept: NEUROLOGY | Age: 60
End: 2020-10-16
Payer: MEDICAID

## 2020-10-16 VITALS
WEIGHT: 213.6 LBS | HEIGHT: 62 IN | SYSTOLIC BLOOD PRESSURE: 132 MMHG | RESPIRATION RATE: 16 BRPM | DIASTOLIC BLOOD PRESSURE: 70 MMHG | TEMPERATURE: 98 F | HEART RATE: 97 BPM | BODY MASS INDEX: 39.31 KG/M2 | OXYGEN SATURATION: 95 %

## 2020-10-16 PROCEDURE — G8926 SPIRO NO PERF OR DOC: HCPCS | Performed by: PSYCHIATRY & NEUROLOGY

## 2020-10-16 PROCEDURE — 3023F SPIROM DOC REV: CPT | Performed by: PSYCHIATRY & NEUROLOGY

## 2020-10-16 PROCEDURE — 3017F COLORECTAL CA SCREEN DOC REV: CPT | Performed by: PSYCHIATRY & NEUROLOGY

## 2020-10-16 PROCEDURE — 99215 OFFICE O/P EST HI 40 MIN: CPT | Performed by: PSYCHIATRY & NEUROLOGY

## 2020-10-16 PROCEDURE — G8484 FLU IMMUNIZE NO ADMIN: HCPCS | Performed by: PSYCHIATRY & NEUROLOGY

## 2020-10-16 PROCEDURE — 1036F TOBACCO NON-USER: CPT | Performed by: PSYCHIATRY & NEUROLOGY

## 2020-10-16 PROCEDURE — 3044F HG A1C LEVEL LT 7.0%: CPT | Performed by: PSYCHIATRY & NEUROLOGY

## 2020-10-16 PROCEDURE — G8427 DOCREV CUR MEDS BY ELIG CLIN: HCPCS | Performed by: PSYCHIATRY & NEUROLOGY

## 2020-10-16 PROCEDURE — 2022F DILAT RTA XM EVC RTNOPTHY: CPT | Performed by: PSYCHIATRY & NEUROLOGY

## 2020-10-16 PROCEDURE — 99214 OFFICE O/P EST MOD 30 MIN: CPT

## 2020-10-16 PROCEDURE — G8417 CALC BMI ABV UP PARAM F/U: HCPCS | Performed by: PSYCHIATRY & NEUROLOGY

## 2020-10-16 RX ORDER — GABAPENTIN 300 MG/1
CAPSULE ORAL
Qty: 120 CAPSULE | Refills: 2 | Status: SHIPPED | OUTPATIENT
Start: 2020-10-16 | End: 2021-01-19 | Stop reason: SDUPTHER

## 2020-10-16 RX ORDER — ROPINIROLE 0.25 MG/1
0.25 TABLET, FILM COATED ORAL 3 TIMES DAILY
Qty: 90 TABLET | Refills: 2 | Status: SHIPPED | OUTPATIENT
Start: 2020-10-16 | End: 2020-10-30

## 2020-10-16 ASSESSMENT — ENCOUNTER SYMPTOMS
CHEST TIGHTNESS: 0
TROUBLE SWALLOWING: 0
DIARRHEA: 0
PHOTOPHOBIA: 0
VISUAL CHANGE: 0
EYE ITCHING: 0
BLOOD IN STOOL: 0
SINUS PRESSURE: 0
NAUSEA: 0
EYE REDNESS: 0
ABDOMINAL DISTENTION: 0
FACIAL SWELLING: 0
ABDOMINAL PAIN: 0
EYE DISCHARGE: 0
WHEEZING: 0
VOMITING: 0
BACK PAIN: 0
VOICE CHANGE: 0
COLOR CHANGE: 0
CHOKING: 0
SHORTNESS OF BREATH: 0
BOWEL INCONTINENCE: 0
APNEA: 0
CONSTIPATION: 0
EYE PAIN: 0

## 2020-10-16 NOTE — PROGRESS NOTES
Subjective:      Patient ID: Rexene Fothergill is a 62 y. o.right  handed female. Neurologic Problem   The patient's primary symptoms include clumsiness, focal sensory loss, focal weakness, a loss of balance, memory loss and weakness. The patient's pertinent negatives include no altered mental status, near-syncope, slurred speech, syncope or visual change. Primary symptoms comment: RESTLESS LEG  SYNDROME. This is a chronic problem. Episode onset: MORE   THAN    5-6 YEARS. The neurological problem developed insidiously. The problem is unchanged. There was left-sided, lower extremity and right-sided focality noted. Pertinent negatives include no abdominal pain, auditory change, aura, back pain, bladder incontinence, bowel incontinence, chest pain, confusion, diaphoresis, dizziness, fatigue, fever, headaches, light-headedness, nausea, neck pain, palpitations, shortness of breath, vertigo or vomiting. Past treatments include medication. The treatment provided moderate relief. Her past medical history is significant for mood changes. There is no history of a bleeding disorder, a clotting disorder, a CVA, dementia, head trauma, liver disease or seizures. History obtained from  The patient     and other  available medical records were  Also  reviewed.                 1)   H/O    CHRONIC    DIABETIC   PERIPHERAL  NEUROPATHY                                                   -  ON  NEURONTIN                          TOLERATING  THE  SAME     &   GETTING  HELP                      2)     H/O      RESTLESS  LEG  SYNDROME                 &  PERIODIC  LEG  MOVEMENTS                              -  IMPROVED                            -       ON  SINEMET              3)    H/O    CHRONIC     SLEEP DIFFICULTIES                        -   RESOLVED              4)   H/O   CHRONIC   MILD  MEMORY PROBLEMS                     -    STABLE                   -  PATIENT  NOT  CONCERNED            5)          CHRONIC  TOBACCO  AND PATIENT  REQUESTS  REFILLS  FOR    NEURONTIN    ALSO    . *    EXPECTATIONS,   GOALS   AND  SIDE  EFFECTS  MEDICATION    WERE                                 REVIEWED     AND   DISCUSSED    IN    DETAIL. 13)      VARIOUS  RISK   FACTORS   WERE  REVIEWED   AND   DISCUSSED. PATIENT   HAS  MULTIPLE   MEDICAL,  NEUROLOGICAL                               AND   MENTAL  HEALTH  PROBLEMS . PATIENT'S   MANAGEMENT  IS  CHALLENGING. The  Duration,  Quality,  Severity,  Location,  Timing,  Context,  Modifying  Factors   Of   The   Chief   Complaint       And  Present  Illness   Was   Reviewed   In   Chronological   [facility].              Patient   Indicates   The  Presence   And  The  Absence  Of  The  Following  Associated  And   Additional  Neurological    Symptoms:                                Balance  And coordination problems  present           Gait problems     present            Headaches      absent              Migraines           absent           Memory problems        Present             Confusion        absent            Paresthesia numbness          present           Seizures  And  Starring  Episodes           absent           Syncope,  Near  syncopal episodes         absent           Speech problems           absent             Swallowing  Problems      absent            Dizziness,  Light headedness           present                        Vertigo        absent             Generalized   Weakness    absent              focal  Weakness     absent             Tremors         absent              Sleep  Problems     present             History  Of   Recent   Head  Injury     absent             History  Of   Recent  TIA     absent             History  Of   Recent    Stroke     absent             Neck  Pain and  Neck muscle Spasms  Present               Radiating  down   And   Weakness           absent            Lower back   Pain  And     Spasms  Present              Radiating    Down   And   Weakness          absent                H/O   FALLS        absent               History  Of   Visual  Symptoms    Absent                  Associated   Diplopia       absent                                                              Also   Additional   Symptoms   Present    As  Documented    In   The detailed      Review  Of  Systems   And    Please   Refer   To    Them for   Additional  Information.       RECORDS   REVIEWED:    historical medical records, lab reports, office notes and radiology reports         INFORMATION   REVIEWED:     MEDICAL   HISTORY,     SURGICAL   HISTORY,   MEDICATIONS   LIST,   ALLERGIES AND  DRUG  INTOLERANCES,     FAMILY   HISTORY,  SOCIAL  HISTORY,    PROBLEM  LIST   FOR  PATIENT  CARE   COORDINATION         Past Medical History:   Diagnosis Date    Alcoholism ([facility]     Anxiety     Astigmatism with presbyopia     Balance problem     [facility]     schizoaffective disorder (Dr. Terence Alexander)    Chronic lumbar radiculopathy     Chronic rectal pain     due to fissures    Chronic shoulder pain     bilaterally, due to osteoarthritis    CTS (carpal tunnel syndrome)     Depression     Fatigue     Hyperlipidemia     Hypertension     Obesity     Peripheral neuropathy     Pica     eats bar soap    Restless leg syndrome     [facility] ([facility]     Sleeping difficulty     Tobacco abuse     Type 2 diabetes mellitus ([facility]                   Past Surgical History:   Procedure Laterality Date    ABSCESS DRAINAGE  2001    perianal    ANUS SURGERY  2001    lateral internal sphincterotomy    BLADDER SUSPENSION  2000    transvaginal sling procedure for incontinence    COLONOSCOPY  2007    polypectomy x 1 (pathology: hyperplastic polyp), repair of perianal fistula    CYST REMOVAL Right 1992 wrist    OTHER SURGICAL HISTORY  2002    perianal fistula repair eua    OTHER SURGICAL HISTORY  1993    cystourethroscopy    UPPER GASTROINTESTINAL ENDOSCOPY  2007    Schatzki's ring, acute gastritis, hiatal hernia    UPPER GASTROINTESTINAL ENDOSCOPY  2000    hiatal hernia, esophagitis, gastritis                 Current Outpatient Medications   Medication Sig Dispense Refill    gabapentin (NEURONTIN) 300 MG capsule ONE    CAPSULE  IN  MORNING,    NOON ,   EVENING  AND  BED  TIME   DAILY   AS  NEEDED 120 capsule 2    carbidopa-levodopa (SINEMET)  MG per tablet take 1 tablet by mouth every evening and 1-  2    tablet at bedtime    AS   needed 180 tablet 1    rOPINIRole (REQUIP) 0.25 MG tablet Take 1 tablet by mouth 3 times daily 90 tablet 2    traMADol (ULTRAM) 50 MG tablet take 1 tablet by mouth every 8 hours AS NEEDED FOR PAIN 90 tablet 0    blood glucose test strips (ONETOUCH ULTRA) strip use 1 TEST STRIP to TEST BLOOD SUGAR three times a day 300 strip 1    diphenhydrAMINE (BENADRYL) 25 MG capsule Take 1 capsule by mouth 2 times daily as needed for Itching or Allergies 60 capsule 0    hydrOXYzine (ATARAX) 25 MG tablet take 1 tablet by mouth every 8 hours if needed for itching 30 tablet 0    losartan (COZAAR) 100 MG tablet take 1 tablet by mouth daily 90 tablet 0    diphenhydrAMINE (BENYLIN) 12.5 MG/5ML syrup Take 5 mLs by mouth nightly as needed (leg cramps) 237 mL 0    nicotine (NICODERM CQ) 14 MG/24HR apply 1 patch TO CLEAN, DRY, AND INTACT SKIN REMOVE AND REPLACE once daily 28 patch 1    omeprazole (PRILOSEC) 20 MG delayed release capsule take 1 tablet by mouth daily 90 capsule 0    Multiple Vitamins-Minerals (MULTIVITAMIN) tablet take 1 tablet by mouth once daily 30 tablet 5    magnesium oxide (MAG-OX) 400 (240 Mg) MG tablet Take 1 tablet by mouth daily 30 tablet 1    pimecrolimus (ELIDEL) 1 % cream Apply topically to rash on face 2 times daily.  30 g 11    triamcinolone (KENALOG) 0.1 % cream Apply to rash on body twice daily (not face, armpit or groin) 80 g 1    ketoconazole (NIZORAL) 2 % shampoo APPLY TO AFFECTED AREA ON SCALP 3 TO 4 TIMES A WEEK LEAVE ON FOR 5 MINUTES THEN WASH  mL 11    hydroquinone 4 % cream Apply topically daily to dark spots 28 g 2    pravastatin (PRAVACHOL) 40 MG tablet Take 1 tablet by mouth daily 30 tablet 5    aspirin 325 MG EC tablet take 1 tablet by mouth once daily 30 tablet 12    naloxone 4 MG/0.1ML LIQD nasal spray 1 spray by Nasal route as needed for Opioid Reversal 1 each 5    Blood Glucose Monitoring Suppl (ONE TOUCH ULTRA 2) w/Device KIT use as directed 1 kit 0    BANOPHEN 25 MG tablet take 1 tablet by mouth twice a day if needed for allergies or itching 60 tablet 2    Cyanocobalamin ER (RA VITAMIN B-12 TR) 1000 MCG TBCR take 1 tablet by mouth once daily 30 tablet 6    celecoxib (CELEBREX) 200 MG capsule take 1 capsule by mouth twice a day 60 capsule 6    Elastic Bandages & Supports (KNEE BRACE) MISC Neoprene brace use as directed right knee 1 each 0    ONETOUCH DELICA LANCETS FINE MISC TEST three times a day 100 each 5    risperiDONE (RISPERDAL) 1 MG tablet Take 1 mg by mouth nightly.  ammonium lactate (LAC-HYDRIN) 12 % lotion apply to affected area once daily (Patient not taking: Reported on 10/16/2020) 400 g 1     No current facility-administered medications for this visit.             No Known Allergies            Family History   Problem Relation Age of Onset   Aidee Deegomery Stroke Mother     Diabetes Mother     High Blood Pressure Mother     Cataracts Mother     Stroke Father     Diabetes Father     Heart Disease Sister 72    Diabetes Daughter     Stroke Sister 68        Brain aneurysm that ruptured and then she got kidney failure and intestinal infection and     Glaucoma Neg Hx              Social History     Socioeconomic History    Marital status: Single     Spouse name: Not on file    Number of children: Not on file    Years of education: Not on file    Highest education level: Not on file   Occupational History    Not on file   Social Needs    Financial resource strain: Not on file    Food insecurity     Worry: Not on file     Inability: Not on file    Transportation needs     Medical: Not on file     Non-medical: Not on file   Tobacco Use    Smoking status: Former Smoker     Packs/day: 1.00     Years: 26.00     Pack years: 26.00     Types: Cigarettes     Start date: 1975     Last attempt to quit: 2018     Years since quittin.8    Smokeless tobacco: Never Used   Substance and Sexual Activity    Alcohol use:  Yes     Alcohol/week: 0.0 standard drinks     Frequency: 4 or more times a week     Drinks per session: 5 or 6     Binge frequency: Never     Comment: daily; \"2 tallboys per day\"    Drug use: No    Sexual activity: Not on file   Lifestyle    Physical activity     Days per week: Not on file     Minutes per session: Not on file    Stress: Not on file   Relationships    Social connections     Talks on phone: Not on file     Gets together: Not on file     Attends Taoist service: Not on file     Active member of club or organization: Not on file     Attends meetings of clubs or organizations: Not on file     Relationship status: Not on file    Intimate partner violence     Fear of current or ex partner: Not on file     Emotionally abused: Not on file     Physically abused: Not on file     Forced sexual activity: Not on file   Other Topics Concern    Not on file   Social History Narrative    Not on file         Vitals:    10/16/20 1359   BP: 132/70   Pulse: 97   Resp: 16   Temp: 98 °F (36.7 °C)   SpO2: 95%         Wt Readings from Last 3 Encounters:   10/16/20 213 lb 9.6 oz (96.9 kg)   20 202 lb (91.6 kg)   20 202 lb 12.8 oz (92 kg)         BP Readings from Last 3 Encounters:   10/16/20 132/70   20 130/84   20 128/76       Hematology and Coagulation  Lab Results   Component Value polyphagia and polyuria. Genitourinary: Negative for bladder incontinence. Musculoskeletal: Positive for gait problem. Negative for back pain, neck pain and neck stiffness. Skin: Negative for color change, pallor and wound. Allergic/Immunologic: Negative for environmental allergies, food allergies and immunocompromised state. Neurological: Positive for focal weakness, weakness and loss of balance. Negative for dizziness, vertigo, tremors, syncope, facial asymmetry, speech difficulty, light-headedness and headaches. Hematological: Negative for adenopathy. Does not bruise/bleed easily. Psychiatric/Behavioral: Positive for decreased concentration and memory loss. Negative for agitation, behavioral problems, confusion, dysphoric mood, hallucinations, self-injury, sleep disturbance and suicidal ideas. The patient is nervous/anxious. The patient is not hyperactive. Objective:   Physical Exam  Constitutional:       Appearance: She is well-developed. HENT:      Head: Normocephalic and atraumatic. No raccoon eyes or Covington's sign. Right Ear: External ear normal.      Left Ear: External ear normal.      Nose: Nose normal.   Eyes:      Conjunctiva/sclera: Conjunctivae normal.      Pupils: Pupils are equal, round, and reactive to light. Neck:      Musculoskeletal: Normal range of motion and neck supple. Normal range of motion. No neck rigidity or muscular tenderness. Thyroid: No thyroid mass or thyromegaly. Vascular: No carotid bruit. Trachea: No tracheal deviation. Meningeal: Brudzinski's sign and Kernig's sign absent. Cardiovascular:      Rate and Rhythm: Normal rate and regular rhythm. Pulmonary:      Effort: Pulmonary effort is normal. No respiratory distress. Breath sounds: Normal breath sounds. No wheezing or rales. Musculoskeletal: Normal range of motion. General: No tenderness. Lymphadenopathy:      Cervical: No cervical adenopathy.    Skin: General: Skin is warm. Coloration: Skin is not pale. Findings: No erythema or rash. Nails: There is no clubbing. Psychiatric:         Attention and Perception: She is attentive. Mood and Affect: Mood is anxious and depressed. Affect is blunt. Affect is not labile or inappropriate. Speech: She is communicative. Speech is not rapid and pressured, delayed, slurred or tangential.         Behavior: Behavior is not agitated, slowed, aggressive, withdrawn, hyperactive or combative. Behavior is cooperative. Thought Content: Thought content normal. Thought content is not paranoid or delusional. Thought content does not include homicidal or suicidal ideation. Thought content does not include homicidal or suicidal plan. Cognition and Memory: Cognition is impaired. Memory is impaired. She exhibits impaired recent memory. She does not exhibit impaired remote memory. Judgment: Judgment is not impulsive or inappropriate. NEUROLOGICAL EXAMINATION :    A) MENTAL STATUS:                   Alert and  oriented  To time, place  And  Person. No Aphasia. No  Dysarthria. Able   To  Follow   SIMPLE    commands without   Any  Difficulty. No right  To left confusion. Normal  Speech  And language function. Insight and  Judgment ,Fund  Of  Knowledge   Decreased                Recent  And  Remote memory  Decreased                Attention &Concentration are decreased                                                   B) CRANIAL NERVES :             2 CN : Visual  Acuity  And  Visual fields  within normal limits                      Fundi  Could  Not  Be  Could  Not  Be  Evaluated. 3,4,6 CN : Both  Pupils are   Reactive and  Equal.                          Extraocular   Movements  Are  Intact. No  Nystagmus. No  RODERICK.   No  Afferent  Pupillary  Defect noted.        5 CN :  Normal  Facial sensations and Corneal  Reflexes         7 CN :  Normal  Facial  Symmetry  And  Strength. No facial  Weakness. 8 CN :  Hearing  Appears within normal limits        9, 10 CN: Normal spontaneous, reflex palate movements       11 CN:   Normal  Shoulder shrug and  strength       12 CN :   Normal  Tongue movements and  Tongue  In midline                      No tongue   Fasciculations or atrophy         C) MOTOR  EXAM:                 Strength  In upper  And  Lower extremities   within normal limits               No  Drift. No  Atrophy               Rapid alternating  And  repetitions  Movements  within normal limits                 Muscle  Tone  In upper  And  Lower  Extremities  within normal limits                No rigidity. No  Spasticity. Bradykinesia   absent                  No  Asterixis. Sustention  Tremor , Resting  Tremor   absent                    No other  Abnormal  Movements noted             D) SENSORY :               light touch, pinprick, position  And  Vibration  decreased          E) REFLEXES:                   Deep  Tendon  Reflexes decreased                    No pathological  Reflexes  Bilaterally.                                     F) COORDINATION  AND  GAIT :                                Station and  Gait   SLOW                                    Romberg's test positive                          Ataxia negative            Assessment:             Patient Active Problem List   Diagnosis    Anxiety    Depression    Schizoaffective disorder ([address]    History of tobacco use    Restless leg syndrome    Pica    Essential hypertension    Bipolar disorder ([address]    Alcoholism ([address]    Sleeping difficulty    Peripheral neuropathy    Balance problem    CTS (carpal tunnel syndrome)    Chronic lumbar radiculopathy    GERD (gastroesophageal reflux disease)    COPD (chronic obstructive pulmonary disease) ([address]    Fatigue    Chronic rectal pain    Type 2 diabetes mellitus with diabetic neuropathy, without long-term current use of insulin ([facility]    Pruritus    Obesity (BMI 30.0-34. 9)    Mixed hyperlipidemia    Chronic right shoulder pain    Bilateral carpal tunnel syndrome    Current severe episode of major depressive disorder without psychotic features ([facility]    Splenic artery aneurysm ([facility]    Bipolar affective disorder, currently depressed, mild ([facility]               Plan:            VISITING DIAGNOSIS:      ICD-10-CM    1. Restless leg syndrome  G25.81 carbidopa-levodopa (SINEMET)  MG per tablet   2. Anxiety  F41.9    3. Polyneuropathy associated with underlying disease ([facility]  G63 gabapentin (NEURONTIN) 300 MG capsule   4. Chronic lumbar radiculopathy  M54.16    5. Severe episode of recurrent major depressive disorder, without psychotic features ([facility]  F33.2    6. Obesity (BMI 30.0-34. 9)  E66.9    7. Chronic obstructive pulmonary disease, unspecified COPD type ([facility]  J44.9    8. History of tobacco use  Z87.891    9. Bipolar affective disorder, current episode hypomanic ([facility]  F31.0    10. Balance problem  R26.89    11. Essential hypertension  I10    12. Schizoaffective disorder, unspecified type ([facility]  F25.9    13. Pica  F50.89    14. Dysthymia  F34.1    15. Tobacco abuse  Z72.0    16. Type 2 diabetes mellitus with diabetic neuropathy, without long-term current use of insulin ([facility])  E11.40    17. Bipolar affective disorder, currently depressed, mild ([facility]  F31.31    18. Mixed hyperlipidemia  E78.2    19. Fatigue due to depression  F32.9     R53.83    20. Sleeping difficulty  G47.9    21. Bilateral carpal tunnel syndrome  G56.03 gabapentin (NEURONTIN) 300 MG capsule   22. Neuropathic pain  M79.2    23. Chronic right shoulder pain  M25.511     G89.29    24.  Alcoholism ([facility]  F10.20                   CONCERNS   &   INCREASED   RISK   FOR         * TIA,  CEREBRO  VASCULAR  ISCHEMIA, STROKE      *   COGNITIVE  & MEMORY PROBLEMS  AND  DEMENTIA      *  MONONEUROPATHIES, RADICULOPATHY       *   PERIPHERAL  NEUROPATHY                      VARIOUS  RISK   FACTORS   WERE  REVIEWED   AND   DISCUSSED. *  PATIENT   HAS  MULTIPLE   MEDICAL,  NEUROLOGICAL      AND   MENTAL  HEALTH  PROBLEMS . PATIENT'S   MANAGEMENT  IS  CHALLENGING. PLAN:         Elizabeth Dick  Of  The  Diagnoses,  The  Management & Treatment  Options           AND    Care  plan  Were        Reviewed and   Discussed   With  patient. * Goals  And  Expectations  Of  The  Therapy  Discussed   And  Reviewed. *   Benefits   And   Side  Effect  Profile  Of  Medication/s   Were   Discussed             * Need   For  Further   Follow up For  The  Various  Problems  Were discussed. * Results  Of  The  Previous  Diagnostic tests were reviewed and questions answered. patient  understand the same. Medical  Decision  Making  Was  Made  Based on the   Complexity  Of  Above  Mentioned  Diagnoses,        Data reviewed   & diagnostic  Tests  Reviewed,  Risk  Of  Significant   Co morbidities and complicating   Factors. Medical  Decision  Was   High  Complexity  Due   To  The  Patient's  Multiple  Symptoms,      Advancing   Disease,  Complex  Treatment  Regimen,  Multiple medications and   Risk  Of   Side  Effects,      Difficulty  In  Medication  Management  And  Diagnostic  Challenges       In  View  Of  The  Associated   Co  Morbid  Conditions   And  Problems. * FALL   PRECAUTIONS. THESE  REVIEWED   AND  DISCUSSED    *   BE  CAREFUL  WITH  ACTIVITIES   INCLUDING  DRIVING.         *   AVOID   NECK  AND/ BACK  STRAINING  ACTIVITIES          *   TO   WEAR   BILATERAL   WRIST  BRACES       *   TO  AVOID  PRESSURE  OVER   ULNAR  ASPECT   OF   ELBOWS          *   ADEQUATE   FLUID  INTAKE   AND  ELECTROLYTE  BALANCE           * KEEP  DAIRY  OF   THE  NEUROLOGICAL  SYMPTOMS        RECORDING THE    DURATION  AND  FREQUENCY. *  AVOID    CONDITIONS  AND  FACTORS   THAT  MAKE   NEUROLOGICAL  SYMPTOMS  WORSE. *  TO  MAINTAIN  REGULAR  SLEEP  WAKE  CYCLES. *   TO  HAVE  ADEQUATE  REST  AND   SLEEP    HOURS.          *    TO   AVOID   TO  SLEEP  IN   SUPINE  POSITION. *      WEIGHT   LOSS. *    AVOID  ANY USAGE OF                   TOBACCO,  EXCESSIVE  ALCOHOL  AND   ILLEGAL   SUBSTANCES          *  CONTINUE MEDICATIONS PRESCRIBED      AS    RECOMMENDED     *   Compliance   With  Medications   And  Instructions        * CURRENTLY  TOLERATING  THE  PRESCRIBED   MEDICATIONS. WITHOUT  ANY  SIGNIFICANT  SIDE  EFFECTS   &  GETTING BENEFIT. *  May   Use  Pill  Box,    If  Needed          *        Antiplatelet  therapy    As   Recommended  Was   Discussed        *    Prophylactic  Use   Of     Vitamin   B   Complex,  Folic  Acid,    Vitamin  B12        Multivitamin   Tablet  Daily    Supplementations   Over  The  Counter  Discussed               *  FOOT  CARE, DAILY  INSPECTION  OF  FEET   AND         PERIODIC  PODIATRY EVALUATIONS . *  PATIENT  IS  ALSO   COUNSELED   TO  KEEP    ACTIVITIES         A)   SIMPLE      B)  ORGANIZED      C)  WRITE   DOWN                  *         PATIENT      FEEL     WORSENING  OF                     NEUROPATHIC  SYMPTOMS  BOTH  FEET     SINCE    FEB. 2020                        -   MOSTLY   DUE   TO    HER  TYPE  II    AND                                 ALCOHOL  ABUSE/  USAGE     REGULARLY                       *        PATIENT  ALREADY  ON  NEURONTIN        AND                               ALSO  ULTRAM  FROM     HER PCP     FOR    SYMPTOMATIC   RELIEF                         -    PATIENT    ADVISED                                     A)     TO   AVOID    ALCOHOL                                    B)    ADEQUATE   DIABETIC  CONTROL                     *         H/O    WORSENING  OF  RESTLESS  LEG  SYNDROME    SINCE  SEPT. Lipid  Profile,     Blood  Sugar levels  And   Heart  Disease. *   Period   Screening  For  Cancers  Involving  Breast,  Colon,     lungs  And  Other  Organs  As  Applicable,  In consultation   With  Your  Primary Care Providers. * Second  Neurological  Opinion  And  Evaluations  In  [facility] AND [facility]  Setting  If  Patient  Is  Interested. *  If  The  Patient remains  Neurologically  Stable   Return   To  [facility] Neurology Department       IN   3 MONTHS  TIME   FOR  FURTHER  FOLLOW UP.                   *  If   There is  Any  Significant  Worsening   Of  Current  Symptoms  And  Or  If patient  Develops   Any additional  New      Neurological  Symptoms  Or  Significant  Concerns   Should  Call  911 or  Go  To  Emergency  Department  For  Further      Immediate  Evaluation. *   The  Neurological   Findings,  Possible  Diagnosis,  Differential diagnoses   And  Options      For    Further   Investigations   And  management   Are  Discussed  Comprehensively. Medications   And  Prescription   Risks  And  Side effects  Are   Also  Discussed. The  Above  Were  Reviewed  With  patient and     questions  Answered  In  Detail. More   Than   50% of face  To face Time   Was  Spent  On  Counseling   And   Coordination  Of  Care       Of   Patient's multiple   Neurological  Problems   And   Comorbid  Medical   Conditions. Electronically signed by Santo Reilly MD     Board Certified in  Neurology &  In  [facility] ([employer])      DISCLAIMER:   Although every effort was made to ensure the accuracy of this  electronic transcription, some errors in transcription may have occurred.       GENERAL PATIENT INSTRUCTIONS:     A Healthy Lifestyle: Care Instructions  Your Care Instructions  A healthy lifestyle can help you feel good, stay at a healthy Version: 11.2  © 5432-5866 [employer]. Care instructions adapted under license by [facility] ([facility]). If you have questions about a medical condition or this instruction, always ask your healthcare professional. [address] any warranty or liability for your use of this information.

## 2020-10-22 NOTE — TELEPHONE ENCOUNTER
Attempted to reach to discuss over due labs. No vm available.       Martha Beebe called requesting a refill of the below medication which has been pended for you:     Requested Prescriptions     Pending Prescriptions Disp Refills    magnesium oxide (MAG-OX) 400 MG tablet [Pharmacy Med Name: MAGNESIUM OXIDE 400 MG TABLET] 30 tablet 0     Sig: take 1 tablet by mouth once daily       Last Appointment Date: 6/17/2020  Next Appointment Date:     No Known Allergies

## 2020-10-26 RX ORDER — MAGNESIUM OXIDE 400 MG/1
TABLET ORAL
Qty: 30 TABLET | OUTPATIENT
Start: 2020-10-26

## 2020-10-27 RX ORDER — MAGNESIUM OXIDE 400 MG/1
TABLET ORAL
Qty: 30 TABLET | Refills: 0 | Status: SHIPPED | OUTPATIENT
Start: 2020-10-27 | End: 2020-11-24

## 2020-10-28 ENCOUNTER — TELEPHONE (OUTPATIENT)
Dept: FAMILY MEDICINE CLINIC | Age: 60
End: 2020-10-28

## 2020-10-28 NOTE — TELEPHONE ENCOUNTER
Patient is calling and requesting something for Lactose intolerance. Patient states that she has taken Pepto and this is not helping.  Please call and advise

## 2020-10-29 RX ORDER — DIPHENHYDRAMINE HCL 25 MG
CAPSULE ORAL
Qty: 60 CAPSULE | Refills: 0 | OUTPATIENT
Start: 2020-10-29

## 2020-10-29 RX ORDER — DIPHENHYDRAMINE HYDROCHLORIDE 12.5 MG/5ML
LIQUID ORAL
Qty: 237 ML | Refills: 0 | OUTPATIENT
Start: 2020-10-29

## 2020-10-29 RX ORDER — HYDROXYZINE HYDROCHLORIDE 25 MG/1
TABLET, FILM COATED ORAL
Qty: 30 TABLET | Refills: 0 | OUTPATIENT
Start: 2020-10-29

## 2020-10-29 NOTE — TELEPHONE ENCOUNTER
Please advise the patient that she needs an appointment. She is requesting refills of three antihistamines. We need to discuss why she is taking three antihistamines and simplify her regimen.

## 2020-10-29 NOTE — TELEPHONE ENCOUNTER
She is requesting medication that she can take at the time of eating dairy. OTC Pepto is expensive for her and doesn't always help with Diarrhea.

## 2020-10-30 ENCOUNTER — VIRTUAL VISIT (OUTPATIENT)
Dept: FAMILY MEDICINE CLINIC | Age: 60
End: 2020-10-30
Payer: MEDICAID

## 2020-10-30 PROCEDURE — 99214 OFFICE O/P EST MOD 30 MIN: CPT | Performed by: FAMILY MEDICINE

## 2020-10-30 PROCEDURE — 99211 OFF/OP EST MAY X REQ PHY/QHP: CPT

## 2020-10-30 RX ORDER — OMEPRAZOLE 20 MG/1
CAPSULE, DELAYED RELEASE ORAL
Qty: 90 CAPSULE | Refills: 0 | Status: SHIPPED | OUTPATIENT
Start: 2020-10-30 | End: 2021-01-21 | Stop reason: SDUPTHER

## 2020-10-30 RX ORDER — LOSARTAN POTASSIUM 100 MG/1
TABLET ORAL
Qty: 90 TABLET | Refills: 0 | Status: SHIPPED | OUTPATIENT
Start: 2020-10-30 | End: 2021-02-02 | Stop reason: SDUPTHER

## 2020-10-30 RX ORDER — PRAVASTATIN SODIUM 40 MG
40 TABLET ORAL DAILY
Qty: 30 TABLET | Refills: 3 | Status: SHIPPED | OUTPATIENT
Start: 2020-10-30 | End: 2021-02-02 | Stop reason: SDUPTHER

## 2020-10-30 RX ORDER — HYDROXYZINE HYDROCHLORIDE 25 MG/1
TABLET, FILM COATED ORAL
Qty: 30 TABLET | Refills: 1 | Status: CANCELLED | OUTPATIENT
Start: 2020-10-30

## 2020-10-30 RX ORDER — DIPHENHYDRAMINE HCL 25 MG
25 CAPSULE ORAL 2 TIMES DAILY PRN
Qty: 60 CAPSULE | Refills: 0 | Status: SHIPPED | OUTPATIENT
Start: 2020-10-30 | End: 2020-11-30 | Stop reason: SDUPTHER

## 2020-10-30 ASSESSMENT — PATIENT HEALTH QUESTIONNAIRE - PHQ9
1. LITTLE INTEREST OR PLEASURE IN DOING THINGS: 0
SUM OF ALL RESPONSES TO PHQ QUESTIONS 1-9: 0
SUM OF ALL RESPONSES TO PHQ9 QUESTIONS 1 & 2: 0
2. FEELING DOWN, DEPRESSED OR HOPELESS: 0

## 2020-10-30 NOTE — Clinical Note
Please mail the after visit summary to the patient, and call her to schedule a follow up visit in 3 months.

## 2020-10-30 NOTE — PROGRESS NOTES
10/30/2020    TELEHEALTH EVALUATION -- Audio/Visual (During TGVCW-66 public health emergency)    HPI:    Alek Virk (:  1960) has requested an audio/video evaluation for the following concern(s):      She states that she has had issues with diarrhea after eating dairy for many years. She has been taking Pepto Bismol when she has dairy, and this has helped. She states that the diarrhea after having has been getting worse over the past few years. Her last colonoscopy was in . She also has concerns about muscle cramps in her legs. She states that the cramps are worse at night. She takes Benadryl 25 mg two every night. She also takes a liquid Benadryl supplement if she  has difficulty getting to sleep. She also has Atarax that she takes when she feels anxious. Dr. Michael Sosa also prescribes Neurontin and Requip. She states that she takes magnesium every day to help with leg cramps. She reports that her psychiatrist has recommended that she begin Prozac, but she has been reluctant to take this after reading the side effects. Review of Systems    Prior to Visit Medications    Medication Sig Taking?  Authorizing Provider   magnesium oxide (MAG-OX) 400 MG tablet take 1 tablet by mouth once daily Yes Laurie Lamb MD   gabapentin (NEURONTIN) 300 MG capsule ONE    CAPSULE  IN  MORNING,    NOON ,   EVENING  AND  BED  TIME   DAILY   AS  NEEDED Yes Oren Lott MD   carbidopa-levodopa (SINEMET)  MG per tablet take 1 tablet by mouth every evening and 1-  2    tablet at bedtime    AS   needed Yes Oren Lott MD   rOPINIRole (REQUIP) 0.25 MG tablet Take 1 tablet by mouth 3 times daily Yes Oren Lott MD   traMADol (ULTRAM) 50 MG tablet take 1 tablet by mouth every 8 hours AS NEEDED FOR PAIN Yes Laurie Lamb MD   blood glucose test strips (ONETOUCH ULTRA) strip use 1 TEST STRIP to TEST BLOOD SUGAR three times a day Yes Laurie Lamb MD   diphenhydrAMINE (BENADRYL) 25 MG capsule Take 1 capsule by mouth 2 times daily as needed for Itching or Allergies Yes Vanessa Ortiz MD   hydrOXYzine (ATARAX) 25 MG tablet take 1 tablet by mouth every 8 hours if needed for itching Yes Vanessa Ortiz MD   losartan (COZAAR) 100 MG tablet take 1 tablet by mouth daily Yes Vanessa Ortiz MD   diphenhydrAMINE (BENYLIN) 12.5 MG/5ML syrup Take 5 mLs by mouth nightly as needed (leg cramps) Yes Vanessa Ortiz MD   nicotine (NICODERM CQ) 14 MG/24HR apply 1 patch TO CLEAN, DRY, AND INTACT SKIN REMOVE AND REPLACE once daily Yes Vanessa Ortiz MD   omeprazole (PRILOSEC) 20 MG delayed release capsule take 1 tablet by mouth daily Yes Vanessa Ortiz MD   Multiple Vitamins-Minerals (MULTIVITAMIN) tablet take 1 tablet by mouth once daily Yes Vanessa Ortiz MD   pimecrolimus (ELIDEL) 1 % cream Apply topically to rash on face 2 times daily. Yes Kasie Zapata MD   triamcinolone (KENALOG) 0.1 % cream Apply to rash on body twice daily (not face, armpit or groin) Yes Aditi Lindsay MD   ketoconazole (NIZORAL) 2 % shampoo APPLY TO AFFECTED AREA ON SCALP 3 TO 4 TIMES A WEEK LEAVE ON FOR 5 MINUTES THEN WASH OFF Yes Kasie Zapata MD   hydroquinone 4 % cream Apply topically daily to dark spots Yes Kasie Zapata MD   pravastatin (PRAVACHOL) 40 MG tablet Take 1 tablet by mouth daily Yes Vanessa Ortiz MD   aspirin 325 MG EC tablet take 1 tablet by mouth once daily Yes Vanessa Ortiz MD   Blood Glucose Monitoring Suppl (ONE TOUCH ULTRA 2) w/Device KIT use as directed Yes Vanessa Ortiz MD   ONETOUCH DELICA LANCETS FINE MISC TEST three times a day Yes Jaison Chang MD   risperiDONE (RISPERDAL) 1 MG tablet Take 1 mg by mouth nightly.  Yes Historical Provider, MD   naloxone 4 MG/0.1ML LIQD nasal spray 1 spray by Nasal route as needed for Opioid Reversal  Patient not taking: Reported on 10/30/2020  Vanessa Ortiz MD   ammonium lactate (LAC-HYDRIN) 12 % lotion apply to affected area once daily  Patient not taking: Reported on 10/16/2020 Reynaldo Neff MD       Social History     Tobacco Use    Smoking status: Former Smoker     Packs/day: 1.00     Years: 26.00     Pack years: 26.00     Types: Cigarettes     Start date: 1975     Last attempt to quit: 2018     Years since quittin.8    Smokeless tobacco: Never Used   Substance Use Topics    Alcohol use: Yes     Alcohol/week: 0.0 standard drinks     Frequency: 4 or more times a week     Drinks per session: 5 or 6     Binge frequency: Never     Comment: daily; \"2 tallboys per day\"    Drug use: No        She has the following allergies:  Patient has no known allergies. She has the following problem list:  Patient Active Problem List   Diagnosis    Anxiety    Depression    [facility]ffective disorder ([facility]    History of tobacco use    Restless leg syndrome    Pica    Essential hypertension    Bipolar disorder ([facility]    Alcoholism ([facility]    Sleeping difficulty    Peripheral neuropathy    Balance problem    CTS (carpal tunnel syndrome)    Chronic lumbar radiculopathy    GERD (gastroesophageal reflux disease)    COPD (chronic obstructive pulmonary disease) ([facility])    Fatigue    Chronic rectal pain    Type 2 diabetes mellitus with diabetic neuropathy, without long-term current use of insulin ([facility])    Pruritus    Obesity (BMI 30.0-34. 9)    Mixed hyperlipidemia    Chronic right shoulder pain    Bilateral carpal tunnel syndrome    Current severe episode of major depressive disorder without psychotic features ([address]    Splenic artery aneurysm ([facility]    Bipolar affective disorder, currently depressed, mild ([facility]         PHYSICAL EXAMINATION:    Vital Signs: (As obtained by patient/caregiver or practitioner observation)    Patient-Reported Vitals 10/30/2020   Patient-Reported Weight 213lbs   Patient-Reported Height 5ft 2in   Patient-Reported Systolic 087   Patient-Reported Diastolic 70   Patient-Reported Pulse -   Patient-Reported Temperature 97.0         Mental Diarrhea, unspecified type  I recommended using Lactaid, or trying non-dairy food options. I also advised that she is due for a colonoscopy for colon cancer screening, but a colonoscopy may be beneficial in evaluation of diarrhea also. She declined a referral for colonoscopy. 9. Morbidly obese ([address]  Her weight has increased. Patient information regarding the Mediterranean-style diet will be mailed to the patient. Wt Readings from Last 3 Encounters:   10/16/20 213 lb 9.6 oz (96.9 kg)   07/27/20 202 lb (91.6 kg)   05/28/20 202 lb 12.8 oz (92 kg)           I will have staff call her to schedule a follow up visit in 3 months. No follow-ups on file. Mely Gilbert is a 62 y.o. female being evaluated by a Virtual Visit (video visit) encounter to address concerns as mentioned above. A caregiver was present when appropriate. Due to this being a TeleHealth encounter (During LXDTB-28 public health emergency), evaluation of the following organ systems was limited: Vitals/Constitutional/EENT/Resp/CV/GI//MS/Neuro/Skin/Heme-Lymph-Imm. Pursuant to the emergency declaration under the [facility] [address] authority and the [employer] Act, this Virtual Visit was conducted with patient's (and/or legal guardian's) consent, to reduce the patient's risk of exposure to COVID-19 and provide necessary medical care. The patient (and/or legal guardian) has also been advised to contact this office for worsening conditions or problems, and seek emergency medical treatment and/or call 911 if deemed necessary. Patient identification was verified at the start of the visit: Yes    Total time spent on this encounter: 30 minutes     Services were provided through a video synchronous discussion virtually to substitute for in-person clinic visit. Patient and provider were located at their individual homes.     --Lenin Martinez MD on 10/30/2020 at 10:46 AM    An electronic signature was used to authenticate this note.

## 2020-10-31 PROBLEM — E66.01 MORBIDLY OBESE (HCC): Status: ACTIVE | Noted: 2020-10-31

## 2020-10-31 NOTE — PATIENT INSTRUCTIONS
Patient Education        Learning About the Mediterranean Diet  What is the [address]? The Mediterranean diet is a style of eating rather than a diet plan. It features foods eaten in [place], [place], [place] and [place], and other countries along the [facility]. It emphasizes eating foods like fish, fruits, vegetables, beans, high-fiber breads and whole grains, nuts, and olive oil. This style of eating includes limited red meat, cheese, and sweets. Why choose the Mediterranean diet? A Mediterranean-style diet may improve heart health. It contains more fat than other heart-healthy diets. But the fats are mainly from nuts, unsaturated oils (such as fish oils and olive oil), and certain nut or seed oils (such as canola, soybean, or flaxseed oil). These fats may help protect the heart and blood vessels. How can you get started on the Mediterranean diet? Here are some things you can do to switch to a more Mediterranean way of eating. What to eat  · Eat a variety of fruits and vegetables each day, such as grapes, blueberries, tomatoes, broccoli, peppers, figs, olives, spinach, eggplant, beans, lentils, and chickpeas. · Eat a variety of whole-grain foods each day, such as oats, brown rice, and whole wheat bread, pasta, and couscous. · Eat fish at least 2 times a week. Try tuna, salmon, mackerel, lake trout, herring, or sardines. · Eat moderate amounts of low-fat dairy products, such as milk, cheese, or yogurt. · Eat moderate amounts of poultry and eggs. · Choose healthy (unsaturated) fats, such as nuts, olive oil, and certain nut or seed oils like canola, soybean, and flaxseed. · Limit unhealthy (saturated) fats, such as butter, palm oil, and coconut oil. And limit fats found in animal products, such as meat and dairy products made with whole milk. Try to eat red meat only a few times a month in very small amounts. · Limit sweets and desserts to only a few times a week.  This includes sugar-sweetened drinks like soda. The Mediterranean diet may also include red wine with your meal--1 glass each day for women and up to 2 glasses a day for men. Tips for eating at home  · Use herbs, spices, garlic, lemon zest, and citrus juice instead of salt to add flavor to foods. · Add avocado slices to your sandwich instead of calero. · Have fish for lunch or dinner instead of red meat. Brush the fish with olive oil, and broil or grill it. · Sprinkle your salad with seeds or nuts instead of cheese. · Cook with olive or canola oil instead of butter or oils that are high in saturated fat. · Switch from 2% milk or whole milk to 1% or fat-free milk. · Dip raw vegetables in a vinaigrette dressing or hummus instead of dips made from mayonnaise or sour cream.  · Have a piece of fruit for dessert instead of a piece of cake. Try baked apples, or have some dried fruit. Tips for eating out  · Try broiled, grilled, baked, or poached fish instead of having it fried or breaded. · Ask your  to have your meals prepared with olive oil instead of butter. · Order dishes made with marinara sauce or sauces made from olive oil. Avoid sauces made from cream or mayonnaise. · Choose whole-grain breads, whole wheat pasta and pizza crust, brown rice, beans, and lentils. · Cut back on butter or margarine on bread. Instead, you can dip your bread in a small amount of olive oil. · Ask for a side salad or grilled vegetables instead of french fries or chips. Where can you learn more? Go to https://[email or web address].healthZoodles. org and sign in to your [employer] account. Enter [phone] in the [facility] box to learn more about \"Learning About the Mediterranean Diet. \"     If you do not have an account, please click on the \"Sign Up Now\" link. Current as of: August 22, 2019               Content Version: 12.6  © 0596-2429 [employer]. Care instructions adapted under license by [facility] ([facility]).  If you have

## 2020-11-03 RX ORDER — HYDROXYZINE HYDROCHLORIDE 25 MG/1
TABLET, FILM COATED ORAL
Qty: 30 TABLET | Refills: 0 | OUTPATIENT
Start: 2020-11-03

## 2020-11-03 RX ORDER — NICOTINE 21 MG/24HR
PATCH, TRANSDERMAL 24 HOURS TRANSDERMAL
Qty: 28 PATCH | Refills: 1 | Status: SHIPPED | OUTPATIENT
Start: 2020-11-03 | End: 2020-12-22 | Stop reason: SDUPTHER

## 2020-11-03 RX ORDER — DIPHENHYDRAMINE HYDROCHLORIDE 12.5 MG/5ML
LIQUID ORAL
Qty: 237 ML | OUTPATIENT
Start: 2020-11-03

## 2020-11-10 RX ORDER — TRAMADOL HYDROCHLORIDE 50 MG/1
TABLET ORAL
Qty: 90 TABLET | Refills: 0 | Status: SHIPPED | OUTPATIENT
Start: 2020-11-10 | End: 2020-12-09

## 2020-11-10 NOTE — TELEPHONE ENCOUNTER
Francesco Black called requesting a refill of the below medication which has been pended for you: [facility] [place]. Tramadol 50 mg last filled 10/14/20 #90/30 days. Report available for your review.      Requested Prescriptions     Pending Prescriptions Disp Refills    traMADol (ULTRAM) 50 MG tablet [Pharmacy Med Name: TRAMADOL HCL 50 MG TABLET] 90 tablet 0     Sig: take 1 tablet by mouth every 8 hours AS NEEDED FOR PAIN       Last Appointment Date: 10/30/2020  Next Appointment Date: Visit date not found    No Known Allergies

## 2020-11-24 RX ORDER — DIPHENHYDRAMINE HYDROCHLORIDE 12.5 MG/5ML
LIQUID ORAL
Qty: 237 ML | OUTPATIENT
Start: 2020-11-24

## 2020-11-24 RX ORDER — HYDROXYZINE HYDROCHLORIDE 25 MG/1
TABLET, FILM COATED ORAL
Qty: 30 TABLET | Refills: 0 | OUTPATIENT
Start: 2020-11-24

## 2020-11-30 DIAGNOSIS — G47.00 INSOMNIA, UNSPECIFIED TYPE: ICD-10-CM

## 2020-11-30 RX ORDER — DIPHENHYDRAMINE HYDROCHLORIDE 12.5 MG/5ML
LIQUID ORAL
Qty: 237 ML | OUTPATIENT
Start: 2020-11-30

## 2020-11-30 NOTE — TELEPHONE ENCOUNTER
Ammy Granados called requesting a refill of the below medication which has been pended for you:     Requested Prescriptions     Pending Prescriptions Disp Refills    diphenhydrAMINE (BENADRYL) 25 MG capsule 60 capsule 0     Sig: Take 1 capsule by mouth 2 times daily as needed for Itching or Allergies       Last Appointment Date: 10/30/2020  Next Appointment Date: Visit date not found    No Known Allergies

## 2020-12-01 RX ORDER — DIPHENHYDRAMINE HCL 25 MG
25 CAPSULE ORAL 2 TIMES DAILY PRN
Qty: 60 CAPSULE | Refills: 3 | Status: SHIPPED | OUTPATIENT
Start: 2020-12-01 | End: 2021-03-16

## 2020-12-08 DIAGNOSIS — G89.29 CHRONIC RECTAL PAIN: ICD-10-CM

## 2020-12-08 DIAGNOSIS — K62.89 CHRONIC RECTAL PAIN: ICD-10-CM

## 2020-12-09 RX ORDER — TRAMADOL HYDROCHLORIDE 50 MG/1
TABLET ORAL
Qty: 90 TABLET | Refills: 0 | Status: SHIPPED | OUTPATIENT
Start: 2020-12-09 | End: 2021-01-05 | Stop reason: SDUPTHER

## 2020-12-09 RX ORDER — TRAMADOL HYDROCHLORIDE 50 MG/1
50 TABLET ORAL EVERY 8 HOURS PRN
Qty: 90 TABLET | Refills: 0 | OUTPATIENT
Start: 2020-12-09 | End: 2021-01-08

## 2020-12-09 NOTE — TELEPHONE ENCOUNTER
Last virtual visit 10/30/20,next virtual visit 02/02/2021 per [facility] requested medication last filled 11/11/2020 #90,30 day supply.

## 2020-12-15 RX ORDER — HYDROXYZINE HYDROCHLORIDE 25 MG/1
TABLET, FILM COATED ORAL
Qty: 30 TABLET | Refills: 0 | OUTPATIENT
Start: 2020-12-15

## 2020-12-21 RX ORDER — HYDROXYZINE HYDROCHLORIDE 25 MG/1
TABLET, FILM COATED ORAL
Qty: 30 TABLET | Refills: 0 | OUTPATIENT
Start: 2020-12-21

## 2020-12-21 RX ORDER — PSYLLIUM HUSK 3.4 G/7G
POWDER ORAL
Qty: 30 TABLET | Refills: 6 | Status: SHIPPED | OUTPATIENT
Start: 2020-12-21 | End: 2021-07-06

## 2020-12-23 RX ORDER — NICOTINE 21 MG/24HR
PATCH, TRANSDERMAL 24 HOURS TRANSDERMAL
Qty: 28 PATCH | Refills: 1 | Status: SHIPPED | OUTPATIENT
Start: 2020-12-23 | End: 2021-02-08

## 2020-12-23 NOTE — TELEPHONE ENCOUNTER
Marcos Mnocada called requesting a refill of the below medication which has been pended for you:     Requested Prescriptions     Pending Prescriptions Disp Refills    nicotine (NICODERM CQ) 14 MG/24HR 28 patch 1     Sig: apply 1 patch TO CLEAN, DRY, AND INTACT SKIN REMOVE AND REPLACE once daily       Last Appointment Date: 10/30/2020  Next Appointment Date: 2/2/2021    No Known Allergies

## 2020-12-29 ENCOUNTER — OFFICE VISIT (OUTPATIENT)
Dept: OPTOMETRY | Age: 60
End: 2020-12-29
Payer: MEDICAID

## 2020-12-29 PROCEDURE — 2024F 7 FLD RTA PHOTO EVC RTNOPTHY: CPT | Performed by: OPTOMETRIST

## 2020-12-29 PROCEDURE — G8427 DOCREV CUR MEDS BY ELIG CLIN: HCPCS | Performed by: OPTOMETRIST

## 2020-12-29 PROCEDURE — G8482 FLU IMMUNIZE ORDER/ADMIN: HCPCS | Performed by: OPTOMETRIST

## 2020-12-29 PROCEDURE — 3044F HG A1C LEVEL LT 7.0%: CPT | Performed by: OPTOMETRIST

## 2020-12-29 PROCEDURE — G8417 CALC BMI ABV UP PARAM F/U: HCPCS | Performed by: OPTOMETRIST

## 2020-12-29 PROCEDURE — 3017F COLORECTAL CA SCREEN DOC REV: CPT | Performed by: OPTOMETRIST

## 2020-12-29 PROCEDURE — 92250 FUNDUS PHOTOGRAPHY W/I&R: CPT | Performed by: OPTOMETRIST

## 2020-12-29 PROCEDURE — 1036F TOBACCO NON-USER: CPT | Performed by: OPTOMETRIST

## 2020-12-29 PROCEDURE — 99211 OFF/OP EST MAY X REQ PHY/QHP: CPT

## 2020-12-29 PROCEDURE — 99214 OFFICE O/P EST MOD 30 MIN: CPT | Performed by: OPTOMETRIST

## 2020-12-29 RX ORDER — TROPICAMIDE 10 MG/ML
1 SOLUTION/ DROPS OPHTHALMIC ONCE
Status: COMPLETED | OUTPATIENT
Start: 2020-12-29 | End: 2020-12-29

## 2020-12-29 RX ADMIN — TROPICAMIDE 1 DROP: 10 SOLUTION/ DROPS OPHTHALMIC at 14:24

## 2020-12-29 ASSESSMENT — REFRACTION_MANIFEST
OD_CYLINDER: -0.50
OD_SPHERE: +0.50
OD_AXIS: 097
OD_ADD: +2.25
OS_SPHERE: +0.75
OS_ADD: +2.25
OS_CYLINDER: DS

## 2020-12-29 ASSESSMENT — REFRACTION_WEARINGRX
OS_ADD: +2.25
OS_AXIS: 105
OD_ADD: +2.25
OS_SPHERE: +0.75
OD_AXIS: 122
SPECS_TYPE: BIFOCAL
OD_CYLINDER: -0.25
OD_SPHERE: +0.25
OS_CYLINDER: -0.25

## 2020-12-29 ASSESSMENT — VISUAL ACUITY
OS_CC+: -1
METHOD: SNELLEN - LINEAR
CORRECTION_TYPE: GLASSES
OD_CC: 20/20 OU
OD_CC+: -1
OS_CC: 20/20

## 2020-12-29 ASSESSMENT — SLIT LAMP EXAM - LIDS
COMMENTS: NORMAL
COMMENTS: NORMAL

## 2020-12-29 ASSESSMENT — TONOMETRY
OD_IOP_MMHG: 17
OS_IOP_MMHG: 18
IOP_METHOD: NON-CONTACT AIR PUFF

## 2020-12-29 ASSESSMENT — ENCOUNTER SYMPTOMS
RESPIRATORY NEGATIVE: 0
GASTROINTESTINAL NEGATIVE: 0
EYES NEGATIVE: 0
ALLERGIC/IMMUNOLOGIC NEGATIVE: 0

## 2020-12-29 NOTE — PROGRESS NOTES
Miriam Lin presents today for   Chief Complaint   Patient presents with    Blurred Vision    Ophth Diabetic Exam    Vision Exam   .    HPI     Blurred Vision     In both eyes. Vision is blurred. Context:  distance vision and reading. Comments     Last Vision Exam: 8/20/2019 Aw  Last Ophthalmology Exam: n/a  Last Filled Glasses Rx: 2018  Insurance: March  Update: Dm Exam; Glasses  Distance and reading are getting more blurry  Not currently being medicated for her diabetes.   Diabetic:  Sugars:    HmgA1C: 5.7  7/22/2020                   Current Outpatient Medications   Medication Sig Dispense Refill    nicotine (NICODERM CQ) 14 MG/24HR apply 1 patch TO CLEAN, DRY, AND INTACT SKIN REMOVE AND REPLACE once daily 28 patch 1    Cyanocobalamin ER (RA VITAMIN B-12 TR) 1000 MCG TBCR take 1 tablet by mouth daily 30 tablet 6    traMADol (ULTRAM) 50 MG tablet take 1 tablet by mouth every 8 hours AS NEEDED FOR PAIN 90 tablet 0    celecoxib (CELEBREX) 200 MG capsule take 1 capsule by mouth twice a day 60 capsule 4    diphenhydrAMINE (BENADRYL) 25 MG capsule Take 1 capsule by mouth 2 times daily as needed for Itching or Allergies 60 capsule 3    magnesium oxide (MAG-OX) 400 (241.3 Mg) MG TABS tablet take 1 tablet by mouth once daily 30 tablet 3    losartan (COZAAR) 100 MG tablet take 1 tablet by mouth daily 90 tablet 0    omeprazole (PRILOSEC) 20 MG delayed release capsule take 1 tablet by mouth daily 90 capsule 0    pravastatin (PRAVACHOL) 40 MG tablet Take 1 tablet by mouth daily 30 tablet 3    gabapentin (NEURONTIN) 300 MG capsule ONE    CAPSULE  IN  MORNING,    NOON ,   EVENING  AND  BED  TIME   DAILY   AS  NEEDED 120 capsule 2    carbidopa-levodopa (SINEMET)  MG per tablet take 1 tablet by mouth every evening and 1-  2    tablet at bedtime    AS   needed 180 tablet 1    blood glucose test strips (ONETOUCH ULTRA) strip use 1 TEST STRIP to TEST BLOOD SUGAR three times a day 300 strip 1  Multiple Vitamins-Minerals (MULTIVITAMIN) tablet take 1 tablet by mouth once daily 30 tablet 5    pimecrolimus (ELIDEL) 1 % cream Apply topically to rash on face 2 times daily. 30 g 11    triamcinolone (KENALOG) 0.1 % cream Apply to rash on body twice daily (not face, armpit or groin) 80 g 1    ketoconazole (NIZORAL) 2 % shampoo APPLY TO AFFECTED AREA ON SCALP 3 TO 4 TIMES A WEEK LEAVE ON FOR 5 MINUTES THEN WASH  mL 11    hydroquinone 4 % cream Apply topically daily to dark spots 28 g 2    aspirin 325 MG EC tablet take 1 tablet by mouth once daily 30 tablet 12    naloxone 4 MG/0.1ML LIQD nasal spray 1 spray by Nasal route as needed for Opioid Reversal 1 each 5    Blood Glucose Monitoring Suppl (ONE TOUCH ULTRA 2) w/Device KIT use as directed 1 kit 0    ONETOUCH DELICA LANCETS FINE MISC TEST three times a day 100 each 5    risperiDONE (RISPERDAL) 1 MG tablet Take 1 mg by mouth nightly.  ammonium lactate (LAC-HYDRIN) 12 % lotion apply to affected area once daily (Patient not taking: Reported on 10/16/2020) 400 g 1     No current facility-administered medications for this visit.       ROS     Negative for: Constitutional, Gastrointestinal, Neurological, Skin, Genitourinary, Musculoskeletal, HENT, Endocrine, Cardiovascular, Eyes, Respiratory, Psychiatric, Allergic/Imm, Heme/Lymph          Family History   Problem Relation Age of Onset    Stroke Mother     Diabetes Mother     High Blood Pressure Mother     Cataracts Mother     Stroke Father     Diabetes Father     Heart Disease Sister 72    Diabetes Daughter     Stroke Sister 68        Brain aneurysm that ruptured and then she got kidney failure and intestinal infection and     Glaucoma Neg Hx      Social History     Socioeconomic History    Marital status: Single     Spouse name: None    Number of children: None    Years of education: None    Highest education level: None   Occupational History    None   Social Needs  Financial resource strain: None    Food insecurity     Worry: None     Inability: None    Transportation needs     Medical: None     Non-medical: None   Tobacco Use    Smoking status: Former Smoker     Packs/day: 1.00     Years: 26.00     Pack years: 26.00     Types: Cigarettes     Start date: 1975     Quit date: 2018     Years since quittin.0    Smokeless tobacco: Never Used   Substance and Sexual Activity    Alcohol use:  Yes     Alcohol/week: 0.0 standard drinks     Frequency: 4 or more times a week     Drinks per session: 5 or 6     Binge frequency: Never     Comment: daily; \"2 tallboys per day\"    Drug use: No    Sexual activity: None   Lifestyle    Physical activity     Days per week: None     Minutes per session: None    Stress: None   Relationships    Social connections     Talks on phone: None     Gets together: None     Attends Restorationism service: None     Active member of club or organization: None     Attends meetings of clubs or organizations: None     Relationship status: None    Intimate partner violence     Fear of current or ex partner: None     Emotionally abused: None     Physically abused: None     Forced sexual activity: None   Other Topics Concern    None   Social History Narrative    None       Past Medical History:   Diagnosis Date    [facility]ism ([facility]     Anxiety     Astigmatism with presbyopia     Balance problem     Bipolar disorder ([facility]     schizoaffective disorder (Dr. Tk Castillo)    Chronic lumbar radiculopathy     Chronic rectal pain     due to fissures    Chronic shoulder pain     bilaterally, due to osteoarthritis    CTS (carpal tunnel syndrome)     Depression     Fatigue     Hyperlipidemia     Hypertension     Obesity     Peripheral neuropathy     Pica     eats bar soap    Restless leg syndrome     Schizoaffective disorder ([facility]     Sleeping difficulty     Tobacco abuse     Type 2 diabetes mellitus [facility])          Main Ophthalmology Exam External Exam       Right Left    External Normal Normal          Slit Lamp Exam       Right Left    Lids/Lashes Normal Normal    Conjunctiva/Sclera White and quiet White and quiet    Cornea Clear Clear    Anterior Chamber Deep and quiet Deep and quiet    Iris Round and reactive Round and reactive    Lens 1+ Nuclear sclerosis, Posterior subcapsular cataract 1+ Nuclear sclerosis, Posterior subcapsular cataract    Vitreous Normal Normal          Fundus Exam       Right Left    Disc Normal Normal    C/D Ratio 0.25-.3 0.25-.3    Macula Normal Normal    Vessels full  full     Periphery Normal Normal                  Tonometry     Tonometry (Non-contact air puff, 2:19 PM)       Right Left    Pressure 17 18   IOP.4             15.5  CH:  10.6          9.0  WS: 7.7          3.1                   Visual Acuity (Snellen - Linear)       Right Left    Dist cc 20/20 -1 20/20 -1    Near cc 20/20 Ou     Correction: Glasses         Not recorded        Pupils     Pupils       Pupils    Right PERRL    Left PERRL               Not recorded         Not recorded          Ophthalmology Exam     Wearing Rx       Sphere Cylinder Axis Add    Right +0.25 -0.25 122 +2.25    Left +0.75 -0.25 105 +2.25    Age: 3yrs    Type: Bifocal                Manifest Refraction     Manifest Refraction       Sphere Cylinder Axis Dist VA Add    Right +0.50 -0.50 097 20/20 +2.25    Left +0.75 ds  20/20 +2.25          Manifest Refraction #2 (Auto)       Sphere Cylinder Axis Dist VA Add    Right +0.25 -0.25 096      Left +0.75 0.00 000                 Final Rx       Sphere Cylinder Axis Add    Right +0.50 -0.50 097 +2.25    Left +0.75 ds  +2.25    Type: Bifocal    Expiration Date: 2022          Neuro/Psych     Neuro/Psych     Oriented x3: Yes    Mood/Affect: Normal                No diabetic retinopathy    Diagnosis Orders   1.  Non-insulin dependent type 2 diabetes mellitus (HCC)  Color Fundus Photography-OU-Both Eyes     DIABETES EYE EXAM 2. Hyperopia of both eyes with astigmatism and presbyopia     3. Blurred vision, bilateral     4.  Nuclear sclerosis of both eyes         Glycemic control as per PCP   Patient Instructions   New glasses recommended    Keep yearly appointments because of diabetes        Return in about 1 year (around 12/29/2021) for complete eye exam.

## 2020-12-30 RX ORDER — HYDROXYZINE HYDROCHLORIDE 25 MG/1
TABLET, FILM COATED ORAL
Qty: 30 TABLET | Refills: 0 | OUTPATIENT
Start: 2020-12-30

## 2021-01-04 DIAGNOSIS — K62.89 CHRONIC RECTAL PAIN: ICD-10-CM

## 2021-01-04 DIAGNOSIS — G89.29 CHRONIC RECTAL PAIN: ICD-10-CM

## 2021-01-04 RX ORDER — TRAMADOL HYDROCHLORIDE 50 MG/1
TABLET ORAL
Qty: 90 TABLET | Refills: 0 | OUTPATIENT
Start: 2021-01-04 | End: 2021-02-03

## 2021-01-05 DIAGNOSIS — G89.29 CHRONIC RECTAL PAIN: ICD-10-CM

## 2021-01-05 DIAGNOSIS — K62.89 CHRONIC RECTAL PAIN: ICD-10-CM

## 2021-01-05 RX ORDER — TRAMADOL HYDROCHLORIDE 50 MG/1
50 TABLET ORAL EVERY 8 HOURS PRN
Qty: 90 TABLET | Refills: 0 | Status: SHIPPED | OUTPATIENT
Start: 2021-01-05 | End: 2021-02-02 | Stop reason: SDUPTHER

## 2021-01-05 NOTE — TELEPHONE ENCOUNTER
Sully Ragsdale called requesting a refill of the below medication which has been pended for you: [place] from [place], and [place] reviewed. Tramadol 50 mg last filled 12/9/20 #90/30 days. Report available for your review. Requested Prescriptions     Pending Prescriptions Disp Refills    traMADol (ULTRAM) 50 MG tablet 90 tablet 0     Sig: Take 1 tablet by mouth every 8 hours as needed for Pain for up to 30 days.        Last Appointment Date: 10/30/2020  Next Appointment Date: 2/2/2021    No Known Allergies

## 2021-01-07 DIAGNOSIS — L29.9 PRURITUS: ICD-10-CM

## 2021-01-08 RX ORDER — HYDROXYZINE HYDROCHLORIDE 25 MG/1
TABLET, FILM COATED ORAL
Qty: 30 TABLET | Refills: 0 | OUTPATIENT
Start: 2021-01-08

## 2021-01-18 DIAGNOSIS — G56.03 BILATERAL CARPAL TUNNEL SYNDROME: ICD-10-CM

## 2021-01-18 DIAGNOSIS — G63 POLYNEUROPATHY ASSOCIATED WITH UNDERLYING DISEASE (HCC): ICD-10-CM

## 2021-01-18 NOTE — TELEPHONE ENCOUNTER
Last Appt:  10/16/2020  Next Appt:   2/4/2021  Med verified in Epic    Patient needs refill on Gabapentin and Ropinirole.

## 2021-01-19 ENCOUNTER — TELEPHONE (OUTPATIENT)
Dept: FAMILY MEDICINE CLINIC | Age: 61
End: 2021-01-19

## 2021-01-19 DIAGNOSIS — L29.9 PRURITUS: ICD-10-CM

## 2021-01-19 RX ORDER — GABAPENTIN 300 MG/1
CAPSULE ORAL
Qty: 120 CAPSULE | Refills: 0 | Status: SHIPPED | OUTPATIENT
Start: 2021-01-19 | End: 2021-02-05 | Stop reason: SDUPTHER

## 2021-01-19 RX ORDER — ROPINIROLE 0.25 MG/1
0.25 TABLET, FILM COATED ORAL 3 TIMES DAILY
Qty: 90 TABLET | Refills: 0 | Status: SHIPPED | OUTPATIENT
Start: 2021-01-19 | End: 2021-02-02

## 2021-01-19 RX ORDER — HYDROXYZINE HYDROCHLORIDE 25 MG/1
TABLET, FILM COATED ORAL
Qty: 30 TABLET | Refills: 0 | OUTPATIENT
Start: 2021-01-19

## 2021-01-19 NOTE — TELEPHONE ENCOUNTER
Patient notified and verbalized understanding. I spoke to her in regards to 10/30/20 OV note. Dr. Chucho Leslie stopped Atarax on 10/30/20. She states she only takes as needed but will follow up with her psychiatrist at [facility] regarding anxiety.

## 2021-01-19 NOTE — TELEPHONE ENCOUNTER
PATIENT'S     MESSAGE                              REVIEWED. PATIENT  NEEDS  FOLLOW  UP   FOR                     REEVALUATIONS                     PLEASE   NOTIFY   THE  PATIENT           OR   AUTHORIZED &  RESPONSIBLE FAMILY MEMBER. THANK   YOU.

## 2021-01-19 NOTE — TELEPHONE ENCOUNTER
Pt called about the hydroxyzine script. [facility] told her the scipt was denied. Pt says that yes she is still taking if for her anxiety.     Please call the pt with any questions

## 2021-01-20 ENCOUNTER — TELEPHONE (OUTPATIENT)
Dept: FAMILY MEDICINE CLINIC | Age: 61
End: 2021-01-20

## 2021-01-21 DIAGNOSIS — F10.20 ALCOHOLISM (HCC): ICD-10-CM

## 2021-01-21 DIAGNOSIS — K21.9 GASTROESOPHAGEAL REFLUX DISEASE, UNSPECIFIED WHETHER ESOPHAGITIS PRESENT: ICD-10-CM

## 2021-01-21 RX ORDER — OMEPRAZOLE 20 MG/1
CAPSULE, DELAYED RELEASE ORAL
Qty: 90 CAPSULE | Refills: 0 | Status: SHIPPED | OUTPATIENT
Start: 2021-01-21 | End: 2021-02-02 | Stop reason: SDUPTHER

## 2021-01-21 RX ORDER — MULTIVITAMIN WITH FOLIC ACID 400 MCG
TABLET ORAL
Qty: 30 TABLET | Refills: 5 | Status: SHIPPED | OUTPATIENT
Start: 2021-01-21 | End: 2021-05-07 | Stop reason: SDUPTHER

## 2021-01-21 NOTE — TELEPHONE ENCOUNTER
Pharmacy is calling about pts Tab-a-vit. This is no longer availble and pharmacy want to know if RR wants a substitute.     Please call pharmacy at the above number

## 2021-01-21 NOTE — TELEPHONE ENCOUNTER
Anthoney Felty called requesting a refill of the below medication which has been pended for you:     Requested Prescriptions     Pending Prescriptions Disp Refills    omeprazole (PRILOSEC) 20 MG delayed release capsule 90 capsule 0     Sig: take 1 tablet by mouth daily       Last Appointment Date: 10/30/2020  Next Appointment Date: 2/2/2021    No Known Allergies

## 2021-02-01 DIAGNOSIS — G89.29 CHRONIC RECTAL PAIN: ICD-10-CM

## 2021-02-01 DIAGNOSIS — K62.89 CHRONIC RECTAL PAIN: ICD-10-CM

## 2021-02-02 ENCOUNTER — VIRTUAL VISIT (OUTPATIENT)
Dept: FAMILY MEDICINE CLINIC | Age: 61
End: 2021-02-02
Payer: MEDICAID

## 2021-02-02 DIAGNOSIS — K21.9 GASTROESOPHAGEAL REFLUX DISEASE, UNSPECIFIED WHETHER ESOPHAGITIS PRESENT: ICD-10-CM

## 2021-02-02 DIAGNOSIS — I10 ESSENTIAL HYPERTENSION: ICD-10-CM

## 2021-02-02 DIAGNOSIS — Z23 NEED FOR VACCINATION: ICD-10-CM

## 2021-02-02 DIAGNOSIS — E11.40 TYPE 2 DIABETES MELLITUS WITH DIABETIC NEUROPATHY, WITHOUT LONG-TERM CURRENT USE OF INSULIN (HCC): Primary | ICD-10-CM

## 2021-02-02 DIAGNOSIS — K62.89 CHRONIC RECTAL PAIN: ICD-10-CM

## 2021-02-02 DIAGNOSIS — E78.2 MIXED HYPERLIPIDEMIA: ICD-10-CM

## 2021-02-02 DIAGNOSIS — G89.29 CHRONIC RECTAL PAIN: ICD-10-CM

## 2021-02-02 PROCEDURE — 99213 OFFICE O/P EST LOW 20 MIN: CPT | Performed by: FAMILY MEDICINE

## 2021-02-02 PROCEDURE — 99212 OFFICE O/P EST SF 10 MIN: CPT

## 2021-02-02 RX ORDER — TRAMADOL HYDROCHLORIDE 50 MG/1
50 TABLET ORAL EVERY 8 HOURS PRN
Qty: 90 TABLET | Refills: 0 | OUTPATIENT
Start: 2021-02-02 | End: 2021-03-04

## 2021-02-02 RX ORDER — LOSARTAN POTASSIUM 100 MG/1
TABLET ORAL
Qty: 90 TABLET | Refills: 0 | Status: SHIPPED | OUTPATIENT
Start: 2021-02-02 | End: 2021-04-27

## 2021-02-02 RX ORDER — PRAVASTATIN SODIUM 40 MG
40 TABLET ORAL DAILY
Qty: 30 TABLET | Refills: 5 | Status: SHIPPED | OUTPATIENT
Start: 2021-02-02 | End: 2021-07-20 | Stop reason: SDUPTHER

## 2021-02-02 RX ORDER — OMEPRAZOLE 20 MG/1
CAPSULE, DELAYED RELEASE ORAL
Qty: 90 CAPSULE | Refills: 1 | Status: SHIPPED | OUTPATIENT
Start: 2021-02-02 | End: 2021-07-20 | Stop reason: SDUPTHER

## 2021-02-02 RX ORDER — TRAMADOL HYDROCHLORIDE 50 MG/1
50 TABLET ORAL EVERY 8 HOURS PRN
Qty: 90 TABLET | Refills: 0 | Status: SHIPPED | OUTPATIENT
Start: 2021-02-02 | End: 2021-03-01

## 2021-02-02 ASSESSMENT — PATIENT HEALTH QUESTIONNAIRE - PHQ9
SUM OF ALL RESPONSES TO PHQ QUESTIONS 1-9: 2
2. FEELING DOWN, DEPRESSED OR HOPELESS: 1
1. LITTLE INTEREST OR PLEASURE IN DOING THINGS: 1

## 2021-02-02 NOTE — PROGRESS NOTES
2021    TELEHEALTH EVALUATION -- Audio/Visual (During RTHON-07 public health emergency)    HPI:    Barry Hamilton (:  1960) has requested an audio/video evaluation for the following concern(s):    Her visit today was scheduled for follow up of type 2 diabetes, hypertension, hyperlipidemia, and chronic pain. She has not had the labs drawn that were ordered to be done before today's visit. She has been trying to stay at home. She is anxious about coming to the lab to have her blood drawn due to the coronavirus pandemic. She has an virtual visit scheduled with her psychiatrist tomorrow. She plans to discuss the increased anxiety. She is tolerating her medications well. She reports that she has not been exercising regularly. She denies chest pain or shortness of breath with activity or at rest.    Review of Systems    Prior to Visit Medications    Medication Sig Taking? Authorizing Provider   omeprazole (PRILOSEC) 20 MG delayed release capsule take 1 tablet by mouth daily Yes Madeline Iniguez MD   Multiple Vitamins-Minerals (MULTIVITAMIN) tablet take 1 tablet by mouth once daily Yes Madeline Iniguez MD   gabapentin (NEURONTIN) 300 MG capsule ONE    CAPSULE  IN  MORNING,    NOON ,   EVENING  AND  BED  TIME   DAILY   AS  NEEDED Yes Crow Khan MD   traMADol (ULTRAM) 50 MG tablet Take 1 tablet by mouth every 8 hours as needed for Pain for up to 30 days.  Yes Madeline Iniguez MD   nicotine (NICODERM CQ) 14 MG/24HR apply 1 patch TO CLEAN, DRY, AND INTACT SKIN REMOVE AND REPLACE once daily Yes Madeline Iniguez MD   Cyanocobalamin ER (RA VITAMIN B-12 TR) 1000 MCG TBCR take 1 tablet by mouth daily Yes Madeline Iniguez MD   celecoxib (CELEBREX) 200 MG capsule take 1 capsule by mouth twice a day Yes Madeline Iniguez MD   diphenhydrAMINE (BENADRYL) 25 MG capsule Take 1 capsule by mouth 2 times daily as needed for Itching or Allergies Yes Madeline Iniguez MD magnesium oxide (MAG-OX) 400 (241.3 Mg) MG TABS tablet take 1 tablet by mouth once daily Yes Ana Dior MD   losartan (COZAAR) 100 MG tablet take 1 tablet by mouth daily Yes Ana Dior MD   pravastatin (PRAVACHOL) 40 MG tablet Take 1 tablet by mouth daily Yes Ana Dior MD   carbidopa-levodopa (SINEMET)  MG per tablet take 1 tablet by mouth every evening and 1-  2    tablet at bedtime    AS   needed Yes Stacy Mendez MD   blood glucose test strips (ONETOUCH ULTRA) strip use 1 TEST STRIP to TEST BLOOD SUGAR three times a day Yes Ana Dior MD   pimecrolimus (ELIDEL) 1 % cream Apply topically to rash on face 2 times daily. Yes Brina Corral MD   triamcinolone (KENALOG) 0.1 % cream Apply to rash on body twice daily (not face, armpit or groin) Yes Aditi Lindsay MD   ketoconazole (NIZORAL) 2 % shampoo APPLY TO AFFECTED AREA ON SCALP 3 TO 4 TIMES A WEEK LEAVE ON FOR 5 MINUTES THEN WASH OFF Yes Brina Corral MD   hydroquinone 4 % cream Apply topically daily to dark spots Yes Brina Corral MD   aspirin 325 MG EC tablet take 1 tablet by mouth once daily Yes Ana Dior MD   naloxone 4 MG/0.1ML LIQD nasal spray 1 spray by Nasal route as needed for Opioid Reversal Yes Ana Dior MD   Blood Glucose Monitoring Suppl (ONE TOUCH ULTRA 2) w/Device KIT use as directed Yes Ana Dior MD   ONETOUCH DELICA LANCETS FINE MISC TEST three times a day Yes Arthur Sanchez MD   risperiDONE (RISPERDAL) 1 MG tablet Take 1 mg by mouth nightly.  Yes Historical Provider, MD   ammonium lactate (LAC-HYDRIN) 12 % lotion apply to affected area once daily  Patient not taking: Reported on   Stacy Mendez MD       Social History     Tobacco Use    Smoking status: Former Smoker     Packs/day: 1.00     Years: 26.00     Pack years: 26.00     Types: Cigarettes     Start date: 1975     Quit date: 2018     Years since quittin.1    Smokeless tobacco: Never Used   Substance Use Topics  Alcohol use: Yes     Alcohol/week: 0.0 standard drinks     Frequency: 4 or more times a week     Drinks per session: 5 or 6     Binge frequency: Never     Comment: daily; \"2 tallboys per day\"    Drug use: No        She has the following allergies:  Patient has no known allergies. She has the following problem list:  Patient Active Problem List   Diagnosis    Anxiety    Depression    [facility]    History of tobacco use    Restless leg syndrome    Pica    Essential hypertension    Bipolar disorder ([facility]    Alcoholism ([facility]    Sleeping difficulty    Peripheral neuropathy    Balance problem    CTS (carpal tunnel syndrome)    Chronic lumbar radiculopathy    GERD (gastroesophageal reflux disease)    COPD (chronic obstructive pulmonary disease) ([facility]    Fatigue    Chronic rectal pain    Type 2 diabetes mellitus with diabetic neuropathy, without long-term current use of insulin ([facility])    Pruritus    Obesity (BMI 30.0-34. 9)    Mixed hyperlipidemia    Chronic right shoulder pain    Bilateral carpal tunnel syndrome    Current severe episode of major depressive disorder without psychotic features ([facility]    Splenic artery aneurysm ([facility]    Bipolar affective disorder, currently depressed, mild ([facility]    Morbidly obese ([facility]         PHYSICAL EXAMINATION:    Vital Signs: (As obtained by patient/caregiver or practitioner observation)    Patient-Reported Vitals 2/2/2021   Patient-Reported Weight (No Data)   Patient-Reported Height (No Data)   Patient-Reported Systolic (No Data)   Patient-Reported Diastolic (No Data)   Patient-Reported Pulse (No Data)   Patient-Reported Temperature (No Data)           Mental status:  She is alert and awake. She is oriented to person/place/time. She is able to follow commands.                 Psychiatric:  Affect is normal. Other pertinent observable physical exam findings:  She responds to questions appropriately. Speech is clear. There is no observed dyspnea with conversation. She has had no recent labs. ASSESSMENT/PLAN:  1. Type 2 diabetes mellitus with diabetic neuropathy, without long-term current use of insulin ([address]  She has had no recent labs. She was encouraged to come to the lab to have the ordered bloodwork done. - Microalbumin, Ur; Future was also ordered. She will be contacted when the results are available. 2. Essential hypertension  I have no recent blood pressure readings to review. Losartan was refilled:   - losartan (COZAAR) 100 MG tablet; take 1 tablet by mouth daily  Dispense: 90 tablet; Refill: 0    3. Mixed hyperlipidemia  She has not had a recent lipid panel. She is tolerating Pravastatin well; this was refilled:  - pravastatin (PRAVACHOL) 40 MG tablet; Take 1 tablet by mouth daily  Dispense: 30 tablet; Refill: 5    She was encouraged to come to the lab to have the ordered bloodwork done. She will be contacted when the results are available. 4. Chronic rectal pain  Controlled substances monitoring: No signs of potential drug abuse or diversion identified when the O[place] report from [place], [place], and [place] was reviewed today. The activity on the report was consistent with the treatment plan. She appears to be taking Tramadol appropriately; this was refilled:  - traMADol (ULTRAM) 50 MG tablet; Take 1 tablet by mouth every 8 hours as needed for Pain for up to 30 days. Dispense: 90 tablet; Refill: 0    5. Gastroesophageal reflux disease, unspecified whether esophagitis present  She is doing well with Omeprazole; this was refilled:  - omeprazole (PRILOSEC) 20 MG delayed release capsule; take 1 tablet by mouth daily  Dispense: 90 capsule; Refill: 1    6.  Need for vaccination She was encouraged to get a Covid-19 vaccine when she is eligible. I answered her questions regarding the currently available Covid-19 vaccines. I will have staff call her to schedule a follow up visit in 4 months. No follow-ups on file. Malcolm Bo is a 62 y.o. female being evaluated by a Virtual Visit (telephone visit) encounter to address concerns as mentioned above. A caregiver was present when appropriate. Due to this being a TeleHealth encounter (During NIDWF-68 public health emergency), evaluation of the following organ systems was limited: Vitals/Constitutional/EENT/Resp/CV/GI//MS/Neuro/Skin/Heme-Lymph-Imm. Pursuant to the emergency declaration under the [address] [place] [address], [address] [place] [address] authority and the [employer] Act, this Virtual Visit was conducted with patient's (and/or legal guardian's) consent, to reduce the patient's risk of exposure to COVID-19 and provide necessary medical care. The patient (and/or legal guardian) has also been advised to contact this office for worsening conditions or problems, and seek emergency medical treatment and/or call 911 if deemed necessary. Patient identification was verified at the start of the visit: Yes    Total time spent on this encounter:  30 minutes       Services were provided through a telephone synchronous discussion virtually to substitute for in-person clinic visit. Patient was in their home setting on their mobile device and I was in my office at [facility] on a secured telephone interface. --Nidhi Parra MD on 2/2/2021 at 2:27 PM    An electronic signature was used to authenticate this note.

## 2021-02-04 ENCOUNTER — OFFICE VISIT (OUTPATIENT)
Dept: NEUROLOGY | Age: 61
End: 2021-02-04
Payer: MEDICAID

## 2021-02-04 VITALS
OXYGEN SATURATION: 94 % | RESPIRATION RATE: 16 BRPM | DIASTOLIC BLOOD PRESSURE: 74 MMHG | WEIGHT: 219 LBS | HEART RATE: 88 BPM | BODY MASS INDEX: 40.3 KG/M2 | HEIGHT: 62 IN | SYSTOLIC BLOOD PRESSURE: 110 MMHG

## 2021-02-04 DIAGNOSIS — K21.9 GASTROESOPHAGEAL REFLUX DISEASE, UNSPECIFIED WHETHER ESOPHAGITIS PRESENT: ICD-10-CM

## 2021-02-04 DIAGNOSIS — F41.9 ANXIETY: ICD-10-CM

## 2021-02-04 DIAGNOSIS — F32.0 CURRENT MILD EPISODE OF MAJOR DEPRESSIVE DISORDER, UNSPECIFIED WHETHER RECURRENT (HCC): ICD-10-CM

## 2021-02-04 DIAGNOSIS — F34.1 DYSTHYMIA: ICD-10-CM

## 2021-02-04 DIAGNOSIS — R53.83 FATIGUE DUE TO DEPRESSION: ICD-10-CM

## 2021-02-04 DIAGNOSIS — G25.81 RESTLESS LEG SYNDROME: Primary | ICD-10-CM

## 2021-02-04 DIAGNOSIS — Z87.891 HISTORY OF TOBACCO USE: ICD-10-CM

## 2021-02-04 DIAGNOSIS — F25.9 SCHIZOAFFECTIVE DISORDER, UNSPECIFIED TYPE (HCC): ICD-10-CM

## 2021-02-04 DIAGNOSIS — G47.9 SLEEPING DIFFICULTY: ICD-10-CM

## 2021-02-04 DIAGNOSIS — E66.9 OBESITY (BMI 30.0-34.9): ICD-10-CM

## 2021-02-04 DIAGNOSIS — F10.20 ALCOHOLISM (HCC): ICD-10-CM

## 2021-02-04 DIAGNOSIS — R26.89 BALANCE PROBLEM: ICD-10-CM

## 2021-02-04 DIAGNOSIS — Z72.0 TOBACCO ABUSE: ICD-10-CM

## 2021-02-04 DIAGNOSIS — G63 POLYNEUROPATHY ASSOCIATED WITH UNDERLYING DISEASE (HCC): ICD-10-CM

## 2021-02-04 DIAGNOSIS — F50.89 PICA: ICD-10-CM

## 2021-02-04 DIAGNOSIS — F32.A FATIGUE DUE TO DEPRESSION: ICD-10-CM

## 2021-02-04 DIAGNOSIS — J44.9 CHRONIC OBSTRUCTIVE PULMONARY DISEASE, UNSPECIFIED COPD TYPE (HCC): ICD-10-CM

## 2021-02-04 DIAGNOSIS — M54.16 CHRONIC LUMBAR RADICULOPATHY: ICD-10-CM

## 2021-02-04 DIAGNOSIS — F31.0 BIPOLAR AFFECTIVE DISORDER, CURRENT EPISODE HYPOMANIC (HCC): ICD-10-CM

## 2021-02-04 DIAGNOSIS — E66.01 MORBIDLY OBESE (HCC): ICD-10-CM

## 2021-02-04 DIAGNOSIS — G56.03 BILATERAL CARPAL TUNNEL SYNDROME: ICD-10-CM

## 2021-02-04 DIAGNOSIS — I10 ESSENTIAL HYPERTENSION: ICD-10-CM

## 2021-02-04 DIAGNOSIS — E11.40 TYPE 2 DIABETES MELLITUS WITH DIABETIC NEUROPATHY, WITHOUT LONG-TERM CURRENT USE OF INSULIN (HCC): ICD-10-CM

## 2021-02-04 DIAGNOSIS — F31.31 BIPOLAR AFFECTIVE DISORDER, CURRENTLY DEPRESSED, MILD (HCC): ICD-10-CM

## 2021-02-04 PROCEDURE — 3023F SPIROM DOC REV: CPT | Performed by: PSYCHIATRY & NEUROLOGY

## 2021-02-04 PROCEDURE — 99214 OFFICE O/P EST MOD 30 MIN: CPT | Performed by: PSYCHIATRY & NEUROLOGY

## 2021-02-04 PROCEDURE — 1036F TOBACCO NON-USER: CPT | Performed by: PSYCHIATRY & NEUROLOGY

## 2021-02-04 PROCEDURE — G8427 DOCREV CUR MEDS BY ELIG CLIN: HCPCS | Performed by: PSYCHIATRY & NEUROLOGY

## 2021-02-04 PROCEDURE — G8926 SPIRO NO PERF OR DOC: HCPCS | Performed by: PSYCHIATRY & NEUROLOGY

## 2021-02-04 PROCEDURE — 2022F DILAT RTA XM EVC RTNOPTHY: CPT | Performed by: PSYCHIATRY & NEUROLOGY

## 2021-02-04 PROCEDURE — G8482 FLU IMMUNIZE ORDER/ADMIN: HCPCS | Performed by: PSYCHIATRY & NEUROLOGY

## 2021-02-04 PROCEDURE — 3046F HEMOGLOBIN A1C LEVEL >9.0%: CPT | Performed by: PSYCHIATRY & NEUROLOGY

## 2021-02-04 PROCEDURE — 3017F COLORECTAL CA SCREEN DOC REV: CPT | Performed by: PSYCHIATRY & NEUROLOGY

## 2021-02-04 PROCEDURE — G8417 CALC BMI ABV UP PARAM F/U: HCPCS | Performed by: PSYCHIATRY & NEUROLOGY

## 2021-02-04 PROCEDURE — 99214 OFFICE O/P EST MOD 30 MIN: CPT

## 2021-02-04 ASSESSMENT — ENCOUNTER SYMPTOMS
BLOOD IN STOOL: 0
EYE ITCHING: 0
BOWEL INCONTINENCE: 0
VISUAL CHANGE: 0
APNEA: 0
NAUSEA: 0
EYE DISCHARGE: 0
CONSTIPATION: 0
EYE REDNESS: 0
TROUBLE SWALLOWING: 0
ABDOMINAL PAIN: 0
WHEEZING: 0
EYE PAIN: 0
DIARRHEA: 0
SINUS PRESSURE: 0
SHORTNESS OF BREATH: 0
ABDOMINAL DISTENTION: 0
FACIAL SWELLING: 0
CHOKING: 0
COLOR CHANGE: 0
CHEST TIGHTNESS: 0
PHOTOPHOBIA: 0
BACK PAIN: 0
VOICE CHANGE: 0
VOMITING: 0

## 2021-02-04 NOTE — PATIENT INSTRUCTIONS
* FALL   PRECAUTIONS. *  USE   WALKING  ASSISTANCE  DEVICES     QUAD  CANE        *   ADEQUATE   FLUID  INTAKE   AND  ELECTROLYTE  BALANCE           * KEEP  DAIRY  OF   THE  NEUROLOGICAL  SYMPTOMS          *  TO  MAINTAIN  REGULAR  SLEEP  WAKE  CYCLES. *   TO  HAVE  ADEQUATE  REST  AND   SLEEP    HOURS.        *    AVOID  USAGE OF   TOBACCO,  EXCESSIVE  ALCOHOL                AND   ILLEGAL   SUBSTANCES,  IF  ANY          *  Maintain   Healthy  Life Style    With   Periodic  Monitoring  Of      Any  Medical  Conditions  Including   Elevated  Blood  Pressure,  Lipid  Profile,     Blood  Sugar levels  And   Heart  Disease. *   Period   Screening  For  Cancers  Involving  Breast,  Colon,   lungs  And  Other  Organs  As  Applicable,  In consultation   With  Your  Primary Care Providers. *  If   There is  Any  Significant  Worsening   Of  Current  Symptoms  And  Or  If    Any additional  New  Neurological  Symptoms                 Or  Significant  Concerns   Should  Call  911 or  Go  To  Emergency  Department  For  Further  Immediate  Evaluation.

## 2021-02-04 NOTE — PROGRESS NOTES
Subjective:      Patient ID: Warren Gagnon is a 62 y. o.right  handed female. Neurologic Problem  The patient's primary symptoms include clumsiness, focal sensory loss, focal weakness, a loss of balance, memory loss and weakness. The patient's pertinent negatives include no altered mental status, near-syncope, slurred speech, syncope or visual change. Primary symptoms comment: RESTLESS LEG  SYNDROME. This is a chronic problem. Episode onset: MORE   THAN    5-6 YEARS. The neurological problem developed insidiously. The problem is unchanged. There was left-sided, lower extremity and right-sided focality noted. Pertinent negatives include no abdominal pain, auditory change, aura, back pain, bladder incontinence, bowel incontinence, chest pain, confusion, diaphoresis, dizziness, fatigue, fever, headaches, light-headedness, nausea, neck pain, palpitations, shortness of breath, vertigo or vomiting. Past treatments include medication. The treatment provided moderate relief. Her past medical history is significant for mood changes. There is no history of a bleeding disorder, a clotting disorder, a CVA, dementia, head trauma, liver disease or seizures. History obtained from  The patient     and other  available medical records were  Also  reviewed.                 1)   H/O    CHRONIC    DIABETIC   PERIPHERAL  NEUROPATHY                                                 -  ON  NEURONTIN                          TOLERATING  THE  SAME     &   GETTING  HELP                      2)     H/O      RESTLESS  LEG  SYNDROME                 &  PERIODIC  LEG  MOVEMENTS                              -  IMPROVED                            -       ON  SINEMET              3)    H/O    CHRONIC     SLEEP DIFFICULTIES                        -   RESOLVED              4)   H/O   CHRONIC   MILD  MEMORY PROBLEMS                     -    STABLE                   -  PATIENT  NOT  CONCERNED            5)          CHRONIC  TOBACCO  AND ALCOHOL  USAGE                     -   PATIENT  AWARE  OF THE  RISKS               CURRENTLY    ON     NICOTINE  PATCHES                         AND   STOPPED  SMOKING   SINCE  JAN 2019                6)         CHRONIC  LUMBAR PAIN  AND JOINT  PAINS   -                           -    STABLE                       ON  ULTRAM  FROM  HER  PCP. 7)    H/O    BILATERAL  CARPAL  TUNNEL  SYNDROME                               -  STABLE            8)          CHRONIC  ANXIETY, DEPRESSION                         AND  SCHIZOAFFECTIVE  DISORDER                           STABLE                                     -  ON   RISPERDAL                      BEING  FOLLOWED  BY  MENTAL  HEALTH  PROFESSIONALS              9)         MULTIPLE  CO  MORBID  MEDICAL CONDITIONS                    BEING  FOLLOWED  BY   HER  PCP. 10)      PATIENT  DENIES    ANY  SEIZURE  ACTIVITY  OR   MIGRAINES . 11)    *            NEUROPATHIC  SYMPTOMS  BOTH  FEET                          -   MOSTLY   DUE   TO    HER  TYPE  II    AND                                 ALCOHOL  ABUSE/  USAGE     REGULARLY                               PATIENT   ON  NEURONTIN        AND                               ULTRAM  FROM     HER PCP     FOR    SYMPTOMATIC   RELIEF                         -    PATIENT    ADVISED                                     A)     TO   AVOID    ALCOHOL                                    B)   CLOSE    FOLLOW     FOR   ADEQUATE   DIABETIC  CONTROL                    12)       H/O    WORSENING  OF  RESTLESS  LEG  SYNDROME    SINCE  SEPT. 2020                           -   IMPROVED      WITH     SINEMET    CR    TID     SINCE  OCT. 2020                               PATIENT  REPORTED      THAT  SHE  IS  SLEEPING  BETTER              13)                   H/O    INTOLERANCE    TO       0.25    MG  TID                 14)       VARIOUS  RISK   FACTORS   WERE  REVIEWED   AND   DISCUSSED. PATIENT   HAS  MULTIPLE   MEDICAL,  NEUROLOGICAL                               AND   MENTAL  HEALTH  PROBLEMS . PATIENT'S   MANAGEMENT  IS  CHALLENGING. The  Duration,  Quality,  Severity,  Location,  Timing,  Context,  Modifying  Factors   Of   The   Chief   Complaint       And  Present  Illness   Was   Reviewed   In   Chronological   [facility].              Patient   Indicates   The  Presence   And  The  Absence  Of  The  Following  Associated  And   Additional  Neurological    Symptoms:                                Balance  And coordination problems  present           Gait problems     present            Headaches      absent              Migraines           absent           Memory problems        Present             Confusion        absent            Paresthesia numbness          present           Seizures  And  Starring  Episodes           absent           Syncope,  Near  syncopal episodes         absent           Speech problems           absent             Swallowing  Problems      absent            Dizziness,  Light headedness           present                        Vertigo        absent             Generalized   Weakness    absent              focal  Weakness     absent             Tremors         absent              Sleep  Problems     present             History  Of   Recent   Head  Injury     absent             History  Of   Recent  TIA     absent             History  Of   Recent    Stroke     absent             Neck  Pain and  Neck muscle  Spasms  Present               Radiating  down   And   Weakness           absent            Lower back   Pain  And     Spasms  Present              Radiating    Down   And   Weakness          absent                H/O   FALLS        absent               History  Of   Visual  Symptoms    Absent                  Associated   Diplopia       absent 100 MG tablet take 1 tablet by mouth daily 90 tablet 0    pravastatin (PRAVACHOL) 40 MG tablet Take 1 tablet by mouth daily 30 tablet 5    traMADol (ULTRAM) 50 MG tablet Take 1 tablet by mouth every 8 hours as needed for Pain for up to 30 days. 90 tablet 0    omeprazole (PRILOSEC) 20 MG delayed release capsule take 1 tablet by mouth daily 90 capsule 1    Multiple Vitamins-Minerals (MULTIVITAMIN) tablet take 1 tablet by mouth once daily 30 tablet 5    gabapentin (NEURONTIN) 300 MG capsule ONE    CAPSULE  IN  MORNING,    NOON ,   EVENING  AND  BED  TIME   DAILY   AS  NEEDED 120 capsule 0    nicotine (NICODERM CQ) 14 MG/24HR apply 1 patch TO CLEAN, DRY, AND INTACT SKIN REMOVE AND REPLACE once daily 28 patch 1    Cyanocobalamin ER (RA VITAMIN B-12 TR) 1000 MCG TBCR take 1 tablet by mouth daily 30 tablet 6    celecoxib (CELEBREX) 200 MG capsule take 1 capsule by mouth twice a day (Patient taking differently: daily ) 60 capsule 4    diphenhydrAMINE (BENADRYL) 25 MG capsule Take 1 capsule by mouth 2 times daily as needed for Itching or Allergies 60 capsule 3    magnesium oxide (MAG-OX) 400 (241.3 Mg) MG TABS tablet take 1 tablet by mouth once daily 30 tablet 3    carbidopa-levodopa (SINEMET)  MG per tablet take 1 tablet by mouth every evening and 1-  2    tablet at bedtime    AS   needed (Patient taking differently: Taking 3  tablets at bedtime) 180 tablet 1    blood glucose test strips (ONETOUCH ULTRA) strip use 1 TEST STRIP to TEST BLOOD SUGAR three times a day 300 strip 1    pimecrolimus (ELIDEL) 1 % cream Apply topically to rash on face 2 times daily.  30 g 11    triamcinolone (KENALOG) 0.1 % cream Apply to rash on body twice daily (not face, armpit or groin) 80 g 1    aspirin 325 MG EC tablet take 1 tablet by mouth once daily 30 tablet 12    Blood Glucose Monitoring Suppl (ONE TOUCH ULTRA 2) w/Device KIT use as directed 1 kit 0    ONETOUCH DELICA LANCETS FINE MISC TEST three times a day 100 each 5    risperiDONE (RISPERDAL) 1 MG tablet Take 1 mg by mouth nightly.  ketoconazole (NIZORAL) 2 % shampoo APPLY TO AFFECTED AREA ON SCALP 3 TO 4 TIMES A WEEK LEAVE ON FOR 5 MINUTES THEN WASH OFF (Patient not taking: Reported on 2021) 120 mL 11    hydroquinone 4 % cream Apply topically daily to dark spots (Patient not taking: Reported on 2021) 28 g 2    naloxone 4 MG/0.1ML LIQD nasal spray 1 spray by Nasal route as needed for Opioid Reversal (Patient not taking: Reported on 2021) 1 each 5    ammonium lactate (LAC-HYDRIN) 12 % lotion apply to affected area once daily (Patient not taking: Reported on 10/16/2020) 400 g 1     No current facility-administered medications for this visit. No Known Allergies            Family History   Problem Relation Age of Onset   Willson Stroke Mother     Diabetes Mother     High Blood Pressure Mother     Cataracts Mother     Stroke Father     Diabetes Father     Heart Disease Sister 72    Diabetes Daughter     Stroke Sister 68        Brain aneurysm that ruptured and then she got kidney failure and intestinal infection and     Glaucoma Neg Hx              Social History     Socioeconomic History    Marital status: Single     Spouse name: Not on file    Number of children: Not on file    Years of education: Not on file    Highest education level: Not on file   Occupational History    Not on file   Social Needs    Financial resource strain: Not on file    Food insecurity     Worry: Not on file     Inability: Not on file    Transportation needs     Medical: Not on file     Non-medical: Not on file   Tobacco Use    Smoking status: Former Smoker     Packs/day: 1.00     Years: 26.00     Pack years: 26.00     Types: Cigarettes     Start date: 1975     Quit date: 2018     Years since quittin.1    Smokeless tobacco: Never Used   Substance and Sexual Activity    Alcohol use:  Yes     Alcohol/week: 0.0 standard drinks     Frequency: 4 or more times a week     Drinks per session: 5 or 6     Binge frequency: Never     Comment: daily; \"2 tallboys per day\"    Drug use: No    Sexual activity: Not on file   Lifestyle    Physical activity     Days per week: Not on file     Minutes per session: Not on file    Stress: Not on file   Relationships    Social connections     Talks on phone: Not on file     Gets together: Not on file     Attends Tenriism service: Not on file     Active member of club or organization: Not on file     Attends meetings of clubs or organizations: Not on file     Relationship status: Not on file    Intimate partner violence     Fear of current or ex partner: Not on file     Emotionally abused: Not on file     Physically abused: Not on file     Forced sexual activity: Not on file   Other Topics Concern    Not on file   Social History Narrative    Not on file         Vitals:    02/04/21 1357   BP: 110/74   Pulse: 88   Resp: 16   SpO2: 94%         Wt Readings from Last 3 Encounters:   02/04/21 219 lb (99.3 kg)   10/16/20 213 lb 9.6 oz (96.9 kg)   07/27/20 202 lb (91.6 kg)         BP Readings from Last 3 Encounters:   02/04/21 110/74   10/16/20 132/70   07/27/20 130/84       Hematology and Coagulation  Lab Results   Component Value Date    WBC 6.4 01/15/2019    RBC 4.29 01/15/2019    HGB 12.9 01/15/2019    HCT 39.0 01/15/2019    MCV 90.9 01/15/2019    MCH 30.0 01/15/2019    MCHC 33.0 01/15/2019    RDW 14.5 01/15/2019     01/15/2019    MPV 8.2 01/15/2019       Chemistries  Lab Results   Component Value Date     07/22/2020    K 4.2 07/22/2020     07/22/2020    CO2 27 07/22/2020    BUN 10 07/22/2020    CREATININE 0.91 07/22/2020    CALCIUM 9.5 07/22/2020    PROT 7.1 07/22/2020    LABALBU 4.0 07/22/2020    BILITOT 0.45 07/22/2020    ALKPHOS 76 07/22/2020    AST 26 07/22/2020    ALT 13 07/22/2020     Lab Results   Component Value Date    ALKPHOS 76 07/22/2020    ALT 13 07/22/2020    AST 26 07/22/2020    PROT 7.1 07/22/2020    BILITOT 0.45 07/22/2020    LABALBU 4.0 07/22/2020     Lab Results   Component Value Date    BUN 10 07/22/2020    CREATININE 0.91 07/22/2020     Lab Results   Component Value Date    CALCIUM 9.5 07/22/2020    MG 1.5 07/22/2020     Lab Results   Component Value Date    AST 26 07/22/2020    ALT 13 07/22/2020       Lab Results   Component Value Date    [id number] 1000 06/12/2018             Review of Systems   Constitutional: Negative for appetite change, diaphoresis, fatigue, fever and unexpected weight change. HENT: Negative for dental problem, drooling, ear discharge, ear pain, facial swelling, hearing loss, mouth sores, nosebleeds, postnasal drip, sinus pressure, tinnitus, trouble swallowing and voice change. Eyes: Negative for photophobia, pain, discharge, redness, itching and visual disturbance. Respiratory: Negative for apnea, choking, chest tightness, shortness of breath and wheezing. Cardiovascular: Negative for chest pain, palpitations, leg swelling and near-syncope. Gastrointestinal: Negative for abdominal distention, abdominal pain, blood in stool, bowel incontinence, constipation, diarrhea, nausea and vomiting. Endocrine: Negative for cold intolerance, heat intolerance, polydipsia, polyphagia and polyuria. Genitourinary: Negative for bladder incontinence. Musculoskeletal: Positive for gait problem. Negative for back pain, neck pain and neck stiffness. Skin: Negative for color change, pallor and wound. Allergic/Immunologic: Negative for environmental allergies, food allergies and immunocompromised state. Neurological: Positive for focal weakness, weakness and loss of balance. Negative for dizziness, vertigo, tremors, syncope, facial asymmetry, speech difficulty, light-headedness and headaches. Hematological: Negative for adenopathy. Does not bruise/bleed easily. Psychiatric/Behavioral: Positive for decreased concentration and memory loss.  Negative for agitation, behavioral problems, confusion, dysphoric mood, hallucinations, self-injury, sleep disturbance and suicidal ideas. The patient is nervous/anxious. The patient is not hyperactive. Objective:   Physical Exam  Constitutional:       Appearance: She is well-developed. HENT:      Head: Normocephalic and atraumatic. No raccoon eyes or Covington's sign. Right Ear: External ear normal.      Left Ear: External ear normal.      Nose: Nose normal.   Eyes:      Conjunctiva/sclera: Conjunctivae normal.      Pupils: Pupils are equal, round, and reactive to light. Neck:      Musculoskeletal: Normal range of motion and neck supple. Normal range of motion. No neck rigidity or muscular tenderness. Thyroid: No thyroid mass or thyromegaly. Vascular: No carotid bruit. Trachea: No tracheal deviation. Meningeal: Brudzinski's sign and Kernig's sign absent. Cardiovascular:      Rate and Rhythm: Normal rate and regular rhythm. Pulmonary:      Effort: Pulmonary effort is normal. No respiratory distress. Breath sounds: Normal breath sounds. No wheezing or rales. Musculoskeletal: Normal range of motion. General: No tenderness. Lymphadenopathy:      Cervical: No cervical adenopathy. Skin:     General: Skin is warm. Coloration: Skin is not pale. Findings: No erythema or rash. Nails: There is no clubbing. Psychiatric:         Attention and Perception: She is attentive. Mood and Affect: Mood is anxious and depressed. Affect is blunt. Affect is not labile or inappropriate. Speech: She is communicative. Speech is not rapid and pressured, delayed, slurred or tangential.         Behavior: Behavior is not agitated, slowed, aggressive, withdrawn, hyperactive or combative. Behavior is cooperative. Thought Content: Thought content normal. Thought content is not paranoid or delusional. Thought content does not include homicidal or suicidal ideation.  Thought content does not include homicidal or suicidal plan. Cognition and Memory: Cognition is impaired. Memory is impaired. She exhibits impaired recent memory. She does not exhibit impaired remote memory. Judgment: Judgment is not impulsive or inappropriate. NEUROLOGICAL EXAMINATION :    A) MENTAL STATUS:                   Alert and  oriented  To time, place  And  Person. No Aphasia. No  Dysarthria. Able   To  Follow   SIMPLE    commands without   Any  Difficulty. No right  To left confusion. Normal  Speech  And language function. Insight and  Judgment ,Fund  Of  Knowledge   Decreased                Recent  And  Remote memory  Decreased                Attention &Concentration are decreased                                                   B) CRANIAL NERVES :             2 CN : Visual  Acuity  And  Visual fields  within normal limits                      Fundi  Could  Not  Be  Could  Not  Be  Evaluated. 3,4,6 CN : Both  Pupils are   Reactive and  Equal.                          Extraocular   Movements  Are  Intact. No  Nystagmus. No  RODERICK. No  Afferent  Pupillary  Defect noted. 5 CN :  Normal  Facial sensations and Corneal  Reflexes         7 CN :  Normal  Facial  Symmetry  And  Strength. No facial  Weakness. 8 CN :  Hearing  Appears within normal limits        9, 10 CN: Normal spontaneous, reflex palate movements       11 CN:   Normal  Shoulder shrug and  strength       12 CN :   Normal  Tongue movements and  Tongue  In midline                      No tongue   Fasciculations or atrophy         C) MOTOR  EXAM:                 Strength  In upper  And  Lower extremities   within normal limits               No  Drift.      No  Atrophy               Rapid alternating  And  repetitions  Movements  within normal limits                 Muscle  Tone  In upper  And Lower  Extremities  within normal limits                No rigidity. No  Spasticity. Bradykinesia   absent                  No  Asterixis. Sustention  Tremor , Resting  Tremor   absent                    No other  Abnormal  Movements noted             D) SENSORY :               light touch, pinprick, position  And  Vibration  decreased          E) REFLEXES:                   Deep  Tendon  Reflexes decreased                    No pathological  Reflexes  Bilaterally. F) COORDINATION  AND  GAIT :                                Station and  Gait   SLOW                                    Romberg's test positive                          Ataxia negative            Assessment:             Patient Active Problem List   Diagnosis    Anxiety    Depression    Schizoaffective disorder ([address]    History of tobacco use    Restless leg syndrome    Pica    Essential hypertension    Bipolar disorder ([address]    Alcoholism ([address]    Sleeping difficulty    Peripheral neuropathy    Balance problem    CTS (carpal tunnel syndrome)    Chronic lumbar radiculopathy    GERD (gastroesophageal reflux disease)    [facility]ch[facility]    Fatigue    Chronic rectal pain    Type 2 diabetes mellitus with diabetic neuropathy, without long-term current use of insulin ([facility])    Pruritus    Obesity (BMI 30.0-34. 9)    Mixed hyperlipidemia    Chronic right shoulder pain    Bilateral carpal tunnel syndrome    Current severe episode of major depressive disorder without psychotic features ([address]    Splenic artery aneurysm ([address]    Bipolar affective disorder, currently depressed, mild ([address]    Morbidly obese ([address]    Tobacco abuse               Plan:            VISITING DIAGNOSIS:      ICD-10-CM    1. Restless leg syndrome  G25.81    2. Bilateral carpal tunnel syndrome  G56.03    3.  Polyneuropathy associated with underlying disease ([address]  Mary Grace Hardwick 4. Type 2 diabetes mellitus with diabetic neuropathy, without long-term current use of insulin (HCC)  E11.40    5. Morbidly obese (HCC)  E66.01    6. Anxiety  F41.9    7. History of tobacco use  Z87.891    8. Bipolar affective disorder, current episode hypomanic ([facility]  F31.0    9. Chronic obstructive pulmonary disease, unspecified COPD type ([facility]  J44.9    10. Chronic lumbar radiculopathy  M54.16    11. Gastroesophageal reflux disease, unspecified whether esophagitis present  K21.9    12. Schizoaffective disorder, unspecified type ([address]  F25.9    13. Essential hypertension  I10    14. Balance problem  R26.89    15. Sleeping difficulty  G47.9    16. Current mild episode of major depressive disorder, unspecified whether recurrent ([facility]  F32.0    17. Bipolar affective disorder, currently depressed, mild ([facility]  F31.31    18. Alcoholism ([facility]  F10.20    19. Tobacco abuse  Z72.0    20. Fatigue due to depression  F32.9     R53.83    21. Pica  F50.89    22. Obesity (BMI 30.0-34. 9)  E66.9    23. Dysthymia  F34.1                   CONCERNS   &   INCREASED   RISK   FOR         * TIA,  CEREBRO  VASCULAR  ISCHEMIA, STROKE      *   COGNITIVE  &   MEMORY PROBLEMS  AND  DEMENTIA      *  MONONEUROPATHIES, RADICULOPATHY       *   PERIPHERAL  NEUROPATHY                      VARIOUS  RISK   FACTORS   WERE  REVIEWED   AND   DISCUSSED. *  PATIENT   HAS  MULTIPLE   MEDICAL,  NEUROLOGICAL      AND   MENTAL  HEALTH  PROBLEMS . PATIENT'S   MANAGEMENT  IS  CHALLENGING. PLAN:         Paulo Right  Of  The  Diagnoses,  The  Management & Treatment  Options           AND    Care  plan  Were        Reviewed and   Discussed   With  patient. * Goals  And  Expectations  Of  The  Therapy  Discussed   And  Reviewed. *   Benefits   And   Side  Effect  Profile  Of  Medication/s   Were   Discussed             * Need   For  Further   Follow up For  The  Various  Problems  Were discussed.        * Results  Of  The Previous  Diagnostic tests were reviewed and questions answered. patient  understand the same. Medical  Decision  Making  Was  Made  Based on the   Complexity  Of  Above  Mentioned  Diagnoses,        Data reviewed   & diagnostic  Tests  Reviewed,  Risk  Of  Significant   Co morbidities and complicating   Factors. Medical  Decision  Was   High  Complexity  Due   To  The  Patient's  Multiple  Symptoms,      Advancing   Disease,  Complex  Treatment  Regimen,  Multiple medications and   Risk  Of   Side  Effects,      Difficulty  In  Medication  Management  And  Diagnostic  Challenges       In  View  Of  The  Associated   Co  Morbid  Conditions   And  Problems. * FALL   PRECAUTIONS. THESE  REVIEWED   AND  DISCUSSED    *   BE  CAREFUL  WITH  ACTIVITIES   INCLUDING  DRIVING. *   AVOID   NECK  AND/ BACK  STRAINING  ACTIVITIES          *   TO   WEAR   BILATERAL   WRIST  BRACES       *   TO  AVOID  PRESSURE  OVER   ULNAR  ASPECT   OF   ELBOWS          *   ADEQUATE   FLUID  INTAKE   AND  ELECTROLYTE  BALANCE           * KEEP  DAIRY  OF   THE  NEUROLOGICAL  SYMPTOMS        RECORDING THE    DURATION  AND  FREQUENCY. *  AVOID    CONDITIONS  AND  FACTORS   THAT  MAKE   NEUROLOGICAL  SYMPTOMS  WORSE. *  TO  MAINTAIN  REGULAR  SLEEP  WAKE  CYCLES. *   TO  HAVE  ADEQUATE  REST  AND   SLEEP    HOURS.          *    TO   AVOID   TO  SLEEP  IN   SUPINE  POSITION. *      WEIGHT   LOSS. *    AVOID  ANY USAGE OF                   TOBACCO,  EXCESSIVE  ALCOHOL  AND   ILLEGAL   SUBSTANCES          *  CONTINUE MEDICATIONS PRESCRIBED      AS    RECOMMENDED     *   Compliance   With  Medications   And  Instructions        * CURRENTLY  TOLERATING  THE  PRESCRIBED   MEDICATIONS. WITHOUT  ANY  SIGNIFICANT  SIDE  EFFECTS   &  GETTING BENEFIT.           *  May   Use  Pill  Box,    If  Needed          *        Antiplatelet  therapy    As Recommended  Was   Discussed        *    Prophylactic  Use   Of     Vitamin   B   Complex,  Folic  Acid,    Vitamin  B12        Multivitamin   Tablet  Daily    Supplementations   Over  The  Counter  Discussed               *  FOOT  CARE, DAILY  INSPECTION  OF  FEET   AND         PERIODIC  PODIATRY EVALUATIONS . *  PATIENT  IS  ALSO   COUNSELED   TO  KEEP    ACTIVITIES         A)   SIMPLE      B)  ORGANIZED      C)  WRITE   DOWN                                *      NEUROPATHIC  SYMPTOMS  BOTH  FEET     SINCE    FEB. 2020                        -   MOSTLY   DUE   TO    HER  TYPE  II    AND                                 ALCOHOL  ABUSE/  USAGE     REGULARLY                       *        PATIENT  ALREADY  ON  NEURONTIN        AND                                ULTRAM  FROM     HER PCP     FOR    SYMPTOMATIC   RELIEF                           -    PATIENT    ADVISED                                     A)     TO   AVOID    ALCOHOL                                    B)    ADEQUATE   DIABETIC  CONTROL                *       H/O    WORSENING  OF  RESTLESS  LEG  SYNDROME    SINCE  SEPT. 2020                           -   IMPROVED      WITH     SINEMET    CR    TID     SINCE  OCT. 2020                               PATIENT  REPORTED      THAT  SHE  IS  SLEEPING  BETTER                *                 H/O    INTOLERANCE    TO       0.25    MG  TID                            *    EXPECTATIONS,   GOALS   AND  SIDE  EFFECTS  MEDICATIONS    WERE                                 REVIEWED     AND   DISCUSSED    IN    DETAIL. Controlled Substances Monitoring: Periodic Controlled Substance Monitoring: Possible medication side effects, risk of tolerance/dependence & alternative treatments discussed. , Assessed functional status.  Ceci Browne MD)              Orders Placed This Encounter   Medications    carbidopa-levodopa (SINEMET)  MG per tablet     Sig: Taking 3  tablets at bedtime Dispense:  180 tablet     Refill:  1                 *PATIENT   TO  FOLLOW  UP  WITH   PRIMARY  CARE   AND   OTHER  CONSULTANTS  AS  BEFORE.               *TO  FOLLOW  WITH   MENTAL  HEALTH  PROFESSIONALS ,  INCLUDING            PSYCHOLOGICAL  COUNSELING   AND  PSYCHIATRIC  EVALUTIONS              *  Maintain   Healthy  Life Style    With   Periodic  Monitoring  Of      Any  Medical  Conditions  Including   Elevated  Blood  Pressure,  Lipid  Profile,     Blood  Sugar levels  And   Heart  Disease. *   Period   Screening  For  Cancers  Involving  Breast,  Colon,     lungs  And  Other  Organs  As  Applicable,  In consultation   With  Your  Primary Care Providers. * Second  Neurological  Opinion  And  Evaluations  In  [facility]  Setting  If  Patient  Is  Interested. *  If  The  Patient remains  Neurologically  Stable   Return   To  [facility] Neurology Department       IN   3 MONTHS  TIME   FOR  FURTHER  FOLLOW UP.                   *  If   There is  Any  Significant  Worsening   Of  Current  Symptoms  And  Or  If patient  Develops   Any additional  New      Neurological  Symptoms  Or  Significant  Concerns   Should  Call  911 or  Go  To  Emergency  Department  For  Further      Immediate  Evaluation. *   The  Neurological   Findings,  Possible  Diagnosis,  Differential diagnoses   And  Options      For    Further   Investigations   And  management   Are  Discussed  Comprehensively. Medications   And  Prescription   Risks  And  Side effects  Are   Also  Discussed. The  Above  Were  Reviewed  With  patient and     questions  Answered  In  Detail. More   Than   50% of face  To face Time   Was  Spent  On  Counseling   And   Coordination  Of  Care       Of   Patient's multiple   Neurological  Problems   And   Comorbid  Medical   Conditions.             Electronically signed by Carroll Preciado MD     Board Certified in  Neurology &  In  [id number] [address] [place] [address] of Psychiatry and Neurology ([employer])      DISCLAIMER:   Although every effort was made to ensure the accuracy of this  electronic transcription, some errors in transcription may have occurred. GENERAL PATIENT INSTRUCTIONS:     A Healthy Lifestyle: Care Instructions  Your Care Instructions  A healthy lifestyle can help you feel good, stay at a healthy weight, and have plenty of energy for both work and play. A healthy lifestyle is something you can share with your whole family. A healthy lifestyle also can lower your risk for serious health problems, such as high blood pressure, heart disease, and diabetes. You can follow a few steps listed below to improve your health and the health of your family. Follow-up care is a key part of your treatment and safety. Be sure to make and go to all appointments, and call your doctor if you are having problems. Its also a good idea to know your test results and keep a list of the medicines you take. How can you care for yourself at home? Do not eat too much sugar, fat, or fast foods. You can still have dessert and treats now and then. The goal is moderation. Start small to improve your eating habits. Pay attention to portion sizes, drink less juice and soda pop, and eat more fruits and vegetables. Eat a healthy amount of food. A 3-ounce serving of meat, for example, is about the size of a deck of cards. Fill the rest of your plate with vegetables and whole grains. Limit the amount of soda and sports drinks you have every day. Drink more water when you are thirsty. Eat at least 5 servings of fruits and vegetables every day. It may seem like a lot, but it is not hard to reach this goal. A serving or helping is 1 piece of fruit, 1 cup of vegetables, or 2 cups of leafy, raw vegetables. Have an apple or some carrot sticks as an afternoon snack instead of a candy bar.  Try to have fruits and/or vegetables at every meal.  Make exercise part of your daily routine. You may want to start with simple activities, such as walking, bicycling, or slow swimming. Try to be active 30 to 60 minutes every day. You do not need to do all 30 to 60 minutes all at once. For example, you can exercise 3 times a day for 10 or 20 minutes. Moderate exercise is safe for most people, but it is always a good idea to talk to your doctor before starting an exercise program.  Keep moving. Suzy Nicole the lawn, work in the garden, or [employer]. Take the stairs instead of the elevator at work. If you smoke, quit. People who smoke have an increased risk for heart attack, stroke, cancer, and other lung illnesses. Quitting is hard, but there are ways to boost your chance of quitting tobacco for good. Use nicotine gum, patches, or lozenges. Ask your doctor about stop-smoking programs and medicines. Keep trying. In addition to reducing your risk of diseases in the future, you will notice some benefits soon after you stop using tobacco. If you have shortness of breath or asthma symptoms, they will likely get better within a few weeks after you quit. Limit how much alcohol you drink. Moderate amounts of alcohol (up to 2 drinks a day for men, 1 drink a day for women) are okay. But drinking too much can lead to liver problems, high blood pressure, and other health problems. Family health  If you have a family, there are many things you can do together to improve your health. Eat meals together as a family as often as possible. Eat healthy foods. This includes fruits, vegetables, lean meats and dairy, and whole grains. Include your family in your fitness plan. Most people think of activities such as jogging or tennis as the way to fitness, but there are many ways you and your family can be more active. Anything that makes you breathe hard and gets your heart pumping is exercise.  Here are some tips:  Walk to do errands or to take your child to school or the bus. Go for a family bike ride after dinner instead of watching TV. Where can you learn more? Go to https://chpepiceweb.[employer]. org and sign in to your [employer] account. Enter L133 in the [employer] box to learn more about \"A Healthy Lifestyle: Care Instructions. \"     If you do not have an account, please click on the \"Sign Up Now\" link. Current as of: July 26, 2016  Content Version: 11.2  © 4967-8963 [employer]. Care instructions adapted under license by [facility] ([facility]). If you have questions about a medical condition or this instruction, always ask your healthcare professional. [address] any warranty or liability for your use of this information.

## 2021-02-05 DIAGNOSIS — G63 POLYNEUROPATHY ASSOCIATED WITH UNDERLYING DISEASE (HCC): ICD-10-CM

## 2021-02-05 DIAGNOSIS — G56.03 BILATERAL CARPAL TUNNEL SYNDROME: ICD-10-CM

## 2021-02-05 RX ORDER — GABAPENTIN 300 MG/1
CAPSULE ORAL
Qty: 120 CAPSULE | Refills: 2 | Status: SHIPPED | OUTPATIENT
Start: 2021-02-05 | End: 2021-05-07 | Stop reason: SDUPTHER

## 2021-02-05 NOTE — TELEPHONE ENCOUNTER
Last Appt:  2/4/2021  Next Appt:   5/7/2021  Med verified in [facility] report ran on 1/18/2021

## 2021-02-08 DIAGNOSIS — F17.219 CIGARETTE NICOTINE DEPENDENCE WITH NICOTINE-INDUCED DISORDER: ICD-10-CM

## 2021-02-08 DIAGNOSIS — Z72.0 TOBACCO ABUSE: ICD-10-CM

## 2021-02-08 RX ORDER — NICOTINE 21 MG/24HR
PATCH, TRANSDERMAL 24 HOURS TRANSDERMAL
Qty: 28 PATCH | Refills: 1 | Status: SHIPPED | OUTPATIENT
Start: 2021-02-08 | End: 2021-04-08

## 2021-02-18 DIAGNOSIS — G25.81 RESTLESS LEG SYNDROME: Primary | ICD-10-CM

## 2021-02-18 RX ORDER — RISPERIDONE 1 MG/1
1 TABLET, FILM COATED ORAL NIGHTLY
Qty: 60 TABLET | Refills: 2 | Status: SHIPPED | OUTPATIENT
Start: 2021-02-18

## 2021-03-01 DIAGNOSIS — G89.29 CHRONIC RECTAL PAIN: Primary | ICD-10-CM

## 2021-03-01 DIAGNOSIS — K62.89 CHRONIC RECTAL PAIN: Primary | ICD-10-CM

## 2021-03-01 NOTE — TELEPHONE ENCOUNTER
Nasra Biggs called requesting a refill of the below medication which has been pended for you: last filled 2/3/2021 #90    Requested Prescriptions     Pending Prescriptions Disp Refills    traMADol (ULTRAM) 50 MG tablet [Pharmacy Med Name: TRAMADOL HCL 50 MG TABLET] 90 tablet      Sig: take 1 tablet by mouth every 8 hours if needed       Last Appointment Date: 2/2/2021  Next Appointment Date: 6/8/2021    No Known Allergies

## 2021-03-02 RX ORDER — TRAMADOL HYDROCHLORIDE 50 MG/1
TABLET ORAL
Qty: 90 TABLET | Refills: 0 | Status: SHIPPED | OUTPATIENT
Start: 2021-03-02 | End: 2021-04-01

## 2021-03-04 ENCOUNTER — HOSPITAL ENCOUNTER (OUTPATIENT)
Dept: MAMMOGRAPHY | Age: 61
Discharge: HOME | End: 2021-03-06
Payer: MEDICAID

## 2021-03-04 DIAGNOSIS — Z12.31 ENCOUNTER FOR SCREENING MAMMOGRAM FOR MALIGNANT NEOPLASM OF BREAST: ICD-10-CM

## 2021-03-04 PROCEDURE — 77067 SCR MAMMO BI INCL CAD: CPT

## 2021-03-15 DIAGNOSIS — G47.00 INSOMNIA, UNSPECIFIED TYPE: ICD-10-CM

## 2021-03-15 NOTE — TELEPHONE ENCOUNTER
Phoenix Cox called requesting a refill of the below medication which has been pended for you:     Requested Prescriptions     Pending Prescriptions Disp Refills    BANOPHEN 25 MG capsule [Pharmacy Med Name: BANOPHEN 25 MG CAPSULE] 60 capsule 3     Sig: take 1 capsule by mouth twice a day if needed for itching or allergies       Last Appointment Date: 2/2/2021  Next Appointment Date: 6/8/2021    No Known Allergies

## 2021-03-16 RX ORDER — DIPHENHYDRAMINE HYDROCHLORIDE 25 MG/1
CAPSULE ORAL
Qty: 60 CAPSULE | Refills: 3 | Status: SHIPPED | OUTPATIENT
Start: 2021-03-16 | End: 2021-07-06

## 2021-03-19 ENCOUNTER — IMMUNIZATION (OUTPATIENT)
Dept: LAB | Age: 61
End: 2021-03-19
Payer: MEDICAID

## 2021-03-19 PROCEDURE — 91301 COVID-19, MODERNA VACCINE 100MCG/0.5ML DOSE: CPT

## 2021-03-29 DIAGNOSIS — G89.29 CHRONIC RECTAL PAIN: ICD-10-CM

## 2021-03-29 DIAGNOSIS — K62.89 CHRONIC RECTAL PAIN: ICD-10-CM

## 2021-03-29 RX ORDER — TRAMADOL HYDROCHLORIDE 50 MG/1
TABLET ORAL
Qty: 90 TABLET | Refills: 0 | OUTPATIENT
Start: 2021-03-29 | End: 2021-04-28

## 2021-03-29 NOTE — TELEPHONE ENCOUNTER
Per Diana from [place] [facility] [place]- Tramadol 50 mg last filled 3/3/21 #90/30 days. Refill requested too soon.

## 2021-04-01 DIAGNOSIS — G89.29 CHRONIC RECTAL PAIN: Primary | ICD-10-CM

## 2021-04-01 DIAGNOSIS — K62.89 CHRONIC RECTAL PAIN: Primary | ICD-10-CM

## 2021-04-01 RX ORDER — TRAMADOL HYDROCHLORIDE 50 MG/1
TABLET ORAL
Qty: 90 TABLET | Refills: 1 | Status: SHIPPED | OUTPATIENT
Start: 2021-04-01 | End: 2021-05-24

## 2021-04-01 NOTE — TELEPHONE ENCOUNTER
Last Vv 02/02/2021,next ov 06/08/2021. Per [facility] requested medication last filled 03/03/2021 #90,30 day supply.

## 2021-04-08 DIAGNOSIS — F17.219 CIGARETTE NICOTINE DEPENDENCE WITH NICOTINE-INDUCED DISORDER: ICD-10-CM

## 2021-04-08 DIAGNOSIS — Z72.0 TOBACCO ABUSE: ICD-10-CM

## 2021-04-08 RX ORDER — NICOTINE 21 MG/24HR
PATCH, TRANSDERMAL 24 HOURS TRANSDERMAL
Qty: 28 PATCH | Refills: 1 | Status: SHIPPED | OUTPATIENT
Start: 2021-04-08 | End: 2021-05-27

## 2021-04-16 ENCOUNTER — IMMUNIZATION (OUTPATIENT)
Dept: LAB | Age: 61
End: 2021-04-16
Payer: MEDICAID

## 2021-04-16 PROCEDURE — 91301 COVID-19, MODERNA VACCINE 100MCG/0.5ML DOSE: CPT

## 2021-04-26 DIAGNOSIS — I10 ESSENTIAL HYPERTENSION: ICD-10-CM

## 2021-04-27 RX ORDER — LOSARTAN POTASSIUM 100 MG/1
TABLET ORAL
Qty: 90 TABLET | Refills: 0 | Status: SHIPPED | OUTPATIENT
Start: 2021-04-27 | End: 2021-07-20 | Stop reason: SDUPTHER

## 2021-04-27 NOTE — TELEPHONE ENCOUNTER
Sachin Montgomery called requesting a refill of the below medication which has been pended for you:     Requested Prescriptions     Pending Prescriptions Disp Refills    losartan (COZAAR) 100 MG tablet [Pharmacy Med Name: LOSARTAN POTASSIUM 100 MG TAB] 90 tablet 0     Sig: take 1 tablet by mouth once daily       Last Appointment Date: 2/2/2021  Next Appointment Date: 6/8/2021    No Known Allergies

## 2021-05-03 DIAGNOSIS — G89.29 CHRONIC RIGHT SHOULDER PAIN: ICD-10-CM

## 2021-05-03 DIAGNOSIS — M25.511 CHRONIC RIGHT SHOULDER PAIN: ICD-10-CM

## 2021-05-03 NOTE — TELEPHONE ENCOUNTER
Suleman Damian called requesting a refill of the below medication which has been pended for you:     Requested Prescriptions     Pending Prescriptions Disp Refills    celecoxib (CELEBREX) 200 MG capsule [Pharmacy Med Name: CELECOXIB 200 MG CAPSULE] 60 capsule 1     Sig: take 1 capsule by mouth twice a day       Last Appointment Date: 2/2/2021  Next Appointment Date: 6/8/2021    No Known Allergies

## 2021-05-04 RX ORDER — CELECOXIB 200 MG/1
CAPSULE ORAL
Qty: 60 CAPSULE | Refills: 1 | Status: SHIPPED | OUTPATIENT
Start: 2021-05-04 | End: 2021-07-06 | Stop reason: SDUPTHER

## 2021-05-07 ENCOUNTER — OFFICE VISIT (OUTPATIENT)
Dept: NEUROLOGY | Age: 61
End: 2021-05-07
Payer: MEDICAID

## 2021-05-07 VITALS
SYSTOLIC BLOOD PRESSURE: 118 MMHG | HEIGHT: 62 IN | BODY MASS INDEX: 41.04 KG/M2 | DIASTOLIC BLOOD PRESSURE: 82 MMHG | OXYGEN SATURATION: 97 % | WEIGHT: 223 LBS | HEART RATE: 84 BPM

## 2021-05-07 DIAGNOSIS — G63 POLYNEUROPATHY ASSOCIATED WITH UNDERLYING DISEASE (HCC): ICD-10-CM

## 2021-05-07 DIAGNOSIS — G56.03 BILATERAL CARPAL TUNNEL SYNDROME: ICD-10-CM

## 2021-05-07 DIAGNOSIS — G47.9 SLEEPING DIFFICULTY: ICD-10-CM

## 2021-05-07 DIAGNOSIS — F32.A FATIGUE DUE TO DEPRESSION: ICD-10-CM

## 2021-05-07 DIAGNOSIS — F31.10 BIPOLAR AFFECTIVE DISORDER, CURRENT EPISODE MANIC, CURRENT EPISODE SEVERITY UNSPECIFIED (HCC): ICD-10-CM

## 2021-05-07 DIAGNOSIS — M54.16 CHRONIC LUMBAR RADICULOPATHY: ICD-10-CM

## 2021-05-07 DIAGNOSIS — F25.0 SCHIZOAFFECTIVE DISORDER, BIPOLAR TYPE (HCC): ICD-10-CM

## 2021-05-07 DIAGNOSIS — R53.83 FATIGUE DUE TO DEPRESSION: ICD-10-CM

## 2021-05-07 DIAGNOSIS — Z87.891 HISTORY OF TOBACCO USE: ICD-10-CM

## 2021-05-07 DIAGNOSIS — J44.9 CHRONIC OBSTRUCTIVE PULMONARY DISEASE, UNSPECIFIED COPD TYPE (HCC): ICD-10-CM

## 2021-05-07 DIAGNOSIS — G25.81 RESTLESS LEG SYNDROME: ICD-10-CM

## 2021-05-07 DIAGNOSIS — E11.40 TYPE 2 DIABETES MELLITUS WITH DIABETIC NEUROPATHY, WITHOUT LONG-TERM CURRENT USE OF INSULIN (HCC): Primary | ICD-10-CM

## 2021-05-07 DIAGNOSIS — E66.01 MORBIDLY OBESE (HCC): ICD-10-CM

## 2021-05-07 DIAGNOSIS — F34.1 DYSTHYMIA: ICD-10-CM

## 2021-05-07 DIAGNOSIS — M79.2 NEUROPATHIC PAIN: ICD-10-CM

## 2021-05-07 DIAGNOSIS — F31.31 BIPOLAR AFFECTIVE DISORDER, CURRENTLY DEPRESSED, MILD (HCC): ICD-10-CM

## 2021-05-07 DIAGNOSIS — F10.20 ALCOHOLISM (HCC): ICD-10-CM

## 2021-05-07 DIAGNOSIS — F41.9 ANXIETY: ICD-10-CM

## 2021-05-07 DIAGNOSIS — R26.89 BALANCE PROBLEM: ICD-10-CM

## 2021-05-07 PROCEDURE — 3017F COLORECTAL CA SCREEN DOC REV: CPT | Performed by: PSYCHIATRY & NEUROLOGY

## 2021-05-07 PROCEDURE — 2022F DILAT RTA XM EVC RTNOPTHY: CPT | Performed by: PSYCHIATRY & NEUROLOGY

## 2021-05-07 PROCEDURE — 99213 OFFICE O/P EST LOW 20 MIN: CPT | Performed by: PSYCHIATRY & NEUROLOGY

## 2021-05-07 PROCEDURE — G8417 CALC BMI ABV UP PARAM F/U: HCPCS | Performed by: PSYCHIATRY & NEUROLOGY

## 2021-05-07 PROCEDURE — 3046F HEMOGLOBIN A1C LEVEL >9.0%: CPT | Performed by: PSYCHIATRY & NEUROLOGY

## 2021-05-07 PROCEDURE — 1036F TOBACCO NON-USER: CPT | Performed by: PSYCHIATRY & NEUROLOGY

## 2021-05-07 PROCEDURE — G8427 DOCREV CUR MEDS BY ELIG CLIN: HCPCS | Performed by: PSYCHIATRY & NEUROLOGY

## 2021-05-07 PROCEDURE — 99214 OFFICE O/P EST MOD 30 MIN: CPT | Performed by: PSYCHIATRY & NEUROLOGY

## 2021-05-07 PROCEDURE — G8926 SPIRO NO PERF OR DOC: HCPCS | Performed by: PSYCHIATRY & NEUROLOGY

## 2021-05-07 PROCEDURE — 3023F SPIROM DOC REV: CPT | Performed by: PSYCHIATRY & NEUROLOGY

## 2021-05-07 RX ORDER — AMMONIUM LACTATE 12 G/100G
LOTION TOPICAL
Qty: 400 G | Refills: 1 | Status: SHIPPED | OUTPATIENT
Start: 2021-05-07 | End: 2021-08-03 | Stop reason: SDUPTHER

## 2021-05-07 RX ORDER — GABAPENTIN 300 MG/1
CAPSULE ORAL
Qty: 120 CAPSULE | Refills: 2 | Status: SHIPPED | OUTPATIENT
Start: 2021-05-07 | End: 2021-08-03 | Stop reason: SDUPTHER

## 2021-05-07 RX ORDER — MULTIVITAMIN WITH FOLIC ACID 400 MCG
TABLET ORAL
Qty: 30 TABLET | Refills: 5 | Status: SHIPPED | OUTPATIENT
Start: 2021-05-07 | End: 2021-07-15

## 2021-05-07 ASSESSMENT — ENCOUNTER SYMPTOMS
BLOOD IN STOOL: 0
TROUBLE SWALLOWING: 0
VOMITING: 0
SHORTNESS OF BREATH: 0
FACIAL SWELLING: 0
DIARRHEA: 0
CHOKING: 0
EYE PAIN: 0
CHEST TIGHTNESS: 0
VISUAL CHANGE: 0
BOWEL INCONTINENCE: 0
APNEA: 0
ABDOMINAL PAIN: 0
PHOTOPHOBIA: 0
ABDOMINAL DISTENTION: 0
NAUSEA: 0
EYE DISCHARGE: 0
EYE REDNESS: 0
EYE ITCHING: 0
SINUS PRESSURE: 0
VOICE CHANGE: 0
COLOR CHANGE: 0
WHEEZING: 0
CONSTIPATION: 0
BACK PAIN: 0

## 2021-05-07 NOTE — PROGRESS NOTES
Subjective:      Patient ID: Natan Ojeda is a 62 y. o.right  handed female. Neurologic Problem  The patient's primary symptoms include clumsiness, focal sensory loss, focal weakness, a loss of balance, memory loss and weakness. The patient's pertinent negatives include no altered mental status, near-syncope, slurred speech, syncope or visual change. Primary symptoms comment: RESTLESS LEG  SYNDROME. This is a chronic problem. Episode onset: MORE   THAN    5-6 YEARS. The neurological problem developed insidiously. The problem is unchanged. There was left-sided, lower extremity and right-sided focality noted. Pertinent negatives include no abdominal pain, auditory change, aura, back pain, bladder incontinence, bowel incontinence, chest pain, confusion, diaphoresis, dizziness, fatigue, fever, headaches, light-headedness, nausea, neck pain, palpitations, shortness of breath, vertigo or vomiting. Past treatments include medication. The treatment provided moderate relief. Her past medical history is significant for mood changes. There is no history of a bleeding disorder, a clotting disorder, a CVA, dementia, head trauma, liver disease or seizures. History obtained from  The patient     and other  available medical records were  Also  reviewed.                 1)   H/O    CHRONIC    DIABETIC   PERIPHERAL  NEUROPATHY                                                      -  ON  NEURONTIN                          TOLERATING  THE  SAME                     WITH   SYMPTOMATIC      IMPROVEMENT                        2)     H/O    CHRONIC      RESTLESS  LEG  SYNDROME                 &  PERIODIC  LEG  MOVEMENTS                              -  IMPROVED                              -       ON  SINEMET              3)    H/O    CHRONIC     SLEEP DIFFICULTIES                        -   RESOLVED              4)        H/O   CHRONIC   MILD  MEMORY PROBLEMS                     -    STABLE                   -  PATIENT  NOT PREVIOUS      H/O      RIGHT  FOOT    SURGERY  IN     2011                             RECOMMENDED    PODIATRY    EVALUATIONS                        14)       VARIOUS  RISK   FACTORS   WERE  REVIEWED   AND   DISCUSSED. PATIENT   HAS  MULTIPLE   MEDICAL,  NEUROLOGICAL                               AND   MENTAL  HEALTH  PROBLEMS . PATIENT'S   MANAGEMENT  IS  CHALLENGING. The  Duration,  Quality,  Severity,  Location,  Timing,  Context,  Modifying  Factors   Of   The   Chief   Complaint       And  Present  Illness   Was   Reviewed   In   Chronological   [facility].              Patient   Indicates   The  Presence   And  The  Absence  Of  The  Following  Associated  And       Additional  Neurological    Symptoms:                                Balance  And coordination problems  present           Gait problems     present            Headaches      absent              Migraines           absent           Memory problems        Present             Confusion        absent            Paresthesia numbness          present           Seizures  And  Starring  Episodes           absent           Syncope,  Near  syncopal episodes         absent           Speech problems           absent             Swallowing  Problems      absent            Dizziness,  Light headedness           present                        Vertigo        absent             Generalized   Weakness    absent              focal  Weakness     absent             Tremors         absent              Sleep  Problems     present             History  Of   Recent   Head  Injury     absent             History  Of   Recent  TIA     absent             History  Of   Recent    Stroke     absent             Neck  Pain and  Neck muscle  Spasms  Present               Radiating  down   And   Weakness           absent            Lower back   Pain  And     Spasms  Present              Radiating    Down And   Weakness          absent                H/O   FALLS        absent               History  Of   Visual  Symptoms    Absent                  Associated   Diplopia       absent                                                              Also   Additional   Symptoms   Present    As  Documented    In   The detailed      Review  Of  Systems   And    Please   Refer   To    Them for   Additional  Information.       RECORDS   REVIEWED:    historical medical records, lab reports, office notes and radiology reports         INFORMATION   REVIEWED:     MEDICAL   HISTORY,     SURGICAL   HISTORY,   MEDICATIONS   LIST,   ALLERGIES AND  DRUG  INTOLERANCES,     FAMILY   HISTORY,  SOCIAL  HISTORY,    PROBLEM  LIST   FOR  PATIENT  CARE   COORDINATION         Past Medical History:   Diagnosis Date    Alcoholism ([facility]     Anxiety     Astigmatism with presbyopia     Balance problem     Bipolar disorder ([facility]     schizoaffective disorder (Dr. Elva Bernard)    Chronic lumbar radiculopathy     Chronic rectal pain     due to fissures    Chronic shoulder pain     bilaterally, due to osteoarthritis    CTS (carpal tunnel syndrome)     Depression     Fatigue     Hyperlipidemia     Hypertension     Obesity     Peripheral neuropathy     Pica     eats bar soap    Restless leg syndrome     Schizoaffective disorder ([facility]     Sleeping difficulty     Tobacco abuse     Type 2 diabetes mellitus ([facility]                   Past Surgical History:   Procedure Laterality Date    ABSCESS DRAINAGE  2001    perianal    ANUS SURGERY  2001    lateral internal sphincterotomy    BLADDER SUSPENSION  2000    transvaginal sling procedure for incontinence    COLONOSCOPY  2007    polypectomy x 1 (pathology: hyperplastic polyp), repair of perianal fistula    CYST REMOVAL Right 1992    wrist    OTHER SURGICAL HISTORY  2002    perianal fistula repair eua    OTHER SURGICAL HISTORY  1993    cystourethroscopy    UPPER GASTROINTESTINAL ENDOSCOPY  2007 Schatzki's ring, acute gastritis, hiatal hernia    UPPER GASTROINTESTINAL ENDOSCOPY  2000    hiatal hernia, esophagitis, gastritis                 Current Outpatient Medications   Medication Sig Dispense Refill    gabapentin (NEURONTIN) 300 MG capsule ONE    CAPSULE  IN  MORNING,    NOON ,   EVENING  AND  BED  TIME   DAILY   AS  NEEDED 120 capsule 2    carbidopa-levodopa (SINEMET)  MG per tablet Taking 3  tablets at bedtime 180 tablet 1    Multiple Vitamins-Minerals (MULTIVITAMIN) tablet take 1 tablet by mouth once daily 30 tablet 5    ammonium lactate (LAC-HYDRIN) 12 % lotion apply to affected area once daily 400 g 1    losartan (COZAAR) 100 MG tablet take 1 tablet by mouth once daily 90 tablet 0    nicotine (NICODERM CQ) 14 MG/24HR apply 1 patch TO CLEAN, DRY, AND INTACT SKIN REMOVE AND REPLACE once daily 28 patch 1    aspirin 325 MG EC tablet take 1 tablet by mouth once daily 30 tablet 12    traMADol (ULTRAM) 50 MG tablet take 1 tablet by mouth every 8 hours if needed 90 tablet 1    BANOPHEN 25 MG capsule take 1 capsule by mouth twice a day if needed for itching or allergies 60 capsule 3    magnesium oxide (MAG-OX) 400 MG tablet take 1 tablet by mouth once daily 30 tablet 2    risperiDONE (RISPERDAL) 1 MG tablet Take 1 tablet by mouth nightly 60 tablet 2    pravastatin (PRAVACHOL) 40 MG tablet Take 1 tablet by mouth daily 30 tablet 5    omeprazole (PRILOSEC) 20 MG delayed release capsule take 1 tablet by mouth daily 90 capsule 1    Cyanocobalamin ER (RA VITAMIN B-12 TR) 1000 MCG TBCR take 1 tablet by mouth daily 30 tablet 6    blood glucose test strips (ONETOUCH ULTRA) strip use 1 TEST STRIP to TEST BLOOD SUGAR three times a day 300 strip 1    pimecrolimus (ELIDEL) 1 % cream Apply topically to rash on face 2 times daily.  30 g 11    triamcinolone (KENALOG) 0.1 % cream Apply to rash on body twice daily (not face, armpit or groin) 80 g 1    Blood Glucose Monitoring Suppl (ONE TOUCH ULTRA 2) w/Device KIT use as directed 1 kit 0    ONETOUCH DELICA LANCETS FINE MISC TEST three times a day 100 each 5    celecoxib (CELEBREX) 200 MG capsule take 1 capsule by mouth twice a day (Patient not taking: Reported on 2021) 60 capsule 1    ketoconazole (NIZORAL) 2 % shampoo APPLY TO AFFECTED AREA ON SCALP 3 TO 4 TIMES A WEEK LEAVE ON FOR 5 MINUTES THEN WASH OFF (Patient not taking: Reported on 2021) 120 mL 11    hydroquinone 4 % cream Apply topically daily to dark spots (Patient not taking: Reported on 2021) 28 g 2    naloxone 4 MG/0.1ML LIQD nasal spray 1 spray by Nasal route as needed for Opioid Reversal (Patient not taking: Reported on 2021) 1 each 5     No current facility-administered medications for this visit. No Known Allergies            Family History   Problem Relation Age of Onset   [facility] Stroke Mother     Diabetes Mother     High Blood Pressure Mother     Cataracts Mother     Stroke Father     Diabetes Father     Heart Disease Sister 72    Diabetes Daughter     Stroke Sister 68        Brain aneurysm that ruptured and then she got kidney failure and intestinal infection and     Glaucoma Neg Hx              Social History     Socioeconomic History    Marital status: Single     Spouse name: Not on file    Number of children: Not on file    Years of education: Not on file    Highest education level: Not on file   Occupational History    Not on file   Social Needs    Financial resource strain: Not on file    Food insecurity     Worry: Not on file     Inability: Not on file    Transportation needs     Medical: Not on file     Non-medical: Not on file   Tobacco Use    Smoking status: Former Smoker     Packs/day: 1.00     Years: 26.00     Pack years: 26.00     Types: Cigarettes     Start date: 1975     Quit date: 2018     Years since quittin.3    Smokeless tobacco: Never Used   Substance and Sexual Activity    Alcohol use:  Yes     Alcohol/week: 0.0 standard drinks     Frequency: 4 or more times a week     Drinks per session: 5 or 6     Binge frequency: Never     Comment: daily; \"2 tallboys per day\"    Drug use: No    Sexual activity: Not on file   Lifestyle    Physical activity     Days per week: Not on file     Minutes per session: Not on file    Stress: Not on file   Relationships    Social connections     Talks on phone: Not on file     Gets together: Not on file     Attends Taoist service: Not on file     Active member of club or organization: Not on file     Attends meetings of clubs or organizations: Not on file     Relationship status: Not on file    Intimate partner violence     Fear of current or ex partner: Not on file     Emotionally abused: Not on file     Physically abused: Not on file     Forced sexual activity: Not on file   Other Topics Concern    Not on file   Social History Narrative    Not on file         Vitals:    05/07/21 1509   BP: 118/82   Pulse: 84   SpO2: 97%         Wt Readings from Last 3 Encounters:   05/07/21 223 lb (101.2 kg)   02/04/21 219 lb (99.3 kg)   10/16/20 213 lb 9.6 oz (96.9 kg)         BP Readings from Last 3 Encounters:   05/07/21 118/82   02/04/21 110/74   10/16/20 132/70       Hematology and Coagulation  Lab Results   Component Value Date    WBC 6.4 01/15/2019    RBC 4.29 01/15/2019    HGB 12.9 01/15/2019    HCT 39.0 01/15/2019    MCV 90.9 01/15/2019    MCH 30.0 01/15/2019    MCHC 33.0 01/15/2019    RDW 14.5 01/15/2019     01/15/2019    MPV 8.2 01/15/2019       Chemistries  Lab Results   Component Value Date     07/22/2020    K 4.2 07/22/2020     07/22/2020    CO2 27 07/22/2020    BUN 10 07/22/2020    CREATININE 0.91 07/22/2020    CALCIUM 9.5 07/22/2020    PROT 7.1 07/22/2020    LABALBU 4.0 07/22/2020    BILITOT 0.45 07/22/2020    ALKPHOS 76 07/22/2020    AST 26 07/22/2020    ALT 13 07/22/2020     Lab Results   Component Value Date    ALKPHOS 76 07/22/2020    ALT 13 07/22/2020    AST 26 07/22/2020    PROT 7.1 07/22/2020    BILITOT 0.45 07/22/2020    LABALBU 4.0 07/22/2020     Lab Results   Component Value Date    BUN 10 07/22/2020    CREATININE 0.91 07/22/2020     Lab Results   Component Value Date    CALCIUM 9.5 07/22/2020    MG 1.5 07/22/2020     Lab Results   Component Value Date    AST 26 07/22/2020    ALT 13 07/22/2020       Lab Results   Component Value Date    QKCVDRMB21 1000 06/12/2018             Review of Systems   Constitutional: Negative for appetite change, diaphoresis, fatigue, fever and unexpected weight change. HENT: Negative for dental problem, drooling, ear discharge, ear pain, facial swelling, hearing loss, mouth sores, nosebleeds, postnasal drip, sinus pressure, tinnitus, trouble swallowing and voice change. Eyes: Negative for photophobia, pain, discharge, redness, itching and visual disturbance. Respiratory: Negative for apnea, choking, chest tightness, shortness of breath and wheezing. Cardiovascular: Negative for chest pain, palpitations, leg swelling and near-syncope. Gastrointestinal: Negative for abdominal distention, abdominal pain, blood in stool, bowel incontinence, constipation, diarrhea, nausea and vomiting. Endocrine: Negative for cold intolerance, heat intolerance, polydipsia, polyphagia and polyuria. Genitourinary: Negative for bladder incontinence. Musculoskeletal: Positive for gait problem. Negative for back pain, neck pain and neck stiffness. Skin: Negative for color change, pallor and wound. Allergic/Immunologic: Negative for environmental allergies, food allergies and immunocompromised state. Neurological: Positive for focal weakness, weakness and loss of balance. Negative for dizziness, vertigo, tremors, syncope, facial asymmetry, speech difficulty, light-headedness and headaches. Hematological: Negative for adenopathy. Does not bruise/bleed easily. Psychiatric/Behavioral: Positive for decreased concentration and memory loss. Negative for agitation, behavioral problems, confusion, dysphoric mood, hallucinations, self-injury, sleep disturbance and suicidal ideas. The patient is nervous/anxious. The patient is not hyperactive. Objective:   Physical Exam  Constitutional:       Appearance: She is well-developed. HENT:      Head: Normocephalic and atraumatic. No raccoon eyes or Covington's sign. Right Ear: External ear normal.      Left Ear: External ear normal.      Nose: Nose normal.   Eyes:      Conjunctiva/sclera: Conjunctivae normal.      Pupils: Pupils are equal, round, and reactive to light. Neck:      Musculoskeletal: Normal range of motion and neck supple. Normal range of motion. No neck rigidity or muscular tenderness. Thyroid: No thyroid mass or thyromegaly. Vascular: No carotid bruit. Trachea: No tracheal deviation. Meningeal: Brudzinski's sign and Kernig's sign absent. Cardiovascular:      Rate and Rhythm: Normal rate and regular rhythm. Pulmonary:      Effort: Pulmonary effort is normal. No respiratory distress. Breath sounds: Normal breath sounds. No wheezing or rales. Musculoskeletal: Normal range of motion. General: No tenderness. Lymphadenopathy:      Cervical: No cervical adenopathy. Skin:     General: Skin is warm. Coloration: Skin is not pale. Findings: No erythema or rash. Nails: There is no clubbing. Psychiatric:         Attention and Perception: She is attentive. Mood and Affect: Mood is anxious and depressed. Affect is blunt. Affect is not labile or inappropriate. Speech: She is communicative. Speech is not rapid and pressured, delayed, slurred or tangential.         Behavior: Behavior is not agitated, slowed, aggressive, withdrawn, hyperactive or combative. Behavior is cooperative. Thought Content:  Thought content normal. Thought content is not paranoid or delusional. Thought content does not include homicidal or suicidal ideation. Thought content does not include homicidal or suicidal plan. Cognition and Memory: Cognition is impaired. Memory is impaired. She exhibits impaired recent memory. She does not exhibit impaired remote memory. Judgment: Judgment is not impulsive or inappropriate. NEUROLOGICAL EXAMINATION :    A) MENTAL STATUS:                   Alert and  oriented  To time, place  And  Person. No Aphasia. No  Dysarthria. Able   To  Follow   SIMPLE    commands without   Any  Difficulty. No right  To left confusion. Normal  Speech  And language function. Insight and  Judgment ,Fund  Of  Knowledge   Decreased                Recent  And  Remote memory  Decreased                Attention &Concentration are decreased                                                   B) CRANIAL NERVES :             2 CN : Visual  Acuity  And  Visual fields  within normal limits                        Fundi  Could  Not  Be  Could  Not  Be  Evaluated. 3,4,6 CN : Both  Pupils are   Reactive and  Equal.                            Extraocular   Movements  Are  Intact. No  Nystagmus. No  RODERICK. No  Afferent  Pupillary  Defect noted. 5 CN :  Normal  Facial sensations and Corneal  Reflexes           7 CN :  Normal  Facial  Symmetry  And  Strength. No facial  Weakness.            8 CN :  Hearing  Appears within normal limits          9, 10 CN: Normal spontaneous, reflex palate movements         11 CN:   Normal  Shoulder shrug and  Strength         12 CN :   Normal  Tongue movements and  Tongue  In midline                        No tongue   Fasciculations or atrophy         C) MOTOR  EXAM:                 Strength  In upper  And  Lower extremities   within normal limits                     Rapid alternating  And  repetitions  Movements  within normal limits                 Muscle  Tone  In Use   Of     Vitamin   B   Complex,  Folic  Acid,    Vitamin  B12        Multivitamin   Tablet  Daily    Supplementations   Over  The  Counter  Discussed               *  FOOT  CARE, DAILY  INSPECTION  OF  FEET   AND         PERIODIC  PODIATRY EVALUATIONS . *  PATIENT  IS  ALSO   COUNSELED   TO  KEEP    ACTIVITIES         A)   SIMPLE      B)  ORGANIZED      C)  WRITE   DOWN                                *      NEUROPATHIC  SYMPTOMS  BOTH  FEET     SINCE    FEB. 2020                        -   MOSTLY   DUE   TO    HER  TYPE  II    AND                                 ALCOHOL  ABUSE/  USAGE     REGULARLY                         *        PATIENT    ON  NEURONTIN        AND                                ULTRAM  FROM     HER PCP     FOR    SYMPTOMATIC   RELIEF                           -    PATIENT    ADVISED                                     A)     TO   AVOID    ALCOHOL                                    B)    ADEQUATE   DIABETIC  CONTROL                           Controlled Substances Monitoring: Periodic Controlled Substance Monitoring: Possible medication side effects, risk of tolerance/dependence & alternative treatments discussed. , Assessed functional status.  Abimbola Chan MD)          Orders Placed This Encounter   Procedures    Ambulatory referral to Podiatry             Orders Placed This Encounter   Medications    gabapentin (NEURONTIN) 300 MG capsule     Sig: ONE    CAPSULE  IN  MORNING,    NOON ,   EVENING  AND  BED  TIME   DAILY   AS  NEEDED     Dispense:  120 capsule     Refill:  2    carbidopa-levodopa (SINEMET)  MG per tablet     Sig: Taking 3  tablets at bedtime     Dispense:  180 tablet     Refill:  1    Multiple Vitamins-Minerals (MULTIVITAMIN) tablet     Sig: take 1 tablet by mouth once daily     Dispense:  30 tablet     Refill:  5    ammonium lactate (LAC-HYDRIN) 12 % lotion     Sig: apply to affected area once daily     Dispense:  400 g     Refill:  1 In  Brain Injury Medicine   American Board of Psychiatry and Neurology (ABPN)      DISCLAIMER:   Although every effort was made to ensure the accuracy of this  electronic transcription, some errors in transcription may have occurred. GENERAL PATIENT INSTRUCTIONS:     A Healthy Lifestyle: Care Instructions  Your Care Instructions  A healthy lifestyle can help you feel good, stay at a healthy weight, and have plenty of energy for both work and play. A healthy lifestyle is something you can share with your whole family. A healthy lifestyle also can lower your risk for serious health problems, such as high blood pressure, heart disease, and diabetes. You can follow a few steps listed below to improve your health and the health of your family. Follow-up care is a key part of your treatment and safety. Be sure to make and go to all appointments, and call your doctor if you are having problems. Its also a good idea to know your test results and keep a list of the medicines you take. How can you care for yourself at home? Do not eat too much sugar, fat, or fast foods. You can still have dessert and treats now and then. The goal is moderation. Start small to improve your eating habits. Pay attention to portion sizes, drink less juice and soda pop, and eat more fruits and vegetables. Eat a healthy amount of food. A 3-ounce serving of meat, for example, is about the size of a deck of cards. Fill the rest of your plate with vegetables and whole grains. Limit the amount of soda and sports drinks you have every day. Drink more water when you are thirsty. Eat at least 5 servings of fruits and vegetables every day. It may seem like a lot, but it is not hard to reach this goal. A serving or helping is 1 piece of fruit, 1 cup of vegetables, or 2 cups of leafy, raw vegetables. Have an apple or some carrot sticks as an afternoon snack instead of a candy bar.  Try to have fruits and/or vegetables at every meal.  Make exercise part of your daily routine. You may want to start with simple activities, such as walking, bicycling, or slow swimming. Try to be active 30 to 60 minutes every day. You do not need to do all 30 to 60 minutes all at once. For example, you can exercise 3 times a day for 10 or 20 minutes. Moderate exercise is safe for most people, but it is always a good idea to talk to your doctor before starting an exercise program.  Keep moving. Jose R Cho the lawn, work in the garden, or [place]. Take the stairs instead of the elevator at work. If you smoke, quit. People who smoke have an increased risk for heart attack, stroke, cancer, and other lung illnesses. Quitting is hard, but there are ways to boost your chance of quitting tobacco for good. Use nicotine gum, patches, or lozenges. Ask your doctor about stop-smoking programs and medicines. Keep trying. In addition to reducing your risk of diseases in the future, you will notice some benefits soon after you stop using tobacco. If you have shortness of breath or asthma symptoms, they will likely get better within a few weeks after you quit. Limit how much alcohol you drink. Moderate amounts of alcohol (up to 2 drinks a day for men, 1 drink a day for women) are okay. But drinking too much can lead to liver problems, high blood pressure, and other health problems. Family health  If you have a family, there are many things you can do together to improve your health. Eat meals together as a family as often as possible. Eat healthy foods. This includes fruits, vegetables, lean meats and dairy, and whole grains. Include your family in your fitness plan. Most people think of activities such as jogging or tennis as the way to fitness, but there are many ways you and your family can be more active. Anything that makes you breathe hard and gets your heart pumping is exercise. Here are some tips:  Walk to do errands or to take your child to school or the bus.   Go for

## 2021-05-13 ENCOUNTER — OFFICE VISIT (OUTPATIENT)
Dept: PODIATRY | Age: 61
End: 2021-05-13
Payer: MEDICAID

## 2021-05-13 VITALS
HEART RATE: 76 BPM | DIASTOLIC BLOOD PRESSURE: 80 MMHG | RESPIRATION RATE: 20 BRPM | WEIGHT: 222.2 LBS | BODY MASS INDEX: 40.64 KG/M2 | SYSTOLIC BLOOD PRESSURE: 130 MMHG

## 2021-05-13 DIAGNOSIS — M72.2 PLANTAR FASCIITIS: Primary | ICD-10-CM

## 2021-05-13 PROCEDURE — G8427 DOCREV CUR MEDS BY ELIG CLIN: HCPCS | Performed by: PODIATRIST

## 2021-05-13 PROCEDURE — 99203 OFFICE O/P NEW LOW 30 MIN: CPT | Performed by: PODIATRIST

## 2021-05-13 PROCEDURE — G8417 CALC BMI ABV UP PARAM F/U: HCPCS | Performed by: PODIATRIST

## 2021-05-13 PROCEDURE — 99214 OFFICE O/P EST MOD 30 MIN: CPT | Performed by: PODIATRIST

## 2021-05-13 PROCEDURE — 1036F TOBACCO NON-USER: CPT | Performed by: PODIATRIST

## 2021-05-13 PROCEDURE — 3017F COLORECTAL CA SCREEN DOC REV: CPT | Performed by: PODIATRIST

## 2021-05-13 RX ORDER — METHYLPREDNISOLONE 4 MG/1
TABLET ORAL
Qty: 1 KIT | Refills: 0 | Status: SHIPPED | OUTPATIENT
Start: 2021-05-13 | End: 2021-07-20

## 2021-05-13 NOTE — PROGRESS NOTES
Subjective:    Fernandez Cavanaugh is a 62 y.o. female who presents to the office today complaining of Bilateral heel pain. Symptoms began 3 week(s) ago. Patient relates pain is Present upon arising from bed in the morning and after periods of rest and then standing. The pain progresses throughout the day. Pain is rated 4 out of 10 and is described as intermittent. Treatments prior to today's visit include: gel pad helped a lot. . Currently denies F/C/N/V. No Known Allergies    Past Medical History:   Diagnosis Date    Alcoholism ([facility]     Anxiety     Astigmatism with presbyopia     Balance problem     B[facility] disorder ([facility])     schizoaffective disorder (Dr. Tiffani Goetz)    Chronic lumbar radiculopathy     Chronic rectal pain     due to fissures    Chronic shoulder pain     bilaterally, due to osteoarthritis    CTS (carpal tunnel syndrome)     Depression     Fatigue     Hyperlipidemia     Hypertension     Obesity     Peripheral neuropathy     Pica     eats bar soap    Restless leg syndrome     Schizoaffective disorder ([facility]     Sleeping difficulty     Tobacco abuse     Type 2 diabetes mellitus ([facility]        Prior to Admission medications    Medication Sig Start Date End Date Taking? Authorizing Provider   methylPREDNISolone (MEDROL DOSEPACK) 4 MG tablet Take by mouth.  5/13/21  Yes Teri Jane DPM   gabapentin (NEURONTIN) 300 MG capsule ONE    CAPSULE  IN  MORNING,    NOON ,   [place]  AND  BED  TIME   DAILY   AS  NEEDED 5/7/21 4/35[birth year] Yes Naun Winters MD   carbidopa-levodopa (SINEMET)  MG per tablet Taking 3  tablets at bedtime 5/7/21  Yes Naun Winters MD   Multiple Vitamins-Minerals (MULTIVITAMIN) tablet take 1 tablet by mouth once daily 5/7/21  Yes Naun Winters MD   ammonium lactate (LAC-HYDRIN) 12 % lotion apply to affected area once daily 5/7/21  Yes Naun Winters MD   celecoxib (CELEBREX) 200 MG capsule take 1 capsule by mouth twice a day 5/4/21  Yes bilateral.  CFT <3 seconds, bilateral.  Hair growth absent to the level of the digits, bilateral.  Edema present, bilateral.  Varicosities present, bilateral. Erythema absent, bilateral. Distal Rubor absent bilateral.  Temperature decreased bilateral. Hyperpigmentation present bilateral. Atrophic skin no  Neurological: Sensation Impaired to light touch to level of digits, bilateral.  Protective sensation intact  5/10 sites via 5.07/10g [place]-Tha Monofilament, bilateral.  negative Tinel's, bilateral.  negative Valleix sign, bilateral.  Vibratory abnormal  bilateral.  Reflexes Decreased bilateral.  Paresthesias positive. Dysthesias negative. Sharp/dull abnormal  bilateral.      Musculoskeletal: Muscle strength 5/5, Bilateral.  Pain present upon palpation of medial calcaneal tubercle, Bilateral.  Also pain along medial edge of the plantar fascial band just distal to the origin. within normal limits medial longitudinal arch, Bilateral.  Ankle ROM decreased,Bilateral.  1st MPJ ROM within normal limits, Bilateral.  Dorsally contracted digits absent digits 0, Bilateral. Other foot deformities none. Integument: Warm, dry, supple, bilateral.  Open lesion absent, bilateral.  Interdigital maceration absent to web spaces bilateral.  Nails within normal limits. Fissures absent, bilateral. Hyperkeratotic tissue is absent. Asessment: Patient is a 62 y.o. female with:    Diagnosis Orders   1. Plantar fasciitis         Plan: Patient examined and evaluated. Current condition and treatment options discussed in detail. Patient was given plantar fasciitis instruction sheet. Patient will start stretching, icing, anti-inflammatory, and arch supports in shoe gear 100% of the time WB. Patient will not go barefoot. Orders Placed This Encounter   Medications    methylPREDNISolone (MEDROL DOSEPACK) 4 MG tablet     Sig: Take by mouth.      Dispense:  1 kit     Refill:  0     Patient low level medical decision making overall. Patient is acute uncomplicated condition. No new testing ordered. Overall low risk of morbidity with current treatment course. Contact office with any questions/problems/concerns. Return to office in 3week(s).

## 2021-05-24 DIAGNOSIS — G89.29 CHRONIC RECTAL PAIN: Primary | ICD-10-CM

## 2021-05-24 DIAGNOSIS — K62.89 CHRONIC RECTAL PAIN: Primary | ICD-10-CM

## 2021-05-24 NOTE — TELEPHONE ENCOUNTER
Yandy called requesting a refill of the below medication which has been pended for you: [place] from [place], [place], and [place] reviewed. Tramadol 50 mg last filled 4/29/21 #90/30 days. Report available for your review.      Requested Prescriptions     Pending Prescriptions Disp Refills    traMADol (ULTRAM) 50 MG tablet [Pharmacy Med Name: TRAMADOL HCL 50 MG TABLET] 90 tablet 0     Sig: take 1 tablet by mouth every 8 hours if needed       Last Appointment Date: 2/2/2021  Next Appointment Date: 7/20/2021    No Known Allergies

## 2021-05-25 RX ORDER — TRAMADOL HYDROCHLORIDE 50 MG/1
TABLET ORAL
Qty: 90 TABLET | Refills: 1 | Status: SHIPPED | OUTPATIENT
Start: 2021-05-25 | End: 2021-07-22

## 2021-05-25 NOTE — TELEPHONE ENCOUNTER
Controlled substances monitoring: No signs of potential drug abuse or diversion identified when the [place] report from [place], [place], and [place] was reviewed today. The activity on the report was consistent with the treatment plan. I will have her sign a medication contract at her next office visit.

## 2021-05-26 DIAGNOSIS — F17.219 CIGARETTE NICOTINE DEPENDENCE WITH NICOTINE-INDUCED DISORDER: ICD-10-CM

## 2021-05-26 DIAGNOSIS — Z72.0 TOBACCO ABUSE: ICD-10-CM

## 2021-05-26 NOTE — TELEPHONE ENCOUNTER
Jasper Vo called requesting a refill of the below medication which has been pended for you:     Requested Prescriptions     Pending Prescriptions Disp Refills    nicotine (Migel Shall) 14 MG/24HR [Pharmacy Med Name: NICOTINE 14 MG/24HR PATCH] 28 patch 1     Sig: apply 1 patch TO CLEAN, DRY, AND INTACT SKIN REMOVE AND REPLACE once daily    ONETOUCH ULTRA strip [Pharmacy Med Name: Brian Zambrano TEST STRP] 300 strip 1     Sig: use 1 TEST STRIP to TEST BLOOD SUGAR three times a day       Last Appointment Date: 2/2/2021  Next Appointment Date: 7/20/2021    No Known Allergies

## 2021-05-27 RX ORDER — BLOOD SUGAR DIAGNOSTIC
STRIP MISCELLANEOUS
Qty: 300 STRIP | Refills: 1 | Status: SHIPPED | OUTPATIENT
Start: 2021-05-27 | End: 2022-02-01

## 2021-05-27 RX ORDER — NICOTINE 21 MG/24HR
PATCH, TRANSDERMAL 24 HOURS TRANSDERMAL
Qty: 28 PATCH | Refills: 1 | Status: SHIPPED | OUTPATIENT
Start: 2021-05-27 | End: 2021-07-20 | Stop reason: DRUGHIGH

## 2021-07-06 DIAGNOSIS — G89.29 CHRONIC RIGHT SHOULDER PAIN: ICD-10-CM

## 2021-07-06 DIAGNOSIS — M25.511 CHRONIC RIGHT SHOULDER PAIN: ICD-10-CM

## 2021-07-06 RX ORDER — CELECOXIB 200 MG/1
CAPSULE ORAL
Qty: 60 CAPSULE | Refills: 0 | Status: SHIPPED | OUTPATIENT
Start: 2021-07-06 | End: 2021-08-02 | Stop reason: SDUPTHER

## 2021-07-06 NOTE — TELEPHONE ENCOUNTER
Heaven Zavala called requesting a refill of the below medication which has been pended for you:     Requested Prescriptions     Pending Prescriptions Disp Refills    celecoxib (CELEBREX) 200 MG capsule 60 capsule 1       Last Appointment Date: 2/2/2021  Next Appointment Date: 7/20/2021    No Known Allergies

## 2021-07-15 DIAGNOSIS — F10.20 ALCOHOLISM (HCC): ICD-10-CM

## 2021-07-15 RX ORDER — MULTIVITAMIN WITH FOLIC ACID 400 MCG
TABLET ORAL
Qty: 30 TABLET | Refills: 5 | Status: SHIPPED | OUTPATIENT
Start: 2021-07-15 | End: 2021-09-14

## 2021-07-15 NOTE — TELEPHONE ENCOUNTER
Last Appt:  5/7/2021  Next Appt:   8/12/2021  Med verified in [facility]

## 2021-07-20 ENCOUNTER — OFFICE VISIT (OUTPATIENT)
Dept: FAMILY MEDICINE CLINIC | Age: 61
End: 2021-07-20
Payer: MEDICAID

## 2021-07-20 VITALS
WEIGHT: 221.2 LBS | HEART RATE: 72 BPM | HEIGHT: 62 IN | DIASTOLIC BLOOD PRESSURE: 82 MMHG | BODY MASS INDEX: 40.7 KG/M2 | SYSTOLIC BLOOD PRESSURE: 128 MMHG | RESPIRATION RATE: 16 BRPM

## 2021-07-20 DIAGNOSIS — L29.9 PRURITUS: ICD-10-CM

## 2021-07-20 DIAGNOSIS — Z87.891 PERSONAL HISTORY OF TOBACCO USE: ICD-10-CM

## 2021-07-20 DIAGNOSIS — E53.8 VITAMIN B12 DEFICIENCY: ICD-10-CM

## 2021-07-20 DIAGNOSIS — Z23 NEED FOR TDAP VACCINATION: ICD-10-CM

## 2021-07-20 DIAGNOSIS — E83.42 HYPOMAGNESEMIA: ICD-10-CM

## 2021-07-20 DIAGNOSIS — I10 ESSENTIAL HYPERTENSION: ICD-10-CM

## 2021-07-20 DIAGNOSIS — Z87.891 HISTORY OF TOBACCO USE: ICD-10-CM

## 2021-07-20 DIAGNOSIS — F17.210 CIGARETTE NICOTINE DEPENDENCE WITHOUT COMPLICATION: ICD-10-CM

## 2021-07-20 DIAGNOSIS — K21.9 GASTROESOPHAGEAL REFLUX DISEASE, UNSPECIFIED WHETHER ESOPHAGITIS PRESENT: ICD-10-CM

## 2021-07-20 DIAGNOSIS — E11.40 TYPE 2 DIABETES MELLITUS WITH DIABETIC NEUROPATHY, WITHOUT LONG-TERM CURRENT USE OF INSULIN (HCC): Primary | ICD-10-CM

## 2021-07-20 DIAGNOSIS — E78.2 MIXED HYPERLIPIDEMIA: ICD-10-CM

## 2021-07-20 PROCEDURE — 99214 OFFICE O/P EST MOD 30 MIN: CPT | Performed by: FAMILY MEDICINE

## 2021-07-20 PROCEDURE — G0296 VISIT TO DETERM LDCT ELIG: HCPCS | Performed by: FAMILY MEDICINE

## 2021-07-20 PROCEDURE — 99212 OFFICE O/P EST SF 10 MIN: CPT

## 2021-07-20 PROCEDURE — G8427 DOCREV CUR MEDS BY ELIG CLIN: HCPCS | Performed by: FAMILY MEDICINE

## 2021-07-20 PROCEDURE — 2022F DILAT RTA XM EVC RTNOPTHY: CPT | Performed by: FAMILY MEDICINE

## 2021-07-20 PROCEDURE — 1036F TOBACCO NON-USER: CPT | Performed by: FAMILY MEDICINE

## 2021-07-20 PROCEDURE — 3046F HEMOGLOBIN A1C LEVEL >9.0%: CPT | Performed by: FAMILY MEDICINE

## 2021-07-20 PROCEDURE — 3017F COLORECTAL CA SCREEN DOC REV: CPT | Performed by: FAMILY MEDICINE

## 2021-07-20 PROCEDURE — G8417 CALC BMI ABV UP PARAM F/U: HCPCS | Performed by: FAMILY MEDICINE

## 2021-07-20 RX ORDER — HYDROXYZINE PAMOATE 25 MG/1
25 CAPSULE ORAL NIGHTLY
COMMUNITY
Start: 2021-07-05

## 2021-07-20 RX ORDER — OMEPRAZOLE 20 MG/1
CAPSULE, DELAYED RELEASE ORAL
Qty: 90 CAPSULE | Refills: 1 | Status: SHIPPED | OUTPATIENT
Start: 2021-07-20 | End: 2022-04-21

## 2021-07-20 RX ORDER — NICOTINE 21 MG/24HR
PATCH, TRANSDERMAL 24 HOURS TRANSDERMAL
Qty: 28 PATCH | Refills: 1 | Status: CANCELLED | OUTPATIENT
Start: 2021-07-20

## 2021-07-20 RX ORDER — PSYLLIUM HUSK 3.4 G/7G
POWDER ORAL
Qty: 30 TABLET | Refills: 5 | Status: SHIPPED | OUTPATIENT
Start: 2021-07-20 | End: 2021-11-11 | Stop reason: SDUPTHER

## 2021-07-20 RX ORDER — DIPHENHYDRAMINE HCL 25 MG
CAPSULE ORAL
Qty: 60 CAPSULE | Refills: 1 | Status: SHIPPED | OUTPATIENT
Start: 2021-07-20 | End: 2021-10-11 | Stop reason: SDUPTHER

## 2021-07-20 RX ORDER — PRAVASTATIN SODIUM 40 MG
40 TABLET ORAL DAILY
Qty: 30 TABLET | Refills: 5 | Status: SHIPPED | OUTPATIENT
Start: 2021-07-20 | End: 2022-01-11

## 2021-07-20 RX ORDER — LOSARTAN POTASSIUM 100 MG/1
TABLET ORAL
Qty: 90 TABLET | Refills: 1 | Status: SHIPPED | OUTPATIENT
Start: 2021-07-20 | End: 2022-02-16

## 2021-07-20 RX ORDER — MAGNESIUM OXIDE 400 MG/1
TABLET ORAL
Qty: 30 TABLET | Refills: 5 | Status: SHIPPED | OUTPATIENT
Start: 2021-07-20 | End: 2022-01-31 | Stop reason: SDUPTHER

## 2021-07-20 NOTE — PROGRESS NOTES
[address]                        Telephone [phone]             Fax [phone]     Lindsey Dominguez  1960  MRN:  [id number]  Date of visit:  7/20/2021    Subjective:    Lindsey Dominguez is a 62 y.o. female who presents to [facility] today (7/20/2021) for follow up/evaluation of:  Diabetes and Hypertension    She states that she has been feeling well. She is tolerating her medications well. She has been able to quit smoking. She continues to use Nicotine replacement patches. Her home glucose readings have been around 175. She states that she tests once a week. She has received both doses of the Moderna Covid-19 vaccine. She has the following problem list:  Patient Active Problem List   Diagnosis    Anxiety    Depression    Schizoaffective disorder ([address]    History of tobacco use    Restless leg syndrome    Pica    Essential hypertension    Bipolar disorder ([address]    Alcoholism ([address]    Sleeping difficulty    Peripheral neuropathy    Balance problem    CTS (carpal tunnel syndrome)    Chronic lumbar radiculopathy    GERD (gastroesophageal reflux disease)    COPD (chronic obstructive pulmonary disease) ([facility])    Fatigue    Chronic rectal pain    Type 2 diabetes mellitus with diabetic neuropathy, without long-term current use of insulin ([facility])    Pruritus    Obesity (BMI 30.0-34. 9)    Mixed hyperlipidemia    Chronic right shoulder pain    Bilateral carpal tunnel syndrome    Current severe episode of major depressive disorder without psychotic features ([address]    Splenic artery aneurysm ([address]    Bipolar affective disorder, currently depressed, mild ([address]    Morbidly obese ([address]    Tobacco abuse        Current medications are:  Outpatient Medications Marked as Taking for the 7/20/21 encounter (Office Visit) with Shelley Low MD   Medication Sig Dispense Refill    hydrOXYzine (VISTARIL) 25 MG capsule       Multiple Vitamins-Minerals (MULTIVITAMIN) tablet take 1 tablet by mouth once daily 30 tablet 5    BANOPHEN 25 MG capsule take 1 capsule by mouth twice a day if needed for itching and allergies 60 capsule 0    Cyanocobalamin ER (RA VITAMIN B-12 TR) 1000 MCG TBCR take 1 tablet by mouth once daily 30 tablet 0    magnesium oxide (MAG-OX) 400 MG tablet take 1 tablet by mouth once daily 30 tablet 1    nicotine (NICODERM CQ) 14 MG/24HR apply 1 patch TO CLEAN, DRY, AND INTACT SKIN REMOVE AND REPLACE once daily 28 patch 1    ONETOUCH ULTRA strip use 1 TEST STRIP to TEST BLOOD SUGAR three times a day 300 strip 1    traMADol (ULTRAM) 50 MG tablet take 1 tablet by mouth every 8 hours if needed 90 tablet 1    gabapentin (NEURONTIN) 300 MG capsule ONE    CAPSULE  IN  MORNING,    NOON ,   EVENING  AND  BED  TIME   DAILY   AS  NEEDED 120 capsule 2    carbidopa-levodopa (SINEMET)  MG per tablet Taking 3  tablets at bedtime 180 tablet 1    ammonium lactate (LAC-HYDRIN) 12 % lotion apply to affected area once daily 400 g 1    losartan (COZAAR) 100 MG tablet take 1 tablet by mouth once daily 90 tablet 0    aspirin 325 MG EC tablet take 1 tablet by mouth once daily 30 tablet 12    risperiDONE (RISPERDAL) 1 MG tablet Take 1 tablet by mouth nightly 60 tablet 2    pravastatin (PRAVACHOL) 40 MG tablet Take 1 tablet by mouth daily 30 tablet 5    omeprazole (PRILOSEC) 20 MG delayed release capsule take 1 tablet by mouth daily 90 capsule 1    pimecrolimus (ELIDEL) 1 % cream Apply topically to rash on face 2 times daily.  30 g 11    triamcinolone (KENALOG) 0.1 % cream Apply to rash on body twice daily (not face, armpit or groin) 80 g 1    ketoconazole (NIZORAL) 2 % shampoo APPLY TO AFFECTED AREA ON SCALP 3 TO 4 TIMES A WEEK LEAVE ON FOR 5 MINUTES THEN WASH  mL 11    hydroquinone 4 % cream Apply topically daily to dark spots 28 g 2 done when she is fasting. She is tolerating Pravastatin well; this was refilled:  - pravastatin (PRAVACHOL) 40 MG tablet; Take 1 tablet by mouth daily  Dispense: 30 tablet; Refill: 5    4. Hypomagnesemia  She has orders for a magnesium level to be done when she returns for her fasting labs. Magnesium oxide was refilled:  - magnesium oxide (MAG-OX) 400 MG tablet; take 1 tablet by mouth once daily  Dispense: 30 tablet; Refill: 5    5. Vitamin B12 deficiency  Her last vitamin B12 level was 258 ng/L on 12/10/2013. Vitamin B12 was refilled:  - Cyanocobalamin ER (RA VITAMIN B-12 TR) 1000 MCG TBCR; take 1 tablet by mouth once daily  Dispense: 30 tablet; Refill: 5    Vitamin B12 level will be ordered. 6. Pruritus  Diphenhydramine was refilled:  - diphenhydrAMINE (BANOPHEN) 25 MG capsule; take 1 capsule by mouth twice a day if needed for itching and allergies  Dispense: 60 capsule; Refill: 1    7. Gastroesophageal reflux disease, unspecified whether esophagitis present  She is doing well with her current dose of Omeprazole; this was refilled:  - omeprazole (PRILOSEC) 20 MG delayed release capsule; take 1 tablet by mouth daily  Dispense: 90 capsule; Refill: 1    8. Cigarette nicotine dependence without complication  She was congratulated on quitting smoking. Nicotine patches were refilled. I will decrease her dose from 14 mg to 7 mg:  - nicotine (NICODERM CQ) 7 MG/24HR; Place 1 patch onto the skin every 24 hours  Dispense: 30 patch; Refill: 3    9. History of tobacco use  10.  Personal history of tobacco use  Low Dose CT (LDCT) Lung Screening criteria met   Age 50-69   Pack year smoking >30   Still smoking or less than 15 year since quit   No sign or symptoms of lung cancer   > 11 months since last LDCT     Risks and benefits of lung cancer screening with LDCT scans discussed:    Significance of positive screen - False-positive LDCT results often occur. 95% of all positive results do not lead to a diagnosis of cancer. Usually further imaging can resolve most false-positive results; however, some patients may require invasive procedures. Over diagnosis risk - 10% to 12% of screen-detected lung cancer cases are over diagnosedthat is, the cancer would not have been detected in the patient's lifetime without the screening. Need for follow up screens annually to continue lung cancer screening effectiveness     Risks associated with radiation from annual LDCT- Radiation exposure is about the same as for a mammogram, which is about 1/3 of the annual background radiation exposure from everyday life. Starting screening at age 54 is not likely to increase cancer risk from radiation exposure. Patients with comorbidities resulting in life expectancy of < 10 years, or that would preclude treatment of an abnormality identified on CT, should not be screened due to lack of benefit. To obtain maximal benefit from this screening, smoking cessation and long-term abstinence from smoking is critical  - FL VISIT TO DISCUSS LUNG CA SCREEN W LDCT  - CT Lung Screen (Annual); Future    11. Routine health maintenance  Health maintenance was reviewed with the patient. She has received both doses of the Moderna Covid-19 vaccine. A Pap test was recommended. Colonoscopy was recommended. She declined. Tdap was recommended and a printed prescription was given to the patient. All other health maintenance, including Pneumovax and Shingrix, is up to date at this time. There is no indication for Prevnar-13 at this time.      (Please note that portions of this note were completed with a voice-recognition program. Efforts were made to edit the dictation but occasionally words are mis-transcribed.)

## 2021-07-21 DIAGNOSIS — G89.29 CHRONIC RECTAL PAIN: Primary | ICD-10-CM

## 2021-07-21 DIAGNOSIS — K62.89 CHRONIC RECTAL PAIN: Primary | ICD-10-CM

## 2021-07-22 RX ORDER — TRAMADOL HYDROCHLORIDE 50 MG/1
TABLET ORAL
Qty: 90 TABLET | Refills: 1 | Status: SHIPPED | OUTPATIENT
Start: 2021-07-22 | End: 2021-09-17 | Stop reason: SDUPTHER

## 2021-07-22 NOTE — TELEPHONE ENCOUNTER
Last ov 07/20/2021,next ov 10/21/2021. Per [facility] requested medication last filled 06/25/2021 #90,30 day supply.

## 2021-07-23 ENCOUNTER — HOSPITAL ENCOUNTER (OUTPATIENT)
Dept: LAB | Age: 61
Discharge: HOME | End: 2021-07-23
Payer: MEDICAID

## 2021-07-23 DIAGNOSIS — R25.2 LEG CRAMPS: ICD-10-CM

## 2021-07-23 DIAGNOSIS — E78.2 MIXED HYPERLIPIDEMIA: ICD-10-CM

## 2021-07-23 DIAGNOSIS — E11.40 TYPE 2 DIABETES MELLITUS WITH DIABETIC NEUROPATHY, WITHOUT LONG-TERM CURRENT USE OF INSULIN (HCC): ICD-10-CM

## 2021-07-23 LAB
ABSOLUTE EOS #: 0.16 K/UL (ref 0–0.44)
ABSOLUTE IMMATURE GRANULOCYTE: 0.03 K/UL (ref 0–0.3)
ABSOLUTE LYMPH #: 1.63 K/UL (ref 1.1–3.7)
ABSOLUTE MONO #: 0.78 K/UL (ref 0.1–1.2)
ALBUMIN SERPL-MCNC: 3.9 G/DL (ref 3.5–5.2)
ALBUMIN/GLOBULIN RATIO: 1.2 (ref 1–2.5)
ALP BLD-CCNC: 101 U/L (ref 35–104)
ALT SERPL-CCNC: 29 U/L (ref 5–33)
ANION GAP SERPL CALCULATED.3IONS-SCNC: 8 MMOL/L (ref 9–17)
AST SERPL-CCNC: 39 U/L
BASOPHILS # BLD: 0 % (ref 0–2)
BASOPHILS ABSOLUTE: 0.03 K/UL (ref 0–0.2)
BILIRUB SERPL-MCNC: 0.63 MG/DL (ref 0.3–1.2)
BUN BLDV-MCNC: 10 MG/DL (ref 6–20)
BUN/CREAT BLD: 6 (ref 9–20)
CALCIUM SERPL-MCNC: 9 MG/DL (ref 8.6–10.4)
CHLORIDE BLD-SCNC: 92 MMOL/L (ref 98–107)
CHOLESTEROL, FASTING: 130 MG/DL
CHOLESTEROL/HDL RATIO: 2.8
CO2: 30 MMOL/L (ref 20–31)
CREAT SERPL-MCNC: 1.78 MG/DL (ref 0.5–0.9)
CREATININE URINE: 75.7 MG/DL (ref 28–217)
DIFFERENTIAL TYPE: ABNORMAL
EOSINOPHILS RELATIVE PERCENT: 2 % (ref 1–4)
GFR AFRICAN AMERICAN: 35 ML/MIN
GFR NON-AFRICAN AMERICAN: 29 ML/MIN
GFR SERPL CREATININE-BSD FRML MDRD: ABNORMAL ML/MIN/{1.73_M2}
GFR SERPL CREATININE-BSD FRML MDRD: ABNORMAL ML/MIN/{1.73_M2}
GLUCOSE BLD-MCNC: 198 MG/DL (ref 70–99)
HCT VFR BLD CALC: 40.2 % (ref 36.3–47.1)
HDLC SERPL-MCNC: 47 MG/DL
HEMOGLOBIN: 13.2 G/DL (ref 11.9–15.1)
IMMATURE GRANULOCYTES: 0 %
LDL CHOLESTEROL: 13 MG/DL (ref 0–130)
LYMPHOCYTES # BLD: 23 % (ref 24–43)
MAGNESIUM: 1.9 MG/DL (ref 1.6–2.6)
MCH RBC QN AUTO: 30.1 PG (ref 25.2–33.5)
MCHC RBC AUTO-ENTMCNC: 32.8 G/DL (ref 25.2–33.5)
MCV RBC AUTO: 91.8 FL (ref 82.6–102.9)
MICROALBUMIN/CREAT 24H UR: <12 MG/L
MICROALBUMIN/CREAT UR-RTO: NORMAL MCG/MG CREAT
MONOCYTES # BLD: 11 % (ref 3–12)
NRBC AUTOMATED: 0 PER 100 WBC
PDW BLD-RTO: 12.1 % (ref 11.8–14.4)
PLATELET # BLD: 215 K/UL (ref 138–453)
PLATELET ESTIMATE: ABNORMAL
PMV BLD AUTO: 9.8 FL (ref 8.1–13.5)
POTASSIUM SERPL-SCNC: 5.2 MMOL/L (ref 3.7–5.3)
RBC # BLD: 4.38 M/UL (ref 3.95–5.11)
RBC # BLD: ABNORMAL 10*6/UL
SEG NEUTROPHILS: 64 % (ref 36–65)
SEGMENTED NEUTROPHILS ABSOLUTE COUNT: 4.39 K/UL (ref 1.5–8.1)
SODIUM BLD-SCNC: 130 MMOL/L (ref 135–144)
TOTAL PROTEIN: 7.1 G/DL (ref 6.4–8.3)
TRIGLYCERIDE, FASTING: 352 MG/DL
VLDLC SERPL CALC-MCNC: ABNORMAL MG/DL (ref 1–30)
WBC # BLD: 7 K/UL (ref 3.5–11.3)
WBC # BLD: ABNORMAL 10*3/UL

## 2021-07-23 PROCEDURE — 36415 COLL VENOUS BLD VENIPUNCTURE: CPT

## 2021-07-23 PROCEDURE — 85025 COMPLETE CBC W/AUTO DIFF WBC: CPT

## 2021-07-23 PROCEDURE — 80053 COMPREHEN METABOLIC PANEL: CPT

## 2021-07-23 PROCEDURE — 82570 ASSAY OF URINE CREATININE: CPT

## 2021-07-23 PROCEDURE — 83735 ASSAY OF MAGNESIUM: CPT

## 2021-07-23 PROCEDURE — 82043 UR ALBUMIN QUANTITATIVE: CPT

## 2021-07-23 PROCEDURE — 80061 LIPID PANEL: CPT

## 2021-07-26 ENCOUNTER — TELEPHONE (OUTPATIENT)
Dept: FAMILY MEDICINE CLINIC | Age: 61
End: 2021-07-26

## 2021-07-26 DIAGNOSIS — E11.40 TYPE 2 DIABETES MELLITUS WITH DIABETIC NEUROPATHY, WITHOUT LONG-TERM CURRENT USE OF INSULIN (HCC): Primary | ICD-10-CM

## 2021-07-26 NOTE — TELEPHONE ENCOUNTER
----- Message from Aislinn Nieto MD sent at 7/23/2021  7:13 PM EDT -----  Please advise Yobani Bo that her kidney function is worse. Please encourage her to increase water intake. Please order a basic metabolic panel to be done in about a week. Please also add a HgbA1c. If this cannot be added, this can be done next week also.

## 2021-07-28 ENCOUNTER — TELEPHONE (OUTPATIENT)
Dept: FAMILY MEDICINE CLINIC | Age: 61
End: 2021-07-28

## 2021-07-28 ENCOUNTER — TELEPHONE (OUTPATIENT)
Dept: ONCOLOGY | Age: 61
End: 2021-07-28

## 2021-08-02 DIAGNOSIS — G63 POLYNEUROPATHY ASSOCIATED WITH UNDERLYING DISEASE (HCC): ICD-10-CM

## 2021-08-02 DIAGNOSIS — G56.03 BILATERAL CARPAL TUNNEL SYNDROME: ICD-10-CM

## 2021-08-02 DIAGNOSIS — M25.511 CHRONIC RIGHT SHOULDER PAIN: ICD-10-CM

## 2021-08-02 DIAGNOSIS — G89.29 CHRONIC RIGHT SHOULDER PAIN: ICD-10-CM

## 2021-08-02 NOTE — TELEPHONE ENCOUNTER
Sabas Foley called requesting a refill of the below medication which has been pended for you:     Requested Prescriptions     Pending Prescriptions Disp Refills    celecoxib (CELEBREX) 200 MG capsule 60 capsule 1     Sig: take 1 capsule by mouth twice a day       Last Appointment Date: 7/20/2021  Next Appointment Date: 10/21/2021    No Known Allergies

## 2021-08-03 RX ORDER — GABAPENTIN 300 MG/1
CAPSULE ORAL
Qty: 120 CAPSULE | Refills: 0 | Status: SHIPPED | OUTPATIENT
Start: 2021-08-03 | End: 2021-08-25 | Stop reason: SDUPTHER

## 2021-08-03 RX ORDER — CELECOXIB 200 MG/1
CAPSULE ORAL
Qty: 60 CAPSULE | Refills: 1 | Status: SHIPPED | OUTPATIENT
Start: 2021-08-03 | End: 2021-10-25 | Stop reason: SDUPTHER

## 2021-08-03 RX ORDER — AMMONIUM LACTATE 12 G/100G
LOTION TOPICAL
Qty: 400 G | Refills: 1 | Status: SHIPPED | OUTPATIENT
Start: 2021-08-03 | End: 2021-10-08 | Stop reason: SDUPTHER

## 2021-08-04 DIAGNOSIS — F10.20 ALCOHOLISM (HCC): ICD-10-CM

## 2021-08-04 RX ORDER — MULTIVITAMIN WITH FOLIC ACID 400 MCG
TABLET ORAL
Qty: 30 TABLET | Refills: 5 | OUTPATIENT
Start: 2021-08-04

## 2021-08-10 ENCOUNTER — OFFICE VISIT (OUTPATIENT)
Dept: PODIATRY | Age: 61
End: 2021-08-10
Payer: MEDICAID

## 2021-08-10 VITALS
DIASTOLIC BLOOD PRESSURE: 72 MMHG | SYSTOLIC BLOOD PRESSURE: 128 MMHG | WEIGHT: 221 LBS | BODY MASS INDEX: 40.42 KG/M2 | HEART RATE: 72 BPM | RESPIRATION RATE: 20 BRPM

## 2021-08-10 DIAGNOSIS — M79.672 PAIN OF LEFT HEEL: ICD-10-CM

## 2021-08-10 DIAGNOSIS — M72.2 PLANTAR FASCIITIS: Primary | ICD-10-CM

## 2021-08-10 PROCEDURE — 99999 PR OFFICE/OUTPT VISIT,PROCEDURE ONLY: CPT | Performed by: PODIATRIST

## 2021-08-10 PROCEDURE — 20550 NJX 1 TENDON SHEATH/LIGAMENT: CPT | Performed by: PODIATRIST

## 2021-08-10 PROCEDURE — 99214 OFFICE O/P EST MOD 30 MIN: CPT | Performed by: PODIATRIST

## 2021-08-10 RX ORDER — BETAMETHASONE SODIUM PHOSPHATE AND BETAMETHASONE ACETATE 3; 3 MG/ML; MG/ML
6 INJECTION, SUSPENSION INTRA-ARTICULAR; INTRALESIONAL; INTRAMUSCULAR; SOFT TISSUE ONCE
Status: COMPLETED | OUTPATIENT
Start: 2021-08-10 | End: 2021-08-10

## 2021-08-10 RX ADMIN — BETAMETHASONE SODIUM PHOSPHATE AND BETAMETHASONE ACETATE 6 MG: 3; 3 INJECTION, SUSPENSION INTRA-ARTICULAR; INTRALESIONAL; INTRAMUSCULAR at 16:42

## 2021-08-10 NOTE — PROGRESS NOTES
1 tablet by mouth once daily 7/20/21  Yes Lissa Garcia MD   diphenhydrAMINE (BANOPHEN) 25 MG capsule take 1 capsule by mouth twice a day if needed for itching and allergies 7/20/21  Yes Lissa Garcia MD   losartan (COZAAR) 100 MG tablet take 1 tablet by mouth once daily 7/20/21  Yes Lissa Garcia MD   pravastatin (PRAVACHOL) 40 MG tablet Take 1 tablet by mouth daily 7/20/21  Yes Lissa Garcia MD   omeprazole (PRILOSEC) 20 MG delayed release capsule take 1 tablet by mouth daily 7/20/21  Yes Lissa Garcia MD   nicotine (NICODERM CQ) 7 MG/24HR Place 1 patch onto the skin every 24 hours 7/20/21 7/20/22 Yes Lissa Garcia MD   Multiple Vitamins-Minerals (MULTIVITAMIN) tablet take 1 tablet by mouth once daily 7/15/21  Yes Ida Cantrell MD   [facility] ULTRA strip use 1 TEST STRIP to TEST BLOOD SUGAR three times a day 5/27/21  Yes Lissa Garcia MD   carbidopa-levodopa (SINEMET)  MG per tablet Taking 3  tablets at bedtime 5/7/21  Yes Ida Cantrell MD   aspirin 325 MG EC tablet take 1 tablet by mouth once daily 4/8/21  Yes Lissa Garcai MD   risperiDONE (RISPERDAL) 1 MG tablet Take 1 tablet by mouth nightly 2/18/21  Yes Ida Cantrell MD   pimecrolimus (ELIDEL) 1 % cream Apply topically to rash on face 2 times daily.  7/27/20  Yes Kathleen Cordoba MD   triamcinolone (KENALOG) 0.1 % cream Apply to rash on body twice daily (not face, armpit or groin) 7/27/20  Yes Kathleen Cordoba MD   ketoconazole (NIZORAL) 2 % shampoo APPLY TO AFFECTED AREA ON SCALP 3 TO 4 TIMES A WEEK LEAVE ON FOR 5 MINUTES THEN [address] OFF 7/27/20  Yes Kathleen Cordoba MD   hydroquinone 4 % cream Apply topically daily to dark spots 7/27/20  Yes Kathleen Cordoba MD   naloxone 4 MG/0.1ML LIQD nasal spray 1 spray by Nasal route as needed for Opioid Reversal 3/3/20  Yes Lissa Garcia MD   Blood Glucose Monitoring Suppl (ONE TOUCH ULTRA 2) w/Device KIT use as directed 2/19/20  Yes Lissa Garcia MD   ONETOUCH DELICA LANCETS FINE MISC TEST three times a day 8/10/17  Yes Vanesa Gann MD       Past Surgical History:   Procedure Laterality Date    ABSCESS DRAINAGE      perianal    ANUS SURGERY      lateral internal sphincterotomy    BLADDER SUSPENSION      transvaginal sling procedure for incontinence    COLONOSCOPY  2007    polypectomy x 1 (pathology: hyperplastic polyp), repair of perianal fistula    CYST REMOVAL Right 1992    wrist    OTHER SURGICAL HISTORY      perianal fistula repair eua    OTHER SURGICAL HISTORY      cystourethroscopy    UPPER GASTROINTESTINAL ENDOSCOPY      Schatzki's ring, acute gastritis, hiatal hernia    UPPER GASTROINTESTINAL ENDOSCOPY      hiatal hernia, esophagitis, gastritis       Family History   Problem Relation Age of Onset    Stroke Mother     Diabetes Mother     High Blood Pressure Mother     Cataracts Mother     Stroke Father     Diabetes Father     Heart Disease Sister 72    Diabetes Daughter     Stroke Sister 68        Brain aneurysm that ruptured and then she got kidney failure and intestinal infection and     Glaucoma Neg Hx        Social History     Tobacco Use    Smoking status: Former Smoker     Packs/day: 1.00     Years: 26.00     Pack years: 26.00     Types: Cigarettes     Start date: 1975     Quit date: 2018     Years since quittin.6    Smokeless tobacco: Never Used   Substance Use Topics    Alcohol use: Yes     Alcohol/week: 0.0 standard drinks     Comment: daily; \"2 tallboys per day\"       ROS: All 14 ROS systems reviewed and pertinent positives noted above, all others negative. Lower Extremity Physical Examination:     Vitals:   Vitals:    08/10/21 1418   BP: 128/72   Pulse: 72   Resp: 20     General: AAO x 3 in NAD.      Vascular: DP and PT pulses palpable 2/4, bilateral.  CFT <3 seconds, bilateral.  Hair growth absent to the level of the digits, bilateral.  Edema present, bilateral.  Varicosities present, bilateral. Erythema absent, bilateral. Distal Rubor absent bilateral.  Temperature decreased bilateral. Hyperpigmentation present bilateral. Atrophic skin no  Neurological: Sensation Impaired to light touch to level of digits, bilateral.  Protective sensation intact  5/10 sites via 5.07/10g [place]-Tha Monofilament, bilateral.  negative Tinel's, bilateral.  negative Valleix sign, bilateral.  Vibratory abnormal  bilateral.  Reflexes Decreased bilateral.  Paresthesias positive. Dysthesias negative. Sharp/dull abnormal  bilateral.  Musculoskeletal: Muscle strength 5/5, Bilateral.  Pain present upon palpation of medial calcaneal tubercle, L  within normal limits medial longitudinal arch, Bilateral.  Ankle ROM decreased,Bilateral.  1st MPJ ROM within normal limits, Bilateral.  Dorsally contracted digits absent digits 0, Bilateral. Other foot deformities none. Integument: Warm, dry, supple, bilateral.  Open lesion absent, bilateral.  Interdigital maceration absent to web spaces bilateral.  Nails within normal limits. Fissures absent, bilateral. Hyperkeratotic tissue is absent. Asessment: Patient is a 62 y.o. female with:    Diagnosis Orders   1. Plantar fasciitis  ID INJECT TENDON SHEATH/LIGAMENT   2. Pain of left heel  ID INJECT TENDON SHEATH/LIGAMENT       Plan: Patient examined and evaluated. Current condition and treatment options discussed in detail. Patient was given plantar fasciitis instruction sheet. Patient will start stretching, icing, anti-inflammatory, and arch supports in shoe gear 100% of the time WB. Patient will not go barefoot. Due to level of pain/deformity, it is in my medical opinion that patient will benefit from and is medically necessary for pre alison orthotics at this time. Pt was given the option of pre fabricated insoles. Pt was advised on appropriate use and how they can help their condition. Pt should use these in shoe gear 100% of the time WB.   Verbal consent obtained from patient for Left heel injection after all questions answered. Left heel palpated medially and most tender location adjacent to the medial calcaneal tubercle identified. This area was prepped with an alcohol swab and 0.5 cc of 1%lidocaine plain and 1 cc of celestone was injected to the Left heel. A band-aid was applied, patient advised of possible local steroid reaction symptoms, and patient instructed to limit activities to this area for 24 hours. Contact office with any questions/problems/concerns. Return to office in 3 week(s).

## 2021-08-10 NOTE — PROGRESS NOTES
Celestone injection in left heel given to patient by Lacho Stewart during office visit. [address] # [id number], LOT # [id number], EXP DATE 8/31/2022.

## 2021-08-18 DIAGNOSIS — M25.562 LEFT KNEE PAIN, UNSPECIFIED CHRONICITY: Primary | ICD-10-CM

## 2021-08-20 ENCOUNTER — HOSPITAL ENCOUNTER (OUTPATIENT)
Dept: LAB | Age: 61
Discharge: HOME | End: 2021-08-20
Payer: MEDICAID

## 2021-08-20 DIAGNOSIS — E53.8 VITAMIN B12 DEFICIENCY: ICD-10-CM

## 2021-08-20 DIAGNOSIS — E11.40 TYPE 2 DIABETES MELLITUS WITH DIABETIC NEUROPATHY, WITHOUT LONG-TERM CURRENT USE OF INSULIN (HCC): ICD-10-CM

## 2021-08-20 LAB
ANION GAP SERPL CALCULATED.3IONS-SCNC: 13 MMOL/L (ref 9–17)
BUN BLDV-MCNC: 11 MG/DL (ref 6–20)
BUN/CREAT BLD: 11 (ref 9–20)
CALCIUM SERPL-MCNC: 9.6 MG/DL (ref 8.6–10.4)
CHLORIDE BLD-SCNC: 98 MMOL/L (ref 98–107)
CO2: 27 MMOL/L (ref 20–31)
CREAT SERPL-MCNC: 0.96 MG/DL (ref 0.5–0.9)
ESTIMATED AVERAGE GLUCOSE: 177 MG/DL
GFR AFRICAN AMERICAN: >60 ML/MIN
GFR NON-AFRICAN AMERICAN: 60 ML/MIN
GFR SERPL CREATININE-BSD FRML MDRD: ABNORMAL ML/MIN/{1.73_M2}
GFR SERPL CREATININE-BSD FRML MDRD: ABNORMAL ML/MIN/{1.73_M2}
GLUCOSE BLD-MCNC: 199 MG/DL (ref 70–99)
HBA1C MFR BLD: 7.8 % (ref 4–6)
POTASSIUM SERPL-SCNC: 5.3 MMOL/L (ref 3.7–5.3)
SODIUM BLD-SCNC: 138 MMOL/L (ref 135–144)
VITAMIN B-12: 1139 PG/ML (ref 232–1245)

## 2021-08-20 PROCEDURE — 83036 HEMOGLOBIN GLYCOSYLATED A1C: CPT

## 2021-08-20 PROCEDURE — 80048 BASIC METABOLIC PNL TOTAL CA: CPT

## 2021-08-20 PROCEDURE — 82607 VITAMIN B-12: CPT

## 2021-08-20 PROCEDURE — 36415 COLL VENOUS BLD VENIPUNCTURE: CPT

## 2021-08-25 ENCOUNTER — OFFICE VISIT (OUTPATIENT)
Dept: NEUROLOGY | Age: 61
End: 2021-08-25
Payer: MEDICAID

## 2021-08-25 VITALS
DIASTOLIC BLOOD PRESSURE: 72 MMHG | BODY MASS INDEX: 40.67 KG/M2 | HEIGHT: 62 IN | OXYGEN SATURATION: 97 % | HEART RATE: 88 BPM | WEIGHT: 221 LBS | SYSTOLIC BLOOD PRESSURE: 126 MMHG

## 2021-08-25 DIAGNOSIS — G62.9 PERIPHERAL POLYNEUROPATHY: Primary | ICD-10-CM

## 2021-08-25 DIAGNOSIS — Z87.891 HISTORY OF TOBACCO USE: ICD-10-CM

## 2021-08-25 DIAGNOSIS — R26.89 BALANCE PROBLEM: ICD-10-CM

## 2021-08-25 DIAGNOSIS — G25.81 RESTLESS LEG SYNDROME: ICD-10-CM

## 2021-08-25 DIAGNOSIS — F25.0 SCHIZOAFFECTIVE DISORDER, BIPOLAR TYPE (HCC): ICD-10-CM

## 2021-08-25 DIAGNOSIS — M54.16 CHRONIC LUMBAR RADICULOPATHY: ICD-10-CM

## 2021-08-25 DIAGNOSIS — G63 POLYNEUROPATHY ASSOCIATED WITH UNDERLYING DISEASE (HCC): ICD-10-CM

## 2021-08-25 DIAGNOSIS — E11.40 TYPE 2 DIABETES MELLITUS WITH DIABETIC NEUROPATHY, WITHOUT LONG-TERM CURRENT USE OF INSULIN (HCC): ICD-10-CM

## 2021-08-25 DIAGNOSIS — F41.9 ANXIETY: ICD-10-CM

## 2021-08-25 DIAGNOSIS — F31.0 BIPOLAR AFFECTIVE DISORDER, CURRENT EPISODE HYPOMANIC (HCC): ICD-10-CM

## 2021-08-25 DIAGNOSIS — R53.82 CHRONIC FATIGUE: ICD-10-CM

## 2021-08-25 DIAGNOSIS — G56.03 BILATERAL CARPAL TUNNEL SYNDROME: ICD-10-CM

## 2021-08-25 DIAGNOSIS — Z72.0 TOBACCO ABUSE: ICD-10-CM

## 2021-08-25 DIAGNOSIS — E66.9 OBESITY (BMI 30.0-34.9): ICD-10-CM

## 2021-08-25 DIAGNOSIS — F31.10 BIPOLAR AFFECTIVE DISORDER, CURRENT EPISODE MANIC, CURRENT EPISODE SEVERITY UNSPECIFIED (HCC): ICD-10-CM

## 2021-08-25 DIAGNOSIS — F32.0 CURRENT MILD EPISODE OF MAJOR DEPRESSIVE DISORDER WITHOUT PRIOR EPISODE (HCC): ICD-10-CM

## 2021-08-25 DIAGNOSIS — G47.9 SLEEPING DIFFICULTY: ICD-10-CM

## 2021-08-25 DIAGNOSIS — F32.A FATIGUE DUE TO DEPRESSION: ICD-10-CM

## 2021-08-25 DIAGNOSIS — F31.31 BIPOLAR AFFECTIVE DISORDER, CURRENTLY DEPRESSED, MILD (HCC): ICD-10-CM

## 2021-08-25 DIAGNOSIS — E66.01 MORBIDLY OBESE (HCC): ICD-10-CM

## 2021-08-25 DIAGNOSIS — F10.20 ALCOHOLISM (HCC): ICD-10-CM

## 2021-08-25 DIAGNOSIS — R53.83 FATIGUE DUE TO DEPRESSION: ICD-10-CM

## 2021-08-25 PROCEDURE — 2022F DILAT RTA XM EVC RTNOPTHY: CPT | Performed by: PSYCHIATRY & NEUROLOGY

## 2021-08-25 PROCEDURE — 3051F HG A1C>EQUAL 7.0%<8.0%: CPT | Performed by: PSYCHIATRY & NEUROLOGY

## 2021-08-25 PROCEDURE — G8417 CALC BMI ABV UP PARAM F/U: HCPCS | Performed by: PSYCHIATRY & NEUROLOGY

## 2021-08-25 PROCEDURE — 3017F COLORECTAL CA SCREEN DOC REV: CPT | Performed by: PSYCHIATRY & NEUROLOGY

## 2021-08-25 PROCEDURE — 99214 OFFICE O/P EST MOD 30 MIN: CPT | Performed by: PSYCHIATRY & NEUROLOGY

## 2021-08-25 PROCEDURE — G8427 DOCREV CUR MEDS BY ELIG CLIN: HCPCS | Performed by: PSYCHIATRY & NEUROLOGY

## 2021-08-25 PROCEDURE — 1036F TOBACCO NON-USER: CPT | Performed by: PSYCHIATRY & NEUROLOGY

## 2021-08-25 RX ORDER — GABAPENTIN 300 MG/1
CAPSULE ORAL
Qty: 120 CAPSULE | Refills: 0 | Status: SHIPPED | OUTPATIENT
Start: 2021-08-25 | End: 2021-09-30 | Stop reason: SDUPTHER

## 2021-08-25 ASSESSMENT — ENCOUNTER SYMPTOMS
CHOKING: 0
BLOOD IN STOOL: 0
VISUAL CHANGE: 0
SHORTNESS OF BREATH: 0
FACIAL SWELLING: 0
CHEST TIGHTNESS: 0
PHOTOPHOBIA: 0
BACK PAIN: 0
SINUS PRESSURE: 0
BOWEL INCONTINENCE: 0
DIARRHEA: 0
ABDOMINAL PAIN: 0
EYE REDNESS: 0
VOICE CHANGE: 0
EYE DISCHARGE: 0
CONSTIPATION: 0
NAUSEA: 0
WHEEZING: 0
APNEA: 0
EYE PAIN: 0
VOMITING: 0
COLOR CHANGE: 0
ABDOMINAL DISTENTION: 0
TROUBLE SWALLOWING: 0
EYE ITCHING: 0

## 2021-08-25 NOTE — PROGRESS NOTES
Subjective:          Patient ID: Oanh Balderas is a 62 y. o.right  handed female. Neurologic Problem  The patient's primary symptoms include clumsiness, focal sensory loss, focal weakness, a loss of balance, memory loss and weakness. The patient's pertinent negatives include no altered mental status, near-syncope, slurred speech, syncope or visual change. Primary symptoms comment: RESTLESS LEG  SYNDROME. This is a chronic problem. Episode onset: MORE   THAN    5-6 YEARS. The neurological problem developed insidiously. The problem is unchanged. There was left-sided, lower extremity and right-sided focality noted. Pertinent negatives include no abdominal pain, auditory change, aura, back pain, bladder incontinence, bowel incontinence, chest pain, confusion, diaphoresis, dizziness, fatigue, fever, headaches, light-headedness, nausea, neck pain, palpitations, shortness of breath, vertigo or vomiting. Past treatments include medication. The treatment provided moderate relief. Her past medical history is significant for mood changes. There is no history of a bleeding disorder, a clotting disorder, a CVA, dementia, head trauma, liver disease or seizures. History obtained from  The patient     and other  available medical records were  Also  reviewed.                 1)   H/O    CHRONIC    DIABETIC   PERIPHERAL  NEUROPATHY                                                      -  ON  NEURONTIN                          TOLERATING  THE  SAME                     WITH   SYMPTOMATIC      IMPROVEMENT                        2)     H/O    CHRONIC      RESTLESS  LEG  SYNDROME                 &  PERIODIC  LEG  MOVEMENTS                              -  IMPROVED                              -       ON  SINEMET              3)    H/O    CHRONIC     SLEEP DIFFICULTIES                        -   RESOLVED              4)        H/O   CHRONIC   MILD  MEMORY PROBLEMS                     -    STABLE                   - PATIENT  NOT  CONCERNED            5)     H/O       CHRONIC  TOBACCO  AND  ALCOHOL  USAGE                     -   PATIENT  AWARE  OF THE  RISKS                      -   ON     NICOTINE  PATCHES                         AND   STOPPED  SMOKING   SINCE  JAN 2019                6)         CHRONIC  LUMBAR PAIN  AND JOINT  PAINS   -                           -    STABLE                       ON  ULTRAM  FROM  HER  PCP. 7)    H/O    BILATERAL  CARPAL  TUNNEL  SYNDROME                               -  STABLE            8)          CHRONIC  ANXIETY, DEPRESSION                         AND  SCHIZOAFFECTIVE  DISORDER                         --  STABLE                                -  ON   RISPERDAL                      BEING  FOLLOWED  BY  MENTAL  HEALTH  PROFESSIONALS              9)         MULTIPLE  CO  MORBID  MEDICAL CONDITIONS                    BEING  FOLLOWED  BY   HER  PCP. 10)      PATIENT  DENIES    ANY  SEIZURE  ACTIVITY  OR   MIGRAINES . 11)              NEUROPATHIC  SYMPTOMS  BOTH  FEET                          -   MOSTLY   DUE   TO    HER  TYPE  II    AND                                       CHRONIC     ALCOHOL  ABUSE/  USAGE                                    PATIENT   ON  NEURONTIN        AND                               ULTRAM  FROM     HER PCP     FOR    SYMPTOMATIC   RELIEF                         -    PATIENT    ADVISED                                     A)     TO   AVOID    ALCOHOL                                    B)   CLOSE    FOLLOW     FOR   ADEQUATE   DIABETIC  CONTROL                                       12)       H/O    WORSENING  OF  RESTLESS  LEG  SYNDROME    SINCE  SEPT. 2020                           -   IMPROVED      WITH     SINEMET    CR    TID     SINCE  OCT.    2020                               PATIENT  REPORTED      THAT  SHE  IS  SLEEPING  BETTER                13)        H/O        LEFT    HEEL    PAIN    SINCE   April absent             Neck  Pain and  Neck muscle  Spasms  Present               Radiating  down   And   Weakness           absent            Lower back   Pain  And     Spasms  Present              Radiating    Down   And   Weakness          absent                H/O   FALLS        absent               History  Of   Visual  Symptoms    Absent                  Associated   Diplopia       absent                                                              Also   Additional   Symptoms   Present    As  Documented    In   The detailed      Review  Of  Systems   And    Please   Refer   To    Them for   Additional  Information.       RECORDS   REVIEWED:    historical medical records, lab reports, office notes and radiology reports         INFORMATION   REVIEWED:     MEDICAL   HISTORY,     SURGICAL   HISTORY,   MEDICATIONS   LIST,   ALLERGIES AND  DRUG  INTOLERANCES,     FAMILY   HISTORY,  SOCIAL  HISTORY,    PROBLEM  LIST   FOR  PATIENT  CARE   COORDINATION         Past Medical History:   Diagnosis Date    [facility]     Anxiety     Astigmatism with presbyopia     Balance problem     Bipolar disorder ([facility]     schizoaffective disorder (Dr. Felisa Herr)    Chronic lumbar radiculopathy     Chronic rectal pain     due to fissures    Chronic shoulder pain     bilaterally, due to osteoarthritis    CTS (carpal tunnel syndrome)     Depression     Fatigue     Hyperlipidemia     Hypertension     Obesity     Peripheral neuropathy     Pica     eats bar soap    Restless leg syndrome     Schizoaffective disorder ([facility]     Sleeping difficulty     Tobacco abuse     Type 2 diabetes mellitus ([facility]                   Past Surgical History:   Procedure Laterality Date    ABSCESS DRAINAGE  2001    perianal    ANUS SURGERY  2001    lateral internal sphincterotomy    BLADDER SUSPENSION  2000    transvaginal sling procedure for incontinence    COLONOSCOPY  2007    polypectomy x 1 (pathology: hyperplastic polyp), repair of times daily. 30 g 11    triamcinolone (KENALOG) 0.1 % cream Apply to rash on body twice daily (not face, armpit or groin) 80 g 1    ketoconazole (NIZORAL) 2 % shampoo APPLY TO AFFECTED AREA ON SCALP 3 TO 4 TIMES A WEEK LEAVE ON FOR 5 MINUTES THEN WASH  mL 11    Blood Glucose Monitoring Suppl (ONE TOUCH ULTRA 2) w/Device KIT use as directed 1 kit 0    ONETOUCH DELICA LANCETS FINE MISC TEST three times a day 100 each 5    celecoxib (CELEBREX) 200 MG capsule take 1 capsule by mouth twice a day (Patient not taking: Reported on 2021) 60 capsule 1    hydrOXYzine (VISTARIL) 25 MG capsule  (Patient not taking: Reported on 2021)      hydroquinone 4 % cream Apply topically daily to dark spots (Patient not taking: Reported on 2021) 28 g 2    naloxone 4 MG/0.1ML LIQD nasal spray 1 spray by Nasal route as needed for Opioid Reversal (Patient not taking: Reported on 2021) 1 each 5     No current facility-administered medications for this visit.            No Known Allergies            Family History   Problem Relation Age of Onset   [de-identified] Stroke Mother     Diabetes Mother     High Blood Pressure Mother     Cataracts Mother     Stroke Father     Diabetes Father     Heart Disease Sister 72    Diabetes Daughter     Stroke Sister 68        Brain aneurysm that ruptured and then she got kidney failure and intestinal infection and     Glaucoma Neg Hx              Social History     Socioeconomic History    Marital status: Single     Spouse name: Not on file    Number of children: Not on file    Years of education: Not on file    Highest education level: Not on file   Occupational History    Not on file   Tobacco Use    Smoking status: Former Smoker     Packs/day: 1.00     Years: 26.00     Pack years: 26.00     Types: Cigarettes     Start date: 1975     Quit date: 2018     Years since quittin.6    Smokeless tobacco: Never Used   Substance and Sexual Activity    Alcohol use: Yes     Alcohol/week: 0.0 standard drinks     Comment: daily; \"2 tallboys per day\"    Drug use: No    Sexual activity: Not on file   Other Topics Concern    Not on file   Social History Narrative    Not on file     Social Determinants of Health     Financial Resource Strain:     Difficulty of Paying Living Expenses:    Food Insecurity:     Worried About Running Out of Food in the Last Year:     [address] in the Last Year:    Transportation Needs:     Lack of Transportation (Medical):      Lack of Transportation (Non-Medical):    Physical Activity:     Days of Exercise per Week:     Minutes of Exercise per Session:    Stress:     Feeling of Stress :    Social Connections:     Frequency of Communication with Friends and Family:     Frequency of Social Gatherings with Friends and Family:     Attends Congregational Services:     Active Member of Clubs or Organizations:     Attends Club or Organization Meetings:     Marital Status:    Intimate Partner Violence:     Fear of Current or Ex-Partner:     Emotionally Abused:     Physically Abused:     Sexually Abused:          Vitals:    08/25/21 1513   BP: 126/72   Pulse: 88   SpO2: 97%         Wt Readings from Last 3 Encounters:   08/25/21 221 lb (100.2 kg)   08/10/21 221 lb (100.2 kg)   07/20/21 221 lb 3.2 oz (100.3 kg)         BP Readings from Last 3 Encounters:   08/25/21 126/72   08/10/21 128/72   07/20/21 128/82       Hematology and Coagulation  Lab Results   Component Value Date    WBC 7.0 07/23/2021    RBC 4.38 07/23/2021    HGB 13.2 07/23/2021    HCT 40.2 07/23/2021    MCV 91.8 07/23/2021    MCH 30.1 07/23/2021    MCHC 32.8 07/23/2021    RDW 12.1 07/23/2021     07/23/2021    MPV 9.8 07/23/2021       Chemistries  Lab Results   Component Value Date     08/20/2021    K 5.3 08/20/2021    CL 98 08/20/2021    CO2 27 08/20/2021    BUN 11 08/20/2021    CREATININE 0.96 08/20/2021    CALCIUM 9.6 08/20/2021    PROT 7.1 07/23/2021    LABALBU 3.9 07/23/2021    BILITOT 0.63 07/23/2021    ALKPHOS 101 07/23/2021    AST 39 07/23/2021    ALT 29 07/23/2021     Lab Results   Component Value Date    ALKPHOS 101 07/23/2021    ALT 29 07/23/2021    AST 39 07/23/2021    PROT 7.1 07/23/2021    BILITOT 0.63 07/23/2021    LABALBU 3.9 07/23/2021     Lab Results   Component Value Date    BUN 11 08/20/2021    CREATININE 0.96 08/20/2021     Lab Results   Component Value Date    CALCIUM 9.6 08/20/2021    MG 1.9 07/23/2021     Lab Results   Component Value Date    AST 39 07/23/2021    ALT 29 07/23/2021       Lab Results   Component Value Date    [id number] 1422 08/20/2021             Review of Systems   Constitutional: Negative for appetite change, diaphoresis, fatigue, fever and unexpected weight change. HENT: Negative for dental problem, drooling, ear discharge, ear pain, facial swelling, hearing loss, mouth sores, nosebleeds, postnasal drip, sinus pressure, tinnitus, trouble swallowing and voice change. Eyes: Negative for photophobia, pain, discharge, redness, itching and visual disturbance. Respiratory: Negative for apnea, choking, chest tightness, shortness of breath and wheezing. Cardiovascular: Negative for chest pain, palpitations, leg swelling and near-syncope. Gastrointestinal: Negative for abdominal distention, abdominal pain, blood in stool, bowel incontinence, constipation, diarrhea, nausea and vomiting. Endocrine: Negative for cold intolerance, heat intolerance, polydipsia, polyphagia and polyuria. Genitourinary: Negative for bladder incontinence. Musculoskeletal: Positive for gait problem. Negative for back pain, neck pain and neck stiffness. Skin: Negative for color change, pallor and wound. Allergic/Immunologic: Negative for environmental allergies, food allergies and immunocompromised state. Neurological: Positive for focal weakness, weakness and loss of balance.  Negative for dizziness, vertigo, tremors, syncope, facial asymmetry, speech difficulty, light-headedness and headaches. Hematological: Negative for adenopathy. Does not bruise/bleed easily. Psychiatric/Behavioral: Positive for decreased concentration and memory loss. Negative for agitation, behavioral problems, confusion, dysphoric mood, hallucinations, self-injury, sleep disturbance and suicidal ideas. The patient is nervous/anxious. The patient is not hyperactive. Objective:   Physical Exam  Constitutional:       Appearance: She is well-developed. HENT:      Head: Normocephalic and atraumatic. No raccoon eyes or Covington's sign. Right Ear: External ear normal.      Left Ear: External ear normal.      Nose: Nose normal.   Eyes:      Conjunctiva/sclera: Conjunctivae normal.      Pupils: Pupils are equal, round, and reactive to light. Neck:      Thyroid: No thyroid mass or thyromegaly. Vascular: No carotid bruit. Trachea: No tracheal deviation. Meningeal: Brudzinski's sign and Kernig's sign absent. Cardiovascular:      Rate and Rhythm: Normal rate and regular rhythm. Pulmonary:      Effort: Pulmonary effort is normal. No respiratory distress. Breath sounds: Normal breath sounds. No wheezing or rales. Musculoskeletal:         General: No tenderness. Normal range of motion. Cervical back: Normal range of motion and neck supple. No rigidity. No muscular tenderness. Normal range of motion. Lymphadenopathy:      Cervical: No cervical adenopathy. Skin:     General: Skin is warm. Coloration: Skin is not pale. Findings: No erythema or rash. Nails: There is no clubbing. Psychiatric:         Attention and Perception: She is attentive. Mood and Affect: Mood is anxious and depressed. Affect is blunt. Affect is not labile or inappropriate. Speech: She is communicative.  Speech is not rapid and pressured, delayed, slurred or tangential.         Behavior: Behavior is not agitated, slowed, aggressive, withdrawn, hyperactive or combative. Behavior is cooperative. Thought Content: Thought content normal. Thought content is not paranoid or delusional. Thought content does not include homicidal or suicidal ideation. Thought content does not include homicidal or suicidal plan. Cognition and Memory: Cognition is impaired. Memory is impaired. She exhibits impaired recent memory. She does not exhibit impaired remote memory. Judgment: Judgment is not impulsive or inappropriate. NEUROLOGICAL EXAMINATION :    A) MENTAL STATUS:                   Alert and  oriented  To time, place  And  Person. No Aphasia. No  Dysarthria. Able   To  Follow   SIMPLE    commands without   Any  Difficulty. No right  To left confusion. Normal  Speech  And language function. Insight and  Judgment ,Fund  Of  Knowledge   Decreased                Recent  And  Remote memory  Decreased                Attention &Concentration are decreased                                                   B) CRANIAL NERVES :             2 CN : Visual  Acuity  And  Visual fields  within normal limits                        Fundi  Could  Not  Be  Could  Not  Be  Evaluated. 3,4,6 CN : Both  Pupils are   Reactive and  Equal.                            Extraocular   Movements  Are  Intact. No  Nystagmus. No  RODERICK. No  Afferent  Pupillary  Defect noted. 5 CN :  Normal  Facial sensations and Corneal  Reflexes           7 CN :  Normal  Facial  Symmetry  And  Strength. No facial  Weakness.            8 CN :  Hearing  Appears within normal limits          9, 10 CN: Normal spontaneous, reflex palate movements         11 CN:   Normal  Shoulder shrug and  Strength         12 CN :   Normal  Tongue movements and  Tongue  In midline                        No tongue   Fasciculations or atrophy         C) MOTOR  EXAM: Strength  In upper  And  Lower extremities   within normal limits                     Rapid alternating  And  repetitions  Movements  within normal limits                 Muscle  Tone  In upper  And  Lower  Extremities  within normal limits                No rigidity. No  Spasticity. Bradykinesia   absent                  No  Asterixis. Sustention  Tremor , Resting  Tremor   absent                    No other  Abnormal  Movements noted             D) SENSORY :               light touch, pinprick, position  And  Vibration  decreased          E) REFLEXES:                   Deep  Tendon  Reflexes decreased                    No pathological  Reflexes  Bilaterally. F) COORDINATION  AND  GAIT :                                Station and  Gait   SLOW                                    Romberg's test positive                          Ataxia negative            Assessment:             Patient Active Problem List   Diagnosis    Anxiety    Depression    Schizoaffective disorder ([address]    History of tobacco use    Restless leg syndrome    Pica    Essential hypertension    Bipolar disorder ([address]    Alcoholism ([address]    Sleeping difficulty    Peripheral neuropathy    Balance problem    CTS (carpal tunnel syndrome)    Chronic lumbar radiculopathy    GERD (gastroesophageal reflux disease)    COPD (chronic obstructive pulmonary disease) (HCC)    Fatigue    Chronic rectal pain    Type 2 diabetes mellitus with diabetic neuropathy, without long-term current use of insulin ([facility]    Pruritus    Obesity (BMI 30.0-34. 9)    Mixed hyperlipidemia    Chronic right shoulder pain    Bilateral carpal tunnel syndrome    Current severe episode of major depressive disorder without psychotic features ([address]    Splenic artery aneurysm ([address]    Bipolar affective disorder, currently depressed, mild ([address]    Morbidly obese ([address]    Tobacco abuse Plan:            VISITING DIAGNOSIS:      ICD-10-CM    1. Peripheral polyneuropathy  G62.9    2. Bilateral carpal tunnel syndrome  G56.03    3. Chronic lumbar radiculopathy  M54.16    4. Bipolar affective disorder, currently depressed, mild ([address]  F31.31    5. Morbidly obese ([facility])  E66.01    6. Tobacco abuse  Z72.0    7. Obesity (BMI 30.0-34. 9)  E66.9    8. Current mild episode of major depressive disorder without prior episode ([address]  F32.0    9. Chronic fatigue  R53.82    10. Anxiety  F41.9    11. Restless leg syndrome  G25.81    12. Bipolar affective disorder, current episode hypomanic ([address]  F31.0    13. Balance problem  R26.89    14. History of tobacco use  Z87.891    15. Sleeping difficulty  G47.9    16. Bipolar affective disorder, current episode manic, current episode severity unspecified ([address]  F31.10    17. Schizoaffective disorder, bipolar type ([address]  F25.0    18. Fatigue due to depression  F32.9     R53.83    19. Alcoholism ([address]  F10.20    20. Type 2 diabetes mellitus with diabetic neuropathy, without long-term current use of insulin ([facility])  E11.40                   CONCERNS   &   INCREASED   RISK   FOR         * TIA,  CEREBRO  VASCULAR  ISCHEMIA, STROKE      *   COGNITIVE  &   MEMORY PROBLEMS  AND  DEMENTIA      *  MONONEUROPATHIES, RADICULOPATHY       *   PERIPHERAL  NEUROPATHY                      VARIOUS  RISK   FACTORS   WERE  REVIEWED   AND   DISCUSSED. *  PATIENT   HAS  MULTIPLE   MEDICAL,  NEUROLOGICAL      AND   MENTAL  HEALTH  PROBLEMS . PATIENT'S   MANAGEMENT  IS  CHALLENGING. PLAN:         Saloni Che  Of  The  Diagnoses,  The  Management & Treatment  Options           AND    Care  plan  Were        Reviewed and   Discussed   With  patient. * Goals  And  Expectations  Of  The  Therapy  Discussed   And  Reviewed.        *   Benefits   And   Side  Effect  Profile  Of  Medication/s   Were   Discussed             * Need   For  Further   Follow up For  The  Various Needed          *        Antiplatelet  therapy    As   Recommended  Was   Discussed        *    Prophylactic  Use   Of     Vitamin   B   Complex,  Folic  Acid,    Vitamin  B12        Multivitamin   Tablet  Daily    Supplementations   Over  The  Counter  Discussed               *  FOOT  CARE, DAILY  INSPECTION  OF  FEET   AND         PERIODIC  PODIATRY EVALUATIONS . *  PATIENT  IS  ALSO   COUNSELED   TO  KEEP    ACTIVITIES         A)   SIMPLE      B)  ORGANIZED      C)  WRITE   DOWN                                *      NEUROPATHIC  SYMPTOMS  BOTH  FEET     SINCE    FEB. 2020                        -   MOSTLY   DUE   TO    HER  TYPE  II    AND                                 ALCOHOL  ABUSE/  USAGE                          *        PATIENT    ON  NEURONTIN        AND                                ULTRAM  FROM     HER PCP     FOR    SYMPTOMATIC   RELIEF                           -    PATIENT    ADVISED                                     A)     TO   AVOID    ALCOHOL                                    B)    ADEQUATE   DIABETIC  CONTROL                           Controlled Substances Monitoring: Periodic Controlled Substance Monitoring: Possible medication side effects, risk of tolerance/dependence & alternative treatments discussed. , Assessed functional status.  Mikie Diehl MD)            Orders Placed This Encounter   Medications    gabapentin (NEURONTIN) 300 MG capsule     Sig: ONE    CAPSULE  IN  MORNING,    NOON ,   EVENING  AND  BED  TIME   DAILY   AS  NEEDED     Dispense:  120 capsule     Refill:  0    carbidopa-levodopa (SINEMET)  MG per tablet     Sig: Taking 3  tablets at bedtime     Dispense:  180 tablet     Refill:  1               *PATIENT   TO  FOLLOW  UP  WITH   PRIMARY  CARE   AND   OTHER  CONSULTANTS  AS  BEFORE.               *TO  FOLLOW  WITH   MENTAL  HEALTH  PROFESSIONALS ,  INCLUDING            PSYCHOLOGICAL  COUNSELING   AND  PSYCHIATRIC  EVALUTIONS              * Maintain   Healthy  Life Style    With   Periodic  Monitoring  Of      Any  Medical  Conditions  Including   Elevated  Blood  Pressure,  Lipid  Profile,     Blood  Sugar levels  And   Heart  Disease. *   Period   Screening  For  Cancers  Involving  Breast,  Colon,     lungs  And  Other  Organs  As  Applicable,  In consultation   With  Your  Primary Care Providers. * Second  Neurological  Opinion  And  Evaluations  In  [facility] AND [facility]  Setting  If  Patient  Is  Interested. *  If  The  Patient remains  Neurologically  Stable   Return   To  [facility] Neurology Department       IN   3    MONTHS  TIME   FOR  FURTHER  FOLLOW UP.                   *  If   There is  Any  Significant  Worsening   Of  Current  Symptoms  And  Or  If patient  Develops   Any additional  New      Neurological  Symptoms  Or  Significant  Concerns   Should  Call  911 or  Go  To  Emergency  Department  For  Further      Immediate  Evaluation. *   The  Neurological   Findings,  Possible  Diagnosis,  Differential diagnoses   And  Options      For    Further   Investigations   And  management   Are  Discussed  Comprehensively. Medications   And  Prescription   Risks  And  Side effects  Are   Also  Discussed. The  Above  Were  Reviewed  With  patient and     questions  Answered  In  Detail. More   Than   50% of face  To face Time   Was  Spent  On  Counseling   And   Coordination  Of  Care       Of   Patient's multiple   Neurological  Problems   And   Comorbid  Medical   Conditions. Electronically signed by Sivakumar Moore MD.,  [facility]       Board Certified in  Neurology &  In  [address] of Psychiatry and Neurology ([employer])      DISCLAIMER:   Although every effort was made to ensure the accuracy of this  electronic transcription, some errors in transcription may have occurred.       GENERAL PATIENT INSTRUCTIONS:     A Healthy Lifestyle: Care Instructions  Your Care Instructions  A healthy lifestyle can help you feel good, stay at a healthy weight, and have plenty of energy for both work and play. A healthy lifestyle is something you can share with your whole family. A healthy lifestyle also can lower your risk for serious health problems, such as high blood pressure, heart disease, and diabetes. You can follow a few steps listed below to improve your health and the health of your family. Follow-up care is a key part of your treatment and safety. Be sure to make and go to all appointments, and call your doctor if you are having problems. Its also a good idea to know your test results and keep a list of the medicines you take. How can you care for yourself at home? Do not eat too much sugar, fat, or fast foods. You can still have dessert and treats now and then. The goal is moderation. Start small to improve your eating habits. Pay attention to portion sizes, drink less juice and soda pop, and eat more fruits and vegetables. Eat a healthy amount of food. A 3-ounce serving of meat, for example, is about the size of a deck of cards. Fill the rest of your plate with vegetables and whole grains. Limit the amount of soda and sports drinks you have every day. Drink more water when you are thirsty. Eat at least 5 servings of fruits and vegetables every day. It may seem like a lot, but it is not hard to reach this goal. A serving or helping is 1 piece of fruit, 1 cup of vegetables, or 2 cups of leafy, raw vegetables. Have an apple or some carrot sticks as an afternoon snack instead of a candy bar. Try to have fruits and/or vegetables at every meal.  Make exercise part of your daily routine. You may want to start with simple activities, such as walking, bicycling, or slow swimming. Try to be active 30 to 60 minutes every day. You do not need to do all 30 to 60 minutes all at once.  For example, you can exercise 3 times a day for 10 or 20 minutes. Moderate exercise is safe for most people, but it is always a good idea to talk to your doctor before starting an exercise program.  Keep moving. Shamika Agueroow the lawn, work in the garden, or [employer]. Take the stairs instead of the elevator at work. If you smoke, quit. People who smoke have an increased risk for heart attack, stroke, cancer, and other lung illnesses. Quitting is hard, but there are ways to boost your chance of quitting tobacco for good. Use nicotine gum, patches, or lozenges. Ask your doctor about stop-smoking programs and medicines. Keep trying. In addition to reducing your risk of diseases in the future, you will notice some benefits soon after you stop using tobacco. If you have shortness of breath or asthma symptoms, they will likely get better within a few weeks after you quit. Limit how much alcohol you drink. Moderate amounts of alcohol (up to 2 drinks a day for men, 1 drink a day for women) are okay. But drinking too much can lead to liver problems, high blood pressure, and other health problems. Family health  If you have a family, there are many things you can do together to improve your health. Eat meals together as a family as often as possible. Eat healthy foods. This includes fruits, vegetables, lean meats and dairy, and whole grains. Include your family in your fitness plan. Most people think of activities such as jogging or tennis as the way to fitness, but there are many ways you and your family can be more active. Anything that makes you breathe hard and gets your heart pumping is exercise. Here are some tips:  Walk to do errands or to take your child to school or the bus. Go for a family bike ride after dinner instead of watching TV. Where can you learn more? Go to https://[facility]. org and sign in to your [employer] account.  Enter I450 in the Lindsey Shell box to learn more about \"A Healthy Lifestyle: Care Instructions. \"     If you do not have an account, please click on the \"Sign Up Now\" link. Current as of: July 26, 2016  Content Version: 11.2  © 4334-0384 [employer]. Care instructions adapted under license by [facility]– [address] If you have questions about a medical condition or this instruction, always ask your healthcare professional. [address] any warranty or liability for your use of this information.

## 2021-08-26 DIAGNOSIS — M25.552 LEFT HIP PAIN: Primary | ICD-10-CM

## 2021-08-26 DIAGNOSIS — E11.40 TYPE 2 DIABETES MELLITUS WITH DIABETIC NEUROPATHY, WITHOUT LONG-TERM CURRENT USE OF INSULIN (HCC): Primary | ICD-10-CM

## 2021-09-10 DIAGNOSIS — F10.20 ALCOHOLISM (HCC): ICD-10-CM

## 2021-09-10 DIAGNOSIS — K62.89 CHRONIC RECTAL PAIN: ICD-10-CM

## 2021-09-10 DIAGNOSIS — G89.29 CHRONIC RECTAL PAIN: ICD-10-CM

## 2021-09-13 RX ORDER — TRAMADOL HYDROCHLORIDE 50 MG/1
TABLET ORAL
Qty: 90 TABLET | Refills: 1 | Status: CANCELLED | OUTPATIENT
Start: 2021-09-13 | End: 2021-11-12

## 2021-09-14 RX ORDER — MULTIVITAMIN WITH FOLIC ACID 400 MCG
TABLET ORAL
Qty: 30 TABLET | Refills: 5 | Status: SHIPPED | OUTPATIENT
Start: 2021-09-14 | End: 2021-10-01

## 2021-09-17 DIAGNOSIS — G89.29 CHRONIC RECTAL PAIN: ICD-10-CM

## 2021-09-17 DIAGNOSIS — K62.89 CHRONIC RECTAL PAIN: ICD-10-CM

## 2021-09-20 RX ORDER — TRAMADOL HYDROCHLORIDE 50 MG/1
50 TABLET ORAL EVERY 8 HOURS PRN
Qty: 90 TABLET | Refills: 0 | Status: SHIPPED | OUTPATIENT
Start: 2021-09-20 | End: 2021-10-18

## 2021-09-20 NOTE — TELEPHONE ENCOUNTER
Ivette Xie called requesting a refill of the below medication which has been pended for you: O[place] from [place], [place], and [place] reviewed. Tramadol 50 mg last filled 8/20/21 #90/30 days. Report available for your review. RR out of office    Requested Prescriptions     Pending Prescriptions Disp Refills    traMADol (ULTRAM) 50 MG tablet 90 tablet 0     Sig: Take 1 tablet by mouth every 8 hours as needed for Pain for up to 30 days.        Last Appointment Date: 7/20/2021  Next Appointment Date: Visit date not found    No Known Allergies

## 2021-09-20 NOTE — TELEPHONE ENCOUNTER
Controlled Substance Monitoring:    Acute and Chronic Pain Monitoring:   RX Monitoring 9/20/2021   Attestation -   Periodic Controlled Substance Monitoring Possible medication side effects, risk of tolerance/dependence & alternative treatments discussed. Chronic Pain > 50 MEDD -   Chronic Pain > 80 MEDD -     Controlled Substance Monitoring:    Acute and Chronic Pain Monitoring:   RX Monitoring 9/20/2021   Attestation -   Periodic Controlled Substance Monitoring Possible medication side effects, risk of tolerance/dependence & alternative treatments discussed.    Chronic Pain > 50 MEDD -   Chronic Pain > 80 MEDD -

## 2021-09-30 DIAGNOSIS — G63 POLYNEUROPATHY ASSOCIATED WITH UNDERLYING DISEASE (HCC): ICD-10-CM

## 2021-09-30 DIAGNOSIS — G56.03 BILATERAL CARPAL TUNNEL SYNDROME: ICD-10-CM

## 2021-09-30 RX ORDER — GABAPENTIN 300 MG/1
CAPSULE ORAL
Qty: 120 CAPSULE | Refills: 1 | Status: SHIPPED | OUTPATIENT
Start: 2021-09-30 | End: 2021-11-11 | Stop reason: SDUPTHER

## 2021-10-01 DIAGNOSIS — F10.20 ALCOHOLISM (HCC): ICD-10-CM

## 2021-10-01 RX ORDER — MULTIVITAMIN WITH FOLIC ACID 400 MCG
TABLET ORAL
Qty: 30 TABLET | Refills: 5 | Status: SHIPPED | OUTPATIENT
Start: 2021-10-01 | End: 2022-08-12 | Stop reason: SDUPTHER

## 2021-10-08 DIAGNOSIS — G89.29 CHRONIC RIGHT SHOULDER PAIN: ICD-10-CM

## 2021-10-08 DIAGNOSIS — L29.9 PRURITUS: ICD-10-CM

## 2021-10-08 DIAGNOSIS — M25.511 CHRONIC RIGHT SHOULDER PAIN: ICD-10-CM

## 2021-10-08 DIAGNOSIS — G63 POLYNEUROPATHY ASSOCIATED WITH UNDERLYING DISEASE (HCC): ICD-10-CM

## 2021-10-08 RX ORDER — AMMONIUM LACTATE 12 G/100G
LOTION TOPICAL
Qty: 400 G | Refills: 1 | Status: SHIPPED | OUTPATIENT
Start: 2021-10-08 | End: 2021-12-06 | Stop reason: SDUPTHER

## 2021-10-08 NOTE — TELEPHONE ENCOUNTER
Jake Rose called requesting a refill of the below medication which has been pended for you:     Requested Prescriptions     Pending Prescriptions Disp Refills    diphenhydrAMINE (BANOPHEN) 25 MG capsule 60 capsule 1     Sig: take 1 capsule by mouth twice a day if needed for itching and allergies       Last Appointment Date: 7/20/2021  Next Appointment Date: Visit date not found    No Known Allergies

## 2021-10-11 RX ORDER — DIPHENHYDRAMINE HCL 25 MG
CAPSULE ORAL
Qty: 60 CAPSULE | Refills: 1 | Status: SHIPPED | OUTPATIENT
Start: 2021-10-11 | End: 2022-01-03 | Stop reason: SDUPTHER

## 2021-10-12 NOTE — TELEPHONE ENCOUNTER
Please encourage the patient to schedule an appointment. She will be due for an appointment in October or November.

## 2021-10-18 ENCOUNTER — OFFICE VISIT (OUTPATIENT)
Dept: PRIMARY CARE CLINIC | Age: 61
End: 2021-10-18
Payer: MEDICAID

## 2021-10-18 VITALS
TEMPERATURE: 97.9 F | DIASTOLIC BLOOD PRESSURE: 70 MMHG | SYSTOLIC BLOOD PRESSURE: 138 MMHG | WEIGHT: 222.2 LBS | BODY MASS INDEX: 40.64 KG/M2 | OXYGEN SATURATION: 94 % | RESPIRATION RATE: 18 BRPM | HEART RATE: 80 BPM

## 2021-10-18 DIAGNOSIS — K62.89 CHRONIC RECTAL PAIN: ICD-10-CM

## 2021-10-18 DIAGNOSIS — M79.672 FOOT PAIN, LEFT: Primary | ICD-10-CM

## 2021-10-18 DIAGNOSIS — G89.29 CHRONIC RECTAL PAIN: ICD-10-CM

## 2021-10-18 PROCEDURE — G8417 CALC BMI ABV UP PARAM F/U: HCPCS | Performed by: FAMILY MEDICINE

## 2021-10-18 PROCEDURE — 3017F COLORECTAL CA SCREEN DOC REV: CPT | Performed by: FAMILY MEDICINE

## 2021-10-18 PROCEDURE — G8484 FLU IMMUNIZE NO ADMIN: HCPCS | Performed by: FAMILY MEDICINE

## 2021-10-18 PROCEDURE — G8427 DOCREV CUR MEDS BY ELIG CLIN: HCPCS | Performed by: FAMILY MEDICINE

## 2021-10-18 PROCEDURE — 1036F TOBACCO NON-USER: CPT | Performed by: FAMILY MEDICINE

## 2021-10-18 PROCEDURE — 99212 OFFICE O/P EST SF 10 MIN: CPT | Performed by: FAMILY MEDICINE

## 2021-10-18 PROCEDURE — 99213 OFFICE O/P EST LOW 20 MIN: CPT | Performed by: FAMILY MEDICINE

## 2021-10-18 RX ORDER — TRIAMCINOLONE ACETONIDE 1 MG/G
CREAM TOPICAL
Qty: 80 G | Refills: 2 | Status: SHIPPED | OUTPATIENT
Start: 2021-10-18 | End: 2022-10-27

## 2021-10-18 ASSESSMENT — ENCOUNTER SYMPTOMS
ALLERGIC/IMMUNOLOGIC NEGATIVE: 1
RESPIRATORY NEGATIVE: 1
GASTROINTESTINAL NEGATIVE: 1
EYES NEGATIVE: 1

## 2021-10-18 NOTE — TELEPHONE ENCOUNTER
Livan Pastor called requesting a refill of the below medication which has been pended for you: [place] from [place], and [place] reviewed. Tramadol 50 mg last filled 9/20/21 #90/30 days.  Report available for your review    Requested Prescriptions     Pending Prescriptions Disp Refills    traMADol (ULTRAM) 50 MG tablet [Pharmacy Med Name: TRAMADOL HCL 50 MG TABLET] 90 tablet 0     Sig: take 1 tablet by mouth every 8 hours AS NEEDED FOR PAIN for up to 30 DAYS       Last Appointment Date: 7/20/21  Next Appointment Date: Visit date not found    No Known Allergies

## 2021-10-18 NOTE — PROGRESS NOTES
Subjective:      Patient ID: Santiago Browne is a 62 y.o. female. HPI  Acute urgent care visit for left foot pain with swelling noted in the last week. No recalled injury or initiating event recalled. Came on gradually over the last week. She is in PT for knee arthritis , but again , doesn't recall an event or injury. No prior issues with foot pain, but does have some neuropathy symptoms in both feet.       Past Medical History:   Diagnosis Date    [facility] ([facility] [facility]     Anxiety     Astigmatism with presbyopia     Balance problem     [facility] ([facility])     schizoaffective disorder (Dr. Julien Edge)    Chronic lumbar radiculopathy     Chronic rectal pain     due to fissures    Chronic shoulder pain     bilaterally, due to osteoarthritis    CTS (carpal tunnel syndrome)     Depression     Fatigue     Hyperlipidemia     Hypertension     Obesity     Peripheral neuropathy     Pica     eats bar soap    Restless leg syndrome     Schizoaffective disorder ([facility] [address] [facility]     Sleeping difficulty     Tobacco abuse     Type 2 diabetes mellitus ([facility] [address]      Past Surgical History:   Procedure Laterality Date    ABSCESS DRAINAGE  2001    perianal    ANUS SURGERY  2001    lateral internal sphincterotomy    BLADDER SUSPENSION  2000    transvaginal sling procedure for incontinence    COLONOSCOPY  2007    polypectomy x 1 (pathology: hyperplastic polyp), repair of perianal fistula    CYST REMOVAL Right 1992    wrist    OTHER SURGICAL HISTORY  2002    perianal fistula repair eua    OTHER SURGICAL HISTORY  1993    cystourethroscopy    UPPER GASTROINTESTINAL ENDOSCOPY  2007    Schatzki's ring, acute gastritis, hiatal hernia    UPPER GASTROINTESTINAL ENDOSCOPY  2000    hiatal hernia, esophagitis, gastritis     Current Outpatient Medications   Medication Sig Dispense Refill    diphenhydrAMINE (BANOPHEN) 25 MG capsule take 1 capsule by mouth twice a day if needed for itching and allergies 60 capsule 1    ammonium lactate (LAC-HYDRIN) 12 % lotion apply to affected area once daily 400 g 1    Multiple Vitamin (MULTIVITAMIN) tablet take 1 tablet by mouth once daily 30 tablet 5    gabapentin (NEURONTIN) 300 MG capsule ONE    CAPSULE  IN  MORNING,    NOON ,   EVENING  AND  BED  TIME   DAILY   AS  NEEDED 120 capsule 1    traMADol (ULTRAM) 50 MG tablet Take 1 tablet by mouth every 8 hours as needed for Pain for up to 30 days. 90 tablet 0    carbidopa-levodopa (SINEMET)  MG per tablet Taking 3  tablets at bedtime 180 tablet 1    celecoxib (CELEBREX) 200 MG capsule take 1 capsule by mouth twice a day 60 capsule 1    hydrOXYzine (VISTARIL) 25 MG capsule       magnesium oxide (MAG-OX) 400 MG tablet take 1 tablet by mouth once daily 30 tablet 5    Cyanocobalamin ER (RA VITAMIN B-12 TR) 1000 MCG TBCR take 1 tablet by mouth once daily 30 tablet 5    losartan (COZAAR) 100 MG tablet take 1 tablet by mouth once daily 90 tablet 1    pravastatin (PRAVACHOL) 40 MG tablet Take 1 tablet by mouth daily 30 tablet 5    omeprazole (PRILOSEC) 20 MG delayed release capsule take 1 tablet by mouth daily 90 capsule 1    nicotine (NICODERM CQ) 7 MG/24HR Place 1 patch onto the skin every 24 hours 30 patch 3    ONETOUCH ULTRA strip use 1 TEST STRIP to TEST BLOOD SUGAR three times a day 300 strip 1    aspirin 325 MG EC tablet take 1 tablet by mouth once daily 30 tablet 12    risperiDONE (RISPERDAL) 1 MG tablet Take 1 tablet by mouth nightly 60 tablet 2    pimecrolimus (ELIDEL) 1 % cream Apply topically to rash on face 2 times daily.  30 g 11    triamcinolone (KENALOG) 0.1 % cream Apply to rash on body twice daily (not face, armpit or groin) 80 g 1    ketoconazole (NIZORAL) 2 % shampoo APPLY TO AFFECTED AREA ON SCALP 3 TO 4 TIMES A WEEK LEAVE ON FOR 5 MINUTES THEN WASH  mL 11    hydroquinone 4 % cream Apply topically daily to dark spots 28 g 2    naloxone 4 MG/0.1ML LIQD nasal spray 1 spray by Nasal route as needed for Opioid Reversal 1 each 5    Blood Glucose Monitoring Suppl (ONE TOUCH ULTRA 2) w/Device KIT use as directed 1 kit 0    ONETOUCH DELICA LANCETS FINE MISC TEST three times a day 100 each 5     No current facility-administered medications for this visit. No Known Allergies      Review of Systems   Constitutional: Negative. HENT: Negative. Eyes: Negative. Respiratory: Negative. Cardiovascular: Positive for leg swelling (left foot acutely). Gastrointestinal: Negative. Endocrine: Negative. Genitourinary: Negative. Musculoskeletal: Positive for arthralgias (left dorsal foot). Negative for gait problem. Skin: Negative. Allergic/Immunologic: Negative. Neurological: Negative. Hematological: Negative. Psychiatric/Behavioral: Negative. Objective:   Physical Exam  Constitutional:       General: She is not in acute distress. Appearance: She is obese. She is not toxic-appearing. HENT:      Head: Normocephalic and atraumatic. Nose: Nose normal.   Cardiovascular:      Rate and Rhythm: Normal rate. Pulses: Normal pulses. Pulmonary:      Effort: Pulmonary effort is normal. No respiratory distress. Abdominal:      General: There is no distension. Musculoskeletal:      Left foot: Normal range of motion. Tenderness (dorsal foot near mtp's, mild rash) present. No swelling or deformity. Neurological:      Mental Status: She is alert. /70   Pulse 80   Temp 97.9 °F (36.6 °C)   Resp 18   Wt 222 lb 3.2 oz (100.8 kg)   LMP 06/06/2011   SpO2 94%   BMI 40.64 kg/m²     Assessment:      Encounter Diagnosis   Name Primary?  Foot pain, left Yes     No significant swelling on exam  Pain over the dorsal mtp areas . Light rash looking like light erythema. Plan: Ice dorsal foot  Loose shoes  Triamcinolone for light rash  Ibuprofen every 8 hrs with food  Recheck if persistent or progressive.               Luisa Byrne MD

## 2021-10-19 ENCOUNTER — TELEPHONE (OUTPATIENT)
Dept: PODIATRY | Age: 61
End: 2021-10-19

## 2021-10-19 NOTE — TELEPHONE ENCOUNTER
Patient called the office, was in urgent care 10/18/21 for left foot swelling, and pain. Patient scheduled 10/29/21 but would like moved up if there are any sooner appointments available.

## 2021-10-20 ENCOUNTER — OFFICE VISIT (OUTPATIENT)
Dept: PODIATRY | Age: 61
End: 2021-10-20
Payer: MEDICAID

## 2021-10-20 ENCOUNTER — HOSPITAL ENCOUNTER (OUTPATIENT)
Dept: GENERAL RADIOLOGY | Age: 61
Discharge: HOME | End: 2021-10-22
Payer: MEDICAID

## 2021-10-20 ENCOUNTER — TELEPHONE (OUTPATIENT)
Dept: FAMILY MEDICINE CLINIC | Age: 61
End: 2021-10-20

## 2021-10-20 VITALS
RESPIRATION RATE: 20 BRPM | HEART RATE: 88 BPM | BODY MASS INDEX: 40.46 KG/M2 | WEIGHT: 221.2 LBS | DIASTOLIC BLOOD PRESSURE: 70 MMHG | SYSTOLIC BLOOD PRESSURE: 126 MMHG

## 2021-10-20 DIAGNOSIS — M79.672 FOOT PAIN, LEFT: Primary | ICD-10-CM

## 2021-10-20 DIAGNOSIS — M79.672 FOOT PAIN, LEFT: ICD-10-CM

## 2021-10-20 DIAGNOSIS — M86.9 INFLAMMATION OF BONE (HCC): ICD-10-CM

## 2021-10-20 DIAGNOSIS — K62.89 CHRONIC RECTAL PAIN: ICD-10-CM

## 2021-10-20 DIAGNOSIS — G89.29 CHRONIC RECTAL PAIN: ICD-10-CM

## 2021-10-20 PROCEDURE — G8427 DOCREV CUR MEDS BY ELIG CLIN: HCPCS | Performed by: PODIATRIST

## 2021-10-20 PROCEDURE — 99213 OFFICE O/P EST LOW 20 MIN: CPT | Performed by: PODIATRIST

## 2021-10-20 PROCEDURE — A9283 FOOT PRESS OFF LOAD SUPP DEV: HCPCS | Performed by: PODIATRIST

## 2021-10-20 PROCEDURE — 3017F COLORECTAL CA SCREEN DOC REV: CPT | Performed by: PODIATRIST

## 2021-10-20 PROCEDURE — G8417 CALC BMI ABV UP PARAM F/U: HCPCS | Performed by: PODIATRIST

## 2021-10-20 PROCEDURE — 99214 OFFICE O/P EST MOD 30 MIN: CPT | Performed by: PODIATRIST

## 2021-10-20 PROCEDURE — 73630 X-RAY EXAM OF FOOT: CPT

## 2021-10-20 PROCEDURE — G8484 FLU IMMUNIZE NO ADMIN: HCPCS | Performed by: PODIATRIST

## 2021-10-20 PROCEDURE — 1036F TOBACCO NON-USER: CPT | Performed by: PODIATRIST

## 2021-10-20 RX ORDER — TRAMADOL HYDROCHLORIDE 50 MG/1
50 TABLET ORAL EVERY 8 HOURS PRN
Qty: 90 TABLET | Refills: 0 | Status: SHIPPED | OUTPATIENT
Start: 2021-10-20 | End: 2021-11-16 | Stop reason: SDUPTHER

## 2021-10-20 RX ORDER — TRAMADOL HYDROCHLORIDE 50 MG/1
50 TABLET ORAL EVERY 8 HOURS PRN
Qty: 90 TABLET | Refills: 0 | Status: CANCELLED | OUTPATIENT
Start: 2021-10-20 | End: 2021-11-19

## 2021-10-20 RX ORDER — NAPROXEN 500 MG/1
500 TABLET ORAL 2 TIMES DAILY WITH MEALS
Qty: 40 TABLET | Refills: 0 | Status: SHIPPED | OUTPATIENT
Start: 2021-10-20 | End: 2021-11-11 | Stop reason: ALTCHOICE

## 2021-10-20 NOTE — TELEPHONE ENCOUNTER
Last ov 07/20/2021,next ov 11/10/2021. She had appointment scheduled for 10/21/2021 but was cancelled. Requested medication lasted filled 09/20/2021 #90,30 day supply.

## 2021-10-20 NOTE — TELEPHONE ENCOUNTER
See refill encounter. Ultram was refilled. She now has an appointment scheduled with me on 11/10/2021.

## 2021-10-20 NOTE — TELEPHONE ENCOUNTER
----- Message from [place] sent at 10/20/2021 11:09 AM EDT -----  Subject: Appointment Request    Reason for Call: Routine Existing Condition Follow Up    QUESTIONS  Type of Appointment? Established Patient  Reason for appointment request? Available appointments did not meet   patient need  Additional Information for Provider? Patient is calling to make an   appointment for her medication follow up . She only has enough to make it   to tomorrow. She can not get in for an appt. until Dec 9 and will need one   before to get her medication refilled. ---------------------------------------------------------------------------  --------------  [place] INFO  What is the best way for the office to contact you? OK to leave message on   voicemail  Preferred Call Back Phone Number? [phone]  ---------------------------------------------------------------------------  --------------  SCRIPT ANSWERS  Relationship to Patient? Self  (Is the patient requesting to be seen urgently for their symptoms?)? No  Is this follow up request related to routine Diabetes Management? No  Are you having any new concerns about your existing condition? No  Have you been diagnosed with, awaiting test results for, or told that you   are suspected of having COVID-19 (Coronavirus)? (If patient has tested   negative or was tested as a requirement for work, school, or travel and   not based on symptoms, answer no)? No  Within the past two weeks have you developed any of the following symptoms   (answer no if symptoms have been present longer than 2 weeks or began   more than 2 weeks ago)? Fever or Chills, Cough, Shortness of breath or   difficulty breathing, Loss of taste or smell, Sore throat, Nasal   congestion, Sneezing or runny nose, Fatigue or generalized body aches   (answer no if pain is specific to a body part e.g. back pain), Diarrhea,   Headache? No  Have you had close contact with someone with COVID-19 in the last 14 days? No  (Service Expert  click yes below to proceed with [facility] As Usual   Scheduling)?  Yes

## 2021-10-20 NOTE — PROGRESS NOTES
Subjective:    Angelica Méndez is a 62 y.o. female who presents to the office today complaining of L foot pain. .  Symptoms worse the past week. Patient relates pain is Present , no hx of trauma. The pain progresses throughout the day. Pain is rated 8 out of 10 and is described as sharp and throbbing. .Currently denies F/C/N/V. No Known Allergies    Past Medical History:   Diagnosis Date    [facility] ([facility] [address]     Anxiety     Astigmatism with presbyopia     Balance problem     Bipolar disorder ([facility])     schizoaffective disorder (Dr. Enma Isbell)    Chronic lumbar radiculopathy     Chronic rectal pain     due to fissures    Chronic shoulder pain     bilaterally, due to osteoarthritis    CTS (carpal tunnel syndrome)     Depression     Fatigue     Hyperlipidemia     Hypertension     Obesity     Peripheral neuropathy     Pica     eats bar soap    Restless leg syndrome     Schizoaffective disorder ([facility] [address] [facility]     Sleeping difficulty     Tobacco abuse     [facility]        Prior to Admission medications    Medication Sig Start Date End Date Taking? Authorizing Provider   naproxen (NAPROSYN) 500 MG tablet Take 1 tablet by mouth 2 times daily (with meals) 10/20/21  Yes George Jane DPM   triamcinolone (KENALOG) 0.1 % cream Apply topically 2 times daily.  10/18/21  Yes Ange Kirk MD   diphenhydrAMINE (BANOPHEN) 25 MG capsule take 1 capsule by mouth twice a day if needed for itching and allergies 10/11/21  Yes Roslyn Hebert MD   ammonium lactate (LAC-HYDRIN) 12 % lotion apply to affected area once daily 10/8/21  Yes Sendy Rowland MD   Multiple Vitamin (MULTIVITAMIN) tablet take 1 tablet by mouth once daily 10/1/21  Yes Sendy Rowland MD   gabapentin (NEURONTIN) 300 MG capsule ONE    CAPSULE  IN  MORNING,    NOON ,   EVENING  AND  BED  TIME   DAILY   AS  NEEDED 9/30/21 7/14/25 Yes Sendy Rowland MD   traMADol (ULTRAM) 50 MG tablet Take 1 tablet by mouth every 8 hours as needed for Pain for up to 30 days. 9/20/21 10/20/21 Yes ANABEL Liu   carbidopa-levodopa (SINEMET)  MG per tablet Taking 3  tablets at bedtime 8/25/21  Yes Leila Rose MD   celecoxib (CELEBREX) 200 MG capsule take 1 capsule by mouth twice a day 8/3/21  Yes Anastasia Benitez MD   hydrOXYzine (VISTARIL) 25 MG capsule  7/5/21  Yes Zaire Harris MD   magnesium oxide (MAG-OX) 400 MG tablet take 1 tablet by mouth once daily 7/20/21  Yes Anastasia Benitez MD   Cyanocobalamin ER (RA VITAMIN B-12 TR) 1000 MCG TBCR take 1 tablet by mouth once daily 7/20/21  Yes Anastasia Benitez MD   losartan (COZAAR) 100 MG tablet take 1 tablet by mouth once daily 7/20/21  Yes Anastasia Benitez MD   pravastatin (PRAVACHOL) 40 MG tablet Take 1 tablet by mouth daily 7/20/21  Yes Anastasia Benitez MD   omeprazole (PRILOSEC) 20 MG delayed release capsule take 1 tablet by mouth daily 7/20/21  Yes Anastasia Benitez MD   nicotine (NICODERM CQ) 7 MG/24HR Place 1 patch onto the skin every 24 hours 7/20/21 7/20/22 Yes Anastasia Benitez MD   ONETOUCH ULTRA strip use 1 TEST STRIP to TEST BLOOD SUGAR three times a day 5/27/21  Yes Anastasia Benitez MD   aspirin 325 MG EC tablet take 1 tablet by mouth once daily 4/8/21  Yes Anastasia Benitez MD   risperiDONE (RISPERDAL) 1 MG tablet Take 1 tablet by mouth nightly 2/18/21  Yes Leila Rose MD   pimecrolimus (ELIDEL) 1 % cream Apply topically to rash on face 2 times daily.  7/27/20  Yes Cathleen Mcintyre MD   triamcinolone (KENALOG) 0.1 % cream Apply to rash on body twice daily (not face, armpit or groin) 7/27/20  Yes Cathleen Mcintyre MD   ketoconazole (NIZORAL) 2 % shampoo APPLY TO AFFECTED AREA ON SCALP 3 TO 4 TIMES A WEEK LEAVE ON FOR 5 MINUTES THEN [address] OFF 7/27/20  Yes Cathleen Mcintyre MD   hydroquinone 4 % cream Apply topically daily to dark spots 7/27/20  Yes Cathleen Mcintyre MD   naloxone 4 MG/0.1ML LIQD nasal spray 1 spray by Nasal route as needed for Opioid Reversal 3/3/20  Yes Anastasia Benitez MD   Blood Glucose Monitoring Suppl (ONE TOUCH ULTRA 2) w/Device KIT use as directed 20  Yes Lana Ribera MD   [address] [place] LANCETS FINE MISC TEST three times a day 8/10/17  Yes Gabriela Khan MD       Past Surgical History:   Procedure Laterality Date    ABSCESS DRAINAGE      perianal    ANUS SURGERY      lateral internal sphincterotomy    BLADDER SUSPENSION      transvaginal sling procedure for incontinence    COLONOSCOPY  2007    polypectomy x 1 (pathology: hyperplastic polyp), repair of perianal fistula    CYST REMOVAL Right 1992    wrist    OTHER SURGICAL HISTORY      perianal fistula repair eua    OTHER SURGICAL HISTORY      cystourethroscopy    UPPER GASTROINTESTINAL ENDOSCOPY      Schatzki's ring, acute gastritis, hiatal hernia    UPPER GASTROINTESTINAL ENDOSCOPY      hiatal hernia, esophagitis, gastritis       Family History   Problem Relation Age of Onset    Stroke Mother     Diabetes Mother     High Blood Pressure Mother     Cataracts Mother     Stroke Father     Diabetes Father     Heart Disease Sister 72    Diabetes Daughter     Stroke Sister 68        Brain aneurysm that ruptured and then she got kidney failure and intestinal infection and     Glaucoma Neg Hx        Social History     Tobacco Use    Smoking status: Former Smoker     Packs/day: 1.00     Years: 26.00     Pack years: 26.00     Types: Cigarettes     Start date: 1975     Quit date: 2018     Years since quittin.8    Smokeless tobacco: Never Used   Substance Use Topics    Alcohol use: Yes     Alcohol/week: 0.0 standard drinks     Comment: daily; \"2 tallboys per day\"       ROS: All 14 ROS systems reviewed and pertinent positives noted above, all others negative. Lower Extremity Physical Examination:     Vitals:   Vitals:    10/20/21 1257   BP: 126/70   Pulse: 88   Resp: 20     General: AAO x 3 in NAD.      Vascular: DP and PT pulses palpable 2/4, bilateral.  CFT <3 seconds, bilateral.  Hair growth absent to the level of the digits, bilateral.  Edema present, bilateral.  Varicosities present, bilateral. Erythema absent, bilateral. Distal Rubor absent bilateral.  Temperature decreased bilateral. Hyperpigmentation present bilateral. Atrophic skin no  Neurological: Sensation Impaired to light touch to level of digits, bilateral.  Protective sensation intact  5/10 sites via 5.07/10g [place] Monofilament, bilateral.  negative Tinel's, bilateral.  negative Valleix sign, bilateral.  Vibratory abnormal  bilateral.  Reflexes Decreased bilateral.  Paresthesias positive. Dysthesias negative. Sharp/dull abnormal  bilateral.  Musculoskeletal: Muscle strength 5/5, Bilateral.  Pain present upon palpation of dorsal 2,3,4 met necks, L  within normal limits medial longitudinal arch, Bilateral.  Ankle ROM decreased,Bilateral.  1st MPJ ROM within normal limits, Bilateral.  Dorsally contracted digits absent digits 0, Bilateral. Other foot deformities none. Integument: Warm, dry, supple, bilateral.  Open lesion absent, bilateral.  Interdigital maceration absent to web spaces bilateral.  Nails within normal limits. Fissures absent, bilateral. Hyperkeratotic tissue is absent. Asessment: Patient is a 62 y.o. female with:    Diagnosis Orders   1. Foot pain, left  XR FOOT LEFT (MIN 3 VIEWS)    DARCO SQUARE TOE SURGICAL SHOE   2. Inflammation of bone (HCC)  XR FOOT LEFT (MIN 3 VIEWS)    DARCO SQUARE TOE SURGICAL SHOE       Plan: Patient examined and evaluated.   Current condition and treatment options discussed in detail  Orders Placed This Encounter   Medications    naproxen (NAPROSYN) 500 MG tablet     Sig: Take 1 tablet by mouth 2 times daily (with meals)     Dispense:  40 tablet     Refill:  0     Orders Placed This Encounter   Procedures    DARCO SQUARE TOE SURGICAL SHOE    XR FOOT LEFT (MIN 3 VIEWS)     Standing Status:   Future     Number of Occurrences:   1     Standing Expiration Date: 10/21/2022     Scheduling Instructions:      WB     Due to level of pain/deformity/condition, it is in my medical opinion that patient will benefit from and is medically necessary for offloading device at this time. Patient was dispensed a surgical shoe  to wear 100% of the time WB. Patient was educated on appropriate use of the device and the need to be compliant with offloading therapy. Patient understands that non compliance with the device will be detrimental to the healing of the condition/ulceration. Patient needs the device to help support the foot and reduce pain. This device is medically necessary due to above listed diagnosis. Contact office with any questions/problems/concerns. Return to office in 3 week(s).

## 2021-10-20 NOTE — TELEPHONE ENCOUNTER
Controlled substances monitoring: No signs of potential drug abuse or diversion identified when the [place] from [place], [place], and [place] was reviewed today. The activity on the report was consistent with the treatment plan. She now has an appointment scheduled with me on 11/10/2021.

## 2021-10-20 NOTE — TELEPHONE ENCOUNTER
Last ov 07/20/2021,next ov 12/15/2021. She had appointment scheduled for 10/21/2021 but was cancelled. Requested medication lasted filled 09/20/2021 #90,30 day supply.

## 2021-10-25 DIAGNOSIS — G89.29 CHRONIC RIGHT SHOULDER PAIN: ICD-10-CM

## 2021-10-25 DIAGNOSIS — M25.511 CHRONIC RIGHT SHOULDER PAIN: ICD-10-CM

## 2021-10-26 RX ORDER — CELECOXIB 200 MG/1
200 CAPSULE ORAL 2 TIMES DAILY PRN
Qty: 60 CAPSULE | Refills: 1 | Status: SHIPPED | OUTPATIENT
Start: 2021-10-26 | End: 2021-11-11

## 2021-11-11 ENCOUNTER — HOSPITAL ENCOUNTER (OUTPATIENT)
Dept: GENERAL RADIOLOGY | Age: 61
Discharge: HOME | End: 2021-11-13
Payer: MEDICAID

## 2021-11-11 ENCOUNTER — OFFICE VISIT (OUTPATIENT)
Dept: NEUROLOGY | Age: 61
End: 2021-11-11
Payer: MEDICAID

## 2021-11-11 ENCOUNTER — OFFICE VISIT (OUTPATIENT)
Dept: PODIATRY | Age: 61
End: 2021-11-11
Payer: MEDICAID

## 2021-11-11 VITALS
WEIGHT: 215 LBS | SYSTOLIC BLOOD PRESSURE: 100 MMHG | DIASTOLIC BLOOD PRESSURE: 60 MMHG | HEIGHT: 62 IN | HEART RATE: 88 BPM | BODY MASS INDEX: 39.56 KG/M2

## 2021-11-11 VITALS
DIASTOLIC BLOOD PRESSURE: 60 MMHG | SYSTOLIC BLOOD PRESSURE: 100 MMHG | WEIGHT: 214 LBS | BODY MASS INDEX: 39.38 KG/M2 | HEIGHT: 62 IN

## 2021-11-11 DIAGNOSIS — F10.20 ALCOHOLISM (HCC): ICD-10-CM

## 2021-11-11 DIAGNOSIS — G62.9 PERIPHERAL POLYNEUROPATHY: ICD-10-CM

## 2021-11-11 DIAGNOSIS — R26.89 BALANCE PROBLEM: ICD-10-CM

## 2021-11-11 DIAGNOSIS — F31.31 BIPOLAR AFFECTIVE DISORDER, CURRENTLY DEPRESSED, MILD (HCC): ICD-10-CM

## 2021-11-11 DIAGNOSIS — M79.672 FOOT PAIN, LEFT: ICD-10-CM

## 2021-11-11 DIAGNOSIS — R53.82 CHRONIC FATIGUE: ICD-10-CM

## 2021-11-11 DIAGNOSIS — G56.03 BILATERAL CARPAL TUNNEL SYNDROME: ICD-10-CM

## 2021-11-11 DIAGNOSIS — G47.9 SLEEPING DIFFICULTY: ICD-10-CM

## 2021-11-11 DIAGNOSIS — M54.16 CHRONIC LUMBAR RADICULOPATHY: ICD-10-CM

## 2021-11-11 DIAGNOSIS — G25.81 RESTLESS LEG SYNDROME: ICD-10-CM

## 2021-11-11 DIAGNOSIS — G63 POLYNEUROPATHY ASSOCIATED WITH UNDERLYING DISEASE (HCC): Primary | ICD-10-CM

## 2021-11-11 DIAGNOSIS — E66.9 OBESITY (BMI 30.0-34.9): ICD-10-CM

## 2021-11-11 DIAGNOSIS — M86.9 INFLAMMATION OF BONE (HCC): Primary | ICD-10-CM

## 2021-11-11 DIAGNOSIS — E53.8 VITAMIN B12 DEFICIENCY: ICD-10-CM

## 2021-11-11 DIAGNOSIS — M86.9 INFLAMMATION OF BONE (HCC): ICD-10-CM

## 2021-11-11 DIAGNOSIS — E11.40 TYPE 2 DIABETES MELLITUS WITH DIABETIC NEUROPATHY, WITHOUT LONG-TERM CURRENT USE OF INSULIN (HCC): ICD-10-CM

## 2021-11-11 DIAGNOSIS — E66.01 MORBIDLY OBESE (HCC): ICD-10-CM

## 2021-11-11 DIAGNOSIS — F41.9 ANXIETY: ICD-10-CM

## 2021-11-11 PROCEDURE — 73630 X-RAY EXAM OF FOOT: CPT

## 2021-11-11 PROCEDURE — G8417 CALC BMI ABV UP PARAM F/U: HCPCS | Performed by: PODIATRIST

## 2021-11-11 PROCEDURE — 99214 OFFICE O/P EST MOD 30 MIN: CPT | Performed by: PSYCHIATRY & NEUROLOGY

## 2021-11-11 PROCEDURE — 99214 OFFICE O/P EST MOD 30 MIN: CPT | Performed by: PODIATRIST

## 2021-11-11 PROCEDURE — G8484 FLU IMMUNIZE NO ADMIN: HCPCS | Performed by: PODIATRIST

## 2021-11-11 PROCEDURE — 1036F TOBACCO NON-USER: CPT | Performed by: PSYCHIATRY & NEUROLOGY

## 2021-11-11 PROCEDURE — 3017F COLORECTAL CA SCREEN DOC REV: CPT | Performed by: PODIATRIST

## 2021-11-11 PROCEDURE — G8417 CALC BMI ABV UP PARAM F/U: HCPCS | Performed by: PSYCHIATRY & NEUROLOGY

## 2021-11-11 PROCEDURE — 3051F HG A1C>EQUAL 7.0%<8.0%: CPT | Performed by: PSYCHIATRY & NEUROLOGY

## 2021-11-11 PROCEDURE — G8427 DOCREV CUR MEDS BY ELIG CLIN: HCPCS | Performed by: PSYCHIATRY & NEUROLOGY

## 2021-11-11 PROCEDURE — 2022F DILAT RTA XM EVC RTNOPTHY: CPT | Performed by: PSYCHIATRY & NEUROLOGY

## 2021-11-11 PROCEDURE — G8484 FLU IMMUNIZE NO ADMIN: HCPCS | Performed by: PSYCHIATRY & NEUROLOGY

## 2021-11-11 PROCEDURE — 99213 OFFICE O/P EST LOW 20 MIN: CPT | Performed by: PODIATRIST

## 2021-11-11 PROCEDURE — 1036F TOBACCO NON-USER: CPT | Performed by: PODIATRIST

## 2021-11-11 PROCEDURE — G8427 DOCREV CUR MEDS BY ELIG CLIN: HCPCS | Performed by: PODIATRIST

## 2021-11-11 PROCEDURE — 3017F COLORECTAL CA SCREEN DOC REV: CPT | Performed by: PSYCHIATRY & NEUROLOGY

## 2021-11-11 RX ORDER — PSYLLIUM HUSK 3.4 G/7G
POWDER ORAL
Qty: 30 TABLET | Refills: 5 | Status: SHIPPED | OUTPATIENT
Start: 2021-11-11 | End: 2022-03-31 | Stop reason: SDUPTHER

## 2021-11-11 RX ORDER — GABAPENTIN 300 MG/1
CAPSULE ORAL
Qty: 120 CAPSULE | Refills: 2 | Status: SHIPPED | OUTPATIENT
Start: 2021-11-11 | End: 2022-02-15

## 2021-11-11 ASSESSMENT — ENCOUNTER SYMPTOMS
VOMITING: 0
FACIAL SWELLING: 0
SINUS PRESSURE: 0
VOICE CHANGE: 0
TROUBLE SWALLOWING: 0
VISUAL CHANGE: 0
BLOOD IN STOOL: 0
EYE ITCHING: 0
CONSTIPATION: 0
ABDOMINAL DISTENTION: 0
EYE REDNESS: 0
BOWEL INCONTINENCE: 0
CHOKING: 0
BACK PAIN: 0
DIARRHEA: 0
EYE DISCHARGE: 0
WHEEZING: 0
COLOR CHANGE: 0
APNEA: 0
SHORTNESS OF BREATH: 0
PHOTOPHOBIA: 0
NAUSEA: 0
CHEST TIGHTNESS: 0
ABDOMINAL PAIN: 0
EYE PAIN: 0

## 2021-11-11 NOTE — PROGRESS NOTES
Subjective:    Debra Jacobson is a 62 y.o. female who presents to the office today complaining of L foot pain. .  Symptoms much improved. Patient relates pain is absent. Patient tolerated surgical shoe well. Currently denies F/C/N/V. No Known Allergies    Past Medical History:   Diagnosis Date    [facility]ism ([facility]     Anxiety     Astigmatism with presbyopia     Balance problem     Bipolar disorder ([facility])     schizoaffective disorder (Dr. Colin Yañez)    Chronic lumbar radiculopathy     Chronic rectal pain     due to fissures    Chronic shoulder pain     bilaterally, due to osteoarthritis    CTS (carpal tunnel syndrome)     Depression     Fatigue     Hyperlipidemia     Hypertension     Obesity     Peripheral neuropathy     Pica     eats bar soap    Restless leg syndrome     Schizoaffective disorder ([facility] [address] [facility]     Sleeping difficulty     Tobacco abuse     Type 2 diabetes mellitus ([facility]        Prior to Admission medications    Medication Sig Start Date End Date Taking? Authorizing Provider   celecoxib (CELEBREX) 200 MG capsule Take 1 capsule by mouth 2 times daily as needed for Pain take 1 capsule by mouth twice a day 10/26/21  Yes Nahed Hercules MD   traMADol (ULTRAM) 50 MG tablet take 1 tablet by mouth every 8 hours AS NEEDED FOR PAIN for up to 30 DAYS 10/20/21 11/19/21 Yes Nahed Hercules MD   naproxen (NAPROSYN) 500 MG tablet Take 1 tablet by mouth 2 times daily (with meals) 10/20/21  Yes Tadeo Border TERE Jane   triamcinolone (KENALOG) 0.1 % cream Apply topically 2 times daily.  10/18/21  Yes Candace Finch MD   diphenhydrAMINE (BANOPHEN) 25 MG capsule take 1 capsule by mouth twice a day if needed for itching and allergies 10/11/21  Yes Nahed Hercules MD   ammonium lactate (LAC-HYDRIN) 12 % lotion apply to affected area once daily 10/8/21  Yes Corina Santiago MD   Multiple Vitamin (MULTIVITAMIN) tablet take 1 tablet by mouth once daily 10/1/21  Yes Corina Santiago MD   gabapentin (NEURONTIN) 300 MG capsule ONE    CAPSULE  IN  [facility]  AND  BED  TIME   DAILY   AS  NEEDED 9/30/21 4/20/90 Yes Jasmine Rehman MD   carbidopa-levodopa (SINEMET)  MG per tablet Taking 3  tablets at bedtime 8/25/21  Yes Jasmine Rehman MD   hydrOXYzine (VISTARIL) 25 MG capsule  7/5/21  Yes Zaire Harris MD   magnesium oxide (MAG-OX) 400 MG tablet take 1 tablet by mouth once daily 7/20/21  Yes Silvana Addison MD   Cyanocobalamin ER (RA VITAMIN B-12 TR) 1000 MCG TBCR take 1 tablet by mouth once daily 7/20/21  Yes Silvana Addison MD   losartan (COZAAR) 100 MG tablet take 1 tablet by mouth once daily 7/20/21  Yes Silvana Addison MD   pravastatin (PRAVACHOL) 40 MG tablet Take 1 tablet by mouth daily 7/20/21  Yes Silvana Addison MD   omeprazole (PRILOSEC) 20 MG delayed release capsule take 1 tablet by mouth daily 7/20/21  Yes Silvana Addison MD   nicotine (NICODERM CQ) 7 MG/24HR Place 1 patch onto the skin every 24 hours 7/20/21 7/20/22 Yes Silvana Addison MD   ONETOUCH ULTRA strip use 1 TEST STRIP to TEST BLOOD SUGAR three times a day 5/27/21  Yes Silvana Addison MD   aspirin 325 MG EC tablet take 1 tablet by mouth once daily 4/8/21  Yes Silvana Addison MD   risperiDONE (RISPERDAL) 1 MG tablet Take 1 tablet by mouth nightly 2/18/21  Yes Jasmine Rehman MD   pimecrolimus (ELIDEL) 1 % cream Apply topically to rash on face 2 times daily.  7/27/20  Yes Anoop Dawson MD   triamcinolone (KENALOG) 0.1 % cream Apply to rash on body twice daily (not face, armpit or groin) 7/27/20  Yes Anoop Dawson MD   ketoconazole (NIZORAL) 2 % shampoo APPLY TO AFFECTED AREA ON SCALP 3 TO 4 TIMES A WEEK LEAVE ON FOR 5 MINUTES THEN [address] OFF 7/27/20  Yes Anoop Dawson MD   hydroquinone 4 % cream Apply topically daily to dark spots 7/27/20  Yes Anoop Dawson MD   naloxone 4 MG/0.1ML LIQD nasal spray 1 spray by Nasal route as needed for Opioid Reversal 3/3/20  Yes Silvana Addison MD   Blood Glucose Monitoring Suppl (ONE TOUCH ULTRA 2) w/Device KIT use as directed 20  Yes Fly Corey MD   [address] [place] LANCETS FINE MISC TEST three times a day 8/10/17  Yes Austin Quintana MD       Past Surgical History:   Procedure Laterality Date    ABSCESS DRAINAGE      perianal    ANUS SURGERY      lateral internal sphincterotomy    BLADDER SUSPENSION      transvaginal sling procedure for incontinence    COLONOSCOPY  2007    polypectomy x 1 (pathology: hyperplastic polyp), repair of perianal fistula    CYST REMOVAL Right 1992    wrist    OTHER SURGICAL HISTORY      perianal fistula repair eua    OTHER SURGICAL HISTORY      cystourethroscopy    UPPER GASTROINTESTINAL ENDOSCOPY      Schatzki's ring, acute gastritis, hiatal hernia    UPPER GASTROINTESTINAL ENDOSCOPY      hiatal hernia, esophagitis, gastritis       Family History   Problem Relation Age of Onset    Stroke Mother     Diabetes Mother     High Blood Pressure Mother     Cataracts Mother     Stroke Father     Diabetes Father     Heart Disease Sister 72    Diabetes Daughter     Stroke Sister 68        Brain aneurysm that ruptured and then she got kidney failure and intestinal infection and     Glaucoma Neg Hx        Social History     Tobacco Use    Smoking status: Former Smoker     Packs/day: 1.00     Years: 26.00     Pack years: 26.00     Types: Cigarettes     Start date: 1975     Quit date: 2018     Years since quittin.9    Smokeless tobacco: Never Used   Substance Use Topics    Alcohol use: Yes     Alcohol/week: 0.0 standard drinks     Comment: daily; \"2 tallboys per day\"       ROS: All 14 ROS systems reviewed and pertinent positives noted above, all others negative. Lower Extremity Physical Examination:     Vitals:   Vitals:    21 1300   BP: 100/60   Pulse: 88     General: AAO x 3 in NAD.      Vascular: DP and PT pulses palpable 2/4, bilateral.  CFT <3 seconds, bilateral.  Hair growth absent to the level of the digits, bilateral.  Edema present, bilateral.  Varicosities present, bilateral. Erythema absent, bilateral. Distal Rubor absent bilateral.  Temperature decreased bilateral. Hyperpigmentation present bilateral. Atrophic skin no  Neurological: Sensation Impaired to light touch to level of digits, bilateral.  Protective sensation intact  5/10 sites via 5.07/10g Flint-Tha Monofilament, bilateral.  negative Tinel's, bilateral.  negative Valleix sign, bilateral.  Vibratory abnormal  bilateral.  Reflexes Decreased bilateral.  Paresthesias positive. Dysthesias negative. Sharp/dull abnormal  bilateral.  Musculoskeletal: Muscle strength 5/5, Bilateral.  Pain absent upon palpation of dorsal 2,3,4 met necks, L  within normal limits medial longitudinal arch, Bilateral.  Ankle ROM decreased,Bilateral.  1st MPJ ROM within normal limits, Bilateral.  Dorsally contracted digits absent digits 0, Bilateral. Other foot deformities none. Integument: Warm, dry, supple, bilateral.  Open lesion absent, bilateral.  Interdigital maceration absent to web spaces bilateral.  Nails within normal limits. Fissures absent, bilateral. Hyperkeratotic tissue is absent. Asessment: Patient is a 62 y.o. female with:    Diagnosis Orders   1. Inflammation of bone ([address]     2. Foot pain, left         Plan: Patient examined and evaluated. Current condition and treatment options discussed in detail  Use anti-inflammatory as needed  Patient will stop the surgical shoe and return to normal supportive shoe gear. Patient stressed importance of continue the prefab insoles and supportive tennis shoes at all times weightbearing. X rays reviewed with the pt in detail. All questions answered. Patient is low level medical decision making. Acute on copy condition. 1 new test reviewed. Low risk morbidity at this time. Contact office with any questions/problems/concerns.

## 2021-11-11 NOTE — PROGRESS NOTES
Subjective:          Patient ID: Yousif Almazan is a 62 y. o.right  handed female. Neurologic Problem  The patient's primary symptoms include clumsiness, focal sensory loss, focal weakness, a loss of balance, memory loss and weakness. The patient's pertinent negatives include no altered mental status, near-syncope, slurred speech, syncope or visual change. Primary symptoms comment: RESTLESS LEG  SYNDROME. This is a chronic problem. Episode onset: MORE   THAN    5-6 YEARS. The neurological problem developed insidiously. The problem is unchanged. There was left-sided, lower extremity and right-sided focality noted. Pertinent negatives include no abdominal pain, auditory change, aura, back pain, bladder incontinence, bowel incontinence, chest pain, confusion, diaphoresis, dizziness, fatigue, fever, headaches, light-headedness, nausea, neck pain, palpitations, shortness of breath, vertigo or vomiting. Past treatments include medication. The treatment provided moderate relief. Her past medical history is significant for mood changes. There is no history of a bleeding disorder, a clotting disorder, a CVA, dementia, head trauma, liver disease or seizures. History obtained from  The patient     and other  available medical records were  Also  reviewed.                 1)      H/O    CHRONIC    DIABETIC   PERIPHERAL  NEUROPATHY                                                      -  ON  NEURONTIN                          TOLERATING  THE  SAME                     WITH   SYMPTOMATIC      IMPROVEMENT                        2)     H/O    CHRONIC      RESTLESS  LEG  SYNDROME                 &  PERIODIC  LEG  MOVEMENTS                              -  IMPROVED                              -       ON  SINEMET              3)    H/O    CHRONIC     SLEEP DIFFICULTIES                        -   RESOLVED              4)        H/O   CHRONIC   MILD  MEMORY PROBLEMS                     -    STABLE                   - 2021                                        - BETTER                         PREVIOUS      H/O      RIGHT  FOOT    SURGERY  IN     2011                             BEING  FOLLOWED  BY     PODIATRY                   14)         PATIENT   DENIES   ANY  NEW  NEUROLOGICAL     PROBLEMS                              REQUESTED    REFILLS  FOR    HER  MEDICATIONS                  15)       VARIOUS  RISK   FACTORS   WERE  REVIEWED   AND   DISCUSSED. PATIENT   HAS  MULTIPLE   MEDICAL,  NEUROLOGICAL                               AND   MENTAL  HEALTH  PROBLEMS . PATIENT'S   MANAGEMENT  IS  CHALLENGING. The  Duration,  Quality,  Severity,  Location,  Timing,  Context,  Modifying  Factors   Of   The   Chief   Complaint       And  Present  Illness   Was   Reviewed   In   Chronological   [facility].              Patient   Indicates   The  Presence   And  The  Absence  Of  The  Following  Associated  And       Additional  Neurological    Symptoms:                                Balance  And coordination problems  present           Gait problems     present            Headaches      absent              Migraines           absent           Memory problems        Present             Confusion        absent            Paresthesia numbness          present           Seizures  And  Starring  Episodes           absent           Syncope,  Near  syncopal episodes         absent           Speech problems           absent             Swallowing  Problems      absent            Dizziness,  Light headedness           present                        Vertigo        absent             Generalized   Weakness    absent              focal  Weakness     absent             Tremors         absent              Sleep  Problems     present             History  Of   Recent   Head  Injury     absent             History  Of   Recent  TIA     absent             History  Of   Recent    Stroke absent             Neck  Pain and  Neck muscle  Spasms  Present               Radiating  down   And   Weakness           absent            Lower back   Pain  And     Spasms  Present              Radiating    Down   And   Weakness          absent                H/O   FALLS        absent               History  Of   Visual  Symptoms    Absent                  Associated   Diplopia       absent                                                              Also   Additional   Symptoms   Present    As  Documented    In   The detailed      Review  Of  Systems   And    Please   Refer   To    Them for   Additional  Information.       RECORDS   REVIEWED:    historical medical records, lab reports, office notes and radiology reports         INFORMATION   REVIEWED:     MEDICAL   HISTORY,     SURGICAL   HISTORY,   MEDICATIONS   LIST,   ALLERGIES AND  DRUG  INTOLERANCES,     FAMILY   HISTORY,  SOCIAL  HISTORY,    PROBLEM  LIST   FOR  PATIENT  CARE   COORDINATION         Past Medical History:   Diagnosis Date    [facility]     Anxiety     Astigmatism with presbyopia     Balance problem     Bipolar disorder ([facility]     schizoaffective disorder (Dr. Loreto Hoang)    Chronic lumbar radiculopathy     Chronic rectal pain     due to fissures    Chronic shoulder pain     bilaterally, due to osteoarthritis    CTS (carpal tunnel syndrome)     Depression     Fatigue     Hyperlipidemia     Hypertension     Obesity     Peripheral neuropathy     Pica     eats bar soap    Restless leg syndrome     Sch[facility]ffect[facility] disorder ([facility]     Sleeping difficulty     Tobacco abuse     Type 2 diabetes mellitus ([facility]                   Past Surgical History:   Procedure Laterality Date    ABSCESS DRAINAGE  2001    perianal    ANUS SURGERY  2001    lateral internal sphincterotomy    BLADDER SUSPENSION  2000    transvaginal sling procedure for incontinence    COLONOSCOPY  2007    polypectomy x 1 (pathology: hyperplastic polyp), repair of nightly 60 tablet 2    pimecrolimus (ELIDEL) 1 % cream Apply topically to rash on face 2 times daily. 30 g 11    triamcinolone (KENALOG) 0.1 % cream Apply to rash on body twice daily (not face, armpit or groin) 80 g 1    ketoconazole (NIZORAL) 2 % shampoo APPLY TO AFFECTED AREA ON SCALP 3 TO 4 TIMES A WEEK LEAVE ON FOR 5 MINUTES THEN WASH  mL 11    Blood Glucose Monitoring Suppl (ONE TOUCH ULTRA 2) w/Device KIT use as directed 1 kit 0    ONETOUCH DELICA LANCETS FINE MISC TEST three times a day 100 each 5    hydrOXYzine (VISTARIL) 25 MG capsule  (Patient not taking: Reported on 2021)      naloxone 4 MG/0.1ML LIQD nasal spray 1 spray by Nasal route as needed for Opioid Reversal (Patient not taking: Reported on 2021) 1 each 5     No current facility-administered medications for this visit. No Known Allergies            Family History   Problem Relation Age of Onset   [facility] Stroke Mother     Diabetes Mother     High Blood Pressure Mother     Cataracts Mother     Stroke Father     Diabetes Father     Heart Disease Sister 72    Diabetes Daughter     Stroke Sister 68        Brain aneurysm that ruptured and then she got kidney failure and intestinal infection and     Glaucoma Neg Hx              Social History     Socioeconomic History    Marital status: Single     Spouse name: Not on file    Number of children: Not on file    Years of education: Not on file    Highest education level: Not on file   Occupational History    Not on file   Tobacco Use    Smoking status: Former Smoker     Packs/day: 1.00     Years: 26.00     Pack years: 26.00     Types: Cigarettes     Start date: 1975     Quit date: 2018     Years since quittin.9    Smokeless tobacco: Never Used   Substance and Sexual Activity    Alcohol use:  Yes     Alcohol/week: 0.0 standard drinks     Comment: daily; \"2 tallboys per day\"    Drug use: No    Sexual activity: Not on file   Other Topics Concern  Not on file   Social History Narrative    Not on file     Social Determinants of Health     Financial Resource Strain:     Difficulty of Paying Living Expenses: Not on file   Food Insecurity:     Worried About Running Out of Food in the Last Year: Not on file    Reena of Food in the Last Year: Not on file   Transportation Needs:     Lack of Transportation (Medical): Not on file    Lack of Transportation (Non-Medical):  Not on file   Physical Activity:     Days of Exercise per Week: Not on file    Minutes of Exercise per Session: Not on file   Stress:     Feeling of Stress : Not on file   Social Connections:     Frequency of Communication with Friends and Family: Not on file    Frequency of Social Gatherings with Friends and Family: Not on file    Attends [employer] Services: Not on file    Active Member of [address] [place] [address] [employer] or Organizations: Not on file    Attends Club or Organization Meetings: Not on file    Marital Status: Not on file   Intimate Partner Violence:     Fear of Current or Ex-Partner: Not on file    Emotionally Abused: Not on file    Physically Abused: Not on file    Sexually Abused: Not on file   Housing Stability:     Unable to Pay for Housing in the Last Year: Not on file    Number of [place] in the Last Year: Not on file    Unstable Housing in the Last Year: Not on file         Vitals:    11/11/21 1358   BP: 100/60         Wt Readings from Last 3 Encounters:   11/11/21 214 lb (97.1 kg)   11/11/21 215 lb (97.5 kg)   10/20/21 221 lb 3.2 oz (100.3 kg)         BP Readings from Last 3 Encounters:   11/11/21 100/60   11/11/21 100/60   10/20/21 126/70       Hematology and Coagulation  Lab Results   Component Value Date    WBC 7.0 07/23/2021    RBC 4.38 07/23/2021    HGB 13.2 07/23/2021    HCT 40.2 07/23/2021    MCV 91.8 07/23/2021    MCH 30.1 07/23/2021    MCHC 32.8 07/23/2021    RDW 12.1 07/23/2021     07/23/2021    MPV 9.8 07/23/2021       Chemistries  Lab Results   Component Value Date     08/20/2021    K 5.3 08/20/2021    CL 98 08/20/2021    CO2 27 08/20/2021    BUN 11 08/20/2021    CREATININE 0.96 08/20/2021    CALCIUM 9.6 08/20/2021    PROT 7.1 07/23/2021    LABALBU 3.9 07/23/2021    BILITOT 0.63 07/23/2021    ALKPHOS 101 07/23/2021    AST 39 07/23/2021    ALT 29 07/23/2021     Lab Results   Component Value Date    ALKPHOS 101 07/23/2021    ALT 29 07/23/2021    AST 39 07/23/2021    PROT 7.1 07/23/2021    BILITOT 0.63 07/23/2021    LABALBU 3.9 07/23/2021     Lab Results   Component Value Date    BUN 11 08/20/2021    CREATININE 0.96 08/20/2021     Lab Results   Component Value Date    CALCIUM 9.6 08/20/2021    MG 1.9 07/23/2021     Lab Results   Component Value Date    AST 39 07/23/2021    ALT 29 07/23/2021       Lab Results   Component Value Date    [id number] 7210 08/20/2021             Review of Systems   Constitutional: Negative for appetite change, diaphoresis, fatigue, fever and unexpected weight change. HENT: Negative for dental problem, drooling, ear discharge, ear pain, facial swelling, hearing loss, mouth sores, nosebleeds, postnasal drip, sinus pressure, tinnitus, trouble swallowing and voice change. Eyes: Negative for photophobia, pain, discharge, redness, itching and visual disturbance. Respiratory: Negative for apnea, choking, chest tightness, shortness of breath and wheezing. Cardiovascular: Negative for chest pain, palpitations, leg swelling and near-syncope. Gastrointestinal: Negative for abdominal distention, abdominal pain, blood in stool, bowel incontinence, constipation, diarrhea, nausea and vomiting. Endocrine: Negative for cold intolerance, heat intolerance, polydipsia, polyphagia and polyuria. Genitourinary: Negative for bladder incontinence. Musculoskeletal: Positive for gait problem. Negative for back pain, neck pain and neck stiffness. Skin: Negative for color change, pallor and wound.    Allergic/Immunologic: Negative for environmental allergies, food allergies and immunocompromised state. Neurological: Positive for focal weakness, weakness and loss of balance. Negative for dizziness, vertigo, tremors, syncope, facial asymmetry, speech difficulty, light-headedness and headaches. Hematological: Negative for adenopathy. Does not bruise/bleed easily. Psychiatric/Behavioral: Positive for decreased concentration and memory loss. Negative for agitation, behavioral problems, confusion, dysphoric mood, hallucinations, self-injury, sleep disturbance and suicidal ideas. The patient is nervous/anxious. The patient is not hyperactive. Objective:   Physical Exam  Constitutional:       Appearance: She is well-developed. HENT:      Head: Normocephalic and atraumatic. No raccoon eyes or Covington's sign. Right Ear: External ear normal.      Left Ear: External ear normal.      Nose: Nose normal.   Eyes:      Conjunctiva/sclera: Conjunctivae normal.      Pupils: Pupils are equal, round, and reactive to light. Neck:      Thyroid: No thyroid mass or thyromegaly. Vascular: No carotid bruit. Trachea: No tracheal deviation. Meningeal: Brudzinski's sign and Kernig's sign absent. Cardiovascular:      Rate and Rhythm: Normal rate and regular rhythm. Pulmonary:      Effort: Pulmonary effort is normal. No respiratory distress. Breath sounds: Normal breath sounds. No wheezing or rales. Musculoskeletal:         General: No tenderness. Normal range of motion. Cervical back: Normal range of motion and neck supple. No rigidity. No muscular tenderness. Normal range of motion. Lymphadenopathy:      Cervical: No cervical adenopathy. Skin:     General: Skin is warm. Coloration: Skin is not pale. Findings: No erythema or rash. Nails: There is no clubbing. Psychiatric:         Attention and Perception: She is attentive. Mood and Affect: Mood is anxious and depressed. Affect is blunt.  Affect is not labile or inappropriate. Speech: She is communicative. Speech is not rapid and pressured, delayed, slurred or tangential.         Behavior: Behavior is not agitated, slowed, aggressive, withdrawn, hyperactive or combative. Behavior is cooperative. Thought Content: Thought content normal. Thought content is not paranoid or delusional. Thought content does not include homicidal or suicidal ideation. Thought content does not include homicidal or suicidal plan. Cognition and Memory: Cognition is impaired. Memory is impaired. She exhibits impaired recent memory. She does not exhibit impaired remote memory. Judgment: Judgment is not impulsive or inappropriate. NEUROLOGICAL EXAMINATION :    A) MENTAL STATUS:                   Alert and  oriented  To time, place  And  Person. No Aphasia. No  Dysarthria. Able   To  Follow   SIMPLE    commands without   Any  Difficulty. No right  To left confusion. Normal  Speech  And language function. Insight and  Judgment ,Fund  Of  Knowledge   Decreased                Recent  And  Remote memory  Decreased                Attention &Concentration are decreased                                                   B) CRANIAL NERVES :             2 CN : Visual  Acuity  And  Visual fields  within normal limits                        Fundi  Could  Not  Be  Could  Not  Be  Evaluated. 3,4,6 CN : Both  Pupils are   Reactive and  Equal.                            Extraocular   Movements  Are  Intact. No  Nystagmus. No  RODERICK. No  Afferent  Pupillary  Defect noted. 5 CN :  Normal  Facial sensations and Corneal  Reflexes           7 CN :  Normal  Facial  Symmetry  And  Strength. No facial  Weakness.            8 CN :  Hearing  Appears within normal limits          9, 10 CN: Normal spontaneous, reflex palate movements 11 CN:   Normal  Shoulder shrug and  Strength         12 CN :   Normal  Tongue movements and  Tongue  In midline                        No tongue   Fasciculations or atrophy         C) MOTOR  EXAM:                 Strength  In upper  And  Lower extremities   within normal limits                     Rapid alternating  And  repetitions  Movements  within normal limits                 Muscle  Tone  In upper  And  Lower  Extremities  within normal limits                No rigidity. No  Spasticity. Bradykinesia   absent                  No  Asterixis. Sustention  Tremor , Resting  Tremor   absent                    No other  Abnormal  Movements noted             D) SENSORY :               light touch, pinprick, position  And  Vibration  decreased          E) REFLEXES:                   Deep  Tendon  Reflexes decreased                    No pathological  Reflexes  Bilaterally. F) COORDINATION  AND  GAIT :                                Station and  Gait   SLOW                                    Romberg's test positive                          Ataxia negative            Assessment:             Patient Active Problem List   Diagnosis    Anxiety    Depression    [facility]    History of tobacco use    Restless leg syndrome    Pica    Essential hypertension    Bipolar disorder ([facility]    Alcoholism ([facility]    Sleeping difficulty    Peripheral neuropathy    Balance problem    CTS (carpal tunnel syndrome)    Chronic lumbar radiculopathy    GERD (gastroesophageal reflux disease)    [facility] obstructive [facility] disease[facility]    Fatigue    Chronic rectal pain    Type 2 diabetes mellitus with diabetic neuropathy, without long-term current use of insulin ([facility])    Pruritus    Obesity (BMI 30.0-34. 9)    Mixed hyperlipidemia    Chronic right shoulder pain    Bilateral carpal tunnel syndrome    Current severe episode of major depressive disorder without psychotic features ([facility]    Splenic artery aneurysm ([facility]    Bipolar affective disorder, currently depressed, mild ([facility]    Morbidly obese ([facility]    Tobacco abuse    Vitamin B12 deficiency               Plan:            VISITING DIAGNOSIS:      ICD-10-CM    1. Polyneuropathy associated with underlying disease ([facility])  G63 gabapentin (NEURONTIN) 300 MG capsule   2. Bilateral carpal tunnel syndrome  G56.03 gabapentin (NEURONTIN) 300 MG capsule   3. Restless leg syndrome  G25.81 carbidopa-levodopa (SINEMET)  MG per tablet   4. Vitamin B12 deficiency  E53.8 Cyanocobalamin ER (RA VITAMIN B-12 TR) 1000 MCG TBCR   5. Chronic lumbar radiculopathy  M54.16    6. Type 2 diabetes mellitus with diabetic neuropathy, without long-term current use of insulin ([facility])  E11.40    7. Bipolar affective disorder, currently depressed, mild ([facility]  F31.31    8. Obesity (BMI 30.0-34. 9)  E66.9    9. Anxiety  F41.9    10. Chronic fatigue  R53.82    11. Balance problem  R26.89    12. Alcoholism ([facility]  F10.20    13. Sleeping difficulty  G47.9    14. Morbidly obese ([facility])  E66.01    15. Peripheral polyneuropathy  G62.9                   CONCERNS   &   INCREASED   RISK   FOR         * TIA,  CEREBRO  VASCULAR  ISCHEMIA, STROKE      *   COGNITIVE  &   MEMORY PROBLEMS  AND  DEMENTIA      *  MONONEUROPATHIES, RADICULOPATHY       *   PERIPHERAL  NEUROPATHY                      VARIOUS  RISK   FACTORS   WERE  REVIEWED   AND   DISCUSSED. *  PATIENT   HAS  MULTIPLE   MEDICAL,  NEUROLOGICAL      AND   MENTAL  HEALTH  PROBLEMS . PATIENT'S   MANAGEMENT  IS  CHALLENGING. PLAN:         Parvin Vidal  Of  The  Diagnoses,  The  Management & Treatment  Options           AND    Care  plan  Were        Reviewed and   Discussed   With  patient. * Goals  And  Expectations  Of  The  Therapy  Discussed   And  Reviewed.        *   Benefits   And   Side  Effect  Profile  Of  Medication/s   Were Discussed             * Need   For  Further   Follow up For  The  Various  Problems  Were discussed. * Results  Of  The  Previous  Diagnostic tests were reviewed and questions answered. patient  understand the same. Medical  Decision  Making  Was  Made  Based on the   Complexity  Of  Above  Mentioned  Diagnoses,        Data reviewed   & diagnostic  Tests  Reviewed,  Risk  Of  Significant   Co morbidities and complicating   Factors. Medical  Decision  Was   High  Complexity  Due   To  The  Patient's  Multiple  Symptoms,      Advancing   Disease,  Complex  Treatment  Regimen,  Multiple medications and   Risk  Of   Side  Effects,      Difficulty  In  Medication  Management  And  Diagnostic  Challenges       In  View  Of  The  Associated   Co  Morbid  Conditions   And  Problems. * FALL   PRECAUTIONS. THESE  REVIEWED   AND  DISCUSSED      *   BE  CAREFUL  WITH  ACTIVITIES   INCLUDING  DRIVING. *   AVOID   NECK  AND/ BACK  STRAINING  ACTIVITIES          *   TO   WEAR   BILATERAL   WRIST  BRACES       *   TO  AVOID  PRESSURE  OVER   ULNAR  ASPECT   OF   ELBOWS          *   ADEQUATE   FLUID  INTAKE   AND  ELECTROLYTE  BALANCE           * KEEP  DAIRY  OF   THE  NEUROLOGICAL  SYMPTOMS        RECORDING THE    DURATION  AND  FREQUENCY. *  AVOID    CONDITIONS  AND  FACTORS   THAT  MAKE   NEUROLOGICAL  SYMPTOMS  WORSE. *  TO  MAINTAIN  REGULAR  SLEEP  WAKE  CYCLES. *   TO  HAVE  ADEQUATE  REST  AND   SLEEP    HOURS.          *    TO   AVOID   TO  SLEEP  IN   SUPINE  POSITION. *      WEIGHT   LOSS. *    AVOID  ANY USAGE OF                   TOBACCO,  EXCESSIVE  ALCOHOL  AND   ILLEGAL   SUBSTANCES          *  CONTINUE MEDICATIONS PRESCRIBED      AS    RECOMMENDED       *   Compliance   With  Medications   And  Instructions        * CURRENTLY  TOLERATING  THE  PRESCRIBED   MEDICATIONS.        WITHOUT  ANY  SIGNIFICANT SIDE  EFFECTS   &  GETTING BENEFIT. *  May   Use  Pill  Box,    If  Needed          *        Antiplatelet  therapy    As   Recommended  Was   Discussed        *    Prophylactic  Use   Of     Vitamin   B   Complex,  Folic  Acid,    Vitamin  B12        Multivitamin   Tablet  Daily    Supplementations   Over  The  Counter  Discussed               *  FOOT  CARE, DAILY  INSPECTION  OF  FEET   AND         PERIODIC  PODIATRY EVALUATIONS . *  PATIENT  IS  ALSO   COUNSELED   TO  KEEP    ACTIVITIES         A)   SIMPLE      B)  ORGANIZED      C)  WRITE   DOWN                                *      NEUROPATHIC  SYMPTOMS  BOTH  FEET     SINCE    FEB. 2020                        -   MOSTLY   DUE   TO    HER  TYPE  II    AND                                 ALCOHOL  ABUSE/  USAGE                          *        PATIENT    ON  NEURONTIN        AND                                ULTRAM  FROM     HER PCP     FOR    SYMPTOMATIC   RELIEF                           -    PATIENT    ADVISED                                     A)     TO   AVOID    ALCOHOL                                    B)    ADEQUATE   DIABETIC  CONTROL                           Controlled Substances Monitoring: Periodic Controlled Substance Monitoring: Possible medication side effects, risk of tolerance/dependence & alternative treatments discussed. , Assessed functional status.  Rosalino Suarez MD)            Orders Placed This Encounter   Medications    gabapentin (NEURONTIN) 300 MG capsule     Sig: ONE    CAPSULE  IN  MORNING,    NOON ,   EVENING  AND  BED  TIME   DAILY   AS  NEEDED     Dispense:  120 capsule     Refill:  2    carbidopa-levodopa (SINEMET)  MG per tablet     Sig: Taking 3  tablets at bedtime     Dispense:  180 tablet     Refill:  1    Cyanocobalamin ER (RA VITAMIN B-12 TR) 1000 MCG TBCR     Sig: take 1 tablet by mouth once daily     Dispense:  30 tablet     Refill:  5    aspirin 325 MG EC tablet     Sig: take 1 tablet by mouth once daily     Dispense:  30 tablet     Refill:  12               *PATIENT   TO  FOLLOW  UP  WITH   PRIMARY  CARE   AND   OTHER  CONSULTANTS  AS  BEFORE.               *TO  FOLLOW  WITH   MENTAL  HEALTH  PROFESSIONALS ,  INCLUDING            PSYCHOLOGICAL  COUNSELING   AND  PSYCHIATRIC  EVALUTIONS              *  Maintain   Healthy  Life Style    With   Periodic  Monitoring  Of      Any  Medical  Conditions  Including   Elevated  Blood  Pressure,  Lipid  Profile,     Blood  Sugar levels  And   Heart  Disease. *   Period   Screening  For  Cancers  Involving  Breast,  Colon,     lungs  And  Other  Organs  As  Applicable,  In consultation   With  Your  Primary Care Providers. * Second  Neurological  Opinion  And  Evaluations  In  [facility] AND [facility]  Setting  If  Patient  Is  Interested. *  If  The  Patient remains  Neurologically  Stable   Return   To  [facility] Neurology Department       IN   3    MONTHS  TIME   FOR  FURTHER  FOLLOW UP.                   *  If   There is  Any  Significant  Worsening   Of  Current  Symptoms  And  Or  If patient  Develops   Any additional  New      Neurological  Symptoms  Or  Significant  Concerns   Should  Call  911 or  Go  To  Emergency  Department  For  Further      Immediate  Evaluation. *   The  Neurological   Findings,  Possible  Diagnosis,  Differential diagnoses   And  Options      For    Further   Investigations   And  management   Are  Discussed  Comprehensively. Medications   And  Prescription   Risks  And  Side effects  Are   Also  Discussed. The  Above  Were  Reviewed  With  patient and     questions  Answered  In  Detail. More   Than   50% of face  To face Time   Was  Spent  On  Counseling   And   Coordination  Of  Care       Of   Patient's multiple   Neurological  Problems   And   Comorbid  Medical   Conditions.             Electronically signed by Keenan House MD.,  Maldonado Jerry       Board Certified in  Neurology &  In  [facility] ([facility])      DISCLAIMER:   Although every effort was made to ensure the accuracy of this  electronic transcription, some errors in transcription may have occurred. GENERAL PATIENT INSTRUCTIONS:     A Healthy Lifestyle: Care Instructions  Your Care Instructions  A healthy lifestyle can help you feel good, stay at a healthy weight, and have plenty of energy for both work and play. A healthy lifestyle is something you can share with your whole family. A healthy lifestyle also can lower your risk for serious health problems, such as high blood pressure, heart disease, and diabetes. You can follow a few steps listed below to improve your health and the health of your family. Follow-up care is a key part of your treatment and safety. Be sure to make and go to all appointments, and call your doctor if you are having problems. Its also a good idea to know your test results and keep a list of the medicines you take. How can you care for yourself at home? Do not eat too much sugar, fat, or fast foods. You can still have dessert and treats now and then. The goal is moderation. Start small to improve your eating habits. Pay attention to portion sizes, drink less juice and soda pop, and eat more fruits and vegetables. Eat a healthy amount of food. A 3-ounce serving of meat, for example, is about the size of a deck of cards. Fill the rest of your plate with vegetables and whole grains. Limit the amount of soda and sports drinks you have every day. Drink more water when you are thirsty. Eat at least 5 servings of fruits and vegetables every day. It may seem like a lot, but it is not hard to reach this goal. A serving or helping is 1 piece of fruit, 1 cup of vegetables, or 2 cups of leafy, raw vegetables.  Have an apple or some carrot sticks as an afternoon snack instead of a candy bar. Try to have fruits and/or vegetables at every meal.  Make exercise part of your daily routine. You may want to start with simple activities, such as walking, bicycling, or slow swimming. Try to be active 30 to 60 minutes every day. You do not need to do all 30 to 60 minutes all at once. For example, you can exercise 3 times a day for 10 or 20 minutes. Moderate exercise is safe for most people, but it is always a good idea to talk to your doctor before starting an exercise program.  Keep moving. Fanny Ommven, work in the garden, or [employer]. Take the stairs instead of the elevator at work. If you smoke, quit. People who smoke have an increased risk for heart attack, stroke, cancer, and other lung illnesses. Quitting is hard, but there are ways to boost your chance of quitting tobacco for good. Use nicotine gum, patches, or lozenges. Ask your doctor about stop-smoking programs and medicines. Keep trying. In addition to reducing your risk of diseases in the future, you will notice some benefits soon after you stop using tobacco. If you have shortness of breath or asthma symptoms, they will likely get better within a few weeks after you quit. Limit how much alcohol you drink. Moderate amounts of alcohol (up to 2 drinks a day for men, 1 drink a day for women) are okay. But drinking too much can lead to liver problems, high blood pressure, and other health problems. Family health  If you have a family, there are many things you can do together to improve your health. Eat meals together as a family as often as possible. Eat healthy foods. This includes fruits, vegetables, lean meats and dairy, and whole grains. Include your family in your fitness plan. Most people think of activities such as jogging or tennis as the way to fitness, but there are many ways you and your family can be more active. Anything that makes you breathe hard and gets your heart pumping is exercise.  Here are some tips:  Walk to do errands or to take your child to school or the bus. Go for a family bike ride after dinner instead of watching TV. Where can you learn more? Go to https://ePrivateHireelkinZapnipeb.[employer]. org and sign in to your [employer] account. Enter F286 in the [facility] box to learn more about \"A Healthy Lifestyle: Care Instructions. \"     If you do not have an account, please click on the \"Sign Up Now\" link. Current as of: July 26, 2016  Content Version: 11.2  © 1333-3744 [employer]. Care instructions adapted under license by [facility] ([facility]). If you have questions about a medical condition or this instruction, always ask your healthcare professional. [address] any warranty or liability for your use of this information.

## 2021-11-16 DIAGNOSIS — G89.29 CHRONIC RECTAL PAIN: ICD-10-CM

## 2021-11-16 DIAGNOSIS — K62.89 CHRONIC RECTAL PAIN: ICD-10-CM

## 2021-11-16 RX ORDER — TRAMADOL HYDROCHLORIDE 50 MG/1
50 TABLET ORAL EVERY 8 HOURS PRN
Qty: 90 TABLET | Refills: 0 | Status: SHIPPED | OUTPATIENT
Start: 2021-11-16 | End: 2021-12-14 | Stop reason: SDUPTHER

## 2021-11-17 NOTE — TELEPHONE ENCOUNTER
Controlled substances monitoring: No signs of potential drug abuse or diversion identified when the [place] from [place], [place], and [place] was reviewed today. The activity on the report was consistent with the treatment plan. Are there any appointments available sooner than 12/15/2021?

## 2021-11-30 RX ORDER — NAPROXEN 500 MG/1
TABLET ORAL
Qty: 40 TABLET | Refills: 0 | OUTPATIENT
Start: 2021-11-30

## 2021-12-01 ENCOUNTER — OFFICE VISIT (OUTPATIENT)
Dept: PODIATRY | Age: 61
End: 2021-12-01
Payer: MEDICAID

## 2021-12-01 VITALS
SYSTOLIC BLOOD PRESSURE: 130 MMHG | BODY MASS INDEX: 39.38 KG/M2 | WEIGHT: 214 LBS | HEIGHT: 62 IN | DIASTOLIC BLOOD PRESSURE: 70 MMHG | HEART RATE: 89 BPM

## 2021-12-01 DIAGNOSIS — E11.40 TYPE 2 DIABETES MELLITUS WITH DIABETIC NEUROPATHY, WITHOUT LONG-TERM CURRENT USE OF INSULIN (HCC): ICD-10-CM

## 2021-12-01 DIAGNOSIS — S90.211A SUBUNGUAL HEMATOMA OF GREAT TOE OF RIGHT FOOT, INITIAL ENCOUNTER: Primary | ICD-10-CM

## 2021-12-01 PROCEDURE — G8417 CALC BMI ABV UP PARAM F/U: HCPCS | Performed by: PODIATRIST

## 2021-12-01 PROCEDURE — 99213 OFFICE O/P EST LOW 20 MIN: CPT | Performed by: PODIATRIST

## 2021-12-01 PROCEDURE — 99214 OFFICE O/P EST MOD 30 MIN: CPT | Performed by: PODIATRIST

## 2021-12-01 PROCEDURE — 3051F HG A1C>EQUAL 7.0%<8.0%: CPT | Performed by: PODIATRIST

## 2021-12-01 PROCEDURE — 2022F DILAT RTA XM EVC RTNOPTHY: CPT | Performed by: PODIATRIST

## 2021-12-01 PROCEDURE — G8484 FLU IMMUNIZE NO ADMIN: HCPCS | Performed by: PODIATRIST

## 2021-12-01 PROCEDURE — G8427 DOCREV CUR MEDS BY ELIG CLIN: HCPCS | Performed by: PODIATRIST

## 2021-12-01 PROCEDURE — 1036F TOBACCO NON-USER: CPT | Performed by: PODIATRIST

## 2021-12-01 PROCEDURE — 3017F COLORECTAL CA SCREEN DOC REV: CPT | Performed by: PODIATRIST

## 2021-12-01 NOTE — PROGRESS NOTES
Subjective:    Santiago Browne is a 62 y.o. female who presents to the office today complaining of soreness right big toe. No known trauma. .  Symptoms started just recently. No change in activity. Patient relates pain is present but mild. No treatment tried. Currently denies F/C/N/V. No Known Allergies    Past Medical History:   Diagnosis Date    Alcoholism ([facility] [address]     Anxiety     Astigmatism with presbyopia     Balance problem     Bipolar disorder ([facility])     schizoaffective disorder (Dr. Julien Edge)    Chronic lumbar radiculopathy     Chronic rectal pain     due to fissures    Chronic shoulder pain     bilaterally, due to osteoarthritis    CTS (carpal tunnel syndrome)     Depression     Fatigue     Hyperlipidemia     Hypertension     Obesity     Peripheral neuropathy     Pica     eats bar soap    Restless leg syndrome     Schizoaffective disorder ([facility]     Sleeping difficulty     Tobacco abuse     Type 2 diabetes mellitus ([facility] [address] [facility]        Prior to Admission medications    Medication Sig Start Date End Date Taking? Authorizing Provider   traMADol (ULTRAM) 50 MG tablet Take 1 tablet by mouth every 8 hours as needed for Pain for up to 30 days. 11/16/21 12/16/21 Yes Lou Lopes MD   gabapentin (NEURONTIN) 300 MG capsule ONE    CAPSULE  IN  [facility]  AND  BED  TIME   DAILY   AS  NEEDED 11/11/21 1/29[birth year] Yes Kay Gomes MD   carbidopa-levodopa (SINEMET)  MG per tablet Taking 3  tablets at bedtime 11/11/21  Yes Kay Gomes MD   Cyanocobalamin ER (RA VITAMIN B-12 TR) 1000 MCG TBCR take 1 tablet by mouth once daily 11/11/21  Yes Kay Gomes MD   aspirin 325 MG EC tablet take 1 tablet by mouth once daily 11/11/21  Yes Kay Gomes MD   triamcinolone (KENALOG) 0.1 % cream Apply topically 2 times daily.  10/18/21  Yes Arron Sher MD   diphenhydrAMINE (BANOPHEN) 25 MG capsule take 1 capsule by mouth twice a day if needed for itching and allergies 10/11/21  Yes Thor Padilla MD   ammonium lactate (LAC-HYDRIN) 12 % lotion apply to affected area once daily 10/8/21  Yes Christiano Davis MD   Multiple Vitamin (MULTIVITAMIN) tablet take 1 tablet by mouth once daily 10/1/21  Yes Christiano Davis MD   hydrOXYzine (VISTARIL) 25 MG capsule  7/5/21  Yes Zaire Harris MD   magnesium oxide (MAG-OX) 400 MG tablet take 1 tablet by mouth once daily 7/20/21  Yes Thor Padilla MD   losartan (COZAAR) 100 MG tablet take 1 tablet by mouth once daily 7/20/21  Yes Thor Padilla MD   pravastatin (PRAVACHOL) 40 MG tablet Take 1 tablet by mouth daily 7/20/21  Yes Thor Padilla MD   omeprazole (PRILOSEC) 20 MG delayed release capsule take 1 tablet by mouth daily 7/20/21  Yes Thor Padilla MD   nicotine (NICODERM CQ) 7 MG/24HR Place 1 patch onto the skin every 24 hours 7/20/21 7/20/22 Yes Thor Padilla MD   [facility] ULTRA strip use 1 TEST STRIP to TEST BLOOD SUGAR three times a day 5/27/21  Yes Thor Padilla MD   risperiDONE (RISPERDAL) 1 MG tablet Take 1 tablet by mouth nightly 2/18/21  Yes Christiano Davis MD   pimecrolimus (ELIDEL) 1 % cream Apply topically to rash on face 2 times daily.  7/27/20  Yes Cassidy Montana MD   triamcinolone (KENALOG) 0.1 % cream Apply to rash on body twice daily (not face, armpit or groin) 7/27/20  Yes Cassidy Montana MD   ketoconazole (NIZORAL) 2 % shampoo APPLY TO AFFECTED AREA ON SCALP 3 TO 4 TIMES A WEEK LEAVE ON FOR 5 MINUTES THEN [address] OFF 7/27/20  Yes Cassidy Montana MD   naloxone 4 MG/0.1ML LIQD nasal spray 1 spray by Nasal route as needed for Opioid Reversal 3/3/20  Yes Thor Padilla MD   Blood Glucose Monitoring Suppl (ONE TOUCH ULTRA 2) w/Device KIT use as directed 2/19/20  Yes Thor Padilla MD   [facility] DELICA LANCETS FINE MISC TEST three times a day 8/10/17  Yes Lynda Barrios MD       Past Surgical History:   Procedure Laterality Date    ABSCESS DRAINAGE  2001    perianal    ANUS SURGERY  2001    lateral internal sphincterotomy    BLADDER SUSPENSION      transvaginal sling procedure for incontinence    COLONOSCOPY  2007    polypectomy x 1 (pathology: hyperplastic polyp), repair of perianal fistula    CYST REMOVAL Right 1992    wrist    OTHER SURGICAL HISTORY      perianal fistula repair eua    OTHER SURGICAL HISTORY      cystourethroscopy    UPPER GASTROINTESTINAL ENDOSCOPY      Schatzki's ring, acute gastritis, hiatal hernia    UPPER GASTROINTESTINAL ENDOSCOPY      hiatal hernia, esophagitis, gastritis       Family History   Problem Relation Age of Onset    Stroke Mother     Diabetes Mother     High Blood Pressure Mother     Cataracts Mother     Stroke Father     Diabetes Father     Heart Disease Sister 72    Diabetes Daughter     Stroke Sister 68        Brain aneurysm that ruptured and then she got kidney failure and intestinal infection and     Glaucoma Neg Hx        Social History     Tobacco Use    Smoking status: Former Smoker     Packs/day: 1.00     Years: 26.00     Pack years: 26.00     Types: Cigarettes     Start date: 1975     Quit date: 2018     Years since quittin.9    Smokeless tobacco: Never Used   Substance Use Topics    Alcohol use: Yes     Alcohol/week: 0.0 standard drinks     Comment: daily; \"2 tallboys per day\"       ROS: All 14 ROS systems reviewed and pertinent positives noted above, all others negative. Lower Extremity Physical Examination:     Vitals:   Vitals:    21 1425   BP: 130/70   Pulse: 89     General: AAO x 3 in NAD.      Vascular: DP and PT pulses palpable 2/4, bilateral.  CFT <3 seconds, bilateral.  Hair growth absent to the level of the digits, bilateral.  Edema present, bilateral.  Varicosities present, bilateral. Erythema absent, bilateral. Distal Rubor absent bilateral.  Temperature decreased bilateral. Hyperpigmentation present bilateral. Atrophic skin no  Neurological: Sensation Impaired to light touch to level of digits, bilateral.  Protective sensation intact  5/10 sites via 5.07/10g [place]-Tha Monofilament, bilateral.  negative Tinel's, bilateral.  negative Valleix sign, bilateral.  Vibratory abnormal  bilateral.  Reflexes Decreased bilateral.  Paresthesias positive. Dysthesias negative. Sharp/dull abnormal  bilateral.  Musculoskeletal: Muscle strength 5/5, Bilateral.  Pain present upon palpation right dorsal nail. Within normal limits medial longitudinal arch, Bilateral.  Ankle ROM decreased,Bilateral.  1st MPJ ROM within normal limits, Bilateral.  Dorsally contracted digits present bilateral.  Slightly elongated hallux right due to the reducible hammertoe condition. Integument: Warm, dry, supple, bilateral.  Open lesion absent, bilateral.  Mild subungual hematoma with discoloration proximal central right hallux nail. There is no paronychia. Slight tenderness palpation in the area. Interdigital maceration absent to web spaces bilateral.  Nails dystrophic. Mild onychocryptosis right hallux. No paronychia right hallux. Fissures absent, bilateral. Hyperkeratotic tissue is absent. Asessment: Patient is a 62 y.o. female with:    Diagnosis Orders   1. Subungual hematoma of great toe of right foot, initial encounter     2. Type 2 diabetes mellitus with diabetic neuropathy, without long-term current use of insulin ([facility] [address]         Plan: Patient examined and evaluated. Current condition and treatment options discussed in detail  Use anti-inflammatory as needed  Patient educated appropriate shoe gear. Patient should increase size of her shoes by one half. No current need for nail procedure drain of the hematoma is very mild at this time. And no signs of paronychia. If so should present back to the office immediately for intervention. Patient is low level medical decision making. Acute uncomplicated condition. No new test.  Low risk morbidity at this time. Contact office with any questions/problems/concerns.

## 2021-12-06 DIAGNOSIS — G63 POLYNEUROPATHY ASSOCIATED WITH UNDERLYING DISEASE (HCC): ICD-10-CM

## 2021-12-06 RX ORDER — AMMONIUM LACTATE 12 G/100G
LOTION TOPICAL
Qty: 400 G | Refills: 1 | Status: SHIPPED | OUTPATIENT
Start: 2021-12-06 | End: 2022-02-01 | Stop reason: SDUPTHER

## 2021-12-06 NOTE — TELEPHONE ENCOUNTER
Last Appt:  11/11/2021  Next Appt:   2/17/2022  Med verified in [facility] [address]

## 2021-12-13 ENCOUNTER — TELEPHONE (OUTPATIENT)
Dept: FAMILY MEDICINE CLINIC | Age: 61
End: 2021-12-13

## 2021-12-13 DIAGNOSIS — K62.89 CHRONIC RECTAL PAIN: Primary | ICD-10-CM

## 2021-12-13 DIAGNOSIS — G89.29 CHRONIC RECTAL PAIN: Primary | ICD-10-CM

## 2021-12-13 NOTE — TELEPHONE ENCOUNTER
----- Message from Diana Abiola sent at 12/13/2021  4:29 PM EST -----  Subject: Message to Provider    QUESTIONS  Information for Provider? Patient called to reschedule her Covid booster   that is scheduled for 12/15. Patient asked that Miladys/DMITRIY contact her in   the afternoon to discuss the reschedule.  ---------------------------------------------------------------------------  --------------  [address]  What is the best way for the office to contact you? OK to leave message on   voicemail  Preferred Call Back Phone Number? [phone]  ---------------------------------------------------------------------------  --------------  SCRIPT ANSWERS  Relationship to Patient?  Self

## 2021-12-13 NOTE — TELEPHONE ENCOUNTER
Per OARRS, last fill 11/17/21, quantity 90 for 30 days. [facility] called requesting a refill of the below medication which has been pended for you:     Requested Prescriptions     Pending Prescriptions Disp Refills    traMADol (ULTRAM) 50 MG tablet 90 tablet 0     Sig: Take 1 tablet by mouth every 8 hours as needed for Pain for up to 30 days.        Last Appointment Date: 7/20/2021  Next Appointment Date: 12/15/2021    No Known Allergies

## 2021-12-14 RX ORDER — TRAMADOL HYDROCHLORIDE 50 MG/1
50 TABLET ORAL EVERY 8 HOURS PRN
Qty: 90 TABLET | Refills: 0 | Status: SHIPPED | OUTPATIENT
Start: 2021-12-14 | End: 2022-01-10

## 2021-12-20 ENCOUNTER — TELEPHONE (OUTPATIENT)
Dept: FAMILY MEDICINE CLINIC | Age: 61
End: 2021-12-20

## 2021-12-21 ENCOUNTER — HOSPITAL ENCOUNTER (OUTPATIENT)
Dept: LAB | Age: 61
Discharge: HOME | End: 2021-12-21
Payer: MEDICAID

## 2021-12-21 DIAGNOSIS — E11.40 TYPE 2 DIABETES MELLITUS WITH DIABETIC NEUROPATHY, WITHOUT LONG-TERM CURRENT USE OF INSULIN (HCC): ICD-10-CM

## 2021-12-21 DIAGNOSIS — I10 ESSENTIAL HYPERTENSION: ICD-10-CM

## 2021-12-21 LAB
ALBUMIN SERPL-MCNC: 4.1 G/DL (ref 3.5–5.2)
ALBUMIN/GLOBULIN RATIO: 1.3 (ref 1–2.5)
ALP BLD-CCNC: 100 U/L (ref 35–104)
ALT SERPL-CCNC: 27 U/L (ref 5–33)
ANION GAP SERPL CALCULATED.3IONS-SCNC: 11 MMOL/L (ref 9–17)
AST SERPL-CCNC: 48 U/L
BILIRUB SERPL-MCNC: 0.41 MG/DL (ref 0.3–1.2)
BUN BLDV-MCNC: 9 MG/DL (ref 8–23)
BUN/CREAT BLD: 11 (ref 9–20)
CALCIUM SERPL-MCNC: 9.8 MG/DL (ref 8.6–10.4)
CHLORIDE BLD-SCNC: 100 MMOL/L (ref 98–107)
CHOLESTEROL, FASTING: 166 MG/DL
CHOLESTEROL/HDL RATIO: 2.7
CO2: 27 MMOL/L (ref 20–31)
CREAT SERPL-MCNC: 0.8 MG/DL (ref 0.5–0.9)
ESTIMATED AVERAGE GLUCOSE: 154 MG/DL
GFR AFRICAN AMERICAN: >60 ML/MIN
GFR NON-AFRICAN AMERICAN: >60 ML/MIN
GFR SERPL CREATININE-BSD FRML MDRD: ABNORMAL ML/MIN/{1.73_M2}
GFR SERPL CREATININE-BSD FRML MDRD: ABNORMAL ML/MIN/{1.73_M2}
GLUCOSE BLD-MCNC: 180 MG/DL (ref 70–99)
HBA1C MFR BLD: 7 % (ref 4–6)
HDLC SERPL-MCNC: 61 MG/DL
LDL CHOLESTEROL: 54 MG/DL (ref 0–130)
POTASSIUM SERPL-SCNC: 4.8 MMOL/L (ref 3.7–5.3)
SODIUM BLD-SCNC: 138 MMOL/L (ref 135–144)
TOTAL PROTEIN: 7.2 G/DL (ref 6.4–8.3)
TRIGLYCERIDE, FASTING: 255 MG/DL
VLDLC SERPL CALC-MCNC: ABNORMAL MG/DL (ref 1–30)

## 2021-12-21 PROCEDURE — 80053 COMPREHEN METABOLIC PANEL: CPT

## 2021-12-21 PROCEDURE — 36415 COLL VENOUS BLD VENIPUNCTURE: CPT

## 2021-12-21 PROCEDURE — 80061 LIPID PANEL: CPT

## 2021-12-21 PROCEDURE — 83036 HEMOGLOBIN GLYCOSYLATED A1C: CPT

## 2021-12-22 ENCOUNTER — IMMUNIZATION (OUTPATIENT)
Dept: LAB | Age: 61
End: 2021-12-22
Payer: MEDICAID

## 2021-12-22 ENCOUNTER — OFFICE VISIT (OUTPATIENT)
Dept: FAMILY MEDICINE CLINIC | Age: 61
End: 2021-12-22
Payer: MEDICAID

## 2021-12-22 VITALS
SYSTOLIC BLOOD PRESSURE: 126 MMHG | TEMPERATURE: 98.6 F | DIASTOLIC BLOOD PRESSURE: 76 MMHG | HEIGHT: 62 IN | OXYGEN SATURATION: 99 % | HEART RATE: 97 BPM | WEIGHT: 212 LBS | BODY MASS INDEX: 39.01 KG/M2

## 2021-12-22 DIAGNOSIS — E11.40 TYPE 2 DIABETES MELLITUS WITH DIABETIC NEUROPATHY, WITHOUT LONG-TERM CURRENT USE OF INSULIN (HCC): Primary | ICD-10-CM

## 2021-12-22 DIAGNOSIS — I10 ESSENTIAL HYPERTENSION: ICD-10-CM

## 2021-12-22 DIAGNOSIS — E66.9 OBESITY, CLASS II, BMI 35-39.9: ICD-10-CM

## 2021-12-22 DIAGNOSIS — E78.2 MIXED HYPERLIPIDEMIA: ICD-10-CM

## 2021-12-22 DIAGNOSIS — K62.89 CHRONIC RECTAL PAIN: ICD-10-CM

## 2021-12-22 DIAGNOSIS — G89.29 CHRONIC RECTAL PAIN: ICD-10-CM

## 2021-12-22 PROCEDURE — 3051F HG A1C>EQUAL 7.0%<8.0%: CPT | Performed by: FAMILY MEDICINE

## 2021-12-22 PROCEDURE — 1036F TOBACCO NON-USER: CPT | Performed by: FAMILY MEDICINE

## 2021-12-22 PROCEDURE — 3017F COLORECTAL CA SCREEN DOC REV: CPT | Performed by: FAMILY MEDICINE

## 2021-12-22 PROCEDURE — 91306 COVID-19, MODERNA BOOSTER VACCINE 0.25ML DOSE: CPT | Performed by: INTERNAL MEDICINE

## 2021-12-22 PROCEDURE — 2022F DILAT RTA XM EVC RTNOPTHY: CPT | Performed by: FAMILY MEDICINE

## 2021-12-22 PROCEDURE — G8427 DOCREV CUR MEDS BY ELIG CLIN: HCPCS | Performed by: FAMILY MEDICINE

## 2021-12-22 PROCEDURE — 99212 OFFICE O/P EST SF 10 MIN: CPT

## 2021-12-22 PROCEDURE — 99214 OFFICE O/P EST MOD 30 MIN: CPT | Performed by: FAMILY MEDICINE

## 2021-12-22 PROCEDURE — G8417 CALC BMI ABV UP PARAM F/U: HCPCS | Performed by: FAMILY MEDICINE

## 2021-12-22 PROCEDURE — PBSHW COVID-19, MODERNA BOOSTER VACCINE 0.25ML DOSE: Performed by: INTERNAL MEDICINE

## 2021-12-22 PROCEDURE — G8484 FLU IMMUNIZE NO ADMIN: HCPCS | Performed by: FAMILY MEDICINE

## 2021-12-22 SDOH — ECONOMIC STABILITY: FOOD INSECURITY: WITHIN THE PAST 12 MONTHS, YOU WORRIED THAT YOUR FOOD WOULD RUN OUT BEFORE YOU GOT MONEY TO BUY MORE.: PATIENT DECLINED

## 2021-12-22 SDOH — ECONOMIC STABILITY: FOOD INSECURITY: WITHIN THE PAST 12 MONTHS, THE FOOD YOU BOUGHT JUST DIDN'T LAST AND YOU DIDN'T HAVE MONEY TO GET MORE.: PATIENT DECLINED

## 2021-12-22 ASSESSMENT — SOCIAL DETERMINANTS OF HEALTH (SDOH): HOW HARD IS IT FOR YOU TO PAY FOR THE VERY BASICS LIKE FOOD, HOUSING, MEDICAL CARE, AND HEATING?: PATIENT DECLINED

## 2021-12-22 NOTE — PATIENT INSTRUCTIONS
Hospital Outpatient Visit on 12/21/2021   Component Date Value Ref Range Status    Hemoglobin A1C 12/21/2021 7.0* 4.0 - 6.0 % Final    Estimated Avg Glucose 12/21/2021 154  mg/dL Final    Comment: The ADA and AACC recommend providing the estimated average glucose result to permit better   patient understanding of their HBA1c result.  Glucose 12/21/2021 180* 70 - 99 mg/dL Final    BUN 12/21/2021 9  8 - 23 mg/dL Final    CREATININE 12/21/2021 0.80  0.50 - 0.90 mg/dL Final    Bun/Cre Ratio 12/21/2021 11  9 - 20 Final    Calcium 12/21/2021 9.8  8.6 - 10.4 mg/dL Final    Sodium 12/21/2021 138  135 - 144 mmol/L Final    Potassium 12/21/2021 4.8  3.7 - 5.3 mmol/L Final    Chloride 12/21/2021 100  98 - 107 mmol/L Final    CO2 12/21/2021 27  20 - 31 mmol/L Final    Anion Gap 12/21/2021 11  9 - 17 mmol/L Final    Alkaline Phosphatase 12/21/2021 100  35 - 104 U/L Final    ALT 12/21/2021 27  5 - 33 U/L Final    AST 12/21/2021 48* <32 U/L Final    Total Bilirubin 12/21/2021 0.41  0.3 - 1.2 mg/dL Final    Total Protein 12/21/2021 7.2  6.4 - 8.3 g/dL Final    Albumin 12/21/2021 4.1  3.5 - 5.2 g/dL Final    Albumin/Globulin Ratio 12/21/2021 1.3  1.0 - 2.5 Final    GFR Non- 12/21/2021 >60  >60 mL/min Final    GFR  12/21/2021 >60  >60 mL/min Final    GFR Comment 12/21/2021        Final    Comment: Average GFR for 61-76 years old:   80 mL/min/1.73sq m  Chronic Kidney Disease:   <60 mL/min/1.73sq m  Kidney failure:   <15 mL/min/1.73sq m              eGFR calculated using average adult body mass.  Additional eGFR calculator available at:        Email Data Source.br            GFR Staging 12/21/2021 NOT REPORTED   Final    Cholesterol, Fasting 12/21/2021 166  <200 mg/dL Final    Comment:    Cholesterol Guidelines:      <200  Desirable   200-240  Borderline      >240  Undesirable         HDL 12/21/2021 61  >40 mg/dL Final    Comment:    HDL Guidelines:    <40     Undesirable   40-59    Borderline    >59     Desirable         LDL Cholesterol 12/21/2021 54  0 - 130 mg/dL Final    Comment:    LDL Guidelines:     <100    Desirable   100-129   Near to/above Desirable   130-159   Borderline      >159   Undesirable     Direct (measured) LDL and calculated LDL are not interchangeable tests.  Chol/HDL Ratio 12/21/2021 2.7  <5 Final            Triglyceride, Fasting 12/21/2021 255* <150 mg/dL Final    Comment:    Triglyceride Guidelines:     <150   Desirable   150-199  Borderline   200-499  High     >499   Very high   Based on AHA Guidelines for fasting triglyceride, October 2012.          VLDL 12/21/2021 NOT REPORTED* 1 - 30 mg/dL Final

## 2021-12-22 NOTE — PROGRESS NOTES
[address]             [address] [facility], [address] [place] [address] Drive                        Telephone [phone]             Fax [phone]       Ashley Caraballo  :  1960  Age:  62 y.o. MRN:  [id number]  Date of visit:  2021       Assessment and Plan:    1. Type 2 diabetes mellitus with diabetic neuropathy, without long-term current use of insulin (HCC)  Her HgbA1c was 7.0 %, which is at goal.   She was advised to continue her current regimen. She states that she does not need a refill today. Labs were ordered to be done prior to her return visit in 4 months:   - Hemoglobin A1C; Future  - Microalbumin, Ur; Future    2. Essential hypertension  Her blood pressure is well-controlled today. (BP: 126/76)   She was advised to continue current medications (Losartan). She states that she does not need a refill today. - Comprehensive Metabolic Panel; Future was ordered to be done prior to her return visit in 4 months. 3. Mixed hyperlipidemia  Her lipid profile was at goal except for elevated triglycerides on her recent lab work. She is tolerating Pravastatin well. She states that she does not need a refill today. - Lipid Panel; Future was ordered to be done prior to her return visit in 4 months. 4. Chronic rectal pain  She takes Tramadol chronically. She states that she does not need a refill today. 5. Obesity, Class II, BMI 35-39.9  - Vitamin D 25 Hydroxy; Future was ordered to be done prior to her return visit in 6 months. 6.  Routine health maintenance  Health maintenance was reviewed with the patient. She has received three doses of the Moderna Covid-19 vaccine. Annual influenza vaccine was recommended and declined. Colonoscopy was recommended. She declined. Chest CT for lung cancer screening was recommended and declined. Cervical cancer screening was recommended and declined.   Tdap was  recommended and declined. All other health maintenance, including Shingrix and Pneumovax, is up to date at this time. There is no indication for Prevnar-13 at this time. Follow up instructions were given to the patient:  Return in about 4 months (around 4/22/2022) for diabetes, hypertension, hyperlipidemia, chronic pain. Subjective:    Carlyn Cortes is a 62 y.o. female who presents to [facility] today (12/22/2021) for follow up/evaluation of:  Diabetes, Hypertension, and Hyperlipidemia      She states that she has been feeling well. She is tolerating her medications well. She has been checking her glucose at home approximately once a week. She denies chest pain or shortness of breath with activity or at rest.  She reports that she continues to drink \"two tall boys\" daily. She has received three doses of the Moderna Covid-19 vaccine. She received her booster dose today. She has the following problem list:  Patient Active Problem List   Diagnosis    Anxiety    Depression    Schizoaffective disorder ([address]    History of tobacco use    Restless leg syndrome    Pica    Essential hypertension    Bipolar disorder ([address]    Alcoholism ([address]    Sleeping difficulty    Peripheral neuropathy    Balance problem    CTS (carpal tunnel syndrome)    Chronic lumbar radiculopathy    GERD (gastroesophageal reflux disease)    COPD (chronic obstructive pulmonary disease) ([facility])    Fatigue    Chronic rectal pain    Type 2 diabetes mellitus with diabetic neuropathy, without long-term current use of insulin (HCC)    Pruritus    Obesity (BMI 30.0-34. 9)    Mixed hyperlipidemia    Chronic right shoulder pain    Bilateral carpal tunnel syndrome    Current severe episode of major depressive disorder without psychotic features ([address]    Splenic artery aneurysm ([address]    Bipolar affective disorder, currently depressed, mild ([address]    Morbidly obese ([address]    Tobacco abuse    face, armpit or groin) 80 g 1    ketoconazole (NIZORAL) 2 % shampoo APPLY TO AFFECTED AREA ON SCALP 3 TO 4 TIMES A WEEK LEAVE ON FOR 5 MINUTES THEN WASH  mL 11    naloxone 4 MG/0.1ML LIQD nasal spray 1 spray by Nasal route as needed for Opioid Reversal 1 each 5    Blood Glucose Monitoring Suppl (ONE TOUCH ULTRA 2) w/Device KIT use as directed 1 kit 0    ONETOUCH DELICA LANCETS FINE MISC TEST three times a day 100 each 5       She has No Known Allergies. She is not currently a smoker. She  reports that she quit smoking about 3 years ago. Her smoking use included cigarettes. She started smoking about 47 years ago. She has a 26.00 pack-year smoking history. She has never used smokeless tobacco.      Objective:    Vitals:    12/22/21 1310   BP: 126/76   Site: Right Upper Arm   Position: Sitting   Cuff Size: Large Adult   Pulse: 97   Temp: 98.6 °F (37 °C)   TempSrc: Tympanic   SpO2: 99%   Weight: 212 lb (96.2 kg)   Height: 5' 2\" (1.575 m)     Body mass index is 38.78 kg/m². Obese female, healthy-appearing, alert, cooperative and in no acute distress. Neck supple. No adenopathy. Thyroid symmetric, normal size. Chest:  Normal expansion. Clear to auscultation. No rales, rhonchi, or wheezing. Heart sounds are normal.  Regular rate and rhythm without murmur, gallop or rub. Lower extremities have no edema. Results of labs done 12/21/2021 were reviewed with the patient:   Hospital Outpatient Visit on 12/21/2021   Component Date Value Ref Range Status    Hemoglobin A1C 12/21/2021 7.0* 4.0 - 6.0 % Final    Estimated Avg Glucose 12/21/2021 154  mg/dL Final    Comment: The ADA and AACC recommend providing the estimated average glucose result to permit better   patient understanding of their HBA1c result.       Glucose 12/21/2021 180* 70 - 99 mg/dL Final    BUN 12/21/2021 9  8 - 23 mg/dL Final    CREATININE 12/21/2021 0.80  0.50 - 0.90 mg/dL Final    Bun/Cre Ratio 12/21/2021 11  9 - 20 Final    Calcium 12/21/2021 9.8  8.6 - 10.4 mg/dL Final    Sodium 12/21/2021 138  135 - 144 mmol/L Final    Potassium 12/21/2021 4.8  3.7 - 5.3 mmol/L Final    Chloride 12/21/2021 100  98 - 107 mmol/L Final    CO2 12/21/2021 27  20 - 31 mmol/L Final    Anion Gap 12/21/2021 11  9 - 17 mmol/L Final    Alkaline Phosphatase 12/21/2021 100  35 - 104 U/L Final    ALT 12/21/2021 27  5 - 33 U/L Final    AST 12/21/2021 48* <32 U/L Final    Total Bilirubin 12/21/2021 0.41  0.3 - 1.2 mg/dL Final    Total Protein 12/21/2021 7.2  6.4 - 8.3 g/dL Final    Albumin 12/21/2021 4.1  3.5 - 5.2 g/dL Final    Albumin/Globulin Ratio 12/21/2021 1.3  1.0 - 2.5 Final    GFR Non- 12/21/2021 >60  >60 mL/min Final    GFR  12/21/2021 >60  >60 mL/min Final    GFR Comment 12/21/2021        Final    Comment: Average GFR for 61-76 years old:   80 mL/min/1.73sq m  Chronic Kidney Disease:   <60 mL/min/1.73sq m  Kidney failure:   <15 mL/min/1.73sq m              eGFR calculated using average adult body mass. Additional eGFR calculator available at:        [employer]im[employer]            GFR Staging 12/21/2021 NOT REPORTED   Final    Cholesterol, Fasting 12/21/2021 166  <200 mg/dL Final    Comment:    Cholesterol Guidelines:      <200  Desirable   200-240  Borderline      >240  Undesirable         HDL 12/21/2021 61  >40 mg/dL Final    Comment:    HDL Guidelines:    <40     Undesirable   40-59    Borderline    >59     Desirable         LDL Cholesterol 12/21/2021 54  0 - 130 mg/dL Final    Comment:    LDL Guidelines:     <100    Desirable   100-129   Near to/above Desirable   130-159   Borderline      >159   Undesirable     Direct (measured) LDL and calculated LDL are not interchangeable tests.       Chol/HDL Ratio 12/21/2021 2.7  <5 Final            Triglyceride, Fasting 12/21/2021 255* <150 mg/dL Final    Comment:    Triglyceride Guidelines:     <150   Desirable   150-199 Borderline   200-499  High     >499   Very high   Based on AHA Guidelines for fasting triglyceride, October 2012.          VLDL 12/21/2021 NOT REPORTED* 1 - 30 mg/dL Final             (Please note that portions of this note were completed with a voice-recognition program. Efforts were made to edit the dictation but occasionally words are mis-transcribed.)

## 2021-12-25 NOTE — TELEPHONE ENCOUNTER
[facility] - Dr. Castillo Headings covering Dr. Livier Alonso  Daily Progress Note  Pt Name: Veda Buitrago Record Number: [id number]  Date of Birth 10/25/1931   Today's Date: 12/25/2021    HD: # 7    CC: No complaints    ASSESSMENT  1. Active Hospital Problems    Diagnosis Date Noted    Fall at home [A22. Chelsie Fret, X36.677] 12/18/2021    Closed fracture of eighth thoracic vertebra [facility]) [S22.069A] 12/18/2021       PROCEDURES  12/21/21 - Kyphoplasty     PLAN  Admit to Trauma Services     Trauma by fall              - PT and OT to continue               - Fall precautions     T8 vertebral body fracture with anterior longitudinal ligament disruption   T6, T7 & T8 spinous process fractures              - Neurosurgery assisting with management              - Activity as tolerated              - MRIs of the spine are unable to be completed as pacer/defib not compatible               - Neuro checks              - Maintain spinal neutrality              - Pain control   - S/p kyphoplasty 12/21 with Dr. Alanna Geller   - [facility] consulted PM&R and signed off 12/22    Hyperkalemia - resolved   -Treated with Kayexalate   -Repeat CMP tomorrow    Hx of HTN, A-fib, DM2, CHF              - Hospitalist & cardiology assisting with management and surgical clearance     Pain control                   - Norco. Lidocaine, Morphine PRN     General diet, carb controlled  Repeat labs in AM  Prophylaxis: SCDs, IS, C&DB, Pepcid, stool softeners, Lovenox   PT, OT, SLP eval and treat  Discharge disposition pending clinical course   -Patient stable from trauma perspective   -Precert initiated to IPR, discharge/readmit when pre-cert returns     SUBJECTIVE  Patient seen on 8E. He reports he is doing well. Only taking Tylenol for pain. Working with therapies and waiting on pre-cert for IPR.   Code stroke was called yesterday for patient \"behaving differently\" and facial droop but this had resolved by the time Neurology came Patient notified and verbalized understanding. to assess patient. Head CT negative. He is passing flatus but has not had a bowel movement in several days. Case discussed with trauma surgeon, Dr. Shoaib Kline. Wt Readings from Last 3 Encounters:   12/25/21 202 lb 1.6 oz (91.7 kg)   08/11/16 209 lb (94.8 kg)   02/16/16 209 lb 12.8 oz (95.2 kg)     Temp Readings from Last 3 Encounters:   12/25/21 98.1 °F (36.7 °C) (Oral)   02/16/16 96.4 °F (35.8 °C)     BP Readings from Last 3 Encounters:   12/25/21 129/73   12/21/21 (!) 104/54   02/16/16 134/84     Pulse Readings from Last 3 Encounters:   12/25/21 97   02/16/16 72   09/09/13 92       24 HR INTAKE/OUTPUT :     Intake/Output Summary (Last 24 hours) at 12/25/2021 0838  Last data filed at 12/24/2021 1810  Gross per 24 hour   Intake 941 ml   Output --   Net 941 ml     ADULT DIET; Regular; 4 carb choices (60 gm/meal)    OBJECTIVE  CURRENT VITALS /73   Pulse 97   Temp 98.1 °F (36.7 °C) (Oral)   Resp 20   Ht 6' 1\" (1.854 m)   Wt 202 lb 1.6 oz (91.7 kg)   SpO2 96%   BMI 26.66 kg/m²   GENERAL: Awake, alert, no acute distress, pleasant and cooperative with exam  HEENT: Normocephalic, pupils equal and reactive to light, nares patent bilaterally  NEURO: Alert and orient, GCS 15, follows commands, PMS intact in all four extremities, no signs of focal neurological deficits  CSPINE/BACK: No midline cervical tenderness to palpation  HEART: Regular rate and rhythm with no obvious murmurs, rubs, gallops. Distal pulses intact. LUNGS/CHEST WALL: Lungs are clear to auscultation bilaterally with no wheezes, rales, rhonchi. No respiratory distress or increased work of breathing. No chest wall tenderness to palpation. ABDOMEN: Abdomen soft, nondistended, with no tenderness to palpation. No guarding or peritoneal signs. Bowel sounds normal active  EXTREMITIES: No cyanosis or edema. PMS intact in all four extremities. No extremity tenderness to palpation. Range of motion intact.   Strength 5/5 bilaterally with  strength and plantar/dorsiflexion. SKIN: Warm and dry    LABS  CBC :   Recent Labs     12/23/21  0516 12/25/21  0733   WBC 6.0 5.7   HGB 11.2* 11.2*   HCT 36.3* 34.7*   .8* 98.0*    205     BMP:   Recent Labs     12/23/21  0516 12/23/21 1952 12/24/21  0713 12/25/21  0733     --  137  --    K 5.5* 4.6 4.4  --      --  103  --    CO2 19*  --  25 23   BUN 43*  --  30* 25*   CREATININE 1.7*  --  1.4* 1.2     COAGS:   Recent Labs     12/24/21  0713 12/25/21  0733   PROT 5.8* 5.6*     Pancreas/HFP:  No results for input(s): LIPASE, AMYLASE in the last 72 hours. Recent Labs     12/24/21 0713 12/25/21  0733   AST 17 16   ALT 9* 7*   BILITOT 0.5 0.5   ALKPHOS 117 114     RADIOLOGY:  No new results     Electronically signed by MESSI Zavala CNP on 12/25/2021 at 8:38 AM    Care coordinated with TITI Dodson. New data reviewed. Events yesterday noted. neurologically intact today. Neurology consulted head CT negative. Awaiting precertification for inpatient rehab. Continue supportive care. Agree as documented.

## 2022-01-03 DIAGNOSIS — L29.9 PRURITUS: ICD-10-CM

## 2022-01-03 RX ORDER — DIPHENHYDRAMINE HCL 25 MG
CAPSULE ORAL
Qty: 30 CAPSULE | Refills: 0 | Status: CANCELLED | OUTPATIENT
Start: 2022-01-03

## 2022-01-03 NOTE — TELEPHONE ENCOUNTER
Mirela Liu called requesting a refill of the below medication which has been pended for you:     Requested Prescriptions     Pending Prescriptions Disp Refills    diphenhydrAMINE (BANOPHEN) 25 MG capsule 60 capsule 1     Sig: take 1 capsule by mouth twice a day if needed for itching and allergies       Last Appointment Date: 1/22/21  Next Appointment Date: 4/26/21    No Known Allergies

## 2022-01-03 NOTE — TELEPHONE ENCOUNTER
Pt called asking for two pills of this medication until pt can get actual refill done, she is out tonight

## 2022-01-03 NOTE — TELEPHONE ENCOUNTER
Roverto Rodrigues called requesting a refill of the below medication which has been pended for you:     Requested Prescriptions     Pending Prescriptions Disp Refills    diphenhydrAMINE (BANOPHEN) 25 MG capsule 60 capsule 0     Sig: take 1 capsule by mouth twice a day if needed for itching and allergies       Last Appointment Date: 12/22/2021  Next Appointment Date: 4/26/2022    No Known Allergies

## 2022-01-04 RX ORDER — DIPHENHYDRAMINE HCL 25 MG
CAPSULE ORAL
Qty: 60 CAPSULE | Refills: 0 | Status: SHIPPED | OUTPATIENT
Start: 2022-01-04 | End: 2022-01-31 | Stop reason: SDUPTHER

## 2022-01-07 DIAGNOSIS — F17.210 CIGARETTE NICOTINE DEPENDENCE WITHOUT COMPLICATION: ICD-10-CM

## 2022-01-07 NOTE — TELEPHONE ENCOUNTER
Luly Cruz called requesting a refill of the below medication which has been pended for you:     Requested Prescriptions     Pending Prescriptions Disp Refills    nicotine (NICODERM CQ) 7 MG/24HR 30 patch 3     Sig: Place 1 patch onto the skin every 24 hours       Last Appointment Date: 12/22/2021  Next Appointment Date: 4/26/2022    No Known Allergies
Pharmacy faxed refill request.
05-Nov-2020

## 2022-01-10 DIAGNOSIS — K62.89 CHRONIC RECTAL PAIN: ICD-10-CM

## 2022-01-10 DIAGNOSIS — E78.2 MIXED HYPERLIPIDEMIA: ICD-10-CM

## 2022-01-10 DIAGNOSIS — G89.29 CHRONIC RECTAL PAIN: ICD-10-CM

## 2022-01-10 NOTE — TELEPHONE ENCOUNTER
Elmer Campbell called requesting a refill of the below medication which has been pended for you: [place] from [place] reviewed. Tramadol 50 mg last filled 12/15/21 #90/30 days.      Requested Prescriptions     Pending Prescriptions Disp Refills    pravastatin (PRAVACHOL) 40 MG tablet [Pharmacy Med Name: PRAVASTATIN SODIUM 40 MG TAB] 30 tablet 5     Sig: take 1 tablet by mouth once daily    traMADol (ULTRAM) 50 MG tablet [Pharmacy Med Name: TRAMADOL HCL 50 MG TABLET] 90 tablet 0     Sig: take 1 tablet by mouth every 8 hours AS NEEDED FOR PAIN       Last Appointment Date: 12/22/2021  Next Appointment Date: 4/26/2022    No Known Allergies

## 2022-01-11 RX ORDER — PRAVASTATIN SODIUM 40 MG
TABLET ORAL
Qty: 30 TABLET | Refills: 5 | Status: SHIPPED | OUTPATIENT
Start: 2022-01-11 | End: 2022-04-26 | Stop reason: SDUPTHER

## 2022-01-11 RX ORDER — TRAMADOL HYDROCHLORIDE 50 MG/1
TABLET ORAL
Qty: 90 TABLET | Refills: 0 | Status: SHIPPED | OUTPATIENT
Start: 2022-01-11 | End: 2022-02-07 | Stop reason: SDUPTHER

## 2022-01-31 DIAGNOSIS — G63 POLYNEUROPATHY ASSOCIATED WITH UNDERLYING DISEASE (HCC): ICD-10-CM

## 2022-01-31 DIAGNOSIS — M25.511 CHRONIC RIGHT SHOULDER PAIN: ICD-10-CM

## 2022-01-31 DIAGNOSIS — E83.42 HYPOMAGNESEMIA: ICD-10-CM

## 2022-01-31 DIAGNOSIS — L29.9 PRURITUS: ICD-10-CM

## 2022-01-31 DIAGNOSIS — G89.29 CHRONIC RIGHT SHOULDER PAIN: ICD-10-CM

## 2022-01-31 RX ORDER — CELECOXIB 200 MG/1
200 CAPSULE ORAL 2 TIMES DAILY PRN
Qty: 60 CAPSULE | Refills: 1 | OUTPATIENT
Start: 2022-01-31

## 2022-01-31 NOTE — TELEPHONE ENCOUNTER
Janelle Reardon called requesting a refill of the below medication which has been pended for you:     Requested Prescriptions     Pending Prescriptions Disp Refills    ONETOUCH ULTRA strip [Pharmacy Med Name: Gabriel Arias TEST STRIP] 300 strip 1     Sig: use 1 TEST STRIP to TEST BLOOD SUGAR three times a day    diphenhydrAMINE (BANOPHEN) 25 MG capsule 60 capsule 1     Sig: take 1 capsule by mouth twice a day if needed for itching and allergies    magnesium oxide (MAG-OX) 400 MG tablet 30 tablet 1     Sig: take 1 tablet by mouth once daily     Refused Prescriptions Disp Refills    celecoxib (CELEBREX) 200 MG capsule 60 capsule 1     Sig: Take 1 capsule by mouth 2 times daily as needed for Pain take 1 capsule by mouth twice a day       Last Appointment Date: 12/22/2021  Next Appointment Date: 4/26/2022    No Known Allergies

## 2022-02-01 RX ORDER — MAGNESIUM OXIDE 400 MG/1
TABLET ORAL
Qty: 30 TABLET | Refills: 1 | Status: SHIPPED | OUTPATIENT
Start: 2022-02-01 | End: 2022-03-23 | Stop reason: SDUPTHER

## 2022-02-01 RX ORDER — BLOOD SUGAR DIAGNOSTIC
STRIP MISCELLANEOUS
Qty: 300 STRIP | Refills: 1 | Status: SHIPPED | OUTPATIENT
Start: 2022-02-01

## 2022-02-01 RX ORDER — DIPHENHYDRAMINE HCL 25 MG
CAPSULE ORAL
Qty: 60 CAPSULE | Refills: 1 | Status: SHIPPED | OUTPATIENT
Start: 2022-02-01 | End: 2022-03-23 | Stop reason: SDUPTHER

## 2022-02-01 RX ORDER — AMMONIUM LACTATE 12 G/100G
LOTION TOPICAL
Qty: 400 G | Refills: 1 | Status: SHIPPED | OUTPATIENT
Start: 2022-02-01 | End: 2022-03-23

## 2022-02-02 DIAGNOSIS — M25.511 CHRONIC RIGHT SHOULDER PAIN: ICD-10-CM

## 2022-02-02 DIAGNOSIS — G89.29 CHRONIC RIGHT SHOULDER PAIN: ICD-10-CM

## 2022-02-02 RX ORDER — CELECOXIB 200 MG/1
CAPSULE ORAL
Qty: 60 CAPSULE | Refills: 1 | OUTPATIENT
Start: 2022-02-02

## 2022-02-07 DIAGNOSIS — K62.89 CHRONIC RECTAL PAIN: ICD-10-CM

## 2022-02-07 DIAGNOSIS — K62.89 CHRONIC RECTAL PAIN: Primary | ICD-10-CM

## 2022-02-07 DIAGNOSIS — G89.29 CHRONIC RECTAL PAIN: ICD-10-CM

## 2022-02-07 DIAGNOSIS — G89.29 CHRONIC RECTAL PAIN: Primary | ICD-10-CM

## 2022-02-07 NOTE — TELEPHONE ENCOUNTER
Miguel Martino called requesting a refill of the below medication which has been pended for you: [place] from [place] reviewed. Tramadol 50 mg last filled 1/12/22 #90/30 days     Requested Prescriptions     Pending Prescriptions Disp Refills    traMADol (ULTRAM) 50 MG tablet 90 tablet 0     Sig: Take 1 tablet by mouth every 8 hours as needed for Pain for up to 30 days.        Last Appointment Date: 12/22/2021  Next Appointment Date: 4/26/2022    No Known Allergies

## 2022-02-08 RX ORDER — TRAMADOL HYDROCHLORIDE 50 MG/1
50 TABLET ORAL EVERY 8 HOURS PRN
Qty: 90 TABLET | Refills: 0 | Status: SHIPPED | OUTPATIENT
Start: 2022-02-08 | End: 2022-03-07 | Stop reason: SDUPTHER

## 2022-02-08 RX ORDER — TRAMADOL HYDROCHLORIDE 50 MG/1
TABLET ORAL
Qty: 90 TABLET | OUTPATIENT
Start: 2022-02-08

## 2022-02-16 DIAGNOSIS — G56.03 BILATERAL CARPAL TUNNEL SYNDROME: ICD-10-CM

## 2022-02-16 DIAGNOSIS — G63 POLYNEUROPATHY ASSOCIATED WITH UNDERLYING DISEASE (HCC): ICD-10-CM

## 2022-02-16 DIAGNOSIS — I10 ESSENTIAL HYPERTENSION: ICD-10-CM

## 2022-02-16 RX ORDER — GABAPENTIN 300 MG/1
CAPSULE ORAL
Qty: 120 CAPSULE | Refills: 0 | OUTPATIENT
Start: 2022-02-16 | End: 2022-03-16

## 2022-02-16 RX ORDER — LOSARTAN POTASSIUM 100 MG/1
TABLET ORAL
Qty: 90 TABLET | Refills: 0 | Status: SHIPPED | OUTPATIENT
Start: 2022-02-16 | End: 2022-04-26 | Stop reason: SDUPTHER

## 2022-02-16 NOTE — TELEPHONE ENCOUNTER
Ganesh Luis called requesting a refill of the below medication which has been pended for you:     Requested Prescriptions     Pending Prescriptions Disp Refills    losartan (COZAAR) 100 MG tablet [Pharmacy Med Name: LOSARTAN POTASSIUM 100 MG TAB] 90 tablet 0     Sig: take 1 tablet by mouth once daily       Last Appointment Date: 12/22/2021  Next Appointment Date: 4/26/2022    No Known Allergies
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
## 2022-03-07 DIAGNOSIS — K62.89 CHRONIC RECTAL PAIN: ICD-10-CM

## 2022-03-07 DIAGNOSIS — G89.29 CHRONIC RECTAL PAIN: ICD-10-CM

## 2022-03-07 RX ORDER — TRAMADOL HYDROCHLORIDE 50 MG/1
TABLET ORAL
Qty: 90 TABLET | OUTPATIENT
Start: 2022-03-07

## 2022-03-07 NOTE — TELEPHONE ENCOUNTER
Dio Morales called requesting a refill of the below medication which has been pended for you:   [place] from [place] reviewed. Tramadol 50 mg last filled 2/9/22 #90/30 days. RR out of office    Requested Prescriptions     Pending Prescriptions Disp Refills    traMADol (ULTRAM) 50 MG tablet 90 tablet 0     Sig: Take 1 tablet by mouth every 8 hours as needed for Pain for up to 30 days.        Last Appointment Date: 12/22/2021  Next Appointment Date: 3/7/2022    No Known Allergies

## 2022-03-08 RX ORDER — TRAMADOL HYDROCHLORIDE 50 MG/1
50 TABLET ORAL EVERY 8 HOURS PRN
Qty: 90 TABLET | Refills: 0 | Status: SHIPPED | OUTPATIENT
Start: 2022-03-11 | End: 2022-04-08

## 2022-03-09 NOTE — TELEPHONE ENCOUNTER
Controlled Substance Monitoring:    Acute and Chronic Pain Monitoring:   RX Monitoring 3/8/2022   Attestation -   Periodic Controlled Substance Monitoring No signs of potential drug abuse or diversion identified. Obtained or confirmed \"Consent for Opioid Use\" on file. Obtained or confirmed \"Medication Contract\" on file.

## 2022-03-22 ENCOUNTER — TELEPHONE (OUTPATIENT)
Dept: FAMILY MEDICINE CLINIC | Age: 62
End: 2022-03-22

## 2022-03-22 DIAGNOSIS — G62.9 PERIPHERAL POLYNEUROPATHY: Primary | ICD-10-CM

## 2022-03-22 DIAGNOSIS — E11.40 TYPE 2 DIABETES MELLITUS WITH DIABETIC NEUROPATHY, WITHOUT LONG-TERM CURRENT USE OF INSULIN (HCC): ICD-10-CM

## 2022-03-22 NOTE — TELEPHONE ENCOUNTER
Jaz Guardado called requesting a refill of the below medication which has been pended for you:     Requested Prescriptions     Pending Prescriptions Disp Refills    UNABLE TO FIND 1 each 0     Sig: Shower chair with back       Last Appointment Date: 12/22/2021  Next Appointment Date: 4/26/2022    No Known Allergies

## 2022-03-23 DIAGNOSIS — G63 POLYNEUROPATHY ASSOCIATED WITH UNDERLYING DISEASE (HCC): ICD-10-CM

## 2022-03-23 DIAGNOSIS — L29.9 PRURITUS: ICD-10-CM

## 2022-03-23 DIAGNOSIS — E83.42 HYPOMAGNESEMIA: ICD-10-CM

## 2022-03-23 DIAGNOSIS — G56.03 BILATERAL CARPAL TUNNEL SYNDROME: ICD-10-CM

## 2022-03-23 RX ORDER — MAGNESIUM OXIDE 400 MG/1
TABLET ORAL
Qty: 30 TABLET | Refills: 1 | Status: SHIPPED | OUTPATIENT
Start: 2022-03-23 | End: 2022-04-26 | Stop reason: SDUPTHER

## 2022-03-23 RX ORDER — GABAPENTIN 300 MG/1
CAPSULE ORAL
Qty: 120 CAPSULE | Refills: 0 | Status: SHIPPED | OUTPATIENT
Start: 2022-03-23 | End: 2022-03-31 | Stop reason: SDUPTHER

## 2022-03-23 RX ORDER — DIPHENHYDRAMINE HCL 25 MG
CAPSULE ORAL
Qty: 60 CAPSULE | Refills: 0 | Status: SHIPPED | OUTPATIENT
Start: 2022-03-23 | End: 2022-04-21

## 2022-03-23 RX ORDER — AMMONIUM LACTATE 12 G/100G
LOTION TOPICAL
Qty: 450 G | Refills: 1 | Status: SHIPPED | OUTPATIENT
Start: 2022-03-23 | End: 2022-04-22

## 2022-03-23 NOTE — TELEPHONE ENCOUNTER
Chepe Monday called requesting a refill of the below medication which has been pended for you:     Requested Prescriptions     Pending Prescriptions Disp Refills    diphenhydrAMINE (BANOPHEN) 25 MG capsule 60 capsule 0     Sig: take 1 capsule by mouth twice a day if needed for itching and allergies    magnesium oxide (MAG-OX) 400 MG tablet 30 tablet 0     Sig: take 1 tablet by mouth once daily       Last Appointment Date: 12/22/2021  Next Appointment Date: 4/26/2022    No Known Allergies

## 2022-03-23 NOTE — TELEPHONE ENCOUNTER
Last Appt:  11/11/2021  Next Appt:  3/31/2022    Med verified in [facility] ran 2/14/2022

## 2022-03-24 ENCOUNTER — TELEPHONE (OUTPATIENT)
Dept: FAMILY MEDICINE CLINIC | Age: 62
End: 2022-03-24

## 2022-03-24 NOTE — TELEPHONE ENCOUNTER
----- Message from Smithahannah Leslie sent at 3/23/2022  4:29 PM EDT -----  Subject: Message to Provider    QUESTIONS  Information for Provider? Patient is calling to check on the status of a   prescription of a shower chair with a back on it. She needs it sent to   [facility]IRO [facility]. She called them and they didn't receive it yet. Please reach out to patient asap.  ---------------------------------------------------------------------------  --------------  CALL BACK INFO  What is the best way for the office to contact you? OK to leave message on   voicemail  Preferred Call Back Phone Number? [phone]  ---------------------------------------------------------------------------  --------------  SCRIPT ANSWERS  Relationship to Patient?  Self

## 2022-03-31 ENCOUNTER — OFFICE VISIT (OUTPATIENT)
Dept: NEUROLOGY | Age: 62
End: 2022-03-31
Payer: MEDICAID

## 2022-03-31 VITALS
HEIGHT: 62 IN | HEART RATE: 96 BPM | TEMPERATURE: 97.2 F | WEIGHT: 213 LBS | BODY MASS INDEX: 39.2 KG/M2 | DIASTOLIC BLOOD PRESSURE: 74 MMHG | OXYGEN SATURATION: 94 % | SYSTOLIC BLOOD PRESSURE: 138 MMHG

## 2022-03-31 DIAGNOSIS — F31.31 BIPOLAR AFFECTIVE DISORDER, CURRENTLY DEPRESSED, MILD (HCC): ICD-10-CM

## 2022-03-31 DIAGNOSIS — F31.10 BIPOLAR AFFECTIVE DISORDER, CURRENT EPISODE MANIC, CURRENT EPISODE SEVERITY UNSPECIFIED (HCC): ICD-10-CM

## 2022-03-31 DIAGNOSIS — R26.89 BALANCE PROBLEM: ICD-10-CM

## 2022-03-31 DIAGNOSIS — E66.01 MORBIDLY OBESE (HCC): ICD-10-CM

## 2022-03-31 DIAGNOSIS — G47.9 SLEEPING DIFFICULTY: ICD-10-CM

## 2022-03-31 DIAGNOSIS — E66.9 OBESITY (BMI 30.0-34.9): ICD-10-CM

## 2022-03-31 DIAGNOSIS — E11.40 TYPE 2 DIABETES MELLITUS WITH DIABETIC NEUROPATHY, WITHOUT LONG-TERM CURRENT USE OF INSULIN (HCC): ICD-10-CM

## 2022-03-31 DIAGNOSIS — G25.81 RESTLESS LEG SYNDROME: ICD-10-CM

## 2022-03-31 DIAGNOSIS — G63 POLYNEUROPATHY ASSOCIATED WITH UNDERLYING DISEASE (HCC): Primary | ICD-10-CM

## 2022-03-31 DIAGNOSIS — E53.8 VITAMIN B12 DEFICIENCY: ICD-10-CM

## 2022-03-31 DIAGNOSIS — G56.03 BILATERAL CARPAL TUNNEL SYNDROME: ICD-10-CM

## 2022-03-31 DIAGNOSIS — F41.9 ANXIETY: ICD-10-CM

## 2022-03-31 DIAGNOSIS — R53.82 CHRONIC FATIGUE: ICD-10-CM

## 2022-03-31 DIAGNOSIS — F10.20 ALCOHOLISM (HCC): ICD-10-CM

## 2022-03-31 DIAGNOSIS — F25.9 SCHIZOAFFECTIVE DISORDER, UNSPECIFIED TYPE (HCC): ICD-10-CM

## 2022-03-31 DIAGNOSIS — F34.1 DYSTHYMIA: ICD-10-CM

## 2022-03-31 PROCEDURE — 1036F TOBACCO NON-USER: CPT | Performed by: PSYCHIATRY & NEUROLOGY

## 2022-03-31 PROCEDURE — G8427 DOCREV CUR MEDS BY ELIG CLIN: HCPCS | Performed by: PSYCHIATRY & NEUROLOGY

## 2022-03-31 PROCEDURE — 3046F HEMOGLOBIN A1C LEVEL >9.0%: CPT | Performed by: PSYCHIATRY & NEUROLOGY

## 2022-03-31 PROCEDURE — 99214 OFFICE O/P EST MOD 30 MIN: CPT | Performed by: PSYCHIATRY & NEUROLOGY

## 2022-03-31 PROCEDURE — 3017F COLORECTAL CA SCREEN DOC REV: CPT | Performed by: PSYCHIATRY & NEUROLOGY

## 2022-03-31 PROCEDURE — G8417 CALC BMI ABV UP PARAM F/U: HCPCS | Performed by: PSYCHIATRY & NEUROLOGY

## 2022-03-31 PROCEDURE — G8484 FLU IMMUNIZE NO ADMIN: HCPCS | Performed by: PSYCHIATRY & NEUROLOGY

## 2022-03-31 PROCEDURE — 2022F DILAT RTA XM EVC RTNOPTHY: CPT | Performed by: PSYCHIATRY & NEUROLOGY

## 2022-03-31 RX ORDER — PSYLLIUM HUSK 3.4 G/7G
POWDER ORAL
Qty: 30 TABLET | Refills: 5 | Status: SHIPPED | OUTPATIENT
Start: 2022-03-31 | End: 2022-08-12 | Stop reason: SDUPTHER

## 2022-03-31 RX ORDER — GABAPENTIN 300 MG/1
CAPSULE ORAL
Qty: 120 CAPSULE | Refills: 2 | Status: SHIPPED | OUTPATIENT
Start: 2022-03-31 | End: 2022-07-07 | Stop reason: SDUPTHER

## 2022-03-31 ASSESSMENT — ENCOUNTER SYMPTOMS
EYE DISCHARGE: 0
CHEST TIGHTNESS: 0
VOMITING: 0
CONSTIPATION: 0
BACK PAIN: 0
ABDOMINAL DISTENTION: 0
SHORTNESS OF BREATH: 0
FACIAL SWELLING: 0
SINUS PRESSURE: 0
CHOKING: 0
EYE ITCHING: 0
VISUAL CHANGE: 0
PHOTOPHOBIA: 0
TROUBLE SWALLOWING: 0
DIARRHEA: 0
APNEA: 0
BOWEL INCONTINENCE: 0
VOICE CHANGE: 0
ABDOMINAL PAIN: 0
EYE REDNESS: 0
BLOOD IN STOOL: 0
COLOR CHANGE: 0
NAUSEA: 0
EYE PAIN: 0
WHEEZING: 0

## 2022-03-31 NOTE — PATIENT INSTRUCTIONS
* FALL   PRECAUTIONS. *  USE   WALKING  ASSISTANCE  DEVICES     QUAD  CANE            *   ADEQUATE   FLUID  INTAKE   AND  ELECTROLYTE  BALANCE             * KEEP  DAIRY  OF   THE  NEUROLOGICAL  SYMPTOMS          *  TO  MAINTAIN  REGULAR  SLEEP  WAKE  CYCLES. *   TO  HAVE  ADEQUATE  REST  AND   SLEEP    HOURS.          *    AVOID  USAGE OF   TOBACCO,  EXCESSIVE  ALCOHOL                AND   ILLEGAL   SUBSTANCES,  IF  ANY          *  Maintain   Healthy  Life Style    With   Periodic  Monitoring  Of         Any  Medical  Conditions  Including   Elevated  Blood  Pressure,  Lipid  Profile,       Blood  Sugar levels  And   Heart  Disease. *   Period   Screening  For  Cancers  Involving  Breast,  Colon,         Lungs  And  Other  Organs  As  Applicable,           In consultation   With  Your  Primary Care Providers. *  If   There is  Any  Significant  Worsening   Of  Current  Symptoms  And             Or  If    Any additional  New  Neurological  Symptoms  and          Significant  Concerns   Should  Call  911 or  Go  To  Emergency  Department            For  Further  Immediate  Evaluation.

## 2022-03-31 NOTE — PROGRESS NOTES
Subjective:        Patient ID: Charla Sevilla is a 62 y. o.right  handed female. Neurologic Problem  The patient's primary symptoms include clumsiness, focal sensory loss, focal weakness, a loss of balance, memory loss and weakness. The patient's pertinent negatives include no altered mental status, near-syncope, slurred speech, syncope or visual change. Primary symptoms comment: RESTLESS LEG  SYNDROME. This is a chronic problem. Episode onset: MORE   THAN    5-6 YEARS. The neurological problem developed insidiously. The problem is unchanged. There was left-sided, lower extremity and right-sided focality noted. Pertinent negatives include no abdominal pain, auditory change, aura, back pain, bladder incontinence, bowel incontinence, chest pain, confusion, diaphoresis, dizziness, fatigue, fever, headaches, light-headedness, nausea, neck pain, palpitations, shortness of breath, vertigo or vomiting. Past treatments include medication. The treatment provided moderate relief. Her past medical history is significant for mood changes. There is no history of a bleeding disorder, a clotting disorder, a CVA, dementia, head trauma, liver disease or seizures. History obtained from  The patient     and other  available medical records were  Also  reviewed.                 1)      H/O    CHRONIC    DIABETIC   PERIPHERAL  NEUROPATHY                                                      -  ON  NEURONTIN                          TOLERATING  THE  SAME                     WITH   SYMPTOMATIC      IMPROVEMENT                        2)     H/O    CHRONIC      RESTLESS  LEG  SYNDROME                 &  PERIODIC  LEG  MOVEMENTS                              -  IMPROVED                              -       ON  SINEMET              3)    H/O    CHRONIC     SLEEP DIFFICULTIES                        -   RESOLVED              4)        H/O   CHRONIC   MILD  MEMORY PROBLEMS                     -    STABLE                   - PATIENT  NOT  CONCERNED            5)     H/O       CHRONIC  TOBACCO  AND  ALCOHOL  USAGE                     -   PATIENT  AWARE  OF THE  RISKS                      -   ON     NICOTINE  PATCHES                         AND   STOPPED  SMOKING   SINCE  JAN 2019                6)         CHRONIC  LUMBAR PAIN  AND JOINT  PAINS   -                           -    STABLE                       ON  ULTRAM  FROM  HER  PCP. 7)    H/O    BILATERAL  CARPAL  TUNNEL  SYNDROME                               -  STABLE            8)          CHRONIC  ANXIETY, DEPRESSION                         AND  SCHIZOAFFECTIVE  DISORDER                         --  STABLE                                -  ON   RISPERDAL                      BEING  FOLLOWED  BY  MENTAL  HEALTH  PROFESSIONALS              9)         MULTIPLE  CO  MORBID  MEDICAL CONDITIONS                    BEING  FOLLOWED  BY   HER  PCP. 10)      PATIENT  DENIES    ANY  SEIZURE  ACTIVITY  OR   MIGRAINES . 11)              NEUROPATHIC  SYMPTOMS  BOTH  FEET                          -   MOSTLY   DUE   TO    HER  TYPE  II    AND                                       CHRONIC     ALCOHOL  ABUSE/  USAGE                                    PATIENT   ON  NEURONTIN        AND                               ULTRAM  FROM     HER PCP     FOR    SYMPTOMATIC   RELIEF                         -    PATIENT    ADVISED                                     A)     TO   AVOID    ALCOHOL                                    B)   CLOSE    FOLLOW     FOR   ADEQUATE   DIABETIC  CONTROL                                       12)       H/O    WORSENING  OF  RESTLESS  LEG  SYNDROME    SINCE  SEPT. 2020                           -   IMPROVED      WITH     SINEMET    CR    TID     SINCE  OCT.    2020                               PATIENT  REPORTED      THAT  SHE  IS  SLEEPING  BETTER                13)        H/O        LEFT    HEEL    PAIN    SINCE   April 2021                                        - BETTER                         PREVIOUS      H/O      RIGHT  FOOT    SURGERY  IN     2011                             BEING  FOLLOWED  BY     PODIATRY                   14)         PATIENT   DENIES   ANY  NEW  NEUROLOGICAL     PROBLEMS                              REQUESTED    REFILLS  FOR    HER  MEDICATIONS                  15)       VARIOUS  RISK   FACTORS   WERE  REVIEWED   AND   DISCUSSED. PATIENT   HAS  MULTIPLE   MEDICAL,  NEUROLOGICAL                               AND   MENTAL  HEALTH  PROBLEMS . PATIENT'S   MANAGEMENT  IS  CHALLENGING. The  Duration,  Quality,  Severity,  Location,  Timing,  Context,  Modifying  Factors   Of   The   Chief   Complaint       And  Present  Illness   Was   Reviewed   In   Chronological   [facility].              Patient   Indicates   The  Presence   And  The  Absence  Of  The  Following  Associated  And       Additional  Neurological    Symptoms:                                Balance  And coordination problems  present           Gait problems     present            Headaches      absent              Migraines           absent           Memory problems        Present             Confusion        absent            Paresthesia numbness          present           Seizures  And  Starring  Episodes           absent           Syncope,  Near  syncopal episodes         absent           Speech problems           absent             Swallowing  Problems      absent            Dizziness,  Light headedness           present                        Vertigo        absent             Generalized   Weakness    absent              focal  Weakness     absent             Tremors         absent              Sleep  Problems     present             History  Of   Recent   Head  Injury     absent             History  Of   Recent  TIA     absent             History  Of   Recent    Stroke absent             Neck  Pain and  Neck muscle  Spasms  Present               Radiating  down   And   Weakness           absent            Lower back   Pain  And     Spasms  Present              Radiating    Down   And   Weakness          absent                H/O   FALLS        absent               History  Of   Visual  Symptoms    Absent                  Associated   Diplopia       absent                                                              Also   Additional   Symptoms   Present    As  Documented    In   The detailed      Review  Of  Systems   And    Please   Refer   To    Them for   Additional  Information.       RECORDS   REVIEWED:    historical medical records, lab reports, office notes and radiology reports         INFORMATION   REVIEWED:     MEDICAL   HISTORY,     SURGICAL   HISTORY,   MEDICATIONS   LIST,   ALLERGIES AND  DRUG  INTOLERANCES,     FAMILY   HISTORY,  SOCIAL  HISTORY,    PROBLEM  LIST   FOR  PATIENT  CARE   COORDINATION         Past Medical History:   Diagnosis Date    [facility]ism ([facility]     Anxiety     Astigmatism with presbyopia     Balance problem     [facility] disorder ([facility]     schizoaffective disorder (Dr. Giovany Liz)    Chronic lumbar radiculopathy     Chronic rectal pain     due to fissures    Chronic shoulder pain     bilaterally, due to osteoarthritis    CTS (carpal tunnel syndrome)     Depression     Fatigue     Hyperlipidemia     Hypertension     Obesity     Peripheral neuropathy     Pica     eats bar soap    Restless leg syndrome     Schizoaffective disorder ([facility]     Sleeping difficulty     Tobacco abuse     Type 2 diabetes mellitus ([facility]                   Past Surgical History:   Procedure Laterality Date    ABSCESS DRAINAGE  2001    perianal    ANUS SURGERY  2001    lateral internal sphincterotomy    BLADDER SUSPENSION  2000    transvaginal sling procedure for incontinence    COLONOSCOPY  2007    polypectomy x 1 (pathology: hyperplastic polyp), repair of perianal fistula    CYST REMOVAL Right 1992    wrist    OTHER SURGICAL HISTORY  2002    perianal fistula repair eua    OTHER SURGICAL HISTORY  1993    cystourethroscopy    UPPER GASTROINTESTINAL ENDOSCOPY  2007    Schatzki's ring, acute gastritis, hiatal hernia    UPPER GASTROINTESTINAL ENDOSCOPY  2000    hiatal hernia, esophagitis, gastritis                 Current Outpatient Medications   Medication Sig Dispense Refill    gabapentin (NEURONTIN) 300 MG capsule take 1 capsule by mouth IN THE MORNING and AT NOON and IN THE EVENING and at bedtime if needed 120 capsule 0    ammonium lactate (LAC-HYDRIN) 12 % lotion APPLY TOPICALLY TO AFFECTED AREAS once daily 450 g 1    diphenhydrAMINE (BANOPHEN) 25 MG capsule take 1 capsule by mouth twice a day if needed for itching and allergies 60 capsule 0    magnesium oxide (MAG-OX) 400 MG tablet take 1 tablet by mouth once daily 30 tablet 1    traMADol (ULTRAM) 50 MG tablet Take 1 tablet by mouth every 8 hours as needed for Pain for up to 30 days.  90 tablet 0    losartan (COZAAR) 100 MG tablet take 1 tablet by mouth once daily 90 tablet 0    pravastatin (PRAVACHOL) 40 MG tablet take 1 tablet by mouth once daily 30 tablet 5    nicotine (NICODERM CQ) 7 MG/24HR Place 1 patch onto the skin every 24 hours 30 patch 3    carbidopa-levodopa (SINEMET)  MG per tablet Taking 3  tablets at bedtime 180 tablet 1    Cyanocobalamin ER (RA VITAMIN B-12 TR) 1000 MCG TBCR take 1 tablet by mouth once daily 30 tablet 5    aspirin 325 MG EC tablet take 1 tablet by mouth once daily 30 tablet 12    Multiple Vitamin (MULTIVITAMIN) tablet take 1 tablet by mouth once daily 30 tablet 5    hydrOXYzine (VISTARIL) 25 MG capsule Take 25 mg by mouth nightly       omeprazole (PRILOSEC) 20 MG delayed release capsule take 1 tablet by mouth daily 90 capsule 1    risperiDONE (RISPERDAL) 1 MG tablet Take 1 tablet by mouth nightly 60 tablet 2    pimecrolimus (ELIDEL) 1 % cream Apply topically to rash on face 2 times daily. 30 g 11    UNABLE TO FIND Shower chair with back 1 each 0    ONETOUCH ULTRA strip use 1 TEST STRIP to TEST BLOOD SUGAR three times a day 300 strip 1    triamcinolone (KENALOG) 0.1 % cream Apply topically 2 times daily. (Patient taking differently: Apply topically 2 times daily prn.) 80 g 2    ketoconazole (NIZORAL) 2 % shampoo APPLY TO AFFECTED AREA ON SCALP 3 TO 4 TIMES A WEEK LEAVE ON FOR 5 MINUTES THEN WASH  mL 11    naloxone 4 MG/0.1ML LIQD nasal spray 1 spray by Nasal route as needed for Opioid Reversal (Patient not taking: Reported on 3/31/2022) 1 each 5    Blood Glucose Monitoring Suppl (ONE TOUCH ULTRA 2) w/Device KIT use as directed 1 kit 0    ONETOUCH DELICA LANCETS FINE MISC TEST three times a day 100 each 5     No current facility-administered medications for this visit. No Known Allergies            Family History   Problem Relation Age of Onset   Rai Safe Stroke Mother     Diabetes Mother     High Blood Pressure Mother     Cataracts Mother     Stroke Father     Diabetes Father     Heart Disease Sister 72    Diabetes Daughter     Stroke Sister 68        Brain aneurysm that ruptured and then she got kidney failure and intestinal infection and     Glaucoma Neg Hx              Social History     Socioeconomic History    Marital status: Single     Spouse name: Not on file    Number of children: Not on file    Years of education: Not on file    Highest education level: Not on file   Occupational History    Not on file   Tobacco Use    Smoking status: Former Smoker     Packs/day: 1.00     Years: 26.00     Pack years: 26.00     Types: Cigarettes     Start date: 1975     Quit date: 2018     Years since quitting: 3.2    Smokeless tobacco: Never Used   Substance and Sexual Activity    Alcohol use:  Yes     Alcohol/week: 0.0 standard drinks     Comment: daily; \"2 tallboys per day\"    Drug use: No    Sexual activity: Not on file   Other Topics Concern    Not on file   Social History Narrative    Not on file     Social Determinants of Health     Financial Resource Strain: Unknown    Difficulty of Paying Living Expenses: Patient refused   Food Insecurity: Unknown    Worried About Running Out of Food in the Last Year: Patient refused    [address] in the Last Year: Patient refused   Transportation Needs:     Lack of Transportation (Medical): Not on file    Lack of Transportation (Non-Medical):  Not on file   Physical Activity:     Days of Exercise per Week: Not on file    Minutes of Exercise per Session: Not on file   Stress:     Feeling of Stress : Not on file   Social Connections:     Frequency of Communication with Friends and Family: Not on file    Frequency of Social Gatherings with Friends and Family: Not on file    Attends Spiritism Services: Not on file    Active Member of [address] [place] [address] [employer] or Organizations: Not on file    Attends Club or Organization Meetings: Not on file    Marital Status: Not on file   Intimate Partner Violence:     Fear of Current or Ex-Partner: Not on file    Emotionally Abused: Not on file    Physically Abused: Not on file    Sexually Abused: Not on file   Housing Stability:     Unable to Pay for Housing in the Last Year: Not on file    Number of [place] in the Last Year: Not on file    Unstable Housing in the Last Year: Not on file         Vitals:    03/31/22 1059   BP: 138/74   Pulse: 96   Temp: 97.2 °F (36.2 °C)   SpO2: 94%         Wt Readings from Last 3 Encounters:   03/31/22 213 lb (96.6 kg)   12/22/21 212 lb (96.2 kg)   12/01/21 214 lb (97.1 kg)         BP Readings from Last 3 Encounters:   03/31/22 138/74   12/22/21 126/76   12/01/21 130/70       Hematology and Coagulation  Lab Results   Component Value Date    WBC 7.0 07/23/2021    RBC 4.38 07/23/2021    HGB 13.2 07/23/2021    HCT 40.2 07/23/2021    MCV 91.8 07/23/2021    MCH 30.1 07/23/2021    MCHC 32.8 07/23/2021    RDW 12.1 07/23/2021  07/23/2021    MPV 9.8 07/23/2021       Chemistries  Lab Results   Component Value Date     12/21/2021    K 4.8 12/21/2021     12/21/2021    CO2 27 12/21/2021    BUN 9 12/21/2021    CREATININE 0.80 12/21/2021    CALCIUM 9.8 12/21/2021    PROT 7.2 12/21/2021    LABALBU 4.1 12/21/2021    BILITOT 0.41 12/21/2021    ALKPHOS 100 12/21/2021    AST 48 12/21/2021    ALT 27 12/21/2021     Lab Results   Component Value Date    ALKPHOS 100 12/21/2021    ALT 27 12/21/2021    AST 48 12/21/2021    PROT 7.2 12/21/2021    BILITOT 0.41 12/21/2021    LABALBU 4.1 12/21/2021     Lab Results   Component Value Date    BUN 9 12/21/2021    CREATININE 0.80 12/21/2021     Lab Results   Component Value Date    CALCIUM 9.8 12/21/2021    MG 1.9 07/23/2021     Lab Results   Component Value Date    AST 48 12/21/2021    ALT 27 12/21/2021       Lab Results   Component Value Date    [id number] 08/20/2021             Review of Systems   Constitutional: Negative for appetite change, diaphoresis, fatigue, fever and unexpected weight change. HENT: Negative for dental problem, drooling, ear discharge, ear pain, facial swelling, hearing loss, mouth sores, nosebleeds, postnasal drip, sinus pressure, tinnitus, trouble swallowing and voice change. Eyes: Negative for photophobia, pain, discharge, redness, itching and visual disturbance. Respiratory: Negative for apnea, choking, chest tightness, shortness of breath and wheezing. Cardiovascular: Negative for chest pain, palpitations, leg swelling and near-syncope. Gastrointestinal: Negative for abdominal distention, abdominal pain, blood in stool, bowel incontinence, constipation, diarrhea, nausea and vomiting. Endocrine: Negative for cold intolerance, heat intolerance, polydipsia, polyphagia and polyuria. Genitourinary: Negative for bladder incontinence. Musculoskeletal: Positive for gait problem. Negative for back pain, neck pain and neck stiffness.    Skin: Negative for color change, pallor and wound. Allergic/Immunologic: Negative for environmental allergies, food allergies and immunocompromised state. Neurological: Positive for focal weakness, weakness and loss of balance. Negative for dizziness, vertigo, tremors, syncope, facial asymmetry, speech difficulty, light-headedness and headaches. Hematological: Negative for adenopathy. Does not bruise/bleed easily. Psychiatric/Behavioral: Positive for decreased concentration and memory loss. Negative for agitation, behavioral problems, confusion, dysphoric mood, hallucinations, self-injury, sleep disturbance and suicidal ideas. The patient is nervous/anxious. The patient is not hyperactive. Objective:   Physical Exam  Constitutional:       Appearance: She is well-developed. HENT:      Head: Normocephalic and atraumatic. No raccoon eyes or Covington's sign. Right Ear: External ear normal.      Left Ear: External ear normal.      Nose: Nose normal.   Eyes:      Conjunctiva/sclera: Conjunctivae normal.      Pupils: Pupils are equal, round, and reactive to light. Neck:      Thyroid: No thyroid mass or thyromegaly. Vascular: No carotid bruit. Trachea: No tracheal deviation. Meningeal: Brudzinski's sign and Kernig's sign absent. Cardiovascular:      Rate and Rhythm: Normal rate and regular rhythm. Pulmonary:      Effort: Pulmonary effort is normal. No respiratory distress. Breath sounds: Normal breath sounds. No wheezing or rales. Musculoskeletal:         General: No tenderness. Normal range of motion. Cervical back: Normal range of motion and neck supple. No rigidity. No muscular tenderness. Normal range of motion. Lymphadenopathy:      Cervical: No cervical adenopathy. Skin:     General: Skin is warm. Coloration: Skin is not pale. Findings: No erythema or rash. Nails: There is no clubbing. Psychiatric:         Attention and Perception: She is attentive. Mood and Affect: Mood is anxious and depressed. Affect is blunt. Affect is not labile or inappropriate. Speech: She is communicative. Speech is not rapid and pressured, delayed, slurred or tangential.         Behavior: Behavior is not agitated, slowed, aggressive, withdrawn, hyperactive or combative. Behavior is cooperative. Thought Content: Thought content normal. Thought content is not paranoid or delusional. Thought content does not include homicidal or suicidal ideation. Thought content does not include homicidal or suicidal plan. Cognition and Memory: Cognition is impaired. Memory is impaired. She exhibits impaired recent memory. She does not exhibit impaired remote memory. Judgment: Judgment is not impulsive or inappropriate. NEUROLOGICAL EXAMINATION :    A) MENTAL STATUS:                   Alert and  oriented  To time, place  And  Person. No Aphasia. No  Dysarthria. Able   To  Follow   SIMPLE    commands without   Any  Difficulty. No right  To left confusion. Normal  Speech  And language function. Insight and  Judgment ,Fund  Of  Knowledge   Decreased                Recent  And  Remote memory  Decreased                Attention &Concentration are decreased                                                   B) CRANIAL NERVES :             2 CN : Visual  Acuity  And  Visual fields  within normal limits                        Fundi  Could  Not  Be  Could  Not  Be  Evaluated. 3,4,6 CN : Both  Pupils are   Reactive and  Equal.                            Extraocular   Movements  Are  Intact. No  Nystagmus. No  RODERICK. No  Afferent  Pupillary  Defect noted. 5 CN :  Normal  Facial sensations and Corneal  Reflexes           7 CN :  Normal  Facial  Symmetry  And  Strength. No facial  Weakness.            8 CN :  Hearing  Appears within normal limits          9, 10 CN: Normal spontaneous, reflex palate movements         11 CN:   Normal  Shoulder shrug and  Strength         12 CN :   Normal  Tongue movements and  Tongue  In midline                        No tongue   Fasciculations or atrophy         C) MOTOR  EXAM:                 Strength  In upper  And  Lower extremities   within normal limits                     Rapid alternating  And  repetitions  Movements  within normal limits                 Muscle  Tone  In upper  And  Lower  Extremities  within normal limits                No rigidity. No  Spasticity. Bradykinesia   absent                  No  Asterixis. Sustention  Tremor , Resting  Tremor   absent                    No other  Abnormal  Movements noted             D) SENSORY :               light touch, pinprick, position  And  Vibration  decreased          E) REFLEXES:                   Deep  Tendon  Reflexes decreased                    No pathological  Reflexes  Bilaterally. F) COORDINATION  AND  GAIT :                                Station and  Gait   SLOW                                    Romberg's test positive                          Ataxia negative            Assessment:             Patient Active Problem List   Diagnosis    Anxiety    Depression    [facility]    History of tobacco use    Restless leg syndrome    Pica    Essential hypertension    Bipolar disorder ([facility]    Alcoholism ([facility]    Sleeping difficulty    Peripheral neuropathy    Balance problem    CTS (carpal tunnel syndrome)    Chronic lumbar radiculopathy    GERD (gastroesophageal reflux disease)    [facility] obstructive [facility]    Fatigue    Chronic rectal pain    Type 2 diabetes mellitus with diabetic neuropathy, without long-term current use of insulin ([facility]    Pruritus    Obesity (BMI 30.0-34. 9)    Mixed hyperlipidemia    Chronic right *   Benefits   And   Side  Effect  Profile  Of  Medication/s   Were   Discussed             * Need   For  Further   Follow up For  The  Various  Problems  Were discussed. * Results  Of  The  Previous  Diagnostic tests were reviewed and questions answered. patient  understand the same. Medical  Decision  Making  Was  Made  Based on the   Complexity  Of  Above  Mentioned  Diagnoses,        Data reviewed   & diagnostic  Tests  Reviewed,  Risk  Of  Significant   Co morbidities and complicating   Factors. Medical  Decision  Was   High  Complexity  Due   To  The  Patient's  Multiple  Symptoms,      Advancing   Disease,  Complex  Treatment  Regimen,  Multiple medications and   Risk  Of   Side  Effects,      Difficulty  In  Medication  Management  And  Diagnostic  Challenges       In  View  Of  The  Associated   Co  Morbid  Conditions   And  Problems. * FALL   PRECAUTIONS. THESE  REVIEWED   AND  DISCUSSED      *   BE  CAREFUL  WITH  ACTIVITIES   INCLUDING  DRIVING. *   AVOID   NECK  AND/ BACK  STRAINING  ACTIVITIES          *   TO   WEAR   BILATERAL   WRIST  BRACES       *   TO  AVOID  PRESSURE  OVER   ULNAR  ASPECT   OF   ELBOWS          *   ADEQUATE   FLUID  INTAKE   AND  ELECTROLYTE  BALANCE           * KEEP  DAIRY  OF   THE  NEUROLOGICAL  SYMPTOMS        RECORDING THE    DURATION  AND  FREQUENCY. *  AVOID    CONDITIONS  AND  FACTORS   THAT  MAKE   NEUROLOGICAL  SYMPTOMS  WORSE. *  TO  MAINTAIN  REGULAR  SLEEP  WAKE  CYCLES. *   TO  HAVE  ADEQUATE  REST  AND   SLEEP    HOURS.          *    TO   AVOID   TO  SLEEP  IN   SUPINE  POSITION. *      WEIGHT   LOSS.              *    AVOID  ANY USAGE OF                   TOBACCO,  EXCESSIVE  ALCOHOL  AND   ILLEGAL   SUBSTANCES          *  CONTINUE MEDICATIONS PRESCRIBED      AS    RECOMMENDED       *   Compliance   With  Medications   And  Instructions        * CURRENTLY TOLERATING  THE  PRESCRIBED   MEDICATIONS. WITHOUT  ANY  SIGNIFICANT  SIDE  EFFECTS   &  GETTING BENEFIT. *  May   Use  Pill  Box,    If  Needed          *        Antiplatelet  therapy    As   Recommended  Was   Discussed        *    Prophylactic  Use   Of     Vitamin   B   Complex,  Folic  Acid,    Vitamin  B12        Multivitamin   Tablet  Daily    Supplementations   Over  The  Counter  Discussed               *  FOOT  CARE, DAILY  INSPECTION  OF  FEET   AND         PERIODIC  PODIATRY EVALUATIONS . *  PATIENT  IS  ALSO   COUNSELED   TO  KEEP    ACTIVITIES         A)   SIMPLE      B)  ORGANIZED      C)  WRITE   DOWN                                *      NEUROPATHIC  SYMPTOMS  BOTH  FEET     SINCE    FEB. 2020                        -   MOSTLY   DUE   TO    HER  TYPE  II    AND                                 ALCOHOL  ABUSE/  USAGE                          *        PATIENT    ON  NEURONTIN        AND                                ULTRAM  FROM     HER PCP     FOR    SYMPTOMATIC   RELIEF                           -    PATIENT    ADVISED                                     A)     TO   AVOID    ALCOHOL                                    B)    ADEQUATE   DIABETIC  CONTROL                           Controlled Substances Monitoring: Periodic Controlled Substance Monitoring: Possible medication side effects, risk of tolerance/dependence & alternative treatments discussed. ,Assessed functional status.  Rajeev Waddell MD)            Orders Placed This Encounter   Medications    gabapentin (NEURONTIN) 300 MG capsule     Sig: ONE  CAPSULE    4   TIMES   DAILY     Dispense:  120 capsule     Refill:  2    carbidopa-levodopa (SINEMET)  MG per tablet     Sig: Taking 3  tablets at bedtime     Dispense:  180 tablet     Refill:  1    Cyanocobalamin ER (RA VITAMIN B-12 TR) 1000 MCG TBCR     Sig: take 1 tablet by mouth once daily     Dispense:  30 tablet     Refill:  5               *PATIENT   TO FOLLOW  UP  WITH   PRIMARY  CARE   AND   OTHER  CONSULTANTS  AS  BEFORE.               *TO  FOLLOW  WITH   MENTAL  HEALTH  PROFESSIONALS ,  INCLUDING            PSYCHOLOGICAL  COUNSELING   AND  PSYCHIATRIC  EVALUTIONS              *  Maintain   Healthy  Life Style    With   Periodic  Monitoring  Of      Any  Medical  Conditions  Including   Elevated  Blood  Pressure,  Lipid  Profile,     Blood  Sugar levels  And   Heart  Disease. *   Period   Screening  For  Cancers  Involving  Breast,  Colon,     lungs  And  Other  Organs  As  Applicable,  In consultation   With  Your  Primary Care Providers. * Second  Neurological  Opinion  And  Evaluations  In  [facility] AND [facility]  Setting  If  Patient  Is  Interested. *  If  The  Patient remains  Neurologically  Stable   Return   To  [facility] Neurology Department       IN   3    MONTHS  TIME   FOR  FURTHER  FOLLOW UP.                   *  If   There is  Any  Significant  Worsening   Of  Current  Symptoms  And  Or  If patient  Develops   Any additional  New      Neurological  Symptoms  Or  Significant  Concerns   Should  Call  911 or  Go  To  Emergency  Department  For  Further      Immediate  Evaluation. *   The  Neurological   Findings,  Possible  Diagnosis,  Differential diagnoses   And  Options      For    Further   Investigations   And  management   Are  Discussed  Comprehensively. Medications   And  Prescription   Risks  And  Side effects  Are   Also  Discussed. The  Above  Were  Reviewed  With  patient and     questions  Answered  In  Detail. More   Than   50% of face  To face Time   Was  Spent  On  Counseling   And   Coordination  Of  Care       Of   Patient's multiple   Neurological  Problems   And   Comorbid  Medical   Conditions.             Electronically signed by Bonita Esparza MD.,  [facility]       Board Certified in  Neurology &  In  [address] Medicine   American Board of Psychiatry and Neurology (ABPN)      DISCLAIMER:   Although every effort was made to ensure the accuracy of this  electronic transcription, some errors in transcription may have occurred. GENERAL PATIENT INSTRUCTIONS:     A Healthy Lifestyle: Care Instructions  Your Care Instructions  A healthy lifestyle can help you feel good, stay at a healthy weight, and have plenty of energy for both work and play. A healthy lifestyle is something you can share with your whole family. A healthy lifestyle also can lower your risk for serious health problems, such as high blood pressure, heart disease, and diabetes. You can follow a few steps listed below to improve your health and the health of your family. Follow-up care is a key part of your treatment and safety. Be sure to make and go to all appointments, and call your doctor if you are having problems. Its also a good idea to know your test results and keep a list of the medicines you take. How can you care for yourself at home? Do not eat too much sugar, fat, or fast foods. You can still have dessert and treats now and then. The goal is moderation. Start small to improve your eating habits. Pay attention to portion sizes, drink less juice and soda pop, and eat more fruits and vegetables. Eat a healthy amount of food. A 3-ounce serving of meat, for example, is about the size of a deck of cards. Fill the rest of your plate with vegetables and whole grains. Limit the amount of soda and sports drinks you have every day. Drink more water when you are thirsty. Eat at least 5 servings of fruits and vegetables every day. It may seem like a lot, but it is not hard to reach this goal. A serving or helping is 1 piece of fruit, 1 cup of vegetables, or 2 cups of leafy, raw vegetables. Have an apple or some carrot sticks as an afternoon snack instead of a candy bar.  Try to have fruits and/or vegetables at every meal.  Make exercise part of your daily routine. You may want to start with simple activities, such as walking, bicycling, or slow swimming. Try to be active 30 to 60 minutes every day. You do not need to do all 30 to 60 minutes all at once. For example, you can exercise 3 times a day for 10 or 20 minutes. Moderate exercise is safe for most people, but it is always a good idea to talk to your doctor before starting an exercise program.  Keep moving. [place] the lawn, work in the garden, or [employer]. Take the stairs instead of the elevator at work. If you smoke, quit. People who smoke have an increased risk for heart attack, stroke, cancer, and other lung illnesses. Quitting is hard, but there are ways to boost your chance of quitting tobacco for good. Use nicotine gum, patches, or lozenges. Ask your doctor about stop-smoking programs and medicines. Keep trying. In addition to reducing your risk of diseases in the future, you will notice some benefits soon after you stop using tobacco. If you have shortness of breath or asthma symptoms, they will likely get better within a few weeks after you quit. Limit how much alcohol you drink. Moderate amounts of alcohol (up to 2 drinks a day for men, 1 drink a day for women) are okay. But drinking too much can lead to liver problems, high blood pressure, and other health problems. Family health  If you have a family, there are many things you can do together to improve your health. Eat meals together as a family as often as possible. Eat healthy foods. This includes fruits, vegetables, lean meats and dairy, and whole grains. Include your family in your fitness plan. Most people think of activities such as jogging or tennis as the way to fitness, but there are many ways you and your family can be more active. Anything that makes you breathe hard and gets your heart pumping is exercise. Here are some tips:  Walk to do errands or to take your child to school or the bus.   Go for a family bike ride after dinner instead of watching TV. Where can you learn more? Go to https://chpepiceweb.[employer]. org and sign in to your [employer] account. Enter U756 in the walkby box to learn more about \"A Healthy Lifestyle: Care Instructions. \"     If you do not have an account, please click on the \"Sign Up Now\" link. Current as of: July 26, 2016  Content Version: 11.2  © 7161-5178 [employer]. Care instructions adapted under license by [facility] ([facility]). If you have questions about a medical condition or this instruction, always ask your healthcare professional. [address] any warranty or liability for your use of this information.

## 2022-04-07 DIAGNOSIS — G89.29 CHRONIC RECTAL PAIN: ICD-10-CM

## 2022-04-07 DIAGNOSIS — K62.89 CHRONIC RECTAL PAIN: ICD-10-CM

## 2022-04-08 RX ORDER — TRAMADOL HYDROCHLORIDE 50 MG/1
50 TABLET ORAL EVERY 8 HOURS PRN
Qty: 90 TABLET | Refills: 0 | Status: SHIPPED | OUTPATIENT
Start: 2022-04-10 | End: 2022-05-03

## 2022-04-08 NOTE — TELEPHONE ENCOUNTER
Controlled Substance Monitoring:    Acute and Chronic Pain Monitoring:   RX Monitoring 4/8/2022   Attestation -   Periodic Controlled Substance Monitoring No signs of potential drug abuse or diversion identified. Obtained or confirmed \"Consent for Opioid Use\" on file. Obtained or confirmed \"Medication Contract\" on file.

## 2022-04-08 NOTE — TELEPHONE ENCOUNTER
Per [facility] last refill 03/11/2022 #90 for a 30 day supply  Medication contract signed 12/22/2021    Marcio Watkins called requesting a refill of the below medication which has been pended for you:     Requested Prescriptions     Pending Prescriptions Disp Refills    traMADol (ULTRAM) 50 MG tablet [Pharmacy Med Name: TRAMADOL HCL 50 MG TABLET] 90 tablet      Sig: take 1 tablet by mouth every 8 hours AS NEEDED FOR PAIN for up to 30 DAYS       Last Appointment Date: 12/22/2021  Next Appointment Date: 04/26/2022    No Known Allergies

## 2022-04-21 DIAGNOSIS — L29.9 PRURITUS: ICD-10-CM

## 2022-04-21 DIAGNOSIS — G63 POLYNEUROPATHY ASSOCIATED WITH UNDERLYING DISEASE (HCC): ICD-10-CM

## 2022-04-21 DIAGNOSIS — K21.9 GASTROESOPHAGEAL REFLUX DISEASE, UNSPECIFIED WHETHER ESOPHAGITIS PRESENT: ICD-10-CM

## 2022-04-21 RX ORDER — DIPHENHYDRAMINE HCL 25 MG
CAPSULE ORAL
Qty: 30 CAPSULE | Refills: 0 | Status: SHIPPED | OUTPATIENT
Start: 2022-04-21 | End: 2022-04-26 | Stop reason: SDUPTHER

## 2022-04-21 RX ORDER — OMEPRAZOLE 20 MG/1
CAPSULE, DELAYED RELEASE ORAL
Qty: 30 CAPSULE | Refills: 0 | Status: SHIPPED | OUTPATIENT
Start: 2022-04-21 | End: 2022-04-26 | Stop reason: SDUPTHER

## 2022-04-21 NOTE — TELEPHONE ENCOUNTER
Marcio Watkins called requesting a refill of the below medication which has been pended for you:     Requested Prescriptions     Pending Prescriptions Disp Refills    omeprazole (PRILOSEC) 20 MG delayed release capsule [Pharmacy Med Name: OMEPRAZOLE DR 20 MG CAPSULE] 30 capsule 0     Sig: take 1 tablet by mouth once daily    diphenhydrAMINE (BANOPHEN) 25 MG capsule [Pharmacy Med Name: Melania Leopard 25 MG CAPSULE] 30 capsule 0     Sig: take 1 capsule by mouth twice a day if needed for itching and allergies       Last Appointment Date: 12/22/2021  Next Appointment Date: 4/26/2022    No Known Allergies

## 2022-04-22 ENCOUNTER — TELEPHONE (OUTPATIENT)
Dept: FAMILY MEDICINE CLINIC | Age: 62
End: 2022-04-22

## 2022-04-22 ENCOUNTER — HOSPITAL ENCOUNTER (OUTPATIENT)
Dept: LAB | Age: 62
Discharge: HOME | End: 2022-04-22
Payer: MEDICAID

## 2022-04-22 DIAGNOSIS — I10 ESSENTIAL HYPERTENSION: ICD-10-CM

## 2022-04-22 DIAGNOSIS — E66.9 OBESITY, CLASS II, BMI 35-39.9: ICD-10-CM

## 2022-04-22 DIAGNOSIS — E78.2 MIXED HYPERLIPIDEMIA: ICD-10-CM

## 2022-04-22 DIAGNOSIS — E11.40 TYPE 2 DIABETES MELLITUS WITH DIABETIC NEUROPATHY, WITHOUT LONG-TERM CURRENT USE OF INSULIN (HCC): ICD-10-CM

## 2022-04-22 LAB
ALBUMIN SERPL-MCNC: 4 G/DL (ref 3.5–5.2)
ALBUMIN/GLOBULIN RATIO: 1.3 (ref 1–2.5)
ALP BLD-CCNC: 93 U/L (ref 35–104)
ALT SERPL-CCNC: 31 U/L (ref 5–33)
ANION GAP SERPL CALCULATED.3IONS-SCNC: 8 MMOL/L (ref 9–17)
AST SERPL-CCNC: 42 U/L
BILIRUB SERPL-MCNC: 0.46 MG/DL (ref 0.3–1.2)
BUN BLDV-MCNC: 5 MG/DL (ref 8–23)
BUN/CREAT BLD: 6 (ref 9–20)
CALCIUM SERPL-MCNC: 9.2 MG/DL (ref 8.6–10.4)
CHLORIDE BLD-SCNC: 99 MMOL/L (ref 98–107)
CHOLESTEROL/HDL RATIO: 2.6
CHOLESTEROL: 145 MG/DL
CO2: 30 MMOL/L (ref 20–31)
CREAT SERPL-MCNC: 0.79 MG/DL (ref 0.5–0.9)
CREATININE URINE: 125.8 MG/DL (ref 28–217)
ESTIMATED AVERAGE GLUCOSE: 166 MG/DL
GFR AFRICAN AMERICAN: >60 ML/MIN
GFR NON-AFRICAN AMERICAN: >60 ML/MIN
GFR SERPL CREATININE-BSD FRML MDRD: ABNORMAL ML/MIN/{1.73_M2}
GLUCOSE BLD-MCNC: 165 MG/DL (ref 70–99)
HBA1C MFR BLD: 7.4 % (ref 4–6)
HDLC SERPL-MCNC: 56 MG/DL
LDL CHOLESTEROL: 42 MG/DL (ref 0–130)
MICROALBUMIN/CREAT 24H UR: <12 MG/L
MICROALBUMIN/CREAT UR-RTO: NORMAL MCG/MG CREAT
POTASSIUM SERPL-SCNC: 4.9 MMOL/L (ref 3.7–5.3)
SODIUM BLD-SCNC: 137 MMOL/L (ref 135–144)
TOTAL PROTEIN: 7.1 G/DL (ref 6.4–8.3)
TRIGL SERPL-MCNC: 236 MG/DL
VITAMIN D 25-HYDROXY: 34.2 NG/ML

## 2022-04-22 PROCEDURE — 36415 COLL VENOUS BLD VENIPUNCTURE: CPT

## 2022-04-22 PROCEDURE — 82570 ASSAY OF URINE CREATININE: CPT

## 2022-04-22 PROCEDURE — 82043 UR ALBUMIN QUANTITATIVE: CPT

## 2022-04-22 PROCEDURE — 80053 COMPREHEN METABOLIC PANEL: CPT

## 2022-04-22 PROCEDURE — 80061 LIPID PANEL: CPT

## 2022-04-22 PROCEDURE — 82306 VITAMIN D 25 HYDROXY: CPT

## 2022-04-22 PROCEDURE — 83036 HEMOGLOBIN GLYCOSYLATED A1C: CPT

## 2022-04-22 RX ORDER — AMMONIUM LACTATE 12 G/100G
LOTION TOPICAL
Qty: 400 G | Refills: 0 | Status: SHIPPED | OUTPATIENT
Start: 2022-04-22 | End: 2022-05-27

## 2022-04-22 NOTE — TELEPHONE ENCOUNTER
Last Appt:  3/31/2022  Next Appt:   7/1/2022  Med verified in [address] [place] [address] Rd    Dr. Alvaro Peterson is out of the office until the first week of May - are you able to refill pended medication? Thank you!

## 2022-04-22 NOTE — TELEPHONE ENCOUNTER
Noted. However, she has an appointment scheduled with me on Tuesday. Does she plan to keep that appointment? The triglycerides are elevated, but the rest of the labs have no significant abnormalities. The HgbA1c result is not available yet. Hospital Outpatient Visit on 04/22/2022   Component Date Value Ref Range Status    Vit D, 25-Hydroxy 04/22/2022 34.2  >29.9 ng/mL Final    Comment:    Reference Range:  Vitamin D status         Range   Deficiency              <20 ng/mL   Mild Deficiency       20-30 ng/mL   Sufficiency           ng/mL   Toxicity               >100 ng/mL      Glucose 04/22/2022 165* 70 - 99 mg/dL Final    BUN 04/22/2022 5* 8 - 23 mg/dL Final    CREATININE 04/22/2022 0.79  0.50 - 0.90 mg/dL Final    Bun/Cre Ratio 04/22/2022 6* 9 - 20 Final    Calcium 04/22/2022 9.2  8.6 - 10.4 mg/dL Final    Sodium 04/22/2022 137  135 - 144 mmol/L Final    Potassium 04/22/2022 4.9  3.7 - 5.3 mmol/L Final    Chloride 04/22/2022 99  98 - 107 mmol/L Final    CO2 04/22/2022 30  20 - 31 mmol/L Final    Anion Gap 04/22/2022 8* 9 - 17 mmol/L Final    Alkaline Phosphatase 04/22/2022 93  35 - 104 U/L Final    ALT 04/22/2022 31  5 - 33 U/L Final    AST 04/22/2022 42* <32 U/L Final    Total Bilirubin 04/22/2022 0.46  0.3 - 1.2 mg/dL Final    Total Protein 04/22/2022 7.1  6.4 - 8.3 g/dL Final    Albumin 04/22/2022 4.0  3.5 - 5.2 g/dL Final    Albumin/Globulin Ratio 04/22/2022 1.3  1.0 - 2.5 Final    GFR Non- 04/22/2022 >60  >60 mL/min Final    GFR  04/22/2022 >60  >60 mL/min Final    GFR Comment 04/22/2022        Final    Comment: Average GFR for 61-76 years old:   80 mL/min/1.73sq m  Chronic Kidney Disease:   <60 mL/min/1.73sq m  Kidney failure:   <15 mL/min/1.73sq m              eGFR calculated using average adult body mass.  Additional eGFR calculator available at:        [email or web address]            Cholesterol 04/22/2022 145 <200 mg/dL Final    Comment:    Cholesterol Guidelines:      <200  Desirable   200-240  Borderline      >240  Undesirable         HDL 04/22/2022 56  >40 mg/dL Final    Comment:    HDL Guidelines:    <40     Undesirable   40-59    Borderline    >59     Desirable         LDL Cholesterol 04/22/2022 42  0 - 130 mg/dL Final    Comment:    LDL Guidelines:     <100    Desirable   100-129   Near to/above Desirable   130-159   Borderline      >159   Undesirable     Direct (measured) LDL and calculated LDL are not interchangeable tests.  Chol/HDL Ratio 04/22/2022 2.6  <5 Final            Triglycerides 04/22/2022 236* <150 mg/dL Final    Comment:    Triglyceride Guidelines:     <150   Desirable   150-199  Borderline   200-499  High     >499   Very high   Based on AHA Guidelines for fasting triglyceride, October 2012.  Microalb, Ur 04/22/2022 <12  <21 mg/L Final    Creatinine, Ur 04/22/2022 125.8  28.0 - 217.0 mg/dL Final    Microalb/Crt.  Ratio 04/22/2022 Can not be calculated  <25 mcg/mg creat Final   .

## 2022-04-23 DIAGNOSIS — F17.210 CIGARETTE NICOTINE DEPENDENCE WITHOUT COMPLICATION: ICD-10-CM

## 2022-04-26 ENCOUNTER — OFFICE VISIT (OUTPATIENT)
Dept: FAMILY MEDICINE CLINIC | Age: 62
End: 2022-04-26
Payer: MEDICAID

## 2022-04-26 VITALS
SYSTOLIC BLOOD PRESSURE: 130 MMHG | HEART RATE: 85 BPM | WEIGHT: 218 LBS | DIASTOLIC BLOOD PRESSURE: 72 MMHG | OXYGEN SATURATION: 96 % | TEMPERATURE: 97.6 F | BODY MASS INDEX: 40.12 KG/M2 | HEIGHT: 62 IN

## 2022-04-26 DIAGNOSIS — E83.42 HYPOMAGNESEMIA: ICD-10-CM

## 2022-04-26 DIAGNOSIS — J44.9 CHRONIC OBSTRUCTIVE PULMONARY DISEASE, UNSPECIFIED COPD TYPE (HCC): ICD-10-CM

## 2022-04-26 DIAGNOSIS — E78.2 MIXED HYPERLIPIDEMIA: ICD-10-CM

## 2022-04-26 DIAGNOSIS — K21.9 GASTROESOPHAGEAL REFLUX DISEASE, UNSPECIFIED WHETHER ESOPHAGITIS PRESENT: ICD-10-CM

## 2022-04-26 DIAGNOSIS — I10 ESSENTIAL HYPERTENSION: ICD-10-CM

## 2022-04-26 DIAGNOSIS — E11.40 TYPE 2 DIABETES MELLITUS WITH DIABETIC NEUROPATHY, WITHOUT LONG-TERM CURRENT USE OF INSULIN (HCC): Primary | ICD-10-CM

## 2022-04-26 DIAGNOSIS — F17.210 CIGARETTE NICOTINE DEPENDENCE WITHOUT COMPLICATION: ICD-10-CM

## 2022-04-26 DIAGNOSIS — Z12.31 BREAST CANCER SCREENING BY MAMMOGRAM: ICD-10-CM

## 2022-04-26 DIAGNOSIS — L29.9 PRURITUS: ICD-10-CM

## 2022-04-26 DIAGNOSIS — Z12.11 SCREENING FOR COLON CANCER: ICD-10-CM

## 2022-04-26 PROCEDURE — 2022F DILAT RTA XM EVC RTNOPTHY: CPT | Performed by: FAMILY MEDICINE

## 2022-04-26 PROCEDURE — 3051F HG A1C>EQUAL 7.0%<8.0%: CPT | Performed by: FAMILY MEDICINE

## 2022-04-26 PROCEDURE — 99212 OFFICE O/P EST SF 10 MIN: CPT

## 2022-04-26 PROCEDURE — 1036F TOBACCO NON-USER: CPT | Performed by: FAMILY MEDICINE

## 2022-04-26 PROCEDURE — 3023F SPIROM DOC REV: CPT | Performed by: FAMILY MEDICINE

## 2022-04-26 PROCEDURE — G8417 CALC BMI ABV UP PARAM F/U: HCPCS | Performed by: FAMILY MEDICINE

## 2022-04-26 PROCEDURE — 3017F COLORECTAL CA SCREEN DOC REV: CPT | Performed by: FAMILY MEDICINE

## 2022-04-26 PROCEDURE — G8427 DOCREV CUR MEDS BY ELIG CLIN: HCPCS | Performed by: FAMILY MEDICINE

## 2022-04-26 PROCEDURE — 99214 OFFICE O/P EST MOD 30 MIN: CPT | Performed by: FAMILY MEDICINE

## 2022-04-26 RX ORDER — OMEPRAZOLE 20 MG/1
CAPSULE, DELAYED RELEASE ORAL
Qty: 90 CAPSULE | Refills: 1 | Status: SHIPPED | OUTPATIENT
Start: 2022-04-26 | End: 2022-09-27

## 2022-04-26 RX ORDER — MAGNESIUM OXIDE 400 MG/1
TABLET ORAL
Qty: 90 TABLET | Refills: 1 | Status: SHIPPED | OUTPATIENT
Start: 2022-04-26

## 2022-04-26 RX ORDER — PRAVASTATIN SODIUM 40 MG
TABLET ORAL
Qty: 90 TABLET | Refills: 1 | Status: SHIPPED | OUTPATIENT
Start: 2022-04-26

## 2022-04-26 RX ORDER — DIPHENHYDRAMINE HCL 25 MG
CAPSULE ORAL
Qty: 180 CAPSULE | Refills: 1 | Status: SHIPPED | OUTPATIENT
Start: 2022-04-26 | End: 2022-11-04

## 2022-04-26 RX ORDER — LOSARTAN POTASSIUM 100 MG/1
TABLET ORAL
Qty: 90 TABLET | Refills: 1 | Status: SHIPPED | OUTPATIENT
Start: 2022-04-26

## 2022-04-26 ASSESSMENT — PATIENT HEALTH QUESTIONNAIRE - PHQ9
2. FEELING DOWN, DEPRESSED OR HOPELESS: 1
4. FEELING TIRED OR HAVING LITTLE ENERGY: 1
SUM OF ALL RESPONSES TO PHQ QUESTIONS 1-9: 3
8. MOVING OR SPEAKING SO SLOWLY THAT OTHER PEOPLE COULD HAVE NOTICED. OR THE OPPOSITE, BEING SO FIGETY OR RESTLESS THAT YOU HAVE BEEN MOVING AROUND A LOT MORE THAN USUAL: 0
SUM OF ALL RESPONSES TO PHQ9 QUESTIONS 1 & 2: 2
9. THOUGHTS THAT YOU WOULD BE BETTER OFF DEAD, OR OF HURTING YOURSELF: 0
SUM OF ALL RESPONSES TO PHQ QUESTIONS 1-9: 3
5. POOR APPETITE OR OVEREATING: 0
SUM OF ALL RESPONSES TO PHQ QUESTIONS 1-9: 3
6. FEELING BAD ABOUT YOURSELF - OR THAT YOU ARE A FAILURE OR HAVE LET YOURSELF OR YOUR FAMILY DOWN: 0
10. IF YOU CHECKED OFF ANY PROBLEMS, HOW DIFFICULT HAVE THESE PROBLEMS MADE IT FOR YOU TO DO YOUR WORK, TAKE CARE OF THINGS AT HOME, OR GET ALONG WITH OTHER PEOPLE: 0
SUM OF ALL RESPONSES TO PHQ QUESTIONS 1-9: 3
3. TROUBLE FALLING OR STAYING ASLEEP: 0
7. TROUBLE CONCENTRATING ON THINGS, SUCH AS READING THE NEWSPAPER OR WATCHING TELEVISION: 0
1. LITTLE INTEREST OR PLEASURE IN DOING THINGS: 1

## 2022-04-26 NOTE — PROGRESS NOTES
Patient declines Tdap and pneumo. Declines Lung CT, colonoscopy, pap.      Agreeable to mammogram.  Eye exam scheduled for 6/2022

## 2022-04-26 NOTE — PROGRESS NOTES
ALAYNA GEIGER [address], [address] [place] [address] Drive                        Telephone [phone]             Fax [phone]       Lindsey Dominguez  :  1960  Age:  62 y.o. MRN:  [id number]  Date of visit:  2022       Assessment and Plan:    1. Type 2 diabetes mellitus with diabetic neuropathy, without long-term current use of insulin (HCC)  Her HgbA1c was 7.4 %, which is not at goal.   She was advised to decrease processed foods in her diet, increase exercise, and attempt to lose weight. Labs were ordered to be done prior to her return visit in 3 months:   - Hemoglobin A1C; Future  - Comprehensive Metabolic Panel; Future    Labs were also ordered to be done in 6 months:  - Hemoglobin A1C; Future  - Microalbumin, Ur; Future    2. Essential hypertension  Her blood pressure is adequately-controlled today. (BP: 130/72)   She was advised to continue current medications. Losartan was refilled:   - losartan (COZAAR) 100 MG tablet; take 1 tablet by mouth once daily  Dispense: 90 tablet; Refill: 1    3. Mixed hyperlipidemia  Her lipid profile was at goal except for elevated triglycerides on her recent lab work. She is tolerating Pravastatin well; this was refilled:   - pravastatin (PRAVACHOL) 40 MG tablet; take 1 tablet by mouth once daily  Dispense: 90 tablet; Refill: 1    Labs were ordered to be done in 6 months:   - Lipid Panel; Future  - Comprehensive Metabolic Panel; Future    4. Gastroesophageal reflux disease, unspecified whether esophagitis present  She is doing well with her current dose of Omeprazole; this was refilled:  - omeprazole (PRILOSEC) 20 MG delayed release capsule; take 1 tablet by mouth once daily  Dispense: 90 capsule; Refill: 1    5. Hypomagnesemia  Her magnesium level was within normal limits (1.9 mmol/L) on 2021.   Magnesium oxide was refilled:   - magnesium oxide (MAG-OX) 400 MG tablet; take 1 she has been doing about the same. She is tolerating her medications well. She does check her glucose at home, and her recent average reading has been 175. She has no new concerns or complaints today. She has received three doses of the Moderna Covid-19 vaccine. The third dose was 12/22/2021. Immunization history was reviewed:  Immunization History   Administered Date(s) Administered    COVID-19, Moderna, Booster, PF, 50mcg/0.25ml 12/22/2021    COVID-19, Tiney File, Primary or Immunocompromised, PF, 100mcg/0.5mL 03/19/2021, 04/16/2021    Hepatitis B 10/30/2012, 12/03/2012, 04/29/2013    Influenza A ([id number]) Vaccine PF IM 01/26/2010    Influenza Vaccine, unspecified formulation 01/11/2011, 10/30/2012    Influenza Virus Vaccine 01/26/2010, 01/11/2011, 10/30/2012, 10/15/2014, 12/08/2015, 10/10/2018    Influenza Whole 01/26/2010, 10/15/2014    Influenza, Quadv, IM, PF (6 mo and older Fluzone, Flulaval, Fluarix, and 3 yrs and older Afluria) 11/10/2016, 09/21/2017, 10/10/2018, 09/25/2019, 10/07/2020    Pneumococcal Polysaccharide ([id number]) 05/27/2010    Tdap (Boostrix, Adacel) 05/27/2010    Zoster Live (Zostavax) 10/15/2014    Zoster Recombinant (Shingrix) 09/25/2019, 11/25/2019          She has the following problem list:  Patient Active Problem List   Diagnosis    Anxiety    Depression    Schizoaffective disorder ([address]    History of tobacco use    Restless leg syndrome    Pica    Essential hypertension    Bipolar disorder ([address]    Alcoholism ([address]    Sleeping difficulty    Peripheral neuropathy    Balance problem    CTS (carpal tunnel syndrome)    Chronic lumbar radiculopathy    GERD (gastroesophageal reflux disease)    COPD (chronic obstructive pulmonary disease) (HCC)    Fatigue    Chronic rectal pain    Type 2 diabetes mellitus with diabetic neuropathy, without long-term current use of insulin (HCC)    Pruritus    Obesity (BMI 30.0-34. 9)    Mixed hyperlipidemia  Chronic right shoulder pain    Bilateral carpal tunnel syndrome    Current severe episode of major depressive disorder without psychotic features ([facility]    Splenic artery aneurysm ([facility]    Bipolar affective disorder, currently depressed, mild ([facility]    Morbidly obese ([facility]    Tobacco abuse    Vitamin B12 deficiency    Polyneuropathy associated with underlying disease ([facility]        Current medications are:  Outpatient Medications Marked as Taking for the 4/26/22 encounter (Office Visit) with Tomy Ryan MD   Medication Sig Dispense Refill    diclofenac sodium (VOLTAREN) 1 % GEL apply 4 grams topically IN THE MORNING and AT NOON AND IN THE EVENING AND BEFORE BEDTIME      ammonium lactate (LAC-HYDRIN) 12 % lotion APPLY TOPICALLY TO AFFECTED AREAS once daily (Patient taking differently: APPLY TOPICALLY TO AFFECTED AREAS as needed) 400 g 0    omeprazole (PRILOSEC) 20 MG delayed release capsule take 1 tablet by mouth once daily 30 capsule 0    diphenhydrAMINE (BANOPHEN) 25 MG capsule take 1 capsule by mouth twice a day if needed for itching and allergies 30 capsule 0    traMADol (ULTRAM) 50 MG tablet Take 1 tablet by mouth every 8 hours as needed for Pain for up to 30 days.  90 tablet 0    gabapentin (NEURONTIN) 300 MG capsule ONE  CAPSULE    4   TIMES   DAILY 120 capsule 2    carbidopa-levodopa (SINEMET)  MG per tablet Taking 3  tablets at bedtime 180 tablet 1    Cyanocobalamin ER (RA VITAMIN B-12 TR) 1000 MCG TBCR take 1 tablet by mouth once daily 30 tablet 5    UNABLE TO FIND Shower chair with back 1 each 0    magnesium oxide (MAG-OX) 400 MG tablet take 1 tablet by mouth once daily 30 tablet 1    losartan (COZAAR) 100 MG tablet take 1 tablet by mouth once daily 90 tablet 0    ONETOUCH ULTRA strip use 1 TEST STRIP to TEST BLOOD SUGAR three times a day 300 strip 1    pravastatin (PRAVACHOL) 40 MG tablet take 1 tablet by mouth once daily 30 tablet 5    nicotine (NICODERM CQ) 7 MG/24HR Place 1 patch onto the skin every 24 hours 30 patch 3    aspirin 325 MG EC tablet take 1 tablet by mouth once daily 30 tablet 12    triamcinolone (KENALOG) 0.1 % cream Apply topically 2 times daily. (Patient taking differently: Apply topically 2 times daily prn.) 80 g 2    Multiple Vitamin (MULTIVITAMIN) tablet take 1 tablet by mouth once daily 30 tablet 5    hydrOXYzine (VISTARIL) 25 MG capsule Take 25 mg by mouth nightly       risperiDONE (RISPERDAL) 1 MG tablet Take 1 tablet by mouth nightly 60 tablet 2    pimecrolimus (ELIDEL) 1 % cream Apply topically to rash on face 2 times daily. 30 g 11    ketoconazole (NIZORAL) 2 % shampoo APPLY TO AFFECTED AREA ON SCALP 3 TO 4 TIMES A WEEK LEAVE ON FOR 5 MINUTES THEN WASH  mL 11    Blood Glucose Monitoring Suppl (ONE TOUCH ULTRA 2) w/Device KIT use as directed 1 kit 0    ONETOUCH DELICA LANCETS FINE MISC TEST three times a day 100 each 5       She has No Known Allergies. She is not currently a smoker. She  reports that she quit smoking about 3 years ago. Her smoking use included cigarettes. She started smoking about 47 years ago. She has a 26.00 pack-year smoking history. She has never used smokeless tobacco.      Objective:    Vitals:    04/26/22 1445   BP: 130/72   Site: Right Upper Arm   Position: Sitting   Cuff Size: Large Adult   Pulse: 85   Temp: 97.6 °F (36.4 °C)   TempSrc: Temporal   SpO2: 96%   Weight: 218 lb (98.9 kg)   Height: 5' 2\" (1.575 m)     Body mass index is 39.87 kg/m². Obese female, healthy-appearing, alert, cooperative and in no acute distress. Neck supple. No adenopathy. Thyroid symmetric, normal size. Chest:  Normal expansion. Clear to auscultation. No rales, rhonchi, or wheezing. Heart sounds are normal.  Regular rate and rhythm without murmur, gallop or rub. Lower extremities have no edema.     Results of labs done 4/22/2022 were reviewed with the patient:   Hospital Outpatient Visit on 04/22/2022   Component Date Value Ref Range Status    Vit D, 25-Hydroxy 04/22/2022 34.2  >29.9 ng/mL Final    Comment:    Reference Range:  Vitamin D status         Range   Deficiency              <20 ng/mL   Mild Deficiency       20-30 ng/mL   Sufficiency           ng/mL   Toxicity               >100 ng/mL      Glucose 04/22/2022 165* 70 - 99 mg/dL Final    BUN 04/22/2022 5* 8 - 23 mg/dL Final    CREATININE 04/22/2022 0.79  0.50 - 0.90 mg/dL Final    Bun/Cre Ratio 04/22/2022 6* 9 - 20 Final    Calcium 04/22/2022 9.2  8.6 - 10.4 mg/dL Final    Sodium 04/22/2022 137  135 - 144 mmol/L Final    Potassium 04/22/2022 4.9  3.7 - 5.3 mmol/L Final    Chloride 04/22/2022 99  98 - 107 mmol/L Final    CO2 04/22/2022 30  20 - 31 mmol/L Final    Anion Gap 04/22/2022 8* 9 - 17 mmol/L Final    Alkaline Phosphatase 04/22/2022 93  35 - 104 U/L Final    ALT 04/22/2022 31  5 - 33 U/L Final    AST 04/22/2022 42* <32 U/L Final    Total Bilirubin 04/22/2022 0.46  0.3 - 1.2 mg/dL Final    Total Protein 04/22/2022 7.1  6.4 - 8.3 g/dL Final    Albumin 04/22/2022 4.0  3.5 - 5.2 g/dL Final    Albumin/Globulin Ratio 04/22/2022 1.3  1.0 - 2.5 Final    GFR Non- 04/22/2022 >60  >60 mL/min Final    GFR  04/22/2022 >60  >60 mL/min Final    GFR Comment 04/22/2022        Final    Comment: Average GFR for 61-76 years old:   80 mL/min/1.73sq m  Chronic Kidney Disease:   <60 mL/min/1.73sq m  Kidney failure:   <15 mL/min/1.73sq m              eGFR calculated using average adult body mass. Additional eGFR calculator available at:        [email or web address]            Hemoglobin A1C 04/22/2022 7.4* 4.0 - 6.0 % Final    Estimated Avg Glucose 04/22/2022 166  mg/dL Final    Comment: The ADA and AACC recommend providing the estimated average glucose result to permit better   patient understanding of their HBA1c result.       Cholesterol 04/22/2022 145  <200 mg/dL Final    Comment:    Cholesterol Guidelines: <200  Desirable   200-240  Borderline      >240  Undesirable         HDL 04/22/2022 56  >40 mg/dL Final    Comment:    HDL Guidelines:    <40     Undesirable   40-59    Borderline    >59     Desirable         LDL Cholesterol 04/22/2022 42  0 - 130 mg/dL Final    Comment:    LDL Guidelines:     <100    Desirable   100-129   Near to/above Desirable   130-159   Borderline      >159   Undesirable     Direct (measured) LDL and calculated LDL are not interchangeable tests.  Chol/HDL Ratio 04/22/2022 2.6  <5 Final            Triglycerides 04/22/2022 236* <150 mg/dL Final    Comment:    Triglyceride Guidelines:     <150   Desirable   150-199  Borderline   200-499  High     >499   Very high   Based on AHA Guidelines for fasting triglyceride, October 2012.  Microalb, Ur 04/22/2022 <12  <21 mg/L Final    Creatinine, Ur 04/22/2022 125.8  28.0 - 217.0 mg/dL Final    Microalb/Crt.  Ratio 04/22/2022 Can not be calculated  <25 mcg/mg creat Final             (Please note that portions of this note were completed with a voice-recognition program. Efforts were made to edit the dictation but occasionally words are mis-transcribed.)

## 2022-05-02 DIAGNOSIS — K62.89 CHRONIC RECTAL PAIN: Primary | ICD-10-CM

## 2022-05-02 DIAGNOSIS — G89.29 CHRONIC RECTAL PAIN: Primary | ICD-10-CM

## 2022-05-03 NOTE — TELEPHONE ENCOUNTER
Selvin Blackburn called requesting a refill of the below medication which has been pended for you:    [facility] from [place], [place], and [place] reviewed. Tramadol 50 mg last filled 4/8/22 #90/30 days. Requested Prescriptions     Pending Prescriptions Disp Refills    traMADol (ULTRAM) 50 MG tablet [Pharmacy Med Name: TRAMADOL HCL 50 MG TABLET] 90 tablet 0     Sig: Take 1 tablet by mouth every 8 hours as needed for Pain for up to 30 days.        Last Appointment Date: 4/26/2022  Next Appointment Date: 7/26/2022    No Known Allergies

## 2022-05-05 RX ORDER — TRAMADOL HYDROCHLORIDE 50 MG/1
50 TABLET ORAL EVERY 8 HOURS PRN
Qty: 90 TABLET | Refills: 0 | Status: SHIPPED | OUTPATIENT
Start: 2022-05-05 | End: 2022-06-02

## 2022-05-27 DIAGNOSIS — G63 POLYNEUROPATHY ASSOCIATED WITH UNDERLYING DISEASE (HCC): ICD-10-CM

## 2022-05-27 RX ORDER — AMMONIUM LACTATE 12 G/100G
LOTION TOPICAL
Qty: 400 G | Refills: 0 | Status: SHIPPED | OUTPATIENT
Start: 2022-05-27 | End: 2022-07-29

## 2022-05-27 NOTE — TELEPHONE ENCOUNTER
Last Appt:  3/31/2022  Next Appt:   7/1/2022  Med verified in Epic    Reordered last by PCP d/t you being out of office.

## 2022-05-31 DIAGNOSIS — K62.89 CHRONIC RECTAL PAIN: Primary | ICD-10-CM

## 2022-05-31 DIAGNOSIS — G89.29 CHRONIC RECTAL PAIN: Primary | ICD-10-CM

## 2022-06-02 RX ORDER — TRAMADOL HYDROCHLORIDE 50 MG/1
50 TABLET ORAL EVERY 8 HOURS PRN
Qty: 90 TABLET | Refills: 1 | Status: SHIPPED | OUTPATIENT
Start: 2022-06-02 | End: 2022-07-28 | Stop reason: SDUPTHER

## 2022-06-02 NOTE — TELEPHONE ENCOUNTER
Martha Beebe called requesting a refill of the below medication which has been pended for you:   [facility] from [place], and [place] reviewed. Tramadol 50 mg last filled 5/6/22 #90/30 days. Requested Prescriptions     Pending Prescriptions Disp Refills    traMADol (ULTRAM) 50 MG tablet [Pharmacy Med Name: TRAMADOL HCL 50 MG TABLET] 90 tablet 0     Sig: Take 1 tablet by mouth every 8 hours as needed for Pain for up to 30 days.        Last Appointment Date: 4/26/2022  Next Appointment Date: 7/26/2022    No Known Allergies

## 2022-07-07 DIAGNOSIS — G63 POLYNEUROPATHY ASSOCIATED WITH UNDERLYING DISEASE (HCC): ICD-10-CM

## 2022-07-07 DIAGNOSIS — G56.03 BILATERAL CARPAL TUNNEL SYNDROME: ICD-10-CM

## 2022-07-07 RX ORDER — GABAPENTIN 300 MG/1
CAPSULE ORAL
Qty: 120 CAPSULE | Refills: 0 | Status: SHIPPED | OUTPATIENT
Start: 2022-07-07 | End: 2022-08-12 | Stop reason: SDUPTHER

## 2022-07-27 ENCOUNTER — HOSPITAL ENCOUNTER (OUTPATIENT)
Dept: LAB | Age: 62
Discharge: HOME | End: 2022-07-27
Payer: MEDICAID

## 2022-07-27 ENCOUNTER — TELEPHONE (OUTPATIENT)
Dept: FAMILY MEDICINE CLINIC | Age: 62
End: 2022-07-27

## 2022-07-27 DIAGNOSIS — E11.40 TYPE 2 DIABETES MELLITUS WITH DIABETIC NEUROPATHY, WITHOUT LONG-TERM CURRENT USE OF INSULIN (HCC): ICD-10-CM

## 2022-07-27 DIAGNOSIS — K62.89 CHRONIC RECTAL PAIN: ICD-10-CM

## 2022-07-27 DIAGNOSIS — G89.29 CHRONIC RECTAL PAIN: ICD-10-CM

## 2022-07-27 DIAGNOSIS — E87.1 HYPONATREMIA: Primary | ICD-10-CM

## 2022-07-27 DIAGNOSIS — E83.42 HYPOMAGNESEMIA: ICD-10-CM

## 2022-07-27 LAB
ALBUMIN SERPL-MCNC: 3.5 G/DL (ref 3.5–5.2)
ALBUMIN/GLOBULIN RATIO: 1.2 (ref 1–2.5)
ALP BLD-CCNC: 90 U/L (ref 35–104)
ALT SERPL-CCNC: 11 U/L (ref 5–33)
ANION GAP SERPL CALCULATED.3IONS-SCNC: 11 MMOL/L (ref 9–17)
AST SERPL-CCNC: 39 U/L
BILIRUB SERPL-MCNC: 0.7 MG/DL (ref 0.3–1.2)
BUN BLDV-MCNC: 11 MG/DL (ref 8–23)
BUN/CREAT BLD: 5 (ref 9–20)
CALCIUM SERPL-MCNC: 8.8 MG/DL (ref 8.6–10.4)
CHLORIDE BLD-SCNC: 91 MMOL/L (ref 98–107)
CO2: 26 MMOL/L (ref 20–31)
CREAT SERPL-MCNC: 2.17 MG/DL (ref 0.5–0.9)
ESTIMATED AVERAGE GLUCOSE: 148 MG/DL
GFR AFRICAN AMERICAN: 28 ML/MIN
GFR NON-AFRICAN AMERICAN: 23 ML/MIN
GFR SERPL CREATININE-BSD FRML MDRD: ABNORMAL ML/MIN/{1.73_M2}
GLUCOSE BLD-MCNC: 166 MG/DL (ref 70–99)
HBA1C MFR BLD: 6.8 % (ref 4–6)
MAGNESIUM: 1.8 MG/DL (ref 1.6–2.6)
POTASSIUM SERPL-SCNC: 5 MMOL/L (ref 3.7–5.3)
SODIUM BLD-SCNC: 128 MMOL/L (ref 135–144)
TOTAL PROTEIN: 6.5 G/DL (ref 6.4–8.3)

## 2022-07-27 PROCEDURE — 83036 HEMOGLOBIN GLYCOSYLATED A1C: CPT

## 2022-07-27 PROCEDURE — 36415 COLL VENOUS BLD VENIPUNCTURE: CPT

## 2022-07-27 PROCEDURE — 80053 COMPREHEN METABOLIC PANEL: CPT

## 2022-07-27 PROCEDURE — 83735 ASSAY OF MAGNESIUM: CPT

## 2022-07-27 RX ORDER — TRAMADOL HYDROCHLORIDE 50 MG/1
50 TABLET ORAL EVERY 8 HOURS PRN
Qty: 90 TABLET | Refills: 1 | Status: CANCELLED | OUTPATIENT
Start: 2022-07-27 | End: 2022-08-26

## 2022-07-27 NOTE — TELEPHONE ENCOUNTER
[facility] called requesting a refill of the below medication which has been pended for you:     [facility] for [place], [place], and [place] reviewed. Last filled on 7/1/22 #90/30days. Requested Prescriptions     Pending Prescriptions Disp Refills    traMADol (ULTRAM) 50 MG tablet 90 tablet 1     Sig: Take 1 tablet by mouth every 8 hours as needed for Pain for up to 30 days.  Do not fill first fill AFTER 8/27/22 and do not fill second fill AFTER 9/27/22       Last Appointment Date: 4/26/2022  Next Appointment Date: 7/28/2022    No Known Allergies

## 2022-07-27 NOTE — TELEPHONE ENCOUNTER
Spoke to patient and she verbalized understanding. She will call someone to bring her some Gatorade. She will labs completed prior to appointment tomorrow. She states she been drinking coffee and energy drinks. Advised patient on signs and symptoms to watch for due to low sodium and when to go to ER.  Patient verbalized understanding

## 2022-07-28 ENCOUNTER — HOSPITAL ENCOUNTER (OUTPATIENT)
Dept: LAB | Age: 62
Discharge: HOME | End: 2022-07-28
Payer: MEDICAID

## 2022-07-28 ENCOUNTER — OFFICE VISIT (OUTPATIENT)
Dept: FAMILY MEDICINE CLINIC | Age: 62
End: 2022-07-28
Payer: MEDICAID

## 2022-07-28 VITALS
OXYGEN SATURATION: 98 % | HEIGHT: 62 IN | HEART RATE: 82 BPM | BODY MASS INDEX: 40.85 KG/M2 | TEMPERATURE: 97.4 F | WEIGHT: 222 LBS | DIASTOLIC BLOOD PRESSURE: 64 MMHG | SYSTOLIC BLOOD PRESSURE: 118 MMHG

## 2022-07-28 DIAGNOSIS — I10 ESSENTIAL HYPERTENSION: ICD-10-CM

## 2022-07-28 DIAGNOSIS — R79.89 ELEVATED SERUM CREATININE: ICD-10-CM

## 2022-07-28 DIAGNOSIS — Z23 NEED FOR TDAP VACCINATION: ICD-10-CM

## 2022-07-28 DIAGNOSIS — Z23 NEED FOR PNEUMOCOCCAL VACCINATION: ICD-10-CM

## 2022-07-28 DIAGNOSIS — F10.10 ALCOHOL ABUSE: ICD-10-CM

## 2022-07-28 DIAGNOSIS — G89.29 CHRONIC RECTAL PAIN: ICD-10-CM

## 2022-07-28 DIAGNOSIS — K62.89 CHRONIC RECTAL PAIN: ICD-10-CM

## 2022-07-28 DIAGNOSIS — E87.1 HYPONATREMIA: ICD-10-CM

## 2022-07-28 DIAGNOSIS — E87.1 HYPONATREMIA: Primary | ICD-10-CM

## 2022-07-28 DIAGNOSIS — E11.40 TYPE 2 DIABETES MELLITUS WITH DIABETIC NEUROPATHY, WITHOUT LONG-TERM CURRENT USE OF INSULIN (HCC): ICD-10-CM

## 2022-07-28 LAB
ANION GAP SERPL CALCULATED.3IONS-SCNC: 8 MMOL/L (ref 9–17)
BUN BLDV-MCNC: 12 MG/DL (ref 8–23)
BUN/CREAT BLD: 7 (ref 9–20)
CALCIUM SERPL-MCNC: 9 MG/DL (ref 8.6–10.4)
CHLORIDE BLD-SCNC: 93 MMOL/L (ref 98–107)
CO2: 27 MMOL/L (ref 20–31)
CREAT SERPL-MCNC: 1.76 MG/DL (ref 0.5–0.9)
GFR AFRICAN AMERICAN: 36 ML/MIN
GFR NON-AFRICAN AMERICAN: 29 ML/MIN
GFR SERPL CREATININE-BSD FRML MDRD: ABNORMAL ML/MIN/{1.73_M2}
GLUCOSE BLD-MCNC: 192 MG/DL (ref 70–99)
POTASSIUM SERPL-SCNC: 5.5 MMOL/L (ref 3.7–5.3)
SODIUM BLD-SCNC: 128 MMOL/L (ref 135–144)

## 2022-07-28 PROCEDURE — 99213 OFFICE O/P EST LOW 20 MIN: CPT

## 2022-07-28 PROCEDURE — 2022F DILAT RTA XM EVC RTNOPTHY: CPT | Performed by: FAMILY MEDICINE

## 2022-07-28 PROCEDURE — 80048 BASIC METABOLIC PNL TOTAL CA: CPT

## 2022-07-28 PROCEDURE — 90677 PCV20 VACCINE IM: CPT | Performed by: FAMILY MEDICINE

## 2022-07-28 PROCEDURE — G8427 DOCREV CUR MEDS BY ELIG CLIN: HCPCS | Performed by: FAMILY MEDICINE

## 2022-07-28 PROCEDURE — 36415 COLL VENOUS BLD VENIPUNCTURE: CPT

## 2022-07-28 PROCEDURE — 1036F TOBACCO NON-USER: CPT | Performed by: FAMILY MEDICINE

## 2022-07-28 PROCEDURE — 99215 OFFICE O/P EST HI 40 MIN: CPT | Performed by: FAMILY MEDICINE

## 2022-07-28 PROCEDURE — 3044F HG A1C LEVEL LT 7.0%: CPT | Performed by: FAMILY MEDICINE

## 2022-07-28 PROCEDURE — 3017F COLORECTAL CA SCREEN DOC REV: CPT | Performed by: FAMILY MEDICINE

## 2022-07-28 PROCEDURE — G8417 CALC BMI ABV UP PARAM F/U: HCPCS | Performed by: FAMILY MEDICINE

## 2022-07-28 RX ORDER — MELOXICAM 15 MG/1
TABLET ORAL
COMMUNITY
Start: 2022-07-11

## 2022-07-28 RX ORDER — TRAMADOL HYDROCHLORIDE 50 MG/1
50 TABLET ORAL EVERY 8 HOURS PRN
Qty: 90 TABLET | Refills: 1 | Status: SHIPPED | OUTPATIENT
Start: 2022-07-28 | End: 2022-09-23

## 2022-07-28 NOTE — PROGRESS NOTES
Patient considering covid vaccine. Will make an appt to come back and get. Agreeable to Tdap, Pneumo, foot exam  Declines Pap. Has an eye exam scheduled for 8/2022    [place] reviewed for [place], [place], and [place]. Tramadol 50mg last filled on 7/1/22 #90/30days  Med pended.

## 2022-07-29 DIAGNOSIS — G63 POLYNEUROPATHY ASSOCIATED WITH UNDERLYING DISEASE (HCC): ICD-10-CM

## 2022-07-29 RX ORDER — AMMONIUM LACTATE 12 G/100G
LOTION TOPICAL
Qty: 400 G | Refills: 0 | Status: SHIPPED | OUTPATIENT
Start: 2022-07-29 | End: 2022-10-13 | Stop reason: SDUPTHER

## 2022-08-12 ENCOUNTER — HOSPITAL ENCOUNTER (OUTPATIENT)
Dept: LAB | Age: 62
Discharge: HOME | End: 2022-08-12
Payer: MEDICAID

## 2022-08-12 ENCOUNTER — OFFICE VISIT (OUTPATIENT)
Dept: NEUROLOGY | Age: 62
End: 2022-08-12
Payer: MEDICAID

## 2022-08-12 ENCOUNTER — TELEPHONE (OUTPATIENT)
Dept: FAMILY MEDICINE CLINIC | Age: 62
End: 2022-08-12

## 2022-08-12 VITALS
OXYGEN SATURATION: 95 % | DIASTOLIC BLOOD PRESSURE: 74 MMHG | WEIGHT: 215 LBS | HEART RATE: 80 BPM | BODY MASS INDEX: 39.56 KG/M2 | HEIGHT: 62 IN | SYSTOLIC BLOOD PRESSURE: 122 MMHG

## 2022-08-12 DIAGNOSIS — R79.89 ELEVATED SERUM CREATININE: ICD-10-CM

## 2022-08-12 DIAGNOSIS — E11.40 TYPE 2 DIABETES MELLITUS WITH DIABETIC NEUROPATHY, WITHOUT LONG-TERM CURRENT USE OF INSULIN (HCC): ICD-10-CM

## 2022-08-12 DIAGNOSIS — G63 POLYNEUROPATHY ASSOCIATED WITH UNDERLYING DISEASE (HCC): Primary | ICD-10-CM

## 2022-08-12 DIAGNOSIS — F41.9 ANXIETY: ICD-10-CM

## 2022-08-12 DIAGNOSIS — F31.0 BIPOLAR AFFECTIVE DISORDER, CURRENT EPISODE HYPOMANIC (HCC): ICD-10-CM

## 2022-08-12 DIAGNOSIS — F10.20 ALCOHOLISM (HCC): ICD-10-CM

## 2022-08-12 DIAGNOSIS — I10 ESSENTIAL HYPERTENSION: ICD-10-CM

## 2022-08-12 DIAGNOSIS — G56.03 BILATERAL CARPAL TUNNEL SYNDROME: ICD-10-CM

## 2022-08-12 DIAGNOSIS — E87.1 HYPONATREMIA: ICD-10-CM

## 2022-08-12 DIAGNOSIS — E53.8 VITAMIN B12 DEFICIENCY: ICD-10-CM

## 2022-08-12 DIAGNOSIS — G25.81 RESTLESS LEG SYNDROME: ICD-10-CM

## 2022-08-12 DIAGNOSIS — F25.9 SCHIZOAFFECTIVE DISORDER, UNSPECIFIED TYPE (HCC): ICD-10-CM

## 2022-08-12 DIAGNOSIS — E66.01 MORBIDLY OBESE (HCC): ICD-10-CM

## 2022-08-12 LAB
ANION GAP SERPL CALCULATED.3IONS-SCNC: 9 MMOL/L (ref 9–17)
BUN BLDV-MCNC: 3 MG/DL (ref 8–23)
BUN/CREAT BLD: 4 (ref 9–20)
CALCIUM SERPL-MCNC: 9 MG/DL (ref 8.6–10.4)
CHLORIDE BLD-SCNC: 100 MMOL/L (ref 98–107)
CO2: 31 MMOL/L (ref 20–31)
CREAT SERPL-MCNC: 0.77 MG/DL (ref 0.5–0.9)
GFR AFRICAN AMERICAN: >60 ML/MIN
GFR NON-AFRICAN AMERICAN: >60 ML/MIN
GFR SERPL CREATININE-BSD FRML MDRD: ABNORMAL ML/MIN/{1.73_M2}
GLUCOSE BLD-MCNC: 170 MG/DL (ref 70–99)
POTASSIUM SERPL-SCNC: 4.8 MMOL/L (ref 3.7–5.3)
SODIUM BLD-SCNC: 140 MMOL/L (ref 135–144)

## 2022-08-12 PROCEDURE — G8427 DOCREV CUR MEDS BY ELIG CLIN: HCPCS | Performed by: PSYCHIATRY & NEUROLOGY

## 2022-08-12 PROCEDURE — 1036F TOBACCO NON-USER: CPT | Performed by: PSYCHIATRY & NEUROLOGY

## 2022-08-12 PROCEDURE — 3017F COLORECTAL CA SCREEN DOC REV: CPT | Performed by: PSYCHIATRY & NEUROLOGY

## 2022-08-12 PROCEDURE — 99214 OFFICE O/P EST MOD 30 MIN: CPT | Performed by: PSYCHIATRY & NEUROLOGY

## 2022-08-12 PROCEDURE — G8417 CALC BMI ABV UP PARAM F/U: HCPCS | Performed by: PSYCHIATRY & NEUROLOGY

## 2022-08-12 PROCEDURE — 3044F HG A1C LEVEL LT 7.0%: CPT | Performed by: PSYCHIATRY & NEUROLOGY

## 2022-08-12 PROCEDURE — 80048 BASIC METABOLIC PNL TOTAL CA: CPT

## 2022-08-12 PROCEDURE — 36415 COLL VENOUS BLD VENIPUNCTURE: CPT

## 2022-08-12 PROCEDURE — 2022F DILAT RTA XM EVC RTNOPTHY: CPT | Performed by: PSYCHIATRY & NEUROLOGY

## 2022-08-12 RX ORDER — PSYLLIUM HUSK 3.4 G/7G
POWDER ORAL
Qty: 30 TABLET | Refills: 5 | Status: SHIPPED | OUTPATIENT
Start: 2022-08-12

## 2022-08-12 RX ORDER — GABAPENTIN 300 MG/1
CAPSULE ORAL
Qty: 120 CAPSULE | Refills: 2 | Status: SHIPPED | OUTPATIENT
Start: 2022-08-12 | End: 2022-09-17

## 2022-08-12 RX ORDER — MULTIVITAMIN WITH FOLIC ACID 400 MCG
TABLET ORAL
Qty: 30 TABLET | Refills: 5 | Status: SHIPPED | OUTPATIENT
Start: 2022-08-12

## 2022-08-12 ASSESSMENT — ENCOUNTER SYMPTOMS
VISUAL CHANGE: 0
BACK PAIN: 0
EYE ITCHING: 0
BLOOD IN STOOL: 0
SINUS PRESSURE: 0
EYE PAIN: 0
CHEST TIGHTNESS: 0
VOICE CHANGE: 0
EYE DISCHARGE: 0
CONSTIPATION: 0
WHEEZING: 0
NAUSEA: 0
EYE REDNESS: 0
COLOR CHANGE: 0
SHORTNESS OF BREATH: 0
APNEA: 0
PHOTOPHOBIA: 0
CHOKING: 0
ABDOMINAL DISTENTION: 0
FACIAL SWELLING: 0
ABDOMINAL PAIN: 0
TROUBLE SWALLOWING: 0
BOWEL INCONTINENCE: 0
VOMITING: 0
DIARRHEA: 0

## 2022-08-12 NOTE — TELEPHONE ENCOUNTER
Patient called to let Dr. Jacquelyn Gonzalez know her left foot is still swelling. She declines any redness. She does state it aches at times. She is not using any compression stockings. She does state when she elevated her legs, the swelling decreases. She states this was discussed during last OV on 7/28/22   Patient aware Dr. Jacquelyn Gonzalez is out of the office until Tuesday. Advised to go to  if swelling gets worse, any redness, or leg/calf pain.      Next OV: 11/2/22

## 2022-08-12 NOTE — PROGRESS NOTES
Subjective:        Patient ID: Marlen Pacheco is a 62 y. o.right  handed female. Neurologic Problem  The patient's primary symptoms include clumsiness, focal sensory loss, focal weakness, a loss of balance, memory loss and weakness. The patient's pertinent negatives include no altered mental status, near-syncope, slurred speech, syncope or visual change. Primary symptoms comment: RESTLESS LEG  SYNDROME. This is a chronic problem. Episode onset: MORE   THAN    5-6 YEARS. The neurological problem developed insidiously. The problem is unchanged. There was left-sided, lower extremity and right-sided focality noted. Pertinent negatives include no abdominal pain, auditory change, aura, back pain, bladder incontinence, bowel incontinence, chest pain, confusion, diaphoresis, dizziness, fatigue, fever, headaches, light-headedness, nausea, neck pain, palpitations, shortness of breath, vertigo or vomiting. Past treatments include medication. The treatment provided moderate relief. Her past medical history is significant for mood changes. There is no history of a bleeding disorder, a clotting disorder, a CVA, dementia, head trauma, liver disease or seizures. History obtained from  The patient     and other  available medical records were  Also  reviewed.                 1)      H/O    CHRONIC    DIABETIC   PERIPHERAL  NEUROPATHY                                                      -  ON  NEURONTIN                          TOLERATING  THE  SAME                     WITH   SYMPTOMATIC      IMPROVEMENT                        2)     H/O    CHRONIC      RESTLESS  LEG  SYNDROME                 &  PERIODIC  LEG  MOVEMENTS                              -  IMPROVED                              -       ON  SINEMET              3)    H/O    CHRONIC     SLEEP DIFFICULTIES                        -   RESOLVED              4)        H/O   CHRONIC   MILD  MEMORY PROBLEMS                     -    STABLE                   - PATIENT  NOT  CONCERNED            5)     H/O       CHRONIC  TOBACCO  AND  ALCOHOL  USAGE                     -   PATIENT  AWARE  OF THE  RISKS                      -   ON     NICOTINE  PATCHES                         AND   STOPPED  SMOKING   SINCE  JAN 2019                6)         CHRONIC  LUMBAR PAIN  AND JOINT  PAINS   -                           -    STABLE                       ON  ULTRAM  FROM  HER  PCP. 7)    H/O    BILATERAL  CARPAL  TUNNEL  SYNDROME                               -  STABLE            8)          CHRONIC  ANXIETY, DEPRESSION                         AND  SCHIZOAFFECTIVE  DISORDER                         --  STABLE                                -  ON   RISPERDAL                      BEING  FOLLOWED  BY  MENTAL  HEALTH  PROFESSIONALS              9)         MULTIPLE  CO  MORBID  MEDICAL CONDITIONS                    BEING  FOLLOWED  BY   HER  PCP. 10)      PATIENT  DENIES    ANY  SEIZURE  ACTIVITY  OR   MIGRAINES . 11)              NEUROPATHIC  SYMPTOMS  BOTH  FEET                          -   MOSTLY   DUE   TO    HER  TYPE  II    AND                                       CHRONIC     ALCOHOL  ABUSE/  USAGE                                    PATIENT   ON  NEURONTIN        AND                               ULTRAM  FROM     HER PCP     FOR    SYMPTOMATIC   RELIEF                         -    PATIENT    ADVISED                                     A)     TO   AVOID    ALCOHOL                                    B)   CLOSE    FOLLOW     FOR   ADEQUATE   DIABETIC  CONTROL                                       12)       H/O    WORSENING  OF  RESTLESS  LEG  SYNDROME    SINCE  SEPT. 2020                           -   IMPROVED      WITH     SINEMET    CR    TID     SINCE  OCT.    2020                               PATIENT  REPORTED      THAT  SHE  IS  SLEEPING  BETTER                13)       PREVIOUS      H/O      RIGHT  FOOT    SURGERY  IN 2011                             BEING  FOLLOWED  BY     PODIATRY                   14)         PATIENT   DENIES   ANY  NEW  NEUROLOGICAL     PROBLEMS                              REQUESTED    REFILLS  FOR    HER  MEDICATIONS                  15)       VARIOUS  RISK   FACTORS   WERE  REVIEWED   AND   DISCUSSED. PATIENT   HAS  MULTIPLE   MEDICAL,  NEUROLOGICAL                               AND   MENTAL  HEALTH  PROBLEMS . PATIENT'S   MANAGEMENT  IS  CHALLENGING. The  Duration,  Quality,  Severity,  Location,  Timing,  Context,  Modifying  Factors   Of   The   Chief   Complaint       And  Present  Illness   Was   Reviewed   In   Chronological   [facility].              Patient   Indicates   The  Presence   And  The  Absence  Of  The  Following  Associated  And       Additional  Neurological    Symptoms:                                Balance  And coordination problems  present           Gait problems     present            Headaches      absent              Migraines           absent           Memory problems        Present             Confusion        absent            Paresthesia numbness          present           Seizures  And  Starring  Episodes           absent           Syncope,  Near  syncopal episodes         absent           Speech problems           absent             Swallowing  Problems      absent            Dizziness,  Light headedness           present                        Vertigo        absent             Generalized   Weakness    absent              focal  Weakness     absent             Tremors         absent              Sleep  Problems     present             History  Of   Recent   Head  Injury     absent             History  Of   Recent  TIA     absent             History  Of   Recent    Stroke     absent             Neck  Pain and  Neck muscle  Spasms  Present               Radiating  down   And   Weakness cystourethroscopy    UPPER GASTROINTESTINAL ENDOSCOPY  2007    Schatzki's ring, acute gastritis, hiatal hernia    UPPER GASTROINTESTINAL ENDOSCOPY  2000    hiatal hernia, esophagitis, gastritis                 Current Outpatient Medications   Medication Sig Dispense Refill    gabapentin (NEURONTIN) 300 MG capsule ONE  CAPSULE    4   TIMES   DAILY 120 capsule 2    ammonium lactate (LAC-HYDRIN) 12 % lotion APPLY TOPICALLY TO AFFECTED AREAS as needed 400 g 0    meloxicam (MOBIC) 15 MG tablet       traMADol (ULTRAM) 50 MG tablet Take 1 tablet by mouth every 8 hours as needed for Pain for up to 30 days. Take 1 tablet by mouth every 8 hours as needed for Pain for up to 30 days. Do not fill AFTER 8/28/22 90 tablet 1    aspirin 325 MG EC tablet take 1 tablet by mouth once daily 30 tablet 12    diclofenac sodium (VOLTAREN) 1 % GEL apply 4 grams topically IN THE MORNING and AT NOON AND IN THE EVENING AND BEFORE BEDTIME      omeprazole (PRILOSEC) 20 MG delayed release capsule take 1 tablet by mouth once daily 90 capsule 1    diphenhydrAMINE (BANOPHEN) 25 MG capsule take 1 capsule by mouth twice a day if needed for itching and allergies 180 capsule 1    magnesium oxide (MAG-OX) 400 MG tablet take 1 tablet by mouth once daily 90 tablet 1    losartan (COZAAR) 100 MG tablet take 1 tablet by mouth once daily 90 tablet 1    pravastatin (PRAVACHOL) 40 MG tablet take 1 tablet by mouth once daily 90 tablet 1    nicotine (NICODERM CQ) 7 MG/24HR Place 1 patch onto the skin every 24 hours 30 patch 3    carbidopa-levodopa (SINEMET)  MG per tablet Taking 3  tablets at bedtime 180 tablet 1    Cyanocobalamin ER (RA VITAMIN B-12 TR) 1000 MCG TBCR take 1 tablet by mouth once daily 30 tablet 5    UNABLE TO FIND Shower chair with back 1 each 0    ONETOUCH ULTRA strip use 1 TEST STRIP to TEST BLOOD SUGAR three times a day 300 strip 1    triamcinolone (KENALOG) 0.1 % cream Apply topically 2 times daily.  (Patient taking differently: Apply topically 2 times daily prn.) 80 g 2    Multiple Vitamin (MULTIVITAMIN) tablet take 1 tablet by mouth once daily 30 tablet 5    risperiDONE (RISPERDAL) 1 MG tablet Take 1 tablet by mouth nightly 60 tablet 2    pimecrolimus (ELIDEL) 1 % cream Apply topically to rash on face 2 times daily. (Patient taking differently: as needed Apply topically to rash on face 2 times daily.) 30 g 11    ketoconazole (NIZORAL) 2 % shampoo APPLY TO AFFECTED AREA ON SCALP 3 TO 4 TIMES A WEEK LEAVE ON FOR 5 MINUTES THEN WASH  mL 11    naloxone 4 MG/0.1ML LIQD nasal spray 1 spray by Nasal route as needed for Opioid Reversal 1 each 5    Blood Glucose Monitoring Suppl (ONE TOUCH ULTRA 2) w/Device KIT use as directed 1 kit 0    ONETOUCH DELICA LANCETS FINE MISC TEST three times a day 100 each 5    hydrOXYzine (VISTARIL) 25 MG capsule Take 25 mg by mouth nightly  (Patient not taking: No sig reported)       No current facility-administered medications for this visit. No Known Allergies            Family History   Problem Relation Age of Onset    Stroke Mother     Diabetes Mother     High Blood Pressure Mother     Cataracts Mother     Stroke Father     Diabetes Father     Heart Disease Sister 72    Diabetes Daughter     Stroke Sister 68        Brain aneurysm that ruptured and then she got kidney failure and intestinal infection and     Glaucoma Neg Hx              Social History     Socioeconomic History    Marital status: Single     Spouse name: Not on file    Number of children: Not on file    Years of education: Not on file    Highest education level: Not on file   Occupational History    Not on file   Tobacco Use    Smoking status: Former     Packs/day: 1.00     Years: 26.00     Pack years: 26.00     Types: Cigarettes     Start date: 1975     Quit date: 2018     Years since quitting: 3.6    Smokeless tobacco: Never   Substance and Sexual Activity    Alcohol use:  Yes     Alcohol/week: 0.0 standard drinks Comment: daily; \"2 tallboys per day\"    Drug use: No    Sexual activity: Not on file   Other Topics Concern    Not on file   Social History Narrative    Not on file     Social Determinants of Health     Financial Resource Strain: Unknown    Difficulty of Paying Living Expenses: Patient refused   Food Insecurity: Unknown    Worried About Running Out of Food in the Last Year: Patient refused    Ran Out of Food in the Last Year: Patient refused   Transportation Needs: Not on file   Physical Activity: Not on file   Stress: Not on file   Social Connections: Not on file   Intimate Partner Violence: Not on file   Housing Stability: Not on file         Vitals:    08/12/22 1341   BP: 122/74   Pulse: 80   SpO2: 95%         Wt Readings from Last 3 Encounters:   08/12/22 215 lb (97.5 kg)   07/28/22 222 lb (100.7 kg)   04/26/22 218 lb (98.9 kg)         BP Readings from Last 3 Encounters:   08/12/22 122/74   07/28/22 118/64   04/26/22 130/72       Hematology and Coagulation  Lab Results   Component Value Date/Time    WBC 7.0 07/23/2021 09:14 AM    RBC 4.38 07/23/2021 09:14 AM    HGB 13.2 07/23/2021 09:14 AM    HCT 40.2 07/23/2021 09:14 AM    MCV 91.8 07/23/2021 09:14 AM    MCH 30.1 07/23/2021 09:14 AM    MCHC 32.8 07/23/2021 09:14 AM    RDW 12.1 07/23/2021 09:14 AM     07/23/2021 09:14 AM    MPV 9.8 07/23/2021 09:14 AM       Chemistries  Lab Results   Component Value Date/Time     07/28/2022 02:21 PM    K 5.5 07/28/2022 02:21 PM    CL 93 07/28/2022 02:21 PM    CO2 27 07/28/2022 02:21 PM    BUN 12 07/28/2022 02:21 PM    CREATININE 1.76 07/28/2022 02:21 PM    CALCIUM 9.0 07/28/2022 02:21 PM    PROT 6.5 07/27/2022 08:58 AM    LABALBU 3.5 07/27/2022 08:58 AM    BILITOT 0.70 07/27/2022 08:58 AM    ALKPHOS 90 07/27/2022 08:58 AM    AST 39 07/27/2022 08:58 AM    ALT 11 07/27/2022 08:58 AM     Lab Results   Component Value Date/Time    ALKPHOS 90 07/27/2022 08:58 AM    ALT 11 07/27/2022 08:58 AM    AST 39 07/27/2022 08:58 AM PROT 6.5 07/27/2022 08:58 AM    BILITOT 0.70 07/27/2022 08:58 AM    LABALBU 3.5 07/27/2022 08:58 AM     Lab Results   Component Value Date/Time    BUN 12 07/28/2022 02:21 PM    CREATININE 1.76 07/28/2022 02:21 PM     Lab Results   Component Value Date/Time    CALCIUM 9.0 07/28/2022 02:21 PM    MG 1.8 07/27/2022 08:58 AM     Lab Results   Component Value Date/Time    AST 39 07/27/2022 08:58 AM    ALT 11 07/27/2022 08:58 AM       Lab Results   Component Value Date/Time    [id number] 08/20/2021 09:17 AM             Review of Systems   Constitutional:  Negative for appetite change, diaphoresis, fatigue, fever and unexpected weight change. HENT:  Negative for dental problem, drooling, ear discharge, ear pain, facial swelling, hearing loss, mouth sores, nosebleeds, postnasal drip, sinus pressure, tinnitus, trouble swallowing and voice change. Eyes:  Negative for photophobia, pain, discharge, redness, itching and visual disturbance. Respiratory:  Negative for apnea, choking, chest tightness, shortness of breath and wheezing. Cardiovascular:  Negative for chest pain, palpitations, leg swelling and near-syncope. Gastrointestinal:  Negative for abdominal distention, abdominal pain, blood in stool, bowel incontinence, constipation, diarrhea, nausea and vomiting. Endocrine: Negative for cold intolerance, heat intolerance, polydipsia, polyphagia and polyuria. Genitourinary:  Negative for bladder incontinence. Musculoskeletal:  Positive for gait problem. Negative for back pain, neck pain and neck stiffness. Skin:  Negative for color change, pallor and wound. Allergic/Immunologic: Negative for environmental allergies, food allergies and immunocompromised state. Neurological:  Positive for focal weakness, weakness and loss of balance. Negative for dizziness, vertigo, tremors, syncope, facial asymmetry, speech difficulty, light-headedness and headaches. Hematological:  Negative for adenopathy.  Does not bruise/bleed easily. Psychiatric/Behavioral:  Positive for decreased concentration and memory loss. Negative for agitation, behavioral problems, confusion, dysphoric mood, hallucinations, self-injury, sleep disturbance and suicidal ideas. The patient is nervous/anxious. The patient is not hyperactive. Objective:   Physical Exam  Constitutional:       Appearance: She is well-developed. HENT:      Head: Normocephalic and atraumatic. No raccoon eyes or Covington's sign. Right Ear: External ear normal.      Left Ear: External ear normal.      Nose: Nose normal.   Eyes:      Conjunctiva/sclera: Conjunctivae normal.      Pupils: Pupils are equal, round, and reactive to light. Neck:      Thyroid: No thyroid mass or thyromegaly. Vascular: No carotid bruit. Trachea: No tracheal deviation. Meningeal: Brudzinski's sign and Kernig's sign absent. Cardiovascular:      Rate and Rhythm: Normal rate and regular rhythm. Pulmonary:      Effort: Pulmonary effort is normal. No respiratory distress. Breath sounds: Normal breath sounds. No wheezing or rales. Musculoskeletal:         General: No tenderness. Normal range of motion. Cervical back: Normal range of motion and neck supple. No rigidity. No muscular tenderness. Normal range of motion. Lymphadenopathy:      Cervical: No cervical adenopathy. Skin:     General: Skin is warm. Coloration: Skin is not pale. Findings: No erythema or rash. Nails: There is no clubbing. Psychiatric:         Attention and Perception: She is attentive. Mood and Affect: Mood is anxious and depressed. Affect is blunt. Affect is not labile or inappropriate. Speech: She is communicative. Speech is not rapid and pressured, delayed, slurred or tangential.         Behavior: Behavior is not agitated, slowed, aggressive, withdrawn, hyperactive or combative. Behavior is cooperative. Thought Content:  Thought content normal. Thought content is not paranoid or delusional. Thought content does not include homicidal or suicidal ideation. Thought content does not include homicidal or suicidal plan. Cognition and Memory: Cognition is impaired. Memory is impaired. She exhibits impaired recent memory. She does not exhibit impaired remote memory. Judgment: Judgment is not impulsive or inappropriate. NEUROLOGICAL EXAMINATION :    A) MENTAL STATUS:                   Alert and  oriented  To time, place  And  Person. No Aphasia. No  Dysarthria. Able   To  Follow   SIMPLE    commands without   Any  Difficulty. No right  To left confusion. Normal  Speech  And language function. Insight and  Judgment ,Fund  Of  Knowledge   Decreased                Recent  And  Remote memory  Decreased                Attention &Concentration are decreased                                                   B) CRANIAL NERVES :             2 CN : Visual  Acuity  And  Visual fields  within normal limits                        Fundi  Could  Not  Be  Could  Not  Be  Evaluated. 3,4,6 CN : Both  Pupils are   Reactive and  Equal.                            Extraocular   Movements  Are  Intact. No  Nystagmus. No  RODERICK. No  Afferent  Pupillary  Defect noted. 5 CN :  Normal  Facial sensations and Corneal  Reflexes           7 CN :  Normal  Facial  Symmetry  And  Strength. No facial  Weakness.            8 CN :  Hearing  Appears within normal limits          9, 10 CN: Normal spontaneous, reflex palate movements         11 CN:   Normal  Shoulder shrug and  Strength         12 CN :   Normal  Tongue movements and  Tongue  In midline                        No tongue   Fasciculations or atrophy         C) MOTOR  EXAM:                 Strength  In upper  And  Lower extremities   within normal limits                     Rapid alternating  And  repetitions  Movements  within normal limits                 Muscle  Tone  In upper  And  Lower  Extremities  within normal limits                No rigidity. No  Spasticity. Bradykinesia   absent                  No  Asterixis. Sustention  Tremor , Resting  Tremor   absent                    No other  Abnormal  Movements noted             D) SENSORY :               light touch, pinprick, position  And  Vibration  decreased          E) REFLEXES:                   Deep  Tendon  Reflexes decreased                    No pathological  Reflexes  Bilaterally. F) COORDINATION  AND  GAIT :                                Station and  Gait   SLOW                                    Romberg's test positive                          Ataxia negative            Assessment:             Patient Active Problem List   Diagnosis    Anxiety    Depression    Schizoaffective disorder ([address]    History of tobacco use    Restless leg syndrome    Pica    Essential hypertension    Bipolar disorder ([facility])    Alcoholism ([facility])    Sleeping difficulty    Peripheral neuropathy    Balance problem    CTS (carpal tunnel syndrome)    Chronic lumbar radiculopathy    GERD (gastroesophageal reflux disease)    COPD (chronic obstructive pulmonary disease) ([facility])    Fatigue    Chronic rectal pain    Type 2 diabetes mellitus with diabetic neuropathy, without long-term current use of insulin ([facility])    Pruritus    Obesity (BMI 30.0-34. 9)    Mixed hyperlipidemia    Chronic right shoulder pain    Bilateral carpal tunnel syndrome    Current severe episode of major depressive disorder without psychotic features ([address]    Splenic artery aneurysm ([facility])    Bipolar affective disorder, currently depressed, mild ([address]    Morbidly obese ([address]    Tobacco abuse    Vitamin B12 deficiency    Polyneuropathy associated with underlying disease ([address]               Plan:            VISITING DIAGNOSIS: ICD-10-CM    1. Polyneuropathy associated with underlying disease ([facility]  G63 gabapentin (NEURONTIN) 300 MG capsule      2. Bilateral carpal tunnel syndrome  G56.03 gabapentin (NEURONTIN) 300 MG capsule      3. Anxiety  F41.9       4. Restless leg syndrome  G25.81       5. Vitamin B12 deficiency  E53.8       6. Type 2 diabetes mellitus with diabetic neuropathy, without long-term current use of insulin ([facility])  E11.40       7. Morbidly obese ([facility])  E66.01       8. Schizoaffective disorder, unspecified type ([facility]  F25.9       9. Essential hypertension  I10       10. Bipolar affective disorder, current episode hypomanic ([facility])  F31.0                      CONCERNS   &   INCREASED   RISK   FOR         * TIA,  CEREBRO  VASCULAR  ISCHEMIA, STROKE      *   COGNITIVE  &   MEMORY PROBLEMS  AND  DEMENTIA      *  MONONEUROPATHIES, RADICULOPATHY       *   PERIPHERAL  NEUROPATHY                      VARIOUS  RISK   FACTORS   WERE  REVIEWED   AND   DISCUSSED. *  PATIENT   HAS  MULTIPLE   MEDICAL,  NEUROLOGICAL      AND   MENTAL  HEALTH  PROBLEMS . PATIENT'S   MANAGEMENT  IS  CHALLENGING. PLAN:         Noe Morenoer  Of  The  Diagnoses,  The  Management & Treatment  Options           AND    Care  plan  Were        Reviewed and   Discussed   With  patient. * Goals  And  Expectations  Of  The  Therapy  Discussed   And  Reviewed. *   Benefits   And   Side  Effect  Profile  Of  Medication/s   Were   Discussed             * Need   For  Further   Follow up For  The  Various  Problems  Were discussed. * Results  Of  The  Previous  Diagnostic tests were reviewed and questions answered. patient  understand the same. Medical  Decision  Making  Was  Made  Based on the   Complexity  Of  Above  Mentioned  Diagnoses,        Data reviewed   & diagnostic  Tests  Reviewed,  Risk  Of  Significant   Co morbidities and complicating   Factors.                  Medical  Decision  Was   High Complexity  Due   To  The  Patient's  Multiple  Symptoms,      Advancing   Disease,  Complex  Treatment  Regimen,  Multiple medications and   Risk  Of   Side  Effects,      Difficulty  In  Medication  Management  And  Diagnostic  Challenges       In  View  Of  The  Associated   Co  Morbid  Conditions   And  Problems. * FALL   PRECAUTIONS. THESE  REVIEWED   AND  DISCUSSED      *   BE  CAREFUL  WITH  ACTIVITIES   INCLUDING  DRIVING. *   AVOID   NECK  AND/ BACK  STRAINING  ACTIVITIES          *   TO   WEAR   BILATERAL   WRIST  BRACES       *   TO  AVOID  PRESSURE  OVER   ULNAR  ASPECT   OF   ELBOWS          *   ADEQUATE   FLUID  INTAKE   AND  ELECTROLYTE  BALANCE           * KEEP  DAIRY  OF   THE  NEUROLOGICAL  SYMPTOMS        RECORDING THE    DURATION  AND  FREQUENCY. *  AVOID    CONDITIONS  AND  FACTORS   THAT  MAKE   NEUROLOGICAL  SYMPTOMS  WORSE. *  TO  MAINTAIN  REGULAR  SLEEP  WAKE  CYCLES. *   TO  HAVE  ADEQUATE  REST  AND   SLEEP    HOURS.          *    TO   AVOID   TO  SLEEP  IN   SUPINE  POSITION. *      WEIGHT   LOSS. *    AVOID  ANY USAGE OF                   TOBACCO,  EXCESSIVE  ALCOHOL  AND   ILLEGAL   SUBSTANCES          *  CONTINUE MEDICATIONS PRESCRIBED      AS    RECOMMENDED       *   Compliance   With  Medications   And  Instructions        * CURRENTLY  TOLERATING  THE  PRESCRIBED   MEDICATIONS. WITHOUT  ANY  SIGNIFICANT  SIDE  EFFECTS   &  GETTING BENEFIT. *  May   Use  Pill  Box,    If  Needed          *        Antiplatelet  therapy    As   Recommended  Was   Discussed        *    Prophylactic  Use   Of     Vitamin   B   Complex,  Folic  Acid,    Vitamin  B12        Multivitamin   Tablet  Daily    Supplementations   Over  The  Counter  Discussed               *  FOOT  CARE, DAILY  INSPECTION  OF  FEET   AND         PERIODIC  PODIATRY EVALUATIONS .         *  PATIENT  IS  ALSO   COUNSELED   TO  KEEP    ACTIVITIES A)   SIMPLE      B)  ORGANIZED      C)  WRITE   DOWN                                *      NEUROPATHIC  SYMPTOMS  BOTH  FEET     SINCE    FEB. 2020                        -   MOSTLY   DUE   TO    HER  TYPE  II    AND                                 ALCOHOL  ABUSE/  USAGE                          *        PATIENT    ON  NEURONTIN        AND                                ULTRAM  FROM     HER PCP     FOR    SYMPTOMATIC   RELIEF                           -    PATIENT    ADVISED                                     A)     TO   AVOID    ALCOHOL                                    B)    ADEQUATE   DIABETIC  CONTROL                           Controlled Substances Monitoring: Periodic Controlled Substance Monitoring: Possible medication side effects, risk of tolerance/dependence & alternative treatments discussed. , Assessed functional status. Nik Lynch MD)          Orders Placed This Encounter   Medications    gabapentin (NEURONTIN) 300 MG capsule     Sig: ONE  CAPSULE    4   TIMES   DAILY     Dispense:  120 capsule     Refill:  2    carbidopa-levodopa (SINEMET)  MG per tablet     Sig: Taking 3  tablets at bedtime     Dispense:  180 tablet     Refill:  1    Cyanocobalamin ER (RA VITAMIN B-12 TR) 1000 MCG TBCR     Sig: take 1 tablet by mouth once daily     Dispense:  30 tablet     Refill:  5    Multiple Vitamin (MULTIVITAMIN) tablet     Sig: take 1 tablet by mouth once daily     Dispense:  30 tablet     Refill:  5               *PATIENT   TO  FOLLOW  UP  WITH   PRIMARY  CARE   AND   OTHER  CONSULTANTS  AS  BEFORE.               *TO  FOLLOW  WITH   MENTAL  HEALTH  PROFESSIONALS ,  INCLUDING            PSYCHOLOGICAL  COUNSELING   AND  PSYCHIATRIC  EVALUTIONS              *  Maintain   Healthy  Life Style    With   Periodic  Monitoring  Of      Any  Medical  Conditions  Including   Elevated  Blood  Pressure,  Lipid  Profile,     Blood  Sugar levels  And   Heart  Disease.                 *   Period   Screening For  Cancers  Involving  Breast,  Colon,     lungs  And  Other  Organs  As  Applicable,  In consultation   With  Your  Primary Care Providers. * Second  Neurological  Opinion  And  Evaluations  In  [facility]  Setting  If  Patient  Is  Interested. *  If  The  Patient remains  Neurologically  Stable   Return   To  [facility] Neurology Department       IN   3    MONTHS  TIME   FOR  FURTHER  FOLLOW UP.                   *  If   There is  Any  Significant  Worsening   Of  Current  Symptoms  And  Or  If patient  Develops   Any additional  New      Neurological  Symptoms  Or  Significant  Concerns   Should  Call  911 or  Go  To  Emergency  Department  For  Further      Immediate  Evaluation. *   The  Neurological   Findings,  Possible  Diagnosis,  Differential diagnoses   And  Options      For    Further   Investigations   And  management   Are  Discussed  Comprehensively. Medications   And  Prescription   Risks  And  Side effects  Are   Also  Discussed. The  Above  Were  Reviewed  With  patient and     questions  Answered  In  Detail. More   Than   50% of face  To face Time   Was  Spent  On  Counseling   And   Coordination  Of  Care       Of   Patient's multiple   Neurological  Problems   And   Comorbid  Medical   Conditions. Electronically signed by Keenan House MD.,  [facility]       Board Certified in  Neurology &  In  [address] of Psychiatry and Neurology (ABPN)      DISCLAIMER:   Although every effort was made to ensure the accuracy of this  electronic transcription, some errors in transcription may have occurred. GENERAL PATIENT INSTRUCTIONS:     A Healthy Lifestyle: Care Instructions  Your Care Instructions  A healthy lifestyle can help you feel good, stay at a healthy weight, and have plenty of energy for both work and play.  A healthy lifestyle is something you can share with your whole family. A healthy lifestyle also can lower your risk for serious health problems, such as high blood pressure, heart disease, and diabetes. You can follow a few steps listed below to improve your health and the health of your family. Follow-up care is a key part of your treatment and safety. Be sure to make and go to all appointments, and call your doctor if you are having problems. Its also a good idea to know your test results and keep a list of the medicines you take. How can you care for yourself at home? Do not eat too much sugar, fat, or fast foods. You can still have dessert and treats now and then. The goal is moderation. Start small to improve your eating habits. Pay attention to portion sizes, drink less juice and soda pop, and eat more fruits and vegetables. Eat a healthy amount of food. A 3-ounce serving of meat, for example, is about the size of a deck of cards. Fill the rest of your plate with vegetables and whole grains. Limit the amount of soda and sports drinks you have every day. Drink more water when you are thirsty. Eat at least 5 servings of fruits and vegetables every day. It may seem like a lot, but it is not hard to reach this goal. A serving or helping is 1 piece of fruit, 1 cup of vegetables, or 2 cups of leafy, raw vegetables. Have an apple or some carrot sticks as an afternoon snack instead of a candy bar. Try to have fruits and/or vegetables at every meal.  Make exercise part of your daily routine. You may want to start with simple activities, such as walking, bicycling, or slow swimming. Try to be active 30 to 60 minutes every day. You do not need to do all 30 to 60 minutes all at once. For example, you can exercise 3 times a day for 10 or 20 minutes. Moderate exercise is safe for most people, but it is always a good idea to talk to your doctor before starting an exercise program.  Keep moving.  Lorel Neenah the lawn, work in the garden, or clean your house. Take the stairs instead of the elevator at work. If you smoke, quit. People who smoke have an increased risk for heart attack, stroke, cancer, and other lung illnesses. Quitting is hard, but there are ways to boost your chance of quitting tobacco for good. Use nicotine gum, patches, or lozenges. Ask your doctor about stop-smoking programs and medicines. Keep trying. In addition to reducing your risk of diseases in the future, you will notice some benefits soon after you stop using tobacco. If you have shortness of breath or asthma symptoms, they will likely get better within a few weeks after you quit. Limit how much alcohol you drink. Moderate amounts of alcohol (up to 2 drinks a day for men, 1 drink a day for women) are okay. But drinking too much can lead to liver problems, high blood pressure, and other health problems. Family health  If you have a family, there are many things you can do together to improve your health. Eat meals together as a family as often as possible. Eat healthy foods. This includes fruits, vegetables, lean meats and dairy, and whole grains. Include your family in your fitness plan. Most people think of activities such as jogging or tennis as the way to fitness, but there are many ways you and your family can be more active. Anything that makes you breathe hard and gets your heart pumping is exercise. Here are some tips:  Walk to do errands or to take your child to school or the bus. Go for a family bike ride after dinner instead of watching TV. Where can you learn more? Go to https://[place].[employer]. org and sign in to your [employer] account. Enter W252 in the [facility] box to learn more about \"A Healthy Lifestyle: Care Instructions. \"     If you do not have an account, please click on the \"Sign Up Now\" link. Current as of: July 26, 2016  Content Version: 11.2  © 3822-8598 [employer].  Care instructions adapted under license by [facility] ([facility]). If you have questions about a medical condition or this instruction, always ask your healthcare professional. [place] [address] any warranty or liability for your use of this information.

## 2022-08-17 NOTE — TELEPHONE ENCOUNTER
Writer spoke to patient and she declines any swelling, redness or pain at this time.  She will call or come into Urgent Care with any concerns

## 2022-08-17 NOTE — TELEPHONE ENCOUNTER
I agree that she should come in for evaluation if she is having pain, redness, or increased swelling.

## 2022-08-31 ENCOUNTER — OFFICE VISIT (OUTPATIENT)
Dept: OPTOMETRY | Age: 62
End: 2022-08-31
Payer: MEDICAID

## 2022-08-31 VITALS — SYSTOLIC BLOOD PRESSURE: 122 MMHG | DIASTOLIC BLOOD PRESSURE: 74 MMHG

## 2022-08-31 DIAGNOSIS — H53.8 BLURRED VISION, BILATERAL: ICD-10-CM

## 2022-08-31 DIAGNOSIS — H52.03 HYPEROPIA OF BOTH EYES WITH ASTIGMATISM AND PRESBYOPIA: Primary | ICD-10-CM

## 2022-08-31 DIAGNOSIS — H52.4 HYPEROPIA OF BOTH EYES WITH ASTIGMATISM AND PRESBYOPIA: Primary | ICD-10-CM

## 2022-08-31 DIAGNOSIS — H52.203 HYPEROPIA OF BOTH EYES WITH ASTIGMATISM AND PRESBYOPIA: Primary | ICD-10-CM

## 2022-08-31 DIAGNOSIS — E11.9 NON-INSULIN DEPENDENT TYPE 2 DIABETES MELLITUS (HCC): ICD-10-CM

## 2022-08-31 DIAGNOSIS — H25.13 NUCLEAR SCLEROSIS OF BOTH EYES: ICD-10-CM

## 2022-08-31 PROCEDURE — 2022F DILAT RTA XM EVC RTNOPTHY: CPT | Performed by: OPTOMETRIST

## 2022-08-31 PROCEDURE — 92083 EXTENDED VISUAL FIELD XM: CPT | Performed by: OPTOMETRIST

## 2022-08-31 PROCEDURE — 92250 FUNDUS PHOTOGRAPHY W/I&R: CPT | Performed by: OPTOMETRIST

## 2022-08-31 PROCEDURE — 92014 COMPRE OPH EXAM EST PT 1/>: CPT | Performed by: OPTOMETRIST

## 2022-08-31 PROCEDURE — 1036F TOBACCO NON-USER: CPT | Performed by: OPTOMETRIST

## 2022-08-31 PROCEDURE — G8427 DOCREV CUR MEDS BY ELIG CLIN: HCPCS | Performed by: OPTOMETRIST

## 2022-08-31 PROCEDURE — G8417 CALC BMI ABV UP PARAM F/U: HCPCS | Performed by: OPTOMETRIST

## 2022-08-31 PROCEDURE — 92015 DETERMINE REFRACTIVE STATE: CPT | Performed by: OPTOMETRIST

## 2022-08-31 PROCEDURE — 99211 OFF/OP EST MAY X REQ PHY/QHP: CPT | Performed by: OPTOMETRIST

## 2022-08-31 RX ORDER — TROPICAMIDE 10 MG/ML
1 SOLUTION/ DROPS OPHTHALMIC ONCE
Status: COMPLETED | OUTPATIENT
Start: 2022-08-31 | End: 2022-08-31

## 2022-08-31 RX ADMIN — TROPICAMIDE 1 DROP: 10 SOLUTION/ DROPS OPHTHALMIC at 14:16

## 2022-08-31 ASSESSMENT — REFRACTION_MANIFEST
OS_SPHERE: +0.75
OD_SPHERE: +0.75
OD_CYLINDER: -0.50
OS_ADD: +2.25
OD_AXIS: 097
OD_ADD: +2.25
OS_CYLINDER: DS

## 2022-08-31 ASSESSMENT — REFRACTION_WEARINGRX
OS_ADD: +2.25
OD_SPHERE: +0.50
OD_AXIS: 097
OS_CYLINDER: DS
OS_SPHERE: +0.75
OD_ADD: +2.25
SPECS_TYPE: BIFOCAL
OD_CYLINDER: -0.50

## 2022-08-31 ASSESSMENT — ENCOUNTER SYMPTOMS
EYES NEGATIVE: 0
RESPIRATORY NEGATIVE: 0
ALLERGIC/IMMUNOLOGIC NEGATIVE: 0
GASTROINTESTINAL NEGATIVE: 0

## 2022-08-31 ASSESSMENT — TONOMETRY
OS_IOP_MMHG: 17
OD_IOP_MMHG: 19
IOP_METHOD: NON-CONTACT AIR PUFF

## 2022-08-31 ASSESSMENT — VISUAL ACUITY
METHOD: SNELLEN - LINEAR
OD_SC: 20/20
CORRECTION_TYPE: GLASSES
OD_CC: 20/20 OU
OD_PH_SC: 20/20 OU
OS_SC: 20/20

## 2022-08-31 NOTE — PROGRESS NOTES
Marlen Ok presents today for   Chief Complaint   Patient presents with    Ophth Diabetic Exam     Last Vision Exam: 12/29/2020  Last Ophthalmology Exam: na  Last Filled Glasses Rx:2020   Insurance: [facility] March  Update: Exam and glasses if needed. Diabetic:  Sugars:    HmgA1C:6.8 July      Blurred Vision   . HPI       Ophth Diabetic Exam              Comments: Last Vision Exam: 12/29/2020  Last Ophthalmology Exam: na  Last Filled Glasses Rx:2020   Insurance: [facility] March  Update: Exam and glasses if needed.   Diabetic:  Sugars:    HmgA1C:6.8 July                Blurred Vision              Laterality: both eyes                   Current Outpatient Medications   Medication Sig Dispense Refill    gabapentin (NEURONTIN) 300 MG capsule ONE  CAPSULE    4   TIMES   DAILY 120 capsule 2    carbidopa-levodopa (SINEMET)  MG per tablet Taking 3  tablets at bedtime 180 tablet 1    Cyanocobalamin ER (RA VITAMIN B-12 TR) 1000 MCG TBCR take 1 tablet by mouth once daily 30 tablet 5    Multiple Vitamin (MULTIVITAMIN) tablet take 1 tablet by mouth once daily 30 tablet 5    ammonium lactate (LAC-HYDRIN) 12 % lotion APPLY TOPICALLY TO AFFECTED AREAS as needed 400 g 0    meloxicam (MOBIC) 15 MG tablet       aspirin 325 MG EC tablet take 1 tablet by mouth once daily 30 tablet 12    diclofenac sodium (VOLTAREN) 1 % GEL apply 4 grams topically IN THE MORNING and AT NOON AND IN THE EVENING AND BEFORE BEDTIME      omeprazole (PRILOSEC) 20 MG delayed release capsule take 1 tablet by mouth once daily 90 capsule 1    diphenhydrAMINE (BANOPHEN) 25 MG capsule take 1 capsule by mouth twice a day if needed for itching and allergies 180 capsule 1    magnesium oxide (MAG-OX) 400 MG tablet take 1 tablet by mouth once daily 90 tablet 1    losartan (COZAAR) 100 MG tablet take 1 tablet by mouth once daily 90 tablet 1    pravastatin (PRAVACHOL) 40 MG tablet take 1 tablet by mouth once daily 90 tablet 1    nicotine (NICODERM CQ) 7 MG/24HR Place 1 patch onto the skin every 24 hours 30 patch 3    UNABLE TO FIND Shower chair with back 1 each 0    ONETOUCH ULTRA strip use 1 TEST STRIP to TEST BLOOD SUGAR three times a day 300 strip 1    triamcinolone (KENALOG) 0.1 % cream Apply topically 2 times daily. (Patient taking differently: Apply topically 2 times daily prn.) 80 g 2    hydrOXYzine (VISTARIL) 25 MG capsule Take 25 mg by mouth nightly      risperiDONE (RISPERDAL) 1 MG tablet Take 1 tablet by mouth nightly 60 tablet 2    pimecrolimus (ELIDEL) 1 % cream Apply topically to rash on face 2 times daily. (Patient taking differently: as needed Apply topically to rash on face 2 times daily.) 30 g 11    ketoconazole (NIZORAL) 2 % shampoo APPLY TO AFFECTED AREA ON SCALP 3 TO 4 TIMES A WEEK LEAVE ON FOR 5 MINUTES THEN WASH  mL 11    naloxone 4 MG/0.1ML LIQD nasal spray 1 spray by Nasal route as needed for Opioid Reversal 1 each 5    Blood Glucose Monitoring Suppl (ONE TOUCH ULTRA 2) w/Device KIT use as directed 1 kit 0    ONETOUCH DELICA LANCETS FINE MISC TEST three times a day 100 each 5     No current facility-administered medications for this visit.          Family History   Problem Relation Age of Onset    Stroke Mother     Diabetes Mother     High Blood Pressure Mother     Cataracts Mother     Stroke Father     Diabetes Father     Heart Disease Sister 72    Diabetes Daughter     Stroke Sister 68        Brain aneurysm that ruptured and then she got kidney failure and intestinal infection and     Glaucoma Neg Hx      Social History     Socioeconomic History    Marital status: Single     Spouse name: None    Number of children: None    Years of education: None    Highest education level: None   Tobacco Use    Smoking status: Former     Packs/day: 1.00     Years: 26.00     Pack years: 26.00     Types: Cigarettes     Start date: 1975     Quit date: 2018     Years since quitting: 3.7    Smokeless tobacco: Never   Substance and Sexual Activity Alcohol use: Yes     Alcohol/week: 0.0 standard drinks     Comment: daily; \"2 tallboys per day\"    Drug use: No     Social Determinants of Health     Financial Resource Strain: Unknown    Difficulty of Paying Living Expenses: Patient refused   Food Insecurity: Unknown    Worried About Running Out of Food in the Last Year: Patient refused    Ran Out of Food in the Last Year: Patient refused     Past Medical History:   Diagnosis Date    Alcoholism ([facility] [address]     Anxiety     Astigmatism with presbyopia     Balance problem     Bipolar disorder (formerly [facility])     schizoaffective disorder (Dr. Herbert De La Rosa)    Chronic lumbar radiculopathy     Chronic rectal pain     due to fissures    Chronic shoulder pain     bilaterally, due to osteoarthritis    CTS (carpal tunnel syndrome)     Depression     Fatigue     Hyperlipidemia     Hypertension     Obesity     Peripheral neuropathy     Pica     eats bar soap    Restless leg syndrome     [facility] ([facility]     Sleeping difficulty     Tobacco abuse     Type 2 diabetes mellitus ([facility]        ROS    Positive for: Endocrine  Negative for: Constitutional, Gastrointestinal, Neurological, Skin, Genitourinary, Musculoskeletal, HENT, Cardiovascular, Eyes, Respiratory, Psychiatric, Allergic/Imm, Heme/Lymph         Main Ophthalmology Exam       External Exam         Right Left    External Normal Normal                   <div id=\"MAIN_EXAM_REVIEWED\"></div>      Tonometry       Tonometry (Non-contact air puff, 2:21 PM)         Right Left    Pressure 19 17   IOP.6             15.2  CH:  9.4          9.6  WS: 7.6          6.5                   Not recorded       Not recorded         Visual Acuity (Snellen - Linear)         Right Left    Dist sc 20/20 20/20    Dist ph sc 20/20 OU     Near cc 20/20 OU       Correction: Glasses   Uses glasses only for reading. Pupils       Pupils         Pupils    Right PERRL    Left PERRL                  Neuro/Psych       Neuro/Psych       Oriented x3:  Yes Mood/Affect: Normal                  Not recorded           Ophthalmology Exam       Wearing Rx         Sphere Cylinder Axis Add    Right +0.50 -0.50 097 +2.25    Left +0.75 ds  +2.25      Type: Bifocal                  Manifest Refraction       Manifest Refraction         Sphere Cylinder Axis Dist VA Add    Right +0.75 -0.50 097 20/20 +2.25    Left +0.75 ds  20/20 +2.25              Manifest Refraction #2 (Auto)         Sphere Cylinder Axis Dist VA Add    Right +0.50 -0.25 093      Left +0.75 0.00 000                     Final Rx         Sphere Cylinder Axis Dist VA Add    Right +0.75 -0.50 097 20/20 +2.25    Left +0.75 ds  20/20 +2.25      Type: Bifocal    Expiration Date: 8/31/2024              Orders Placed This Encounter   Procedures     DIABETES EYE EXAM    Color Fundus Photography-OU-Both Eyes           IMPRESSION:  1. Hyperopia of both eyes with astigmatism and presbyopia    2. Non-insulin dependent type 2 diabetes mellitus ([address]    3. Blurred vision, bilateral    4. Nuclear sclerosis of both eyes        PLAN:    New glasses recommended  Discussed the patient's diagnosis of diabetes and the impact this can have on their ocular health, potentially even leading to permanent blindness. I discussed with the patient the importance of continued follow-up and management with their primary care physician to control their glycemic, blood pressure, and lipid levels. The patient verbalized understanding. 4. Monitor for future progression and visual significance. Counseled patient that more glare may be noticed and more light may be needed for reading. There are no Patient Instructions on file for this visit.    Return in about 1 year (around 8/31/2023) for complete eye exam.

## 2022-09-07 DIAGNOSIS — F17.210 CIGARETTE NICOTINE DEPENDENCE WITHOUT COMPLICATION: ICD-10-CM

## 2022-09-07 NOTE — TELEPHONE ENCOUNTER
Kendra Arias called requesting a refill of the below medication which has been pended for you:     Requested Prescriptions     Pending Prescriptions Disp Refills    nicotine (Barb Yojana) 7 MG/24HR [Pharmacy Med Name: NICOTINE 7 MG/24HR PATCH] 28 patch      Sig: apply 1 patch ONTO THE SKIN every 24 hours       Last Appointment Date: 7/28/2022  Next Appointment Date: 11/2/2022    No Known Allergies

## 2022-09-18 DIAGNOSIS — K62.89 CHRONIC RECTAL PAIN: ICD-10-CM

## 2022-09-18 DIAGNOSIS — G89.29 CHRONIC RECTAL PAIN: ICD-10-CM

## 2022-09-19 RX ORDER — TRAMADOL HYDROCHLORIDE 50 MG/1
TABLET ORAL
Qty: 90 TABLET | OUTPATIENT
Start: 2022-09-19

## 2022-09-19 NOTE — TELEPHONE ENCOUNTER
Asia Tucker called requesting a refill of the below medication which has been pended for you:     Per Diana from [place], [place], and [place]. Tramadol 50 mg last filled 8/26/22 #90/30 days.    Refill requested too soon  Requested Prescriptions     Refused Prescriptions Disp Refills    traMADol (ULTRAM) 50 MG tablet [Pharmacy Med Name: TRAMADOL HCL 50 MG TABLET] 90 tablet      Sig: take 1 tablet by mouth every 8 hours AS NEEDED FOR PAIN for up to 30 DAYS       Last Appointment Date: 7/28/2022  Next Appointment Date: 11/2/2022    No Known Allergies

## 2022-09-21 DIAGNOSIS — G89.29 CHRONIC RECTAL PAIN: ICD-10-CM

## 2022-09-21 DIAGNOSIS — K62.89 CHRONIC RECTAL PAIN: ICD-10-CM

## 2022-09-23 RX ORDER — TRAMADOL HYDROCHLORIDE 50 MG/1
TABLET ORAL
Qty: 90 TABLET | Refills: 0 | Status: SHIPPED | OUTPATIENT
Start: 2022-09-24 | End: 2022-10-20

## 2022-09-27 DIAGNOSIS — K21.9 GASTROESOPHAGEAL REFLUX DISEASE, UNSPECIFIED WHETHER ESOPHAGITIS PRESENT: ICD-10-CM

## 2022-09-27 RX ORDER — OMEPRAZOLE 20 MG/1
CAPSULE, DELAYED RELEASE ORAL
Qty: 90 CAPSULE | Refills: 0 | Status: SHIPPED | OUTPATIENT
Start: 2022-09-27

## 2022-09-27 NOTE — TELEPHONE ENCOUNTER
Livan Pastor called requesting a refill of the below medication which has been pended for you:     Requested Prescriptions     Pending Prescriptions Disp Refills    omeprazole (PRILOSEC) 20 MG delayed release capsule [Pharmacy Med Name: OMEPRAZOLE DR 20 MG CAPSULE] 90 capsule 0     Sig: take 1 capsule by mouth once daily       Last Appointment Date: 7/28/2022  Next Appointment Date: 11/2/2022    No Known Allergies

## 2022-10-13 DIAGNOSIS — G63 POLYNEUROPATHY ASSOCIATED WITH UNDERLYING DISEASE (HCC): ICD-10-CM

## 2022-10-13 RX ORDER — AMMONIUM LACTATE 12 G/100G
LOTION TOPICAL
Qty: 400 G | Refills: 0 | Status: SHIPPED | OUTPATIENT
Start: 2022-10-13 | End: 2022-11-14 | Stop reason: SDUPTHER

## 2022-10-13 NOTE — TELEPHONE ENCOUNTER
Last Appt:  8/12/2022  Next Appt:   11/11/2022  Med verified in Epic     Patient needs refill on Ammonium lactate

## 2022-10-18 DIAGNOSIS — K62.89 CHRONIC RECTAL PAIN: ICD-10-CM

## 2022-10-18 DIAGNOSIS — G89.29 CHRONIC RECTAL PAIN: ICD-10-CM

## 2022-10-18 NOTE — TELEPHONE ENCOUNTER
Parul Sanchez called requesting a refill of the below medication which has been pended for you:     [place] for [place], [place], and [place].  Tramadol 50mg last fille don 9/24/2022 #90/30days    Requested Prescriptions     Pending Prescriptions Disp Refills    traMADol (ULTRAM) 50 MG tablet [Pharmacy Med Name: TRAMADOL HCL 50 MG TABLET] 90 tablet 0     Sig: take 1 tablet by mouth every 8 hours AS NEEDED FOR PAIN for up to 30 DAYS       Last Appointment Date: 7/28/2022  Next Appointment Date: 10/28/2022    No Known Allergies

## 2022-10-20 RX ORDER — TRAMADOL HYDROCHLORIDE 50 MG/1
TABLET ORAL
Qty: 90 TABLET | Refills: 0 | Status: SHIPPED | OUTPATIENT
Start: 2022-10-20 | End: 2022-11-19

## 2022-10-27 RX ORDER — TRIAMCINOLONE ACETONIDE 1 MG/G
CREAM TOPICAL
Qty: 80 G | Refills: 0 | Status: SHIPPED | OUTPATIENT
Start: 2022-10-27 | End: 2022-12-13

## 2022-10-27 NOTE — TELEPHONE ENCOUNTER
Lorena Phillip called requesting a refill of the below medication which has been pended for you:     Requested Prescriptions     Pending Prescriptions Disp Refills    triamcinolone (KENALOG) 0.1 % cream [Pharmacy Med Name: TRIAMCINOLONE 0.1% CREAM] 80 g 2     Sig: apply to affected area twice a day       Last Appointment Date: 7/28/2022  Next Appointment Date: Patient cancelled appt for 10/28/2022. Rescheduled for 12/27/2022. Message left with patient to assist with scheduling a sooner appointment.     No Known Allergies

## 2022-11-04 DIAGNOSIS — L29.9 PRURITUS: ICD-10-CM

## 2022-11-04 RX ORDER — DIPHENHYDRAMINE HCL 25 MG
CAPSULE ORAL
Qty: 60 CAPSULE | Refills: 0 | Status: SHIPPED | OUTPATIENT
Start: 2022-11-04

## 2022-11-04 NOTE — TELEPHONE ENCOUNTER
Latoya Estrada called requesting a refill of the below medication which has been pended for you:     Requested Prescriptions     Pending Prescriptions Disp Refills    diphenhydrAMINE (BANOPHEN) 25 MG capsule [Pharmacy Med Name: BANOPHEN 25 MG CAPSULE] 60 capsule 1     Sig: take 1 capsule by mouth twice a day if needed for itching and allergies       Last Appointment Date: 7/28/2022  Next Appointment Date: 12/27/2022    No Known Allergies

## 2022-11-07 DIAGNOSIS — I10 ESSENTIAL HYPERTENSION: ICD-10-CM

## 2022-11-08 RX ORDER — LOSARTAN POTASSIUM 100 MG/1
TABLET ORAL
Qty: 90 TABLET | Refills: 0 | Status: SHIPPED | OUTPATIENT
Start: 2022-11-08

## 2022-11-08 NOTE — TELEPHONE ENCOUNTER
Diaz Finley called requesting a refill of the below medication which has been pended for you:     Requested Prescriptions     Pending Prescriptions Disp Refills    losartan (COZAAR) 100 MG tablet [Pharmacy Med Name: LOSARTAN POTASSIUM 100 MG TAB] 90 tablet 0     Sig: take 1 tablet by mouth once daily       Last Appointment Date: 7/28/2022  Next Appointment Date: 12/27/2022    No Known Allergies

## 2022-11-10 DIAGNOSIS — G56.03 BILATERAL CARPAL TUNNEL SYNDROME: ICD-10-CM

## 2022-11-10 DIAGNOSIS — G63 POLYNEUROPATHY ASSOCIATED WITH UNDERLYING DISEASE (HCC): Primary | ICD-10-CM

## 2022-11-10 RX ORDER — GABAPENTIN 300 MG/1
CAPSULE ORAL
Qty: 120 CAPSULE | Refills: 0 | Status: SHIPPED | OUTPATIENT
Start: 2022-11-10 | End: 2022-12-16

## 2022-11-10 NOTE — TELEPHONE ENCOUNTER
Last Appt:  8/12/2022  Next Appt:   12/22/2022  Med verified in Epic     Pt called needing refill on Gabapentin - Dr. Evelyne Carpio has been out of the office last week/this week, will be out before he is due to return. Would you be able to send in a short term supply? Thank you!     Jerome Matute

## 2022-11-14 DIAGNOSIS — G63 POLYNEUROPATHY ASSOCIATED WITH UNDERLYING DISEASE (HCC): ICD-10-CM

## 2022-11-14 DIAGNOSIS — G89.29 CHRONIC RECTAL PAIN: ICD-10-CM

## 2022-11-14 DIAGNOSIS — K62.89 CHRONIC RECTAL PAIN: ICD-10-CM

## 2022-11-14 RX ORDER — AMMONIUM LACTATE 12 G/100G
LOTION TOPICAL
Qty: 400 G | Refills: 0 | Status: SHIPPED | OUTPATIENT
Start: 2022-11-14

## 2022-11-14 RX ORDER — LANOLIN ALCOHOL/MO/W.PET/CERES
CREAM (GRAM) TOPICAL
Qty: 30 TABLET | Refills: 1 | Status: SHIPPED | OUTPATIENT
Start: 2022-11-14 | End: 2023-01-12 | Stop reason: SDUPTHER

## 2022-11-14 RX ORDER — TRAMADOL HYDROCHLORIDE 50 MG/1
TABLET ORAL
Qty: 90 TABLET | Refills: 0 | OUTPATIENT
Start: 2022-11-14 | End: 2022-12-14

## 2022-11-14 NOTE — TELEPHONE ENCOUNTER
Agusto Slater called requesting a refill of the below medication which has been pended for you:     Requested Prescriptions     Pending Prescriptions Disp Refills    magnesium oxide (MAG-OX) 400 (240 Mg) MG tablet [Pharmacy Med Name: MAGNESIUM OXIDE 400 MG TABLET] 30 tablet 0     Sig: take 1 tablet by mouth once daily     Refused Prescriptions Disp Refills    traMADol (ULTRAM) 50 MG tablet [Pharmacy Med Name: TRAMADOL HCL 50 MG TABLET] 90 tablet      Sig: take 1 tablet by mouth every 8 hours AS NEEDED FOR PAIN for up to 30 DAYS       Last Appointment Date: 7/28/2022  Next Appointment Date: 12/27/2022    No Known Allergies

## 2022-11-17 DIAGNOSIS — G89.29 CHRONIC RECTAL PAIN: ICD-10-CM

## 2022-11-17 DIAGNOSIS — K62.89 CHRONIC RECTAL PAIN: ICD-10-CM

## 2022-11-18 RX ORDER — TRAMADOL HYDROCHLORIDE 50 MG/1
50 TABLET ORAL EVERY 8 HOURS PRN
Qty: 90 TABLET | Refills: 0 | Status: SHIPPED | OUTPATIENT
Start: 2022-11-18 | End: 2022-12-16

## 2022-11-18 NOTE — TELEPHONE ENCOUNTER
Dionisio Court called requesting a refill of the below medication which has been pended for you:     Dr. Pasquale Norton is out of the office    Requested Prescriptions     Pending Prescriptions Disp Refills    traMADol (ULTRAM) 50 MG tablet [Pharmacy Med Name: TRAMADOL HCL 50 MG TABLET] 90 tablet 0     Sig: take 1 tablet by mouth every 8 hours AS NEEDED FOR PAIN for up to 30 DAYS       Last Appointment Date: 7/28/2022  Next Appointment Date: 12/27/2022    No Known Allergies

## 2022-11-28 DIAGNOSIS — I72.8 SPLENIC ARTERY ANEURYSM (HCC): Primary | ICD-10-CM

## 2022-12-05 DIAGNOSIS — L29.9 PRURITUS: ICD-10-CM

## 2022-12-06 RX ORDER — DIPHENHYDRAMINE HCL 25 MG
CAPSULE ORAL
Qty: 60 CAPSULE | Refills: 0 | Status: SHIPPED | OUTPATIENT
Start: 2022-12-06

## 2022-12-09 DIAGNOSIS — G63 POLYNEUROPATHY ASSOCIATED WITH UNDERLYING DISEASE (HCC): ICD-10-CM

## 2022-12-09 DIAGNOSIS — G56.03 BILATERAL CARPAL TUNNEL SYNDROME: ICD-10-CM

## 2022-12-09 RX ORDER — GABAPENTIN 300 MG/1
CAPSULE ORAL
Qty: 120 CAPSULE | Refills: 0 | Status: SHIPPED | OUTPATIENT
Start: 2022-12-09 | End: 2023-01-09

## 2022-12-12 DIAGNOSIS — G63 POLYNEUROPATHY ASSOCIATED WITH UNDERLYING DISEASE (HCC): ICD-10-CM

## 2022-12-12 DIAGNOSIS — F17.210 CIGARETTE NICOTINE DEPENDENCE WITHOUT COMPLICATION: ICD-10-CM

## 2022-12-12 NOTE — TELEPHONE ENCOUNTER
Last Appt:  8/12/2022  Next Appt:   12/22/2022  Med verified in [facility] [address]

## 2022-12-12 NOTE — TELEPHONE ENCOUNTER
Marlena Denise called requesting a refill of the below medication which has been pended for you:     Requested Prescriptions     Pending Prescriptions Disp Refills    nicotine (Arlyss Albino) 7 MG/24HR [Pharmacy Med Name: NICOTINE 7 MG/24HR PATCH] 28 patch 2     Sig: apply 1 patch ONTO THE SKIN every 24 hours    triamcinolone (KENALOG) 0.1 % cream [Pharmacy Med Name: TRIAMCINOLONE 0.1% CREAM] 80 g 0     Sig: apply topically twice a day if needed       Last Appointment Date: 7/28/2022  Next Appointment Date: 12/27/2022    No Known Allergies

## 2022-12-13 RX ORDER — TRIAMCINOLONE ACETONIDE 1 MG/G
CREAM TOPICAL
Qty: 80 G | Refills: 0 | Status: SHIPPED | OUTPATIENT
Start: 2022-12-13 | End: 2023-01-17

## 2022-12-13 RX ORDER — AMMONIUM LACTATE 12 G/100G
LOTION TOPICAL
Qty: 400 G | Refills: 0 | Status: SHIPPED | OUTPATIENT
Start: 2022-12-13

## 2022-12-14 ENCOUNTER — TELEPHONE (OUTPATIENT)
Dept: FAMILY MEDICINE CLINIC | Age: 62
End: 2022-12-14

## 2022-12-14 DIAGNOSIS — E53.8 VITAMIN B12 DEFICIENCY: Primary | ICD-10-CM

## 2022-12-14 NOTE — TELEPHONE ENCOUNTER
Pt called her ins no longer covers VIT B-12, is there another suggestion as to what to take or does she really needs to take this vitamin please call pt back to advise

## 2022-12-15 NOTE — TELEPHONE ENCOUNTER
I ordered a vitamin B12. She should have this done before her appointment on 12/27/2022. She has additional labs ordered to be done before her appointment also.

## 2022-12-16 DIAGNOSIS — G89.29 CHRONIC RECTAL PAIN: Primary | ICD-10-CM

## 2022-12-16 DIAGNOSIS — K62.89 CHRONIC RECTAL PAIN: Primary | ICD-10-CM

## 2022-12-16 RX ORDER — TRAMADOL HYDROCHLORIDE 50 MG/1
50 TABLET ORAL EVERY 8 HOURS PRN
Qty: 90 TABLET | Refills: 0 | Status: SHIPPED | OUTPATIENT
Start: 2022-12-16 | End: 2023-01-15

## 2022-12-16 NOTE — TELEPHONE ENCOUNTER
Dileep Forrest called requesting a refill of the below medication which has been pended for you:   [place] from [place], and [place] reviewed. Tramadol 50 mg last filled 11/21/22 #90/30 days  Requested Prescriptions     Pending Prescriptions Disp Refills    traMADol (ULTRAM) 50 MG tablet [Pharmacy Med Name: TRAMADOL HCL 50 MG TABLET] 90 tablet 0     Sig: Take 1 tablet by mouth every 8 hours as needed for Pain for up to 30 days.        Last Appointment Date: 7/28/2022  Next Appointment Date: 12/27/2022    No Known Allergies

## 2022-12-17 ENCOUNTER — HOSPITAL ENCOUNTER (OUTPATIENT)
Age: 62
Discharge: HOME | End: 2022-12-17
Payer: MEDICAID

## 2022-12-17 DIAGNOSIS — E11.40 TYPE 2 DIABETES MELLITUS WITH DIABETIC NEUROPATHY, WITHOUT LONG-TERM CURRENT USE OF INSULIN (HCC): ICD-10-CM

## 2022-12-17 DIAGNOSIS — E53.8 VITAMIN B12 DEFICIENCY: ICD-10-CM

## 2022-12-17 DIAGNOSIS — E78.2 MIXED HYPERLIPIDEMIA: ICD-10-CM

## 2022-12-17 LAB
ALBUMIN SERPL-MCNC: 4 G/DL (ref 3.5–5.2)
ALBUMIN/GLOBULIN RATIO: 1.2 (ref 1–2.5)
ALP BLD-CCNC: 99 U/L (ref 35–104)
ALT SERPL-CCNC: 13 U/L (ref 5–33)
ANION GAP SERPL CALCULATED.3IONS-SCNC: 9 MMOL/L (ref 9–17)
AST SERPL-CCNC: 38 U/L
BILIRUB SERPL-MCNC: 0.5 MG/DL (ref 0.3–1.2)
BUN BLDV-MCNC: 4 MG/DL (ref 8–23)
BUN/CREAT BLD: 5 (ref 9–20)
CALCIUM SERPL-MCNC: 9.1 MG/DL (ref 8.6–10.4)
CHLORIDE BLD-SCNC: 100 MMOL/L (ref 98–107)
CHOLESTEROL/HDL RATIO: 2.3
CHOLESTEROL: 149 MG/DL
CO2: 28 MMOL/L (ref 20–31)
CREAT SERPL-MCNC: 0.78 MG/DL (ref 0.5–0.9)
CREATININE URINE: 248.6 MG/DL (ref 28–217)
GFR SERPL CREATININE-BSD FRML MDRD: >60 ML/MIN/1.73M2
GLUCOSE BLD-MCNC: 165 MG/DL (ref 70–99)
HDLC SERPL-MCNC: 65 MG/DL
LDL CHOLESTEROL: 45 MG/DL (ref 0–130)
MICROALBUMIN/CREAT 24H UR: 21 MG/L
MICROALBUMIN/CREAT UR-RTO: 8 MCG/MG CREAT
POTASSIUM SERPL-SCNC: 4.5 MMOL/L (ref 3.7–5.3)
SODIUM BLD-SCNC: 137 MMOL/L (ref 135–144)
TOTAL PROTEIN: 7.3 G/DL (ref 6.4–8.3)
TRIGL SERPL-MCNC: 195 MG/DL
VITAMIN B-12: 929 PG/ML (ref 232–1245)

## 2022-12-17 PROCEDURE — 80061 LIPID PANEL: CPT

## 2022-12-17 PROCEDURE — 83036 HEMOGLOBIN GLYCOSYLATED A1C: CPT

## 2022-12-17 PROCEDURE — 36415 COLL VENOUS BLD VENIPUNCTURE: CPT

## 2022-12-17 PROCEDURE — 82043 UR ALBUMIN QUANTITATIVE: CPT

## 2022-12-17 PROCEDURE — 82570 ASSAY OF URINE CREATININE: CPT

## 2022-12-17 PROCEDURE — 82607 VITAMIN B-12: CPT

## 2022-12-17 PROCEDURE — 80053 COMPREHEN METABOLIC PANEL: CPT

## 2022-12-18 LAB
ESTIMATED AVERAGE GLUCOSE: 134 MG/DL
HBA1C MFR BLD: 6.3 % (ref 4–6)

## 2022-12-19 RX ORDER — TRIAMCINOLONE ACETONIDE 1 MG/G
CREAM TOPICAL
Qty: 80 G | Refills: 0 | OUTPATIENT
Start: 2022-12-19

## 2022-12-22 ENCOUNTER — IMMUNIZATION (OUTPATIENT)
Dept: LAB | Age: 62
End: 2022-12-22
Payer: MEDICAID

## 2022-12-22 ENCOUNTER — OFFICE VISIT (OUTPATIENT)
Dept: NEUROLOGY | Age: 62
End: 2022-12-22
Payer: MEDICAID

## 2022-12-22 VITALS
WEIGHT: 206 LBS | HEART RATE: 90 BPM | SYSTOLIC BLOOD PRESSURE: 138 MMHG | OXYGEN SATURATION: 96 % | BODY MASS INDEX: 37.68 KG/M2 | DIASTOLIC BLOOD PRESSURE: 82 MMHG | RESPIRATION RATE: 16 BRPM

## 2022-12-22 DIAGNOSIS — Z87.891 HISTORY OF TOBACCO USE: ICD-10-CM

## 2022-12-22 DIAGNOSIS — G47.9 SLEEPING DIFFICULTY: ICD-10-CM

## 2022-12-22 DIAGNOSIS — F10.20 ALCOHOLISM (HCC): ICD-10-CM

## 2022-12-22 DIAGNOSIS — G62.9 PERIPHERAL POLYNEUROPATHY: ICD-10-CM

## 2022-12-22 DIAGNOSIS — E66.01 MORBIDLY OBESE (HCC): ICD-10-CM

## 2022-12-22 DIAGNOSIS — F31.31 BIPOLAR AFFECTIVE DISORDER, CURRENTLY DEPRESSED, MILD (HCC): ICD-10-CM

## 2022-12-22 DIAGNOSIS — F34.1 DYSTHYMIA: ICD-10-CM

## 2022-12-22 DIAGNOSIS — M54.16 CHRONIC LUMBAR RADICULOPATHY: ICD-10-CM

## 2022-12-22 DIAGNOSIS — E53.8 VITAMIN B12 DEFICIENCY: ICD-10-CM

## 2022-12-22 DIAGNOSIS — G63 POLYNEUROPATHY ASSOCIATED WITH UNDERLYING DISEASE (HCC): Primary | ICD-10-CM

## 2022-12-22 DIAGNOSIS — Z72.0 TOBACCO ABUSE: ICD-10-CM

## 2022-12-22 DIAGNOSIS — F31.0 BIPOLAR AFFECTIVE DISORDER, CURRENT EPISODE HYPOMANIC (HCC): ICD-10-CM

## 2022-12-22 DIAGNOSIS — M79.2 NEUROPATHIC PAIN: ICD-10-CM

## 2022-12-22 DIAGNOSIS — R26.89 BALANCE PROBLEM: ICD-10-CM

## 2022-12-22 DIAGNOSIS — F25.9 SCHIZOAFFECTIVE DISORDER, UNSPECIFIED TYPE (HCC): ICD-10-CM

## 2022-12-22 DIAGNOSIS — G25.81 RESTLESS LEG SYNDROME: ICD-10-CM

## 2022-12-22 DIAGNOSIS — F41.9 ANXIETY: ICD-10-CM

## 2022-12-22 DIAGNOSIS — G56.03 BILATERAL CARPAL TUNNEL SYNDROME: ICD-10-CM

## 2022-12-22 DIAGNOSIS — E11.40 TYPE 2 DIABETES MELLITUS WITH DIABETIC NEUROPATHY, WITHOUT LONG-TERM CURRENT USE OF INSULIN (HCC): ICD-10-CM

## 2022-12-22 PROCEDURE — 90688 IIV4 VACCINE SPLT 0.5 ML IM: CPT | Performed by: FAMILY MEDICINE

## 2022-12-22 PROCEDURE — 99214 OFFICE O/P EST MOD 30 MIN: CPT | Performed by: PSYCHIATRY & NEUROLOGY

## 2022-12-22 RX ORDER — GABAPENTIN 300 MG/1
CAPSULE ORAL
Qty: 120 CAPSULE | Refills: 2 | Status: SHIPPED | OUTPATIENT
Start: 2022-12-22 | End: 2023-01-22

## 2022-12-22 RX ORDER — AMMONIUM LACTATE 12 G/100G
LOTION TOPICAL
Qty: 400 G | Refills: 3 | Status: SHIPPED | OUTPATIENT
Start: 2022-12-22

## 2022-12-22 RX ORDER — MULTIVITAMIN WITH FOLIC ACID 400 MCG
TABLET ORAL
Qty: 30 TABLET | Refills: 5 | Status: SHIPPED | OUTPATIENT
Start: 2022-12-22

## 2022-12-22 ASSESSMENT — ENCOUNTER SYMPTOMS
CHOKING: 0
WHEEZING: 0
EYE DISCHARGE: 0
ABDOMINAL DISTENTION: 0
ABDOMINAL PAIN: 0
BACK PAIN: 0
APNEA: 0
BLOOD IN STOOL: 0
EYE ITCHING: 0
SINUS PRESSURE: 0
VISUAL CHANGE: 0
VOICE CHANGE: 0
CONSTIPATION: 0
DIARRHEA: 0
NAUSEA: 0
CHEST TIGHTNESS: 0
SHORTNESS OF BREATH: 0
EYE PAIN: 0
VOMITING: 0
TROUBLE SWALLOWING: 0
EYE REDNESS: 0
COLOR CHANGE: 0
FACIAL SWELLING: 0
BOWEL INCONTINENCE: 0
PHOTOPHOBIA: 0

## 2022-12-22 ASSESSMENT — PATIENT HEALTH QUESTIONNAIRE - PHQ9
1. LITTLE INTEREST OR PLEASURE IN DOING THINGS: 0
SUM OF ALL RESPONSES TO PHQ QUESTIONS 1-9: 0
2. FEELING DOWN, DEPRESSED OR HOPELESS: 0
SUM OF ALL RESPONSES TO PHQ9 QUESTIONS 1 & 2: 0

## 2022-12-22 NOTE — PROGRESS NOTES
Subjective:        Patient ID: Britney Cota is a 65 y. o.right  handed female. Neurologic Problem  The patient's primary symptoms include clumsiness, focal sensory loss, focal weakness, a loss of balance, memory loss and weakness. The patient's pertinent negatives include no altered mental status, near-syncope, slurred speech, syncope or visual change. Primary symptoms comment: RESTLESS LEG  SYNDROME. This is a chronic problem. Episode onset: MORE   THAN    5-6 YEARS. The neurological problem developed insidiously. The problem is unchanged. There was left-sided, lower extremity and right-sided focality noted. Pertinent negatives include no abdominal pain, auditory change, aura, back pain, bladder incontinence, bowel incontinence, chest pain, confusion, diaphoresis, dizziness, fatigue, fever, headaches, light-headedness, nausea, neck pain, palpitations, shortness of breath, vertigo or vomiting. Past treatments include medication. The treatment provided moderate relief. Her past medical history is significant for mood changes. There is no history of a bleeding disorder, a clotting disorder, a CVA, dementia, head trauma, liver disease or seizures. History obtained from  The patient     and other  available medical records were  Also  reviewed.                 1)      H/O    CHRONIC    DIABETIC   PERIPHERAL  NEUROPATHY                                                      -  ON  NEURONTIN                          TOLERATING  THE  SAME                     WITH   SYMPTOMATIC      IMPROVEMENT                        2)     H/O    CHRONIC      RESTLESS  LEG  SYNDROME                 &  PERIODIC  LEG  MOVEMENTS                              -  IMPROVED  /   ON  SINEMET              3)    H/O    CHRONIC     SLEEP DIFFICULTIES                        -   RESOLVED              4)        H/O   CHRONIC   MILD  MEMORY PROBLEMS                     -    STABLE                   -  PATIENT  NOT  CONCERNED            5) H/O       CHRONIC  TOBACCO  AND  ALCOHOL  USAGE                     -   PATIENT  AWARE  OF THE  RISKS                      -   ON     NICOTINE  PATCHES                         AND   STOPPED  SMOKING   SINCE  JAN 2019                6)         CHRONIC  LUMBAR PAIN  AND JOINT  PAINS   -                           -    STABLE                       ON  ULTRAM  FROM  HER  PCP. 7)    H/O    BILATERAL  CARPAL  TUNNEL  SYNDROME                               -  STABLE            8)          CHRONIC  ANXIETY, DEPRESSION                         AND  SCHIZOAFFECTIVE  DISORDER                         --  STABLE                                -  ON   RISPERDAL                      BEING  FOLLOWED  BY  MENTAL  HEALTH  PROFESSIONALS              9)         MULTIPLE  CO  MORBID  MEDICAL CONDITIONS                    BEING  FOLLOWED  BY   HER  PCP. 10)      PATIENT  DENIES    ANY  SEIZURE  ACTIVITY  OR   MIGRAINES . 11)              NEUROPATHIC  SYMPTOMS  BOTH  FEET                          -   MOSTLY   DUE   TO    HER  TYPE  II    AND                                       CHRONIC     ALCOHOL  ABUSE/  USAGE                                    PATIENT   ON  NEURONTIN        AND                               ULTRAM  FROM     HER PCP     FOR    SYMPTOMATIC   RELIEF                         -    PATIENT    ADVISED                                     A)     TO   AVOID    ALCOHOL                                    B)   CLOSE    FOLLOW       WITH   HER    PCP                                    C)     ADEQUATE   DIABETIC  CONTROL                                       12)       H/O    WORSENING  OF  RESTLESS  LEG  SYNDROME   IN     SEPT. 2020                           -   IMPROVED      WITH     SINEMET    CR    TID     SINCE  OCT.    2020                               PATIENT  REPORTED      THAT  SHE  IS  SLEEPING  BETTER                13)       PREVIOUS      H/O      RIGHT  FOOT SURGERY                           ADVISED     TO    FOLLOW  WITH     PODIATRY                   14)         PATIENT   DENIES   ANY  NEW  NEUROLOGICAL     PROBLEMS                              REQUESTED    REFILLS  FOR    HER  MEDICATIONS                  15)       VARIOUS  RISK   FACTORS   WERE  REVIEWED   AND   DISCUSSED. PATIENT   HAS  MULTIPLE   MEDICAL,  NEUROLOGICAL                               AND   MENTAL  HEALTH  PROBLEMS . PATIENT'S   MANAGEMENT  IS  CHALLENGING. The  Duration,  Quality,  Severity,  Location,  Timing,  Context,  Modifying  Factors   Of   The   Chief   Complaint       And  Present  Illness   Was   Reviewed   In   Chronological   [facility].              Patient   Indicates   The  Presence   And  The  Absence  Of  The  Following  Associated  And       Additional  Neurological    Symptoms:                                Balance  And coordination problems  present           Gait problems     present            Headaches      absent              Migraines           absent           Memory problems        Present             Confusion        absent            Paresthesia numbness          present           Seizures  And  Starring  Episodes           absent           Syncope,  Near  syncopal episodes         absent           Speech problems           absent             Swallowing  Problems      absent            Dizziness,  Light headedness           present                        Vertigo        absent             Generalized   Weakness    absent              focal  Weakness     absent             Tremors         absent              Sleep  Problems     present             History  Of   Recent   Head  Injury     absent             History  Of   Recent  TIA     absent             History  Of   Recent    Stroke     absent             Neck  Pain and  Neck muscle  Spasms  Present               Radiating  down   And   Weakness absent            Lower back   Pain  And     Spasms  Present              Radiating    Down   And   Weakness          absent                H/O   FALLS        absent               History  Of   Visual  Symptoms    Absent                  Associated   Diplopia       absent                                                              Also   Additional   Symptoms   Present    As  Documented    In   The detailed      Review  Of  Systems   And    Please   Refer   To    Them for   Additional  Information.       RECORDS   REVIEWED:    historical medical records, lab reports, office notes and radiology reports         INFORMATION   REVIEWED:     MEDICAL   HISTORY,     SURGICAL   HISTORY,   MEDICATIONS   LIST,   ALLERGIES AND  DRUG  INTOLERANCES,     FAMILY   HISTORY,  SOCIAL  HISTORY,    PROBLEM  LIST   FOR  PATIENT  CARE   COORDINATION         Past Medical History:   Diagnosis Date    Alcoholism ([facility]     Anxiety     Astigmatism with presbyopia     Balance problem     Bipolar disorder ([facility]     schizoaffective disorder (Dr. Alba Vazquez)    Chronic lumbar radiculopathy     Chronic rectal pain     due to fissures    Chronic shoulder pain     bilaterally, due to osteoarthritis    CTS (carpal tunnel syndrome)     Depression     Fatigue     Hyperlipidemia     Hypertension     Obesity     Peripheral neuropathy     Pica     eats bar soap    Restless leg syndrome     Schizoaffective disorder ([facility]     Sleeping difficulty     Tobacco abuse     Type 2 diabetes mellitus ([facility]                   Past Surgical History:   Procedure Laterality Date    ABSCESS DRAINAGE  2001    perianal    ANUS SURGERY  2001    lateral internal sphincterotomy    BLADDER SUSPENSION  2000    transvaginal sling procedure for incontinence    COLONOSCOPY  2007    polypectomy x 1 (pathology: hyperplastic polyp), repair of perianal fistula    CYST REMOVAL Right 1992    wrist    OTHER SURGICAL HISTORY  2002    perianal fistula repair eua    OTHER SURGICAL HISTORY 1993    cystourethroscopy    UPPER GASTROINTESTINAL ENDOSCOPY  2007    Schatzki's ring, acute gastritis, hiatal hernia    UPPER GASTROINTESTINAL ENDOSCOPY  2000    hiatal hernia, esophagitis, gastritis                 Current Outpatient Medications   Medication Sig Dispense Refill    traMADol (ULTRAM) 50 MG tablet Take 1 tablet by mouth every 8 hours as needed for Pain for up to 30 days. 90 tablet 0    nicotine (NICODERM CQ) 7 MG/24HR apply 1 patch ONTO THE SKIN every 24 hours 28 patch 0    triamcinolone (KENALOG) 0.1 % cream apply topically twice a day if needed 80 g 0    ammonium lactate (LAC-HYDRIN) 12 % lotion Apply topically as needed.  400 g 0    gabapentin (NEURONTIN) 300 MG capsule take 1 capsule by mouth four times a day 120 capsule 0    diphenhydrAMINE (BANOPHEN) 25 MG capsule take 1 capsule by mouth twice a day if needed for itching and allergies 60 capsule 0    aspirin 325 MG EC tablet take 1 tablet by mouth once daily 30 tablet 12    magnesium oxide (MAG-OX) 400 (240 Mg) MG tablet take 1 tablet by mouth once daily 30 tablet 1    losartan (COZAAR) 100 MG tablet take 1 tablet by mouth once daily 90 tablet 0    omeprazole (PRILOSEC) 20 MG delayed release capsule take 1 capsule by mouth once daily 90 capsule 0    carbidopa-levodopa (SINEMET)  MG per tablet Taking 3  tablets at bedtime 180 tablet 1    Multiple Vitamin (MULTIVITAMIN) tablet take 1 tablet by mouth once daily 30 tablet 5    diclofenac sodium (VOLTAREN) 1 % GEL apply 4 grams topically IN THE MORNING and AT NOON AND IN THE EVENING AND BEFORE BEDTIME      magnesium oxide (MAG-OX) 400 MG tablet take 1 tablet by mouth once daily 90 tablet 1    pravastatin (PRAVACHOL) 40 MG tablet take 1 tablet by mouth once daily 90 tablet 1    UNABLE TO FIND Shower chair with back 1 each 0    ONETOUCH ULTRA strip use 1 TEST STRIP to TEST BLOOD SUGAR three times a day 300 strip 1    risperiDONE (RISPERDAL) 1 MG tablet Take 1 tablet by mouth nightly 60 tablet 2 pimecrolimus (ELIDEL) 1 % cream Apply topically to rash on face 2 times daily. (Patient taking differently: as needed Apply topically to rash on face 2 times daily.) 30 g 11    ketoconazole (NIZORAL) 2 % shampoo APPLY TO AFFECTED AREA ON SCALP 3 TO 4 TIMES A WEEK LEAVE ON FOR 5 MINUTES THEN WASH  mL 11    Blood Glucose Monitoring Suppl (ONE TOUCH ULTRA 2) w/Device KIT use as directed 1 kit 0    ONETOUCH DELICA LANCETS FINE MISC TEST three times a day 100 each 5    Cyanocobalamin ER (RA VITAMIN B-12 TR) 1000 MCG TBCR take 1 tablet by mouth once daily (Patient not taking: Reported on 2022) 30 tablet 5    meloxicam (MOBIC) 15 MG tablet  (Patient not taking: Reported on 2022)      hydrOXYzine (VISTARIL) 25 MG capsule Take 25 mg by mouth nightly (Patient not taking: Reported on 2022)      naloxone 4 MG/0.1ML LIQD nasal spray 1 spray by Nasal route as needed for Opioid Reversal (Patient not taking: Reported on 2022) 1 each 5     No current facility-administered medications for this visit. Allergies   Allergen Reactions    Lactose Intolerance (Gi) Diarrhea     Lactose Intolerant.                    Family History   Problem Relation Age of Onset    Stroke Mother     Diabetes Mother     High Blood Pressure Mother     Cataracts Mother     Stroke Father     Diabetes Father     Heart Disease Sister 72    Diabetes Daughter     Stroke Sister 68        Brain aneurysm that ruptured and then she got kidney failure and intestinal infection and     Glaucoma Neg Hx              Social History     Socioeconomic History    Marital status: Single     Spouse name: Not on file    Number of children: Not on file    Years of education: Not on file    Highest education level: Not on file   Occupational History    Not on file   Tobacco Use    Smoking status: Former     Packs/day: 1.00     Years: 26.00     Pack years: 26.00     Types: Cigarettes     Start date: 1975     Quit date: 2018 Years since quittin.0    Smokeless tobacco: Never   Substance and Sexual Activity    Alcohol use:  Yes     Alcohol/week: 0.0 standard drinks     Comment: daily; \"2 tallboys per day\"    Drug use: No    Sexual activity: Not on file   Other Topics Concern    Not on file   Social History Narrative    Not on file     Social Determinants of Health     Financial Resource Strain: Unknown    Difficulty of Paying Living Expenses: Patient refused   Food Insecurity: Unknown    Worried About Running Out of Food in the Last Year: Patient refused    Ran Out of Food in the Last Year: Patient refused   Transportation Needs: Not on file   Physical Activity: Not on file   Stress: Not on file   Social Connections: Not on file   Intimate Partner Violence: Not on file   Housing Stability: Not on file         Vitals:    22 1447   BP: 138/82   Pulse: 90   Resp: 16   SpO2: 96%         Wt Readings from Last 3 Encounters:   22 206 lb (93.4 kg)   22 215 lb (97.5 kg)   22 222 lb (100.7 kg)         BP Readings from Last 3 Encounters:   22 138/82   22 122/74   22 122/74       Hematology and Coagulation  Lab Results   Component Value Date/Time    WBC 7.0 2021 09:14 AM    RBC 4.38 2021 09:14 AM    HGB 13.2 2021 09:14 AM    HCT 40.2 2021 09:14 AM    MCV 91.8 2021 09:14 AM    MCH 30.1 2021 09:14 AM    MCHC 32.8 2021 09:14 AM    RDW 12.1 2021 09:14 AM     2021 09:14 AM    MPV 9.8 2021 09:14 AM       Chemistries  Lab Results   Component Value Date/Time     2022 09:05 AM    K 4.5 2022 09:05 AM     2022 09:05 AM    CO2 28 2022 09:05 AM    BUN 4 2022 09:05 AM    CREATININE 0.78 2022 09:05 AM    CALCIUM 9.1 2022 09:05 AM    PROT 7.3 2022 09:05 AM    LABALBU 4.0 2022 09:05 AM    BILITOT 0.5 2022 09:05 AM    ALKPHOS 99 2022 09:05 AM    AST 38 2022 09:05 AM ALT 13 12/17/2022 09:05 AM     Lab Results   Component Value Date/Time    ALKPHOS 99 12/17/2022 09:05 AM    ALT 13 12/17/2022 09:05 AM    AST 38 12/17/2022 09:05 AM    PROT 7.3 12/17/2022 09:05 AM    BILITOT 0.5 12/17/2022 09:05 AM    LABALBU 4.0 12/17/2022 09:05 AM     Lab Results   Component Value Date/Time    BUN 4 12/17/2022 09:05 AM    CREATININE 0.78 12/17/2022 09:05 AM     Lab Results   Component Value Date/Time    CALCIUM 9.1 12/17/2022 09:05 AM    MG 1.8 07/27/2022 08:58 AM     Lab Results   Component Value Date/Time    AST 38 12/17/2022 09:05 AM    ALT 13 12/17/2022 09:05 AM       Lab Results   Component Value Date/Time    [id number] 12/17/2022 09:05 AM             Review of Systems   Constitutional:  Negative for appetite change, diaphoresis, fatigue, fever and unexpected weight change. HENT:  Negative for dental problem, drooling, ear discharge, ear pain, facial swelling, hearing loss, mouth sores, nosebleeds, postnasal drip, sinus pressure, tinnitus, trouble swallowing and voice change. Eyes:  Negative for photophobia, pain, discharge, redness, itching and visual disturbance. Respiratory:  Negative for apnea, choking, chest tightness, shortness of breath and wheezing. Cardiovascular:  Negative for chest pain, palpitations, leg swelling and near-syncope. Gastrointestinal:  Negative for abdominal distention, abdominal pain, blood in stool, bowel incontinence, constipation, diarrhea, nausea and vomiting. Endocrine: Negative for cold intolerance, heat intolerance, polydipsia, polyphagia and polyuria. Genitourinary:  Negative for bladder incontinence. Musculoskeletal:  Positive for gait problem. Negative for back pain, neck pain and neck stiffness. Skin:  Negative for color change, pallor and wound. Allergic/Immunologic: Negative for environmental allergies, food allergies and immunocompromised state. Neurological:  Positive for focal weakness, weakness and loss of balance. Negative for dizziness, vertigo, tremors, syncope, facial asymmetry, speech difficulty, light-headedness and headaches. Hematological:  Negative for adenopathy. Does not bruise/bleed easily. Psychiatric/Behavioral:  Positive for decreased concentration and memory loss. Negative for agitation, behavioral problems, confusion, dysphoric mood, hallucinations, self-injury, sleep disturbance and suicidal ideas. The patient is nervous/anxious. The patient is not hyperactive. Objective:   Physical Exam  Constitutional:       Appearance: She is well-developed. HENT:      Head: Normocephalic and atraumatic. No raccoon eyes or Covington's sign. Right Ear: External ear normal.      Left Ear: External ear normal.      Nose: Nose normal.   Eyes:      Conjunctiva/sclera: Conjunctivae normal.      Pupils: Pupils are equal, round, and reactive to light. Neck:      Thyroid: No thyroid mass or thyromegaly. Vascular: No carotid bruit. Trachea: No tracheal deviation. Meningeal: Brudzinski's sign and Kernig's sign absent. Cardiovascular:      Rate and Rhythm: Normal rate and regular rhythm. Pulmonary:      Effort: Pulmonary effort is normal. No respiratory distress. Breath sounds: Normal breath sounds. No wheezing or rales. Musculoskeletal:         General: No tenderness. Normal range of motion. Cervical back: Normal range of motion and neck supple. No rigidity. No muscular tenderness. Normal range of motion. Lymphadenopathy:      Cervical: No cervical adenopathy. Skin:     General: Skin is warm. Coloration: Skin is not pale. Findings: No erythema or rash. Nails: There is no clubbing. Psychiatric:         Attention and Perception: She is attentive. Mood and Affect: Mood is anxious and depressed. Affect is blunt. Affect is not labile or inappropriate. Speech: She is communicative.  Speech is not rapid and pressured, delayed, slurred or tangential. Behavior: Behavior is not agitated, slowed, aggressive, withdrawn, hyperactive or combative. Behavior is cooperative. Thought Content: Thought content normal. Thought content is not paranoid or delusional. Thought content does not include homicidal or suicidal ideation. Thought content does not include homicidal or suicidal plan. Cognition and Memory: Cognition is impaired. Memory is impaired. She exhibits impaired recent memory. She does not exhibit impaired remote memory. Judgment: Judgment is not impulsive or inappropriate. NEUROLOGICAL EXAMINATION :    A) MENTAL STATUS:                   Alert and  oriented  To time, place  And  Person. No Aphasia. No  Dysarthria. Able   To  Follow   SIMPLE    commands without   Any  Difficulty. No right  To left confusion. Normal  Speech  And language function. Insight and  Judgment ,Fund  Of  Knowledge   Decreased                Recent  And  Remote memory  Decreased                Attention &Concentration are decreased                                                   B) CRANIAL NERVES :             2 CN : Visual  Acuity  And  Visual fields  within normal limits                        Fundi  Could  Not  Be  Could  Not  Be  Evaluated. 3,4,6 CN : Both  Pupils are   Reactive and  Equal.                            Extraocular   Movements  Are  Intact. No  Nystagmus. No  RODERICK. No  Afferent  Pupillary  Defect noted. 5 CN :  Normal  Facial sensations and Corneal  Reflexes           7 CN :  Normal  Facial  Symmetry  And  Strength. No facial  Weakness.            8 CN :  Hearing  Appears within normal limits          9, 10 CN: Normal spontaneous, reflex palate movements         11 CN:   Normal  Shoulder shrug and  Strength         12 CN :   Normal  Tongue movements and  Tongue  In midline                        No tongue   Fasciculations or atrophy         C) MOTOR  EXAM:                 Strength  In upper  And  Lower extremities   within normal limits                     Rapid alternating  And  repetitions  Movements  within normal limits                 Muscle  Tone  In upper  And  Lower  Extremities  within normal limits                No rigidity. No  Spasticity. Bradykinesia   absent                  No  Asterixis. Sustention  Tremor , Resting  Tremor   absent                    No other  Abnormal  Movements noted             D) SENSORY :               light touch, pinprick, position  And  Vibration  decreased          E) REFLEXES:                   Deep  Tendon  Reflexes decreased                    No pathological  Reflexes  Bilaterally. F) COORDINATION  AND  GAIT :                                Station and  Gait   SLOW                                    Romberg's test positive                          Ataxia negative            Assessment:             Patient Active Problem List   Diagnosis    Anxiety    Depression    Schizoaffective disorder ([facility]    History of tobacco use    Restless leg syndrome    Pica    Essential hypertension    Bipolar disorder ([facility]    Alcoholism ([facility])    Sleeping difficulty    Peripheral neuropathy    Balance problem    CTS (carpal tunnel syndrome)    Chronic lumbar radiculopathy    GERD (gastroesophageal reflux disease)    COPD (chronic obstructive pulmonary disease) ([facility])    Fatigue    Chronic rectal pain    Type 2 diabetes mellitus with diabetic neuropathy, without long-term current use of insulin ([facility])    Pruritus    Obesity (BMI 30.0-34. 9)    Mixed hyperlipidemia    Chronic right shoulder pain    Bilateral carpal tunnel syndrome    Current severe episode of major depressive disorder without psychotic features ([facility])    Splenic artery aneurysm ([facility])    Bipolar affective disorder, currently depressed, mild ([address]    Morbidly obese ([facility])    Tobacco abuse    Vitamin B12 deficiency    Polyneuropathy associated with underlying disease ([facility]               Plan:            VISITING DIAGNOSIS:      ICD-10-CM    1. Polyneuropathy associated with underlying disease ([facility]  G63       2. Chronic lumbar radiculopathy  M54.16       3. Bilateral carpal tunnel syndrome  G56.03       4. Type 2 diabetes mellitus with diabetic neuropathy, without long-term current use of insulin ([facility])  E11.40       5. Vitamin B12 deficiency  E53.8       6. Bipolar affective disorder, currently depressed, mild ([facility]  F31.31       7. Morbidly obese ([facility])  E66.01       8. Tobacco abuse  Z72.0       9. Anxiety  F41.9       10. Restless leg syndrome  G25.81       11. Alcoholism ([facility]  F10.20       12. Schizoaffective disorder, unspecified type ([facility]  F25.9       13. History of tobacco use  Z87.891       14. Bipolar affective disorder, current episode hypomanic ([facility]  F31.0       15. Peripheral polyneuropathy  G62.9       16. Sleeping difficulty  G47.9       17. Dysthymia  F34.1       18. Balance problem  R26.89       19. Neuropathic pain  M79.2                      CONCERNS   &   INCREASED   RISK   FOR         * TIA,  CEREBRO  VASCULAR  ISCHEMIA, STROKE      *   COGNITIVE  &   MEMORY PROBLEMS  AND  DEMENTIA      *  MONONEUROPATHIES, RADICULOPATHY       *   PERIPHERAL  NEUROPATHY                      VARIOUS  RISK   FACTORS   WERE  REVIEWED   AND   DISCUSSED. *  PATIENT   HAS  MULTIPLE   MEDICAL,  NEUROLOGICAL      AND   MENTAL  HEALTH  PROBLEMS . PATIENT'S   MANAGEMENT  IS  CHALLENGING. PLAN:         Collin Ambrocio  Of  The  Diagnoses,  The  Management & Treatment  Options           AND    Care  plan  Were        Reviewed and   Discussed   With  patient. * Goals  And  Expectations  Of  The  Therapy  Discussed   And  Reviewed.        *   Benefits   And   Side  Effect  Profile  Of  Medication/s   Were   Discussed             * Need   For  Further Follow up For  The  Various  Problems  Were discussed. * Results  Of  The  Previous  Diagnostic tests were reviewed and questions answered. patient  understand the same. Medical  Decision  Making  Was  Made  Based on the   Complexity  Of  Above  Mentioned  Diagnoses,        Data reviewed   & diagnostic  Tests  Reviewed,  Risk  Of  Significant   Co morbidities and complicating   Factors. Medical  Decision  Was   High  Complexity  Due   To  The  Patient's  Multiple  Symptoms,      Advancing   Disease,  Complex  Treatment  Regimen,  Multiple medications and   Risk  Of   Side  Effects,      Difficulty  In  Medication  Management  And  Diagnostic  Challenges       In  View  Of  The  Associated   Co  Morbid  Conditions   And  Problems. * FALL   PRECAUTIONS. THESE  REVIEWED   AND  DISCUSSED      *   BE  CAREFUL  WITH  ACTIVITIES   INCLUDING  DRIVING. *   AVOID   NECK  AND/ BACK  STRAINING  ACTIVITIES          *   TO   WEAR   BILATERAL   WRIST  BRACES       *   TO  AVOID  PRESSURE  OVER   ULNAR  ASPECT   OF   ELBOWS          *   ADEQUATE   FLUID  INTAKE   AND  ELECTROLYTE  BALANCE           * KEEP  DAIRY  OF   THE  NEUROLOGICAL  SYMPTOMS        RECORDING THE    DURATION  AND  FREQUENCY. *  AVOID    CONDITIONS  AND  FACTORS   THAT  MAKE   NEUROLOGICAL  SYMPTOMS  WORSE. *  TO  MAINTAIN  REGULAR  SLEEP  WAKE  CYCLES. *   TO  HAVE  ADEQUATE  REST  AND   SLEEP    HOURS.          *    TO   AVOID   TO  SLEEP  IN   SUPINE  POSITION. *      WEIGHT   LOSS. *    AVOID  ANY USAGE OF                   TOBACCO,  EXCESSIVE  ALCOHOL  AND   ILLEGAL   SUBSTANCES          *  CONTINUE MEDICATIONS PRESCRIBED      AS    RECOMMENDED       *   Compliance   With  Medications   And  Instructions        * CURRENTLY  TOLERATING  THE  PRESCRIBED   MEDICATIONS. WITHOUT  ANY  SIGNIFICANT  SIDE  EFFECTS   &  GETTING BENEFIT.           * May   Use  Pill  Box,    If  Needed          *        Antiplatelet  therapy    As   Recommended  Was   Discussed        *    Prophylactic  Use   Of     Vitamin   B   Complex,  Folic  Acid,    Vitamin  B12        Multivitamin   Tablet  Daily    Supplementations   Over  The  Counter  Discussed               *  FOOT  CARE, DAILY  INSPECTION  OF  FEET   AND         PERIODIC  PODIATRY EVALUATIONS . *  PATIENT  IS  ALSO   COUNSELED   TO  KEEP    ACTIVITIES         A)   SIMPLE      B)  ORGANIZED      C)  WRITE   DOWN                                *      NEUROPATHIC  SYMPTOMS  BOTH  FEET     SINCE    FEB. 2020                        -   MOSTLY   DUE   TO    HER  TYPE  II    AND                                 ALCOHOL  ABUSE/  USAGE                          *        PATIENT    ON  NEURONTIN        AND                                ULTRAM  FROM     HER PCP     FOR    SYMPTOMATIC   RELIEF                           -    PATIENT    ADVISED                                     A)     TO   AVOID    ALCOHOL                                    B)    ADEQUATE   DIABETIC  CONTROL                           Controlled Substances Monitoring: Periodic Controlled Substance Monitoring: Possible medication side effects, risk of tolerance/dependence & alternative treatments discussed. , Assessed functional status. Bette Restrepo MD)              Orders Placed This Encounter   Medications    gabapentin (NEURONTIN) 300 MG capsule     Sig: take 1 capsule by mouth four times a day     Dispense:  120 capsule     Refill:  2     Diagnosis:  Polyneuropathy associated with underlying disease ([facility] [address] Natali Jacobson. Do not fill AFTER 12/10/2022. ammonium lactate (LAC-HYDRIN) 12 % lotion     Sig: Apply topically as needed.      Dispense:  400 g     Refill:  3    carbidopa-levodopa (SINEMET)  MG per tablet     Sig: Taking 3  tablets at bedtime     Dispense:  180 tablet     Refill:  1    Multiple Vitamin (MULTIVITAMIN) tablet     Sig: take 1 tablet by mouth once daily     Dispense:  30 tablet     Refill:  5               *PATIENT   TO  FOLLOW  UP  WITH   PRIMARY  CARE   AND   OTHER  CONSULTANTS  AS  BEFORE.               *TO  FOLLOW  WITH   MENTAL  HEALTH  PROFESSIONALS ,  INCLUDING            PSYCHOLOGICAL  COUNSELING   AND  PSYCHIATRIC  EVALUTIONS              *  Maintain   Healthy  Life Style    With   Periodic  Monitoring  Of      Any  Medical  Conditions  Including   Elevated  Blood  Pressure,  Lipid  Profile,     Blood  Sugar levels  And   Heart  Disease. *   Period   Screening  For  Cancers  Involving  Breast,  Colon,     lungs  And  Other  Organs  As  Applicable,  In consultation   With  Your  Primary Care Providers. * Second  Neurological  Opinion  And  Evaluations  In  [facility]  Setting  If  Patient  Is  Interested. *  If  The  Patient remains  Neurologically  Stable   Return   To  [facility] Neurology Department       IN   3    MONTHS  TIME   FOR  FURTHER  FOLLOW UP.                   *  If   There is  Any  Significant  Worsening   Of  Current  Symptoms  And  Or  If patient  Develops   Any additional  New      Neurological  Symptoms  Or  Significant  Concerns   Should  Call  911 or  Go  To  Emergency  Department  For  Further      Immediate  Evaluation. *   The  Neurological   Findings,  Possible  Diagnosis,  Differential diagnoses   And  Options      For    Further   Investigations   And  management   Are  Discussed  Comprehensively. Medications   And  Prescription   Risks  And  Side effects  Are   Also  Discussed. The  Above  Were  Reviewed  With  patient and     questions  Answered  In  Detail. More   Than   50% of face  To face Time   Was  Spent  On  Counseling   And   Coordination  Of  Care       Of   Patient's multiple   Neurological  Problems   And   Comorbid  Medical   Conditions.             Electronically signed by Saud Vargas MD.,  [facility]       Board Certified in  Neurology &  In  [facility] [employer] ([employer])      DISCLAIMER:   Although every effort was made to ensure the accuracy of this  electronic transcription, some errors in transcription may have occurred. GENERAL PATIENT INSTRUCTIONS:     A Healthy Lifestyle: Care Instructions  Your Care Instructions  A healthy lifestyle can help you feel good, stay at a healthy weight, and have plenty of energy for both work and play. A healthy lifestyle is something you can share with your whole family. A healthy lifestyle also can lower your risk for serious health problems, such as high blood pressure, heart disease, and diabetes. You can follow a few steps listed below to improve your health and the health of your family. Follow-up care is a key part of your treatment and safety. Be sure to make and go to all appointments, and call your doctor if you are having problems. Its also a good idea to know your test results and keep a list of the medicines you take. How can you care for yourself at home? Do not eat too much sugar, fat, or fast foods. You can still have dessert and treats now and then. The goal is moderation. Start small to improve your eating habits. Pay attention to portion sizes, drink less juice and soda pop, and eat more fruits and vegetables. Eat a healthy amount of food. A 3-ounce serving of meat, for example, is about the size of a deck of cards. Fill the rest of your plate with vegetables and whole grains. Limit the amount of soda and sports drinks you have every day. Drink more water when you are thirsty. Eat at least 5 servings of fruits and vegetables every day. It may seem like a lot, but it is not hard to reach this goal. A serving or helping is 1 piece of fruit, 1 cup of vegetables, or 2 cups of leafy, raw vegetables.  Have an apple or some carrot sticks as an afternoon snack instead of a candy bar. Try to have fruits and/or vegetables at every meal.  Make exercise part of your daily routine. You may want to start with simple activities, such as walking, bicycling, or slow swimming. Try to be active 30 to 60 minutes every day. You do not need to do all 30 to 60 minutes all at once. For example, you can exercise 3 times a day for 10 or 20 minutes. Moderate exercise is safe for most people, but it is always a good idea to talk to your doctor before starting an exercise program.  Keep moving. Zia Shukla the lawn, work in the garden, or [employer]. Take the stairs instead of the elevator at work. If you smoke, quit. People who smoke have an increased risk for heart attack, stroke, cancer, and other lung illnesses. Quitting is hard, but there are ways to boost your chance of quitting tobacco for good. Use nicotine gum, patches, or lozenges. Ask your doctor about stop-smoking programs and medicines. Keep trying. In addition to reducing your risk of diseases in the future, you will notice some benefits soon after you stop using tobacco. If you have shortness of breath or asthma symptoms, they will likely get better within a few weeks after you quit. Limit how much alcohol you drink. Moderate amounts of alcohol (up to 2 drinks a day for men, 1 drink a day for women) are okay. But drinking too much can lead to liver problems, high blood pressure, and other health problems. Family health  If you have a family, there are many things you can do together to improve your health. Eat meals together as a family as often as possible. Eat healthy foods. This includes fruits, vegetables, lean meats and dairy, and whole grains. Include your family in your fitness plan. Most people think of activities such as jogging or tennis as the way to fitness, but there are many ways you and your family can be more active. Anything that makes you breathe hard and gets your heart pumping is exercise.  Here are some tips:  Walk to do errands or to take your child to school or the bus. Go for a family bike ride after dinner instead of watching TV. Where can you learn more? Go to https://Mytopiakazeb.healthSnappy [employer]. org and sign in to your [employer]Coin account. Enter Z854 in the [facility] box to learn more about \"A Healthy Lifestyle: Care Instructions. \"     If you do not have an account, please click on the \"Sign Up Now\" link. Current as of: July 26, 2016  Content Version: 11.2  © 5866-0983 [employer]. Care instructions adapted under license by [facility] ([facility]). If you have questions about a medical condition or this instruction, always ask your healthcare professional. [address] any warranty or liability for your use of this information.

## 2022-12-27 ENCOUNTER — TELEPHONE (OUTPATIENT)
Dept: FAMILY MEDICINE CLINIC | Age: 62
End: 2022-12-27

## 2022-12-27 NOTE — TELEPHONE ENCOUNTER
----- Message from [facility] sent at 12/27/2022  1:18 PM EST -----  Subject: Message to Provider    QUESTIONS  Information for Provider? patient had to cancel appt. , she is requesting a   call to go over blood work done on 12/17/22.  ---------------------------------------------------------------------------  --------------  [id number]  [phone]; OK to leave message on voicemail  ---------------------------------------------------------------------------  --------------  SCRIPT ANSWERS  Relationship to Patient?  Self

## 2022-12-27 NOTE — TELEPHONE ENCOUNTER
She cancelled the appointment scheduled for today. She now has an appointment scheduled 2/1/2023. Her last office visit with me was 7/28/2022. Tramadol was refilled on 12/16/2022. Please have her schedule an appointment sooner than 2/1 if she will need a refill before that appointment. Thank you.

## 2023-01-09 DIAGNOSIS — F17.210 CIGARETTE NICOTINE DEPENDENCE WITHOUT COMPLICATION: ICD-10-CM

## 2023-01-09 DIAGNOSIS — L29.9 PRURITUS: ICD-10-CM

## 2023-01-09 RX ORDER — TRIAMCINOLONE ACETONIDE 1 MG/G
CREAM TOPICAL
Qty: 80 G | Refills: 0 | OUTPATIENT
Start: 2023-01-09

## 2023-01-09 RX ORDER — LANOLIN ALCOHOL/MO/W.PET/CERES
CREAM (GRAM) TOPICAL
Qty: 30 TABLET | Refills: 0 | OUTPATIENT
Start: 2023-01-09

## 2023-01-09 RX ORDER — DIPHENHYDRAMINE HCL 25 MG
CAPSULE ORAL
Qty: 60 CAPSULE | Refills: 0 | OUTPATIENT
Start: 2023-01-09

## 2023-01-12 ENCOUNTER — OFFICE VISIT (OUTPATIENT)
Dept: FAMILY MEDICINE CLINIC | Age: 63
End: 2023-01-12
Payer: MEDICAID

## 2023-01-12 VITALS
DIASTOLIC BLOOD PRESSURE: 80 MMHG | WEIGHT: 205 LBS | SYSTOLIC BLOOD PRESSURE: 122 MMHG | OXYGEN SATURATION: 95 % | HEART RATE: 91 BPM | HEIGHT: 62 IN | BODY MASS INDEX: 37.73 KG/M2

## 2023-01-12 DIAGNOSIS — L29.9 PRURITUS: ICD-10-CM

## 2023-01-12 DIAGNOSIS — G89.29 CHRONIC RECTAL PAIN: ICD-10-CM

## 2023-01-12 DIAGNOSIS — F25.9 SCHIZOAFFECTIVE DISORDER, UNSPECIFIED TYPE (HCC): ICD-10-CM

## 2023-01-12 DIAGNOSIS — I10 ESSENTIAL HYPERTENSION: ICD-10-CM

## 2023-01-12 DIAGNOSIS — K21.9 GASTROESOPHAGEAL REFLUX DISEASE, UNSPECIFIED WHETHER ESOPHAGITIS PRESENT: ICD-10-CM

## 2023-01-12 DIAGNOSIS — E83.42 HYPOMAGNESEMIA: ICD-10-CM

## 2023-01-12 DIAGNOSIS — Z87.891 PERSONAL HISTORY OF TOBACCO USE: ICD-10-CM

## 2023-01-12 DIAGNOSIS — Z23 NEED FOR TETANUS, DIPHTHERIA, AND ACELLULAR PERTUSSIS (TDAP) VACCINE: ICD-10-CM

## 2023-01-12 DIAGNOSIS — G63 POLYNEUROPATHY ASSOCIATED WITH UNDERLYING DISEASE (HCC): ICD-10-CM

## 2023-01-12 DIAGNOSIS — F17.210 CIGARETTE NICOTINE DEPENDENCE WITHOUT COMPLICATION: ICD-10-CM

## 2023-01-12 DIAGNOSIS — Z12.11 SCREENING FOR COLON CANCER: ICD-10-CM

## 2023-01-12 DIAGNOSIS — J44.9 CHRONIC OBSTRUCTIVE PULMONARY DISEASE, UNSPECIFIED COPD TYPE (HCC): ICD-10-CM

## 2023-01-12 DIAGNOSIS — E66.9 OBESITY (BMI 30-39.9): ICD-10-CM

## 2023-01-12 DIAGNOSIS — E78.2 MIXED HYPERLIPIDEMIA: ICD-10-CM

## 2023-01-12 DIAGNOSIS — K62.89 CHRONIC RECTAL PAIN: ICD-10-CM

## 2023-01-12 DIAGNOSIS — E11.40 TYPE 2 DIABETES MELLITUS WITH DIABETIC NEUROPATHY, WITHOUT LONG-TERM CURRENT USE OF INSULIN (HCC): Primary | ICD-10-CM

## 2023-01-12 PROCEDURE — G0296 VISIT TO DETERM LDCT ELIG: HCPCS | Performed by: FAMILY MEDICINE

## 2023-01-12 RX ORDER — LANOLIN ALCOHOL/MO/W.PET/CERES
CREAM (GRAM) TOPICAL
Qty: 90 TABLET | Refills: 1 | Status: SHIPPED | OUTPATIENT
Start: 2023-01-12

## 2023-01-12 RX ORDER — OMEPRAZOLE 20 MG/1
CAPSULE, DELAYED RELEASE ORAL
Qty: 90 CAPSULE | Refills: 1 | Status: SHIPPED | OUTPATIENT
Start: 2023-01-12

## 2023-01-12 RX ORDER — PRAVASTATIN SODIUM 40 MG
TABLET ORAL
Qty: 90 TABLET | Refills: 1 | Status: SHIPPED | OUTPATIENT
Start: 2023-01-12

## 2023-01-12 RX ORDER — TRAMADOL HYDROCHLORIDE 50 MG/1
50 TABLET ORAL EVERY 8 HOURS PRN
Qty: 90 TABLET | Refills: 0 | Status: SHIPPED | OUTPATIENT
Start: 2023-01-12 | End: 2023-02-11

## 2023-01-12 RX ORDER — LOSARTAN POTASSIUM 100 MG/1
TABLET ORAL
Qty: 90 TABLET | Refills: 1 | Status: SHIPPED | OUTPATIENT
Start: 2023-01-12

## 2023-01-12 RX ORDER — DIPHENHYDRAMINE HCL 25 MG
CAPSULE ORAL
Qty: 60 CAPSULE | Refills: 0 | Status: SHIPPED | OUTPATIENT
Start: 2023-01-12

## 2023-01-12 SDOH — ECONOMIC STABILITY: FOOD INSECURITY: WITHIN THE PAST 12 MONTHS, THE FOOD YOU BOUGHT JUST DIDN'T LAST AND YOU DIDN'T HAVE MONEY TO GET MORE.: PATIENT DECLINED

## 2023-01-12 SDOH — ECONOMIC STABILITY: FOOD INSECURITY: WITHIN THE PAST 12 MONTHS, YOU WORRIED THAT YOUR FOOD WOULD RUN OUT BEFORE YOU GOT MONEY TO BUY MORE.: PATIENT DECLINED

## 2023-01-12 ASSESSMENT — SOCIAL DETERMINANTS OF HEALTH (SDOH): HOW HARD IS IT FOR YOU TO PAY FOR THE VERY BASICS LIKE FOOD, HOUSING, MEDICAL CARE, AND HEATING?: PATIENT DECLINED

## 2023-01-12 ASSESSMENT — PATIENT HEALTH QUESTIONNAIRE - PHQ9
SUM OF ALL RESPONSES TO PHQ QUESTIONS 1-9: 0
4. FEELING TIRED OR HAVING LITTLE ENERGY: 0
SUM OF ALL RESPONSES TO PHQ QUESTIONS 1-9: 0
SUM OF ALL RESPONSES TO PHQ QUESTIONS 1-9: 0
7. TROUBLE CONCENTRATING ON THINGS, SUCH AS READING THE NEWSPAPER OR WATCHING TELEVISION: 0
9. THOUGHTS THAT YOU WOULD BE BETTER OFF DEAD, OR OF HURTING YOURSELF: 0
SUM OF ALL RESPONSES TO PHQ9 QUESTIONS 1 & 2: 0
1. LITTLE INTEREST OR PLEASURE IN DOING THINGS: 0
8. MOVING OR SPEAKING SO SLOWLY THAT OTHER PEOPLE COULD HAVE NOTICED. OR THE OPPOSITE, BEING SO FIGETY OR RESTLESS THAT YOU HAVE BEEN MOVING AROUND A LOT MORE THAN USUAL: 0
6. FEELING BAD ABOUT YOURSELF - OR THAT YOU ARE A FAILURE OR HAVE LET YOURSELF OR YOUR FAMILY DOWN: 0
2. FEELING DOWN, DEPRESSED OR HOPELESS: 0
3. TROUBLE FALLING OR STAYING ASLEEP: 0
SUM OF ALL RESPONSES TO PHQ QUESTIONS 1-9: 0
5. POOR APPETITE OR OVEREATING: 0
10. IF YOU CHECKED OFF ANY PROBLEMS, HOW DIFFICULT HAVE THESE PROBLEMS MADE IT FOR YOU TO DO YOUR WORK, TAKE CARE OF THINGS AT HOME, OR GET ALONG WITH OTHER PEOPLE: 0

## 2023-01-12 NOTE — PROGRESS NOTES
[address]             [address]                        Telephone [phone]             Fax [phone]       Joyce Trujillo  :  1960  Age:  65 y.o. MRN:  [id number]  Date of visit:  2023       Assessment and Plan:    1. Type 2 diabetes mellitus with diabetic neuropathy, without long-term current use of insulin (HCC)  Her HgbA1c was 6.3 %, which is at goal.   She was advised to continue her current regimen. - Hemoglobin A1C; Future was ordered to be done in 6 months. 2. Essential hypertension  Her blood pressure is well-controlled today. (BP: 122/80)   She was advised to continue current medications. Losartan was refilled:   - losartan (COZAAR) 100 MG tablet; take 1 tablet by mouth once daily  Dispense: 90 tablet; Refill: 1    - Comprehensive Metabolic Panel; Future was ordered to be done in 6 months. 3. Mixed hyperlipidemia  Her lipid profile was very good except for elevated triglycerides on her recent labs. She is tolerating Pravastatin well; this was refilled:   - pravastatin (PRAVACHOL) 40 MG tablet; take 1 tablet by mouth once daily  Dispense: 90 tablet; Refill: 1    - Lipid Panel; Future was ordered to be done in 6 months. 4. Gastroesophageal reflux disease, unspecified whether esophagitis present  Omeprazole was refilled:  - omeprazole (PRILOSEC) 20 MG delayed release capsule; take 1 capsule by mouth once daily  Dispense: 90 capsule; Refill: 1    5. Chronic rectal pain  Controlled substances monitoring: No signs of potential drug abuse or diversion identified when the OARRS report from [address] [place] [address], [place], and [place] was reviewed today. The activity on the report was consistent with the treatment plan. Tramadol was refilled:  - traMADol (ULTRAM) 50 MG tablet; Take 1 tablet by mouth every 8 hours as needed for Pain for up to 30 days. Dispense: 90 tablet;  Refill: 0    She signed a controlled medication contract today. She will return in 3 months for re-evaluation. 6. Chronic obstructive pulmonary disease, unspecified COPD type ([address]  She has had no recent exacerbations. She has quit smoking. 7. Cigarette nicotine dependence without complication  She has quit smoking. She continues to use Nicotine patches. Nicotine patches were refilled:  - nicotine (NICODERM CQ) 7 MG/24HR; apply 1 patch ONTO THE SKIN every 24 hours  Dispense: 28 patch; Refill: 5    8. Personal history of tobacco use  Discussed with the patient the current USPSTF guidelines released March 9, 2021 for screening for lung cancer. For adults aged 48 to [de-identified] years who have a 20 pack-year smoking history and currently smoke or have quit within the past 15 years the grade B recommendation is to:  Screen for lung cancer with low-dose computed tomography (LDCT) every year. Stop screening once a person has not smoked for 15 years or has a health problem that limits life expectancy or the ability to have lung surgery. The patient  reports that she quit smoking about 4 years ago. Her smoking use included cigarettes. She started smoking about 48 years ago. She has a 26.00 pack-year smoking history. She has never used smokeless tobacco.. Discussed with patient the risks and benefits of screening, including over-diagnosis, false positive rate, and total radiation exposure. The patient currently exhibits no signs or symptoms suggestive of lung cancer. Discussed with patient the importance of compliance with yearly annual lung cancer screenings and willingness to undergo diagnosis and treatment if screening scan is positive. In addition, the patient was counseled regarding the importance of remaining smoke free and/or total smoking cessation.     Also reviewed the following if the patient has Medicare that as of February 10, 2022, Medicare only covers LDCT screening in patients aged 51-72 with at least a 20 pack-year smoking history who currently smoke or have quit in the last 15 years  - GA VISIT TO DISCUSS LUNG CA SCREEN W LDCT  - CT Lung Screen (Annual/Baseline); Future    9. Polyneuropathy associated with underlying disease ([address]  She is taking Gabapentin prescribed by Dr. Madison Bishop. 10. Pruritus  Diphenhydramine was refilled:  - diphenhydrAMINE (BANOPHEN) 25 MG capsule; take 1 capsule by mouth twice a day if needed for itching and allergies  Dispense: 60 capsule; Refill: 0    11. Schizoaffective disorder, unspecified type [facility])  She sees [facility] for management. 12. Hypomagnesemia  Magnesium was refilled:  - magnesium oxide (MAG-OX) 400 (240 Mg) MG tablet; take 1 tablet by mouth once daily  Dispense: 90 tablet; Refill: 1    - Magnesium; Future was ordered to be done in 6 months. 13. Obesity (BMI 30-39.9)  - Vitamin D 25 Hydroxy; Future was ordered to be done in 6 months. 14.  Routine health maintenance  Health maintenance was reviewed with the patient. She has received three doses of the Moderna Covid-19 vaccine. The updated bivalent Covid vaccine was recommended. She has received an influenza vaccine this influenza season. Tdap was recommended, and a printed prescription was given to the patient. Colon cancer screening was recommended. She plans to complete Cologuard testing. Cervical cancer screening was recommended. All other health maintenance, including Pneumovax, Prevnar-20, and Tdap, is up to date at this time. Follow up instructions were given to the patient:  Return in about 3 months (around 4/12/2023) for chronic pain.          Subjective:    Alex Escobar is a 65 y.o. female who presents to [place] [address] today (1/12/2023) for follow up/evaluation of:  Diabetes (6 month follow up), Hypertension (6 month follow up), Hyperlipidemia (6 month follow up), and Pain (6 month follow up- rectal pain using Tramadol 50 mg every 8 hours)      She states that she is feeling well. She is tolerating her medications well. She is seeing a nurse practitioner at [facility] for management of schizoaffective disorder. She continues to use Nicotine patches, but she is not smoking. She continues to drink alcohol daily. She is not ready to quit at this time. She has received three doses of the Moderna Covid-19 vaccine. The most recent dose was 12/22/2021.        Immunization history was reviewed:  Immunization History   Administered Date(s) Administered    COVID-19, MODERNA BLUE border, Primary or Immunocompromised, (age 12y+), IM, 100 mcg/0.5mL 03/19/2021, 04/16/2021    COVID-19, MODERNA Booster BLUE border, (age 18y+), IM, 50mcg/0.25mL 12/22/2021    Hepatitis B 10/30/2012, 12/03/2012, 04/29/2013    Influenza A ([id number]) Vaccine PF IM 01/26/2010    Influenza Vaccine, unspecified formulation 01/11/2011, 10/30/2012    Influenza Virus Vaccine 01/26/2010, 01/11/2011, 10/30/2012, 10/15/2014, 12/08/2015, 10/10/2018    Influenza Whole 01/26/2010, 10/15/2014    Influenza, AFLURIA (age 1 yrs+), FLUZONE, (age 10 mo+), MDV, 0.5mL 12/22/2022    Influenza, FLUARIX, FLULAVAL, FLUZONE (age 10 mo+) AND AFLURIA, (age 1 y+), PF, 0.5mL 11/10/2016, 09/21/2017, 10/10/2018, 09/25/2019, 10/07/2020    Pneumococcal Polysaccharide ([id number]) 05/27/2010    Pneumococcal conjugate PCV20, PF (Prevnar 20) 07/28/2022    Tdap (Boostrix, Adacel) 05/27/2010    Zoster Live (Zostavax) 10/15/2014    Zoster Recombinant (Shingrix) 09/25/2019, 11/25/2019          She has the following problem list:  Patient Active Problem List   Diagnosis    Anxiety    Depression    Schizoaffective disorder ([facility]    History of tobacco use    Restless leg syndrome    Pica    Essential hypertension    Bipolar disorder ([address]    Alcoholism ([facility]    Sleeping difficulty    Peripheral neuropathy    Balance problem    CTS (carpal tunnel syndrome)    Chronic lumbar radiculopathy    GERD (gastroesophageal reflux disease)    COPD (chronic obstructive pulmonary disease) (HCC)    Fatigue    Chronic rectal pain    Type 2 diabetes mellitus with diabetic neuropathy, without long-term current use of insulin (HCC)    Pruritus    Obesity (BMI 30.0-34. 9)    Mixed hyperlipidemia    Chronic right shoulder pain    Bilateral carpal tunnel syndrome    Current severe episode of major depressive disorder without psychotic features (HCC)    Splenic artery aneurysm (HCC)    Bipolar affective disorder, currently depressed, mild ([address]    Morbidly obese ([address]    Tobacco abuse    Vitamin B12 deficiency    Polyneuropathy associated with underlying disease ([facility]        Current medications are:  Outpatient Medications Marked as Taking for the 1/12/23 encounter (Office Visit) with Gena Ace MD   Medication Sig Dispense Refill    gabapentin (NEURONTIN) 300 MG capsule take 1 capsule by mouth four times a day 120 capsule 2    ammonium lactate (LAC-HYDRIN) 12 % lotion Apply topically as needed. 400 g 3    carbidopa-levodopa (SINEMET)  MG per tablet Taking 3  tablets at bedtime 180 tablet 1    Multiple Vitamin (MULTIVITAMIN) tablet take 1 tablet by mouth once daily 30 tablet 5    traMADol (ULTRAM) 50 MG tablet Take 1 tablet by mouth every 8 hours as needed for Pain for up to 30 days.  90 tablet 0    nicotine (NICODERM CQ) 7 MG/24HR apply 1 patch ONTO THE SKIN every 24 hours 28 patch 0    triamcinolone (KENALOG) 0.1 % cream apply topically twice a day if needed 80 g 0    diphenhydrAMINE (BANOPHEN) 25 MG capsule take 1 capsule by mouth twice a day if needed for itching and allergies 60 capsule 0    aspirin 325 MG EC tablet take 1 tablet by mouth once daily 30 tablet 12    magnesium oxide (MAG-OX) 400 (240 Mg) MG tablet take 1 tablet by mouth once daily 30 tablet 1    losartan (COZAAR) 100 MG tablet take 1 tablet by mouth once daily 90 tablet 0    omeprazole (PRILOSEC) 20 MG delayed release capsule take 1 capsule by mouth once daily 90 capsule 0    diclofenac sodium (VOLTAREN) 1 % GEL apply 4 grams topically IN THE MORNING and AT NOON AND IN THE EVENING AND BEFORE BEDTIME      pravastatin (PRAVACHOL) 40 MG tablet take 1 tablet by mouth once daily 90 tablet 1    UNABLE TO FIND Shower chair with back 1 each 0    ONETOUCH ULTRA strip use 1 TEST STRIP to TEST BLOOD SUGAR three times a day 300 strip 1    risperiDONE (RISPERDAL) 1 MG tablet Take 1 tablet by mouth nightly 60 tablet 2    ketoconazole (NIZORAL) 2 % shampoo APPLY TO AFFECTED AREA ON SCALP 3 TO 4 TIMES A WEEK LEAVE ON FOR 5 MINUTES THEN WASH  mL 11    Blood Glucose Monitoring Suppl (ONE TOUCH ULTRA 2) w/Device KIT use as directed 1 kit 0    ONETOUCH DELICA LANCETS FINE MISC TEST three times a day 100 each 5       She is allergic to lactose intolerance (gi). She is not currently a smoker. She  reports that she quit smoking about 4 years ago. Her smoking use included cigarettes. She started smoking about 48 years ago. She has a 26.00 pack-year smoking history. She has never used smokeless tobacco.      Objective:    Vitals:    01/12/23 1332   BP: 122/80   Site: Right Upper Arm   Position: Sitting   Cuff Size: Large Adult   Pulse: 91   SpO2: 95%   Weight: 205 lb (93 kg)   Height: 5' 2\" (1.575 m)     Body mass index is 37.49 kg/m². Well-nourished, well-developed female with obesity, healthy-appearing, alert, cooperative, and in no acute distress. Neck supple. No adenopathy. Thyroid symmetric, normal size. Chest:  Normal expansion. Clear to auscultation. No rales, rhonchi, or wheezing. Heart sounds are normal.  Regular rate and rhythm without murmur, gallop or rub. Lower extremities have no edema.     Results of labs done 12/17/2022 were reviewed with the patient:   Hospital Outpatient Visit on 12/17/2022   Component Date Value Ref Range Status    Vitamin B-12 12/17/2022 929  232 - 1245 pg/mL Final    Hemoglobin A1C 12/17/2022 6.3 (A)  4.0 - 6.0 % Final    Estimated Avg Glucose 12/17/2022 134  mg/dL Final    Comment: The ADA and AACC recommend providing the estimated average glucose result to permit better   patient understanding of their HBA1c result. Cholesterol 12/17/2022 149  <200 mg/dL Final    Comment:    Cholesterol Guidelines:      <200  Desirable   200-240  Borderline      >240  Undesirable         HDL 12/17/2022 65  >40 mg/dL Final    Comment:    HDL Guidelines:    <40     Undesirable   40-59    Borderline    >59     Desirable         LDL Cholesterol 12/17/2022 45  0 - 130 mg/dL Final    Comment:    LDL Guidelines:     <100    Desirable   100-129   Near to/above Desirable   130-159   Borderline      >159   Undesirable     Direct (measured) LDL and calculated LDL are not interchangeable tests. Chol/HDL Ratio 12/17/2022 2.3  <5 Final            Triglycerides 12/17/2022 195 (A)  <150 mg/dL Final    Comment:    Triglyceride Guidelines:     <150   Desirable   150-199  Borderline   200-499  High     >499   Very high   Based on AHA Guidelines for fasting triglyceride, October 2012. Glucose 12/17/2022 165 (A)  70 - 99 mg/dL Final    BUN 12/17/2022 4 (A)  8 - 23 mg/dL Final    Creatinine 12/17/2022 0.78  0.50 - 0.90 mg/dL Final    Est, Glom Filt Rate 12/17/2022 >60  >60 mL/min/1.73m2 Final    Comment:       Effective Oct 3, 2022        These results are not intended for use in patients <25years of age. eGFR results are calculated without a race factor using the 2021 CKD-EPI equation. Careful clinical correlation is recommended, particularly when comparing to results   calculated using previous equations. The CKD-EPI equation is less accurate in patients with extremes of muscle mass, extra-renal   metabolism of creatine, excessive creatine ingestion, or following therapy that affects   renal tubular secretion.       Bun/Cre Ratio 12/17/2022 5 (A)  9 - 20 Final    Calcium 12/17/2022 9.1  8.6 - 10.4 mg/dL Final    Sodium 12/17/2022 137  135 - 144 mmol/L Final    Potassium 12/17/2022 4.5  3.7 - 5.3 mmol/L Final    Chloride 12/17/2022 100  98 - 107 mmol/L Final    CO2 12/17/2022 28  20 - 31 mmol/L Final    Anion Gap 12/17/2022 9  9 - 17 mmol/L Final    Alkaline Phosphatase 12/17/2022 99  35 - 104 U/L Final    ALT 12/17/2022 13  5 - 33 U/L Final    AST 12/17/2022 38 (A)  <32 U/L Final    Total Bilirubin 12/17/2022 0.5  0.3 - 1.2 mg/dL Final    Total Protein 12/17/2022 7.3  6.4 - 8.3 g/dL Final    Albumin 12/17/2022 4.0  3.5 - 5.2 g/dL Final    Albumin/Globulin Ratio 12/17/2022 1.2  1.0 - 2.5 Final    Microalb, Ur 12/17/2022 21 (A)  <21 mg/L Final    Creatinine, Ur 12/17/2022 248.6 (A)  28.0 - 217.0 mg/dL Final    Microalb/Crt. Ratio 12/17/2022 8  <25 mcg/mg creat Final         On this date 1/12/2023 I have spent over 40 minutes reviewing previous notes, test results and face to face with the patient discussing the diagnosis and importance of compliance with the treatment plan as well as documenting on the day of the visit.        (Please note that portions of this note were completed with a voice-recognition program. Efforts were made to edit the dictation but occasionally words are mis-transcribed.)

## 2023-01-12 NOTE — PROGRESS NOTES
Patient is seeing Doc Heard @ [facility]. She is going to discuss severe drowsiness from hydroxyzine. She states she sleeps well but is very groggy in the morning. Patient declines covid booster and tdap. Patient declines low dose CT lung screening. Patient declines cervical cancer screening. Patient agreeable to schedule mammogram and also agreeable to have new cologuard order as well.

## 2023-01-12 NOTE — PATIENT INSTRUCTIONS

## 2023-01-13 ENCOUNTER — TELEPHONE (OUTPATIENT)
Dept: ONCOLOGY | Age: 63
End: 2023-01-13

## 2023-01-13 NOTE — LETTER
1/13/2023        [facility] [address]    Dear Go Mcmillan: Your healthcare provider has ordered a low dose CT scan of the chest for lung cancer screening. Enclosed you will find information about CT lung screening and smoking cessation resources. If you are unable to keep you appointment for you CT lung screening, please call our scheduling department at [phone]. Keep in mind that CT lung screening does not take the place of smoking cessation. Please do not hesitate to contact me if you have any questions or concerns.     [address] [facility],      [facility]  [phone]

## 2023-01-17 DIAGNOSIS — L29.9 PRURITUS: Primary | ICD-10-CM

## 2023-01-17 RX ORDER — TRIAMCINOLONE ACETONIDE 1 MG/G
CREAM TOPICAL
Qty: 80 G | Refills: 0 | Status: SHIPPED | OUTPATIENT
Start: 2023-01-17

## 2023-01-23 DIAGNOSIS — L29.9 PRURITUS: ICD-10-CM

## 2023-01-25 RX ORDER — TRIAMCINOLONE ACETONIDE 1 MG/G
CREAM TOPICAL
Qty: 80 G | Refills: 0 | OUTPATIENT
Start: 2023-01-25

## 2023-01-30 DIAGNOSIS — L29.9 PRURITUS: ICD-10-CM

## 2023-01-30 RX ORDER — TRIAMCINOLONE ACETONIDE 1 MG/G
CREAM TOPICAL
Qty: 80 G | Refills: 0 | OUTPATIENT
Start: 2023-01-30

## 2023-02-06 DIAGNOSIS — L29.9 PRURITUS: ICD-10-CM

## 2023-02-08 RX ORDER — DIPHENHYDRAMINE HCL 25 MG
CAPSULE ORAL
Qty: 60 CAPSULE | Refills: 1 | Status: SHIPPED | OUTPATIENT
Start: 2023-02-08 | End: 2023-04-07

## 2023-02-08 NOTE — TELEPHONE ENCOUNTER
Parul Sanchez called requesting a refill of the below medication which has been pended for you:     Requested Prescriptions     Pending Prescriptions Disp Refills    diphenhydrAMINE (BANOPHEN) 25 MG capsule [Pharmacy Med Name: BANOPHEN 25 MG CAPSULE] 60 capsule 0     Sig: take 1 capsule by mouth twice a day if needed for itching or allergies       Last Appointment Date: 1/12/2023  Next Appointment Date: 4/13/2023    Allergies   Allergen Reactions    Lactose Intolerance (Gi) Diarrhea     Lactose Intolerant.

## 2023-02-13 DIAGNOSIS — L29.9 PRURITUS: ICD-10-CM

## 2023-02-13 DIAGNOSIS — K62.89 CHRONIC RECTAL PAIN: Primary | ICD-10-CM

## 2023-02-13 DIAGNOSIS — G89.29 CHRONIC RECTAL PAIN: Primary | ICD-10-CM

## 2023-02-14 NOTE — TELEPHONE ENCOUNTER
Asia Tucker called requesting a refill of the below medication which has been pended for you:   OARRS from [place], [place], and [place] reviewed. Tramadol 50 mg last filled 1/16/23 #90/30 days    Requested Prescriptions     Pending Prescriptions Disp Refills    triamcinolone (KENALOG) 0.1 % cream [Pharmacy Med Name: TRIAMCINOLONE 0.1% CREAM] 80 g 0     Sig: apply topically twice a day if needed    traMADol (ULTRAM) 50 MG tablet [Pharmacy Med Name: TRAMADOL HCL 50 MG TABLET] 90 tablet 0     Sig: take 1 tablet by mouth every 8 hours AS NEEDED FOR PAIN       Last Appointment Date: 1/12/2023  Next Appointment Date: 4/13/2023    Allergies   Allergen Reactions    Lactose Intolerance (Gi) Diarrhea     Lactose Intolerant.

## 2023-02-15 RX ORDER — TRAMADOL HYDROCHLORIDE 50 MG/1
TABLET ORAL
Qty: 90 TABLET | Refills: 0 | Status: SHIPPED | OUTPATIENT
Start: 2023-02-15 | End: 2023-03-17

## 2023-02-15 RX ORDER — TRIAMCINOLONE ACETONIDE 1 MG/G
CREAM TOPICAL
Qty: 80 G | Refills: 0 | Status: SHIPPED | OUTPATIENT
Start: 2023-02-15

## 2023-03-07 ENCOUNTER — HOSPITAL ENCOUNTER (OUTPATIENT)
Dept: CT IMAGING | Age: 63
Discharge: HOME | End: 2023-03-09
Payer: MEDICAID

## 2023-03-07 ENCOUNTER — HOSPITAL ENCOUNTER (OUTPATIENT)
Dept: MAMMOGRAPHY | Age: 63
Discharge: HOME | End: 2023-03-09
Payer: MEDICAID

## 2023-03-07 DIAGNOSIS — Z87.891 PERSONAL HISTORY OF TOBACCO USE: ICD-10-CM

## 2023-03-07 DIAGNOSIS — Z12.31 BREAST CANCER SCREENING BY MAMMOGRAM: ICD-10-CM

## 2023-03-07 PROCEDURE — 77063 BREAST TOMOSYNTHESIS BI: CPT

## 2023-03-07 PROCEDURE — 71271 CT THORAX LUNG CANCER SCR C-: CPT

## 2023-03-13 DIAGNOSIS — G89.29 CHRONIC RECTAL PAIN: Primary | ICD-10-CM

## 2023-03-13 DIAGNOSIS — K62.89 CHRONIC RECTAL PAIN: Primary | ICD-10-CM

## 2023-03-14 RX ORDER — TRAMADOL HYDROCHLORIDE 50 MG/1
TABLET ORAL
Qty: 90 TABLET | Refills: 0 | Status: SHIPPED | OUTPATIENT
Start: 2023-03-14 | End: 2023-04-13

## 2023-03-14 NOTE — TELEPHONE ENCOUNTER
Lana Beckman called requesting a refill of the below medication which has been pended for you:         OARRS from [place], [place], and [place] reviewed. Tramadol 50 mg last filled 2/15/23 #90/30 days    Requested Prescriptions     Pending Prescriptions Disp Refills    traMADol (ULTRAM) 50 MG tablet [Pharmacy Med Name: TRAMADOL HCL 50 MG TABLET] 90 tablet 0     Sig: take 1 tablet by mouth every 8 hours AS NEEDED FOR PAIN       Last Appointment Date: 1/12/2023  Next Appointment Date: 4/13/23    Allergies   Allergen Reactions    Lactose Intolerance (Gi) Diarrhea     Lactose Intolerant.

## 2023-03-15 ENCOUNTER — TELEPHONE (OUTPATIENT)
Dept: FAMILY MEDICINE CLINIC | Age: 63
End: 2023-03-15

## 2023-03-15 DIAGNOSIS — M25.562 CHRONIC PAIN OF LEFT KNEE: Primary | ICD-10-CM

## 2023-03-15 DIAGNOSIS — G89.29 CHRONIC PAIN OF LEFT KNEE: Primary | ICD-10-CM

## 2023-03-15 DIAGNOSIS — L29.9 PRURITUS: ICD-10-CM

## 2023-03-15 RX ORDER — TRIAMCINOLONE ACETONIDE 1 MG/G
CREAM TOPICAL
Qty: 80 G | Refills: 0 | Status: SHIPPED | OUTPATIENT
Start: 2023-03-15

## 2023-03-15 NOTE — TELEPHONE ENCOUNTER
[facility] calling to get a referral for pt to [facility] for L knee pain, please fax referral to [phone]

## 2023-03-15 NOTE — TELEPHONE ENCOUNTER
Lorena Phillip called requesting a refill of the below medication which has been pended for you:     Requested Prescriptions     Pending Prescriptions Disp Refills    triamcinolone (KENALOG) 0.1 % cream [Pharmacy Med Name: TRIAMCINOLONE 0.1% CREAM] 80 g 0     Sig: apply topically twice a day if needed       Last Appointment Date: 1/12/2023  Next Appointment Date: 4/13/2023    Allergies   Allergen Reactions    Lactose Intolerance (Gi) Diarrhea     Lactose Intolerant.

## 2023-03-15 NOTE — TELEPHONE ENCOUNTER
Patient has been seeing Laura Setting [facility] for her orthopedic issues and just needed a referral for them to continue to see patient. Referral faxed.

## 2023-03-24 ENCOUNTER — OFFICE VISIT (OUTPATIENT)
Dept: NEUROLOGY | Age: 63
End: 2023-03-24
Payer: MEDICAID

## 2023-03-24 VITALS
HEIGHT: 62 IN | HEART RATE: 78 BPM | DIASTOLIC BLOOD PRESSURE: 70 MMHG | OXYGEN SATURATION: 96 % | BODY MASS INDEX: 38.13 KG/M2 | SYSTOLIC BLOOD PRESSURE: 114 MMHG | WEIGHT: 207.2 LBS

## 2023-03-24 DIAGNOSIS — G47.9 SLEEPING DIFFICULTY: ICD-10-CM

## 2023-03-24 DIAGNOSIS — F41.9 ANXIETY: ICD-10-CM

## 2023-03-24 DIAGNOSIS — G63 POLYNEUROPATHY ASSOCIATED WITH UNDERLYING DISEASE (HCC): Primary | ICD-10-CM

## 2023-03-24 DIAGNOSIS — R26.89 BALANCE PROBLEM: ICD-10-CM

## 2023-03-24 DIAGNOSIS — E53.8 VITAMIN B12 DEFICIENCY: ICD-10-CM

## 2023-03-24 DIAGNOSIS — E11.40 TYPE 2 DIABETES MELLITUS WITH DIABETIC NEUROPATHY, WITHOUT LONG-TERM CURRENT USE OF INSULIN (HCC): ICD-10-CM

## 2023-03-24 DIAGNOSIS — R53.82 CHRONIC FATIGUE: ICD-10-CM

## 2023-03-24 DIAGNOSIS — M54.16 CHRONIC LUMBAR RADICULOPATHY: ICD-10-CM

## 2023-03-24 DIAGNOSIS — G56.03 BILATERAL CARPAL TUNNEL SYNDROME: ICD-10-CM

## 2023-03-24 DIAGNOSIS — J44.9 CHRONIC OBSTRUCTIVE PULMONARY DISEASE, UNSPECIFIED COPD TYPE (HCC): ICD-10-CM

## 2023-03-24 DIAGNOSIS — E66.01 MORBIDLY OBESE (HCC): ICD-10-CM

## 2023-03-24 DIAGNOSIS — G25.81 RESTLESS LEG SYNDROME: ICD-10-CM

## 2023-03-24 DIAGNOSIS — F25.0 SCHIZOAFFECTIVE DISORDER, BIPOLAR TYPE (HCC): ICD-10-CM

## 2023-03-24 PROBLEM — F31.0 BIPOLAR AFFECTIVE DISORDER, CURRENT EPISODE HYPOMANIC (HCC): Status: ACTIVE | Noted: 2023-03-24

## 2023-03-24 PROCEDURE — 99214 OFFICE O/P EST MOD 30 MIN: CPT | Performed by: PSYCHIATRY & NEUROLOGY

## 2023-03-24 RX ORDER — GABAPENTIN 300 MG/1
CAPSULE ORAL
Qty: 120 CAPSULE | Refills: 2 | Status: SHIPPED | OUTPATIENT
Start: 2023-03-24 | End: 2023-06-24

## 2023-03-24 RX ORDER — AMMONIUM LACTATE 12 G/100G
LOTION TOPICAL
Qty: 400 G | Refills: 3 | Status: SHIPPED | OUTPATIENT
Start: 2023-03-24

## 2023-03-24 RX ORDER — HYDROXYZINE HYDROCHLORIDE 10 MG/1
TABLET, FILM COATED ORAL
COMMUNITY
Start: 2023-03-10

## 2023-03-24 ASSESSMENT — ENCOUNTER SYMPTOMS
FACIAL SWELLING: 0
VOICE CHANGE: 0
ABDOMINAL PAIN: 0
EYE REDNESS: 0
SHORTNESS OF BREATH: 0
DIARRHEA: 0
APNEA: 0
CHEST TIGHTNESS: 0
CHOKING: 0
EYE ITCHING: 0
EYE DISCHARGE: 0
COLOR CHANGE: 0
BLOOD IN STOOL: 0
BOWEL INCONTINENCE: 0
EYE PAIN: 0
WHEEZING: 0
TROUBLE SWALLOWING: 0
ABDOMINAL DISTENTION: 0
VOMITING: 0
NAUSEA: 0
PHOTOPHOBIA: 0
BACK PAIN: 0
CONSTIPATION: 0
SINUS PRESSURE: 0
VISUAL CHANGE: 0

## 2023-03-24 NOTE — PROGRESS NOTES
insulin ([facility])  E11.40       11. Chronic fatigue  R53.82       12. Sleeping difficulty  G47.9       13. Chronic obstructive pulmonary disease, unspecified COPD type ([facility])  J44.9                      CONCERNS   &   INCREASED   RISK   FOR         * TIA,  CEREBRO  VASCULAR  ISCHEMIA, STROKE      *   COGNITIVE  &   MEMORY PROBLEMS  AND  DEMENTIA      *  MONONEUROPATHIES, RADICULOPATHY       *   PERIPHERAL  NEUROPATHY                      VARIOUS  RISK   FACTORS   WERE  REVIEWED   AND   DISCUSSED. *  PATIENT   HAS  MULTIPLE   MEDICAL,  NEUROLOGICAL      AND   MENTAL  HEALTH  PROBLEMS . PATIENT'S   MANAGEMENT  IS  CHALLENGING. PLAN:         Luh Meléndez  Of  The  Diagnoses,  The  Management & Treatment  Options           AND    Care  plan  Were        Reviewed and   Discussed   With  patient. * Goals  And  Expectations  Of  The  Therapy  Discussed   And  Reviewed. *   Benefits   And   Side  Effect  Profile  Of  Medication/s   Were   Discussed             * Need   For  Further   Follow up For  The  Various  Problems  Were discussed. * Results  Of  The  Previous  Diagnostic tests were reviewed and questions answered. patient  understand the same. Medical  Decision  Making  Was  Made  Based on the   Complexity  Of  Above  Mentioned  Diagnoses,        Data reviewed   & diagnostic  Tests  Reviewed,  Risk  Of  Significant   Co morbidities and complicating   Factors. Medical  Decision  Was   High  Complexity  Due   To  The  Patient's  Multiple  Symptoms,      Advancing   Disease,  Complex  Treatment  Regimen,  Multiple medications and   Risk  Of   Side  Effects,      Difficulty  In  Medication  Management  And  Diagnostic  Challenges       In  View  Of  The  Associated   Co  Morbid  Conditions   And  Problems. * FALL   PRECAUTIONS.       THESE  REVIEWED   AND  DISCUSSED      *   BE  CAREFUL  WITH  ACTIVITIES   INCLUDING

## 2023-04-07 DIAGNOSIS — L29.9 PRURITUS: ICD-10-CM

## 2023-04-07 RX ORDER — DIPHENHYDRAMINE HCL 25 MG
CAPSULE ORAL
Qty: 60 CAPSULE | Refills: 0 | Status: SHIPPED | OUTPATIENT
Start: 2023-04-07

## 2023-04-07 NOTE — TELEPHONE ENCOUNTER
Lovejohann Ofelia called requesting a refill of the below medication which has been pended for you:     Dr. Meseret Dale is out of the office    Requested Prescriptions     Pending Prescriptions Disp Refills    diphenhydrAMINE (BANOPHEN) 25 MG capsule [Pharmacy Med Name: Olivia Inchlupillo 25 MG CAPSULE] 60 capsule 0     Sig: take 1 capsule by mouth twice a day if needed for itching or allergies       Last Appointment Date: 1/12/2023  Next Appointment Date: 4/19/2023    Allergies   Allergen Reactions    Lactose Intolerance (Gi) Diarrhea     Lactose Intolerant.

## 2023-04-19 ENCOUNTER — OFFICE VISIT (OUTPATIENT)
Dept: FAMILY MEDICINE CLINIC | Age: 63
End: 2023-04-19
Payer: MEDICAID

## 2023-04-19 VITALS
HEART RATE: 86 BPM | HEIGHT: 62 IN | BODY MASS INDEX: 37.54 KG/M2 | TEMPERATURE: 98.6 F | WEIGHT: 204 LBS | DIASTOLIC BLOOD PRESSURE: 68 MMHG | SYSTOLIC BLOOD PRESSURE: 118 MMHG | OXYGEN SATURATION: 97 %

## 2023-04-19 DIAGNOSIS — G89.29 CHRONIC RECTAL PAIN: Primary | ICD-10-CM

## 2023-04-19 DIAGNOSIS — K62.89 CHRONIC RECTAL PAIN: Primary | ICD-10-CM

## 2023-04-19 PROCEDURE — 99213 OFFICE O/P EST LOW 20 MIN: CPT | Performed by: FAMILY MEDICINE

## 2023-04-19 PROCEDURE — 3017F COLORECTAL CA SCREEN DOC REV: CPT | Performed by: FAMILY MEDICINE

## 2023-04-19 PROCEDURE — 3074F SYST BP LT 130 MM HG: CPT | Performed by: FAMILY MEDICINE

## 2023-04-19 PROCEDURE — 1036F TOBACCO NON-USER: CPT | Performed by: FAMILY MEDICINE

## 2023-04-19 PROCEDURE — 3078F DIAST BP <80 MM HG: CPT | Performed by: FAMILY MEDICINE

## 2023-04-19 PROCEDURE — G8427 DOCREV CUR MEDS BY ELIG CLIN: HCPCS | Performed by: FAMILY MEDICINE

## 2023-04-19 PROCEDURE — G8417 CALC BMI ABV UP PARAM F/U: HCPCS | Performed by: FAMILY MEDICINE

## 2023-04-19 SDOH — ECONOMIC STABILITY: FOOD INSECURITY: WITHIN THE PAST 12 MONTHS, YOU WORRIED THAT YOUR FOOD WOULD RUN OUT BEFORE YOU GOT MONEY TO BUY MORE.: PATIENT DECLINED

## 2023-04-19 SDOH — ECONOMIC STABILITY: HOUSING INSECURITY
IN THE LAST 12 MONTHS, WAS THERE A TIME WHEN YOU DID NOT HAVE A STEADY PLACE TO SLEEP OR SLEPT IN A SHELTER (INCLUDING NOW)?: PATIENT REFUSED

## 2023-04-19 SDOH — ECONOMIC STABILITY: FOOD INSECURITY: WITHIN THE PAST 12 MONTHS, THE FOOD YOU BOUGHT JUST DIDN'T LAST AND YOU DIDN'T HAVE MONEY TO GET MORE.: PATIENT DECLINED

## 2023-04-19 SDOH — ECONOMIC STABILITY: INCOME INSECURITY: HOW HARD IS IT FOR YOU TO PAY FOR THE VERY BASICS LIKE FOOD, HOUSING, MEDICAL CARE, AND HEATING?: PATIENT DECLINED

## 2023-04-19 NOTE — PROGRESS NOTES
Patient has prescription for tdap vaccine. Patient declines covid vaccine, and cervical cancer screen.  Patient unsure if refills are needed at this time and will call
about 48 years ago. She has a 26.00 pack-year smoking history. She has never used smokeless tobacco.      Objective:    Vitals:    04/19/23 1349   BP: 118/68   Site: Right Upper Arm   Position: Sitting   Cuff Size: Large Adult   Pulse: 86   Temp: 98.6 °F (37 °C)   TempSrc: Tympanic   SpO2: 97%   Weight: 204 lb (92.5 kg)   Height: 5' 2\" (1.575 m)     Body mass index is 37.31 kg/m². Well-nourished, well-developed female with obesity, healthy-appearing, alert, cooperative, and in no acute distress. Neck supple. No adenopathy. Thyroid symmetric, normal size. Chest:  Normal expansion. Clear to auscultation. No rales, rhonchi, or wheezing. Heart sounds are normal.  Regular rate and rhythm without murmur, gallop or rub. Lower extremities have no edema.                (Please note that portions of this note were completed with a voice-recognition program. Efforts were made to edit the dictation but occasionally words are mis-transcribed.)

## 2023-05-04 DIAGNOSIS — L29.9 PRURITUS: ICD-10-CM

## 2023-05-04 RX ORDER — DIPHENHYDRAMINE HCL 25 MG
CAPSULE ORAL
Qty: 60 CAPSULE | Refills: 2 | Status: SHIPPED | OUTPATIENT
Start: 2023-05-04

## 2023-05-11 DIAGNOSIS — K62.89 CHRONIC RECTAL PAIN: Primary | ICD-10-CM

## 2023-05-11 DIAGNOSIS — G89.29 CHRONIC RECTAL PAIN: Primary | ICD-10-CM

## 2023-05-12 DIAGNOSIS — K62.89 CHRONIC RECTAL PAIN: ICD-10-CM

## 2023-05-12 DIAGNOSIS — G89.29 CHRONIC RECTAL PAIN: ICD-10-CM

## 2023-05-12 RX ORDER — TRAMADOL HYDROCHLORIDE 50 MG/1
50 TABLET ORAL EVERY 8 HOURS PRN
Qty: 90 TABLET | Refills: 0 | OUTPATIENT
Start: 2023-05-12 | End: 2023-06-11

## 2023-05-12 RX ORDER — TRAMADOL HYDROCHLORIDE 50 MG/1
50 TABLET ORAL EVERY 8 HOURS PRN
Qty: 90 TABLET | Refills: 0 | Status: SHIPPED | OUTPATIENT
Start: 2023-05-12 | End: 2023-06-11

## 2023-05-12 NOTE — TELEPHONE ENCOUNTER
Jenniffer Bee called requesting a refill of the below medication which has been pended for you:     OARRS from [place], [place], and [place] reviewed. Tramadol 50 mg last filled 4/14/23 #90/30 days    Requested Prescriptions     Pending Prescriptions Disp Refills    traMADol (ULTRAM) 50 MG tablet [Pharmacy Med Name: TRAMADOL HCL 50 MG TABLET] 90 tablet 0     Sig: Take 1 tablet by mouth every 8 hours as needed for Pain for up to 30 days. Max Daily Amount: 150 mg       Last Appointment Date: 4/19/2023  Next Appointment Date: 7/20/2023    Allergies   Allergen Reactions    Lactose Intolerance (Gi) Diarrhea     Lactose Intolerant.

## 2023-06-06 DIAGNOSIS — K62.89 CHRONIC RECTAL PAIN: Primary | ICD-10-CM

## 2023-06-06 DIAGNOSIS — G89.29 CHRONIC RECTAL PAIN: Primary | ICD-10-CM

## 2023-06-07 RX ORDER — TRAMADOL HYDROCHLORIDE 50 MG/1
50 TABLET ORAL EVERY 8 HOURS PRN
Qty: 90 TABLET | Refills: 0 | Status: SHIPPED | OUTPATIENT
Start: 2023-06-07 | End: 2023-07-07

## 2023-06-07 NOTE — TELEPHONE ENCOUNTER
Raz Ryan called requesting a refill of the below medication which has been pended for you: OARRS from [place], [place], and [place] reviewed. Tramadol 50 mg last filled 5/21/23 #60/20 days    Requested Prescriptions     Pending Prescriptions Disp Refills    traMADol (ULTRAM) 50 MG tablet 90 tablet 0     Sig: Take 1 tablet by mouth every 8 hours as needed for Pain for up to 30 days. Max Daily Amount: 150 mg       Last Appointment Date: 4/19/2023  Next Appointment Date: 7/20/2023    Allergies   Allergen Reactions    Lactose Intolerance (Gi) Diarrhea     Lactose Intolerant.

## 2023-06-29 DIAGNOSIS — G56.03 BILATERAL CARPAL TUNNEL SYNDROME: ICD-10-CM

## 2023-06-29 DIAGNOSIS — E83.42 HYPOMAGNESEMIA: ICD-10-CM

## 2023-06-29 DIAGNOSIS — G63 POLYNEUROPATHY ASSOCIATED WITH UNDERLYING DISEASE (HCC): ICD-10-CM

## 2023-06-29 RX ORDER — LANOLIN ALCOHOL/MO/W.PET/CERES
CREAM (GRAM) TOPICAL
Qty: 30 TABLET | Refills: 0 | Status: SHIPPED | OUTPATIENT
Start: 2023-06-29

## 2023-07-05 DIAGNOSIS — K62.89 CHRONIC RECTAL PAIN: Primary | ICD-10-CM

## 2023-07-05 DIAGNOSIS — G89.29 CHRONIC RECTAL PAIN: Primary | ICD-10-CM

## 2023-07-05 RX ORDER — GABAPENTIN 300 MG/1
CAPSULE ORAL
Qty: 120 CAPSULE | Refills: 2 | Status: SHIPPED | OUTPATIENT
Start: 2023-07-05 | End: 2023-09-29

## 2023-07-05 RX ORDER — TRAMADOL HYDROCHLORIDE 50 MG/1
50 TABLET ORAL EVERY 8 HOURS PRN
Qty: 90 TABLET | Refills: 0 | Status: SHIPPED | OUTPATIENT
Start: 2023-07-05 | End: 2023-08-04

## 2023-07-05 NOTE — TELEPHONE ENCOUNTER
Last filled # 90 on 6/8/23      [facility] is requesting a refill on the following medication(s):  Requested Prescriptions     Pending Prescriptions Disp Refills    traMADol (ULTRAM) 50 MG tablet 90 tablet 0     Sig: Take 1 tablet by mouth every 8 hours as needed for Pain for up to 30 days.  Max Daily Amount: 150 mg       Last Visit Date (If Applicable):  8/62/1327    Next Visit Date:    7/20/2023

## 2023-07-05 NOTE — TELEPHONE ENCOUNTER
Controlled substances monitoring: No signs of potential drug abuse or diversion identified when the OARRS report from [place], [place], and [place] was reviewed today. The activity on the report was consistent with the treatment plan.

## 2023-07-13 DIAGNOSIS — F17.210 CIGARETTE NICOTINE DEPENDENCE WITHOUT COMPLICATION: ICD-10-CM

## 2023-07-13 NOTE — TELEPHONE ENCOUNTER
Uyen Just called requesting a refill of the below medication which has been pended for you:     Requested Prescriptions     Pending Prescriptions Disp Refills    nicotine ([facility]) 7 MG/24HR [Pharmacy Med Name: NICOTINE 7 MG/24HR PATCH] 28 patch 0     Sig: apply 1 patch ONTO THE SKIN every 24 hours       Last Appointment Date: 4/19/2023  Next Appointment Date: 7/20/2023    Allergies   Allergen Reactions    Lactose Intolerance (Gi) Diarrhea     Lactose Intolerant.

## 2023-07-26 ENCOUNTER — OFFICE VISIT (OUTPATIENT)
Dept: FAMILY MEDICINE CLINIC | Age: 63
End: 2023-07-26
Payer: MEDICAID

## 2023-07-26 VITALS
OXYGEN SATURATION: 96 % | HEART RATE: 96 BPM | SYSTOLIC BLOOD PRESSURE: 118 MMHG | BODY MASS INDEX: 36.99 KG/M2 | HEIGHT: 62 IN | WEIGHT: 201 LBS | DIASTOLIC BLOOD PRESSURE: 68 MMHG

## 2023-07-26 DIAGNOSIS — K21.9 GASTROESOPHAGEAL REFLUX DISEASE, UNSPECIFIED WHETHER ESOPHAGITIS PRESENT: ICD-10-CM

## 2023-07-26 DIAGNOSIS — I10 ESSENTIAL HYPERTENSION: ICD-10-CM

## 2023-07-26 DIAGNOSIS — K62.89 CHRONIC RECTAL PAIN: ICD-10-CM

## 2023-07-26 DIAGNOSIS — E11.40 TYPE 2 DIABETES MELLITUS WITH DIABETIC NEUROPATHY, WITHOUT LONG-TERM CURRENT USE OF INSULIN (HCC): Primary | ICD-10-CM

## 2023-07-26 DIAGNOSIS — E78.2 MIXED HYPERLIPIDEMIA: ICD-10-CM

## 2023-07-26 DIAGNOSIS — G89.29 CHRONIC RECTAL PAIN: ICD-10-CM

## 2023-07-26 PROCEDURE — 3017F COLORECTAL CA SCREEN DOC REV: CPT | Performed by: FAMILY MEDICINE

## 2023-07-26 PROCEDURE — G8427 DOCREV CUR MEDS BY ELIG CLIN: HCPCS | Performed by: FAMILY MEDICINE

## 2023-07-26 PROCEDURE — 3046F HEMOGLOBIN A1C LEVEL >9.0%: CPT | Performed by: FAMILY MEDICINE

## 2023-07-26 PROCEDURE — 1036F TOBACCO NON-USER: CPT | Performed by: FAMILY MEDICINE

## 2023-07-26 PROCEDURE — 3078F DIAST BP <80 MM HG: CPT | Performed by: FAMILY MEDICINE

## 2023-07-26 PROCEDURE — 99214 OFFICE O/P EST MOD 30 MIN: CPT | Performed by: FAMILY MEDICINE

## 2023-07-26 PROCEDURE — 3074F SYST BP LT 130 MM HG: CPT | Performed by: FAMILY MEDICINE

## 2023-07-26 PROCEDURE — 2022F DILAT RTA XM EVC RTNOPTHY: CPT | Performed by: FAMILY MEDICINE

## 2023-07-26 PROCEDURE — G8417 CALC BMI ABV UP PARAM F/U: HCPCS | Performed by: FAMILY MEDICINE

## 2023-07-26 PROCEDURE — 99212 OFFICE O/P EST SF 10 MIN: CPT | Performed by: FAMILY MEDICINE

## 2023-07-26 RX ORDER — OMEPRAZOLE 20 MG/1
CAPSULE, DELAYED RELEASE ORAL
Qty: 90 CAPSULE | Refills: 1 | Status: SHIPPED | OUTPATIENT
Start: 2023-07-26

## 2023-07-26 RX ORDER — PRAVASTATIN SODIUM 40 MG
TABLET ORAL
Qty: 90 TABLET | Refills: 1 | Status: SHIPPED | OUTPATIENT
Start: 2023-07-26

## 2023-07-26 RX ORDER — LOSARTAN POTASSIUM 100 MG/1
TABLET ORAL
Qty: 90 TABLET | Refills: 1 | Status: SHIPPED | OUTPATIENT
Start: 2023-07-26

## 2023-07-26 NOTE — PROGRESS NOTES
Pr declines Covid vaccine   Pt declines colonoscopy and declines pap at this time.
Recombinant (Shing[employer]) 09/25/2019, 11/25/2019          She has the following problem list:  Patient Active Problem List   Diagnosis    Anxiety    Depression    Schizoaffective disorder ([address])    History of tobacco use    Restless leg syndrome    Pica    Essential hypertension    Bipolar disorder ([facility])    Alcoholism ([address])    Sleeping difficulty    Peripheral neuropathy    Balance problem    CTS (carpal tunnel syndrome)    Chronic lumbar radiculopathy    GERD (gastroesophageal reflux disease)    COPD (chronic obstructive pulmonary disease) ([facility])    Fatigue    Chronic rectal pain    Type 2 diabetes mellitus with diabetic neuropathy, without long-term current use of insulin ([facility])    Pruritus    Obesity (BMI 30.0-34. 9)    Mixed hyperlipidemia    Chronic right shoulder pain    Bilateral carpal tunnel syndrome    Current severe episode of major depressive disorder without psychotic features ([facility])    Splenic artery aneurysm ([facility])    Bipolar affective disorder, currently depressed, mild ([address])    Morbidly obese ([facility])    Tobacco abuse    Vitamin B12 deficiency    Polyneuropathy associated with underlying disease ([address])    Bipolar affective disorder, current episode hypomanic ([address])        Current medications are:  Outpatient Medications Marked as Taking for the 7/26/23 encounter (Office Visit) with Jose Norton MD   Medication Sig Dispense Refill    nicotine (NICODERM CQ) 7 MG/24HR apply 1 patch ONTO THE SKIN every 24 hours 28 patch 0    gabapentin (NEURONTIN) 300 MG capsule take 1 capsule by mouth four times a day 120 capsule 2    traMADol (ULTRAM) 50 MG tablet Take 1 tablet by mouth every 8 hours as needed for Pain for up to 30 days.  Max Daily Amount: 150 mg 90 tablet 0    magnesium oxide (MAG-OX) 400 (240 Mg) MG tablet take 1 tablet by mouth once daily 30 tablet 0    diphenhydrAMINE (BANOPHEN) 25 MG capsule take 1 capsule by mouth twice a day if needed for itching or allergies 60 capsule 2    ammonium lactate (LAC-HYDRIN) 12

## 2023-08-01 DIAGNOSIS — K62.89 CHRONIC RECTAL PAIN: ICD-10-CM

## 2023-08-01 DIAGNOSIS — E83.42 HYPOMAGNESEMIA: ICD-10-CM

## 2023-08-01 DIAGNOSIS — G89.29 CHRONIC RECTAL PAIN: ICD-10-CM

## 2023-08-01 RX ORDER — TRAMADOL HYDROCHLORIDE 50 MG/1
TABLET ORAL
Qty: 90 TABLET | OUTPATIENT
Start: 2023-08-01

## 2023-08-01 NOTE — TELEPHONE ENCOUNTER
Pt stated she is good until the 08/09/23   Colton Soriano called requesting a refill of the below medication which has been pended for you:     Requested Prescriptions     Pending Prescriptions Disp Refills    magnesium oxide (MAG-OX) 400 (240 Mg) MG tablet [Pharmacy Med Name: MAGNESIUM OXIDE 400 MG TABLET] 30 tablet 0     Sig: take 1 tablet by mouth once daily       Last Appointment Date: 7/26/2023  Next Appointment Date: 10/26/2023    Allergies   Allergen Reactions    Lactose Intolerance (Gi) Diarrhea     Lactose Intolerant.

## 2023-08-07 DIAGNOSIS — G89.29 CHRONIC RECTAL PAIN: ICD-10-CM

## 2023-08-07 DIAGNOSIS — K62.89 CHRONIC RECTAL PAIN: ICD-10-CM

## 2023-08-07 DIAGNOSIS — L29.9 PRURITUS: ICD-10-CM

## 2023-08-07 RX ORDER — DIPHENHYDRAMINE HCL 25 MG
CAPSULE ORAL
Qty: 60 CAPSULE | Refills: 0 | Status: SHIPPED | OUTPATIENT
Start: 2023-08-07

## 2023-08-07 RX ORDER — TRAMADOL HYDROCHLORIDE 50 MG/1
50 TABLET ORAL EVERY 8 HOURS PRN
Qty: 90 TABLET | Refills: 0 | Status: SHIPPED | OUTPATIENT
Start: 2023-08-07 | End: 2023-09-06

## 2023-08-07 NOTE — TELEPHONE ENCOUNTER
Patient is out of both medications today. Ultram last filled 07/07/23 #90 R 0  Days 30    Dea called requesting a refill of the below medication which has been pended for you:     Requested Prescriptions     Pending Prescriptions Disp Refills    diphenhydrAMINE (BANOPHEN) 25 MG capsule 60 capsule 2     Sig: take 1 capsule by mouth twice a day if needed for itching or allergies    traMADol (ULTRAM) 50 MG tablet 90 tablet 0     Sig: Take 1 tablet by mouth every 8 hours as needed for Pain for up to 30 days. Max Daily Amount: 150 mg       Last Appointment Date: 7/26/2023  Next Appointment Date: 10/26/2023    Allergies   Allergen Reactions    Lactose Intolerance (Gi) Diarrhea     Lactose Intolerant.

## 2023-08-08 RX ORDER — LANOLIN ALCOHOL/MO/W.PET/CERES
CREAM (GRAM) TOPICAL
Qty: 30 TABLET | Refills: 2 | Status: SHIPPED | OUTPATIENT
Start: 2023-08-08

## 2023-08-10 ENCOUNTER — OFFICE VISIT (OUTPATIENT)
Dept: NEUROLOGY | Age: 63
End: 2023-08-10
Payer: MEDICAID

## 2023-08-10 VITALS
HEIGHT: 62 IN | WEIGHT: 202.2 LBS | DIASTOLIC BLOOD PRESSURE: 74 MMHG | OXYGEN SATURATION: 96 % | HEART RATE: 100 BPM | SYSTOLIC BLOOD PRESSURE: 122 MMHG | BODY MASS INDEX: 37.21 KG/M2

## 2023-08-10 DIAGNOSIS — F41.9 ANXIETY: ICD-10-CM

## 2023-08-10 DIAGNOSIS — F31.31 BIPOLAR AFFECTIVE DISORDER, CURRENTLY DEPRESSED, MILD (HCC): ICD-10-CM

## 2023-08-10 DIAGNOSIS — M79.2 NEUROPATHIC PAIN: ICD-10-CM

## 2023-08-10 DIAGNOSIS — E66.9 OBESITY (BMI 30.0-34.9): ICD-10-CM

## 2023-08-10 DIAGNOSIS — G62.9 PERIPHERAL POLYNEUROPATHY: ICD-10-CM

## 2023-08-10 DIAGNOSIS — E53.8 VITAMIN B12 DEFICIENCY: ICD-10-CM

## 2023-08-10 DIAGNOSIS — F25.9 SCHIZOAFFECTIVE DISORDER, UNSPECIFIED TYPE (HCC): ICD-10-CM

## 2023-08-10 DIAGNOSIS — M54.16 CHRONIC LUMBAR RADICULOPATHY: ICD-10-CM

## 2023-08-10 DIAGNOSIS — R26.89 BALANCE PROBLEM: ICD-10-CM

## 2023-08-10 DIAGNOSIS — F31.0 BIPOLAR AFFECTIVE DISORDER, CURRENT EPISODE HYPOMANIC (HCC): ICD-10-CM

## 2023-08-10 DIAGNOSIS — F10.20 ALCOHOLISM (HCC): ICD-10-CM

## 2023-08-10 DIAGNOSIS — G56.03 BILATERAL CARPAL TUNNEL SYNDROME: ICD-10-CM

## 2023-08-10 DIAGNOSIS — R53.82 CHRONIC FATIGUE: ICD-10-CM

## 2023-08-10 DIAGNOSIS — G47.9 SLEEPING DIFFICULTY: ICD-10-CM

## 2023-08-10 DIAGNOSIS — E66.01 MORBIDLY OBESE (HCC): ICD-10-CM

## 2023-08-10 DIAGNOSIS — G63 POLYNEUROPATHY ASSOCIATED WITH UNDERLYING DISEASE (HCC): Primary | ICD-10-CM

## 2023-08-10 DIAGNOSIS — J44.9 CHRONIC OBSTRUCTIVE PULMONARY DISEASE, UNSPECIFIED COPD TYPE (HCC): ICD-10-CM

## 2023-08-10 DIAGNOSIS — F34.1 DYSTHYMIA: ICD-10-CM

## 2023-08-10 DIAGNOSIS — F25.0 SCHIZOAFFECTIVE DISORDER, BIPOLAR TYPE (HCC): ICD-10-CM

## 2023-08-10 DIAGNOSIS — G25.81 RESTLESS LEG SYNDROME: ICD-10-CM

## 2023-08-10 PROCEDURE — 99214 OFFICE O/P EST MOD 30 MIN: CPT | Performed by: PSYCHIATRY & NEUROLOGY

## 2023-08-10 PROCEDURE — 3017F COLORECTAL CA SCREEN DOC REV: CPT | Performed by: PSYCHIATRY & NEUROLOGY

## 2023-08-10 PROCEDURE — 3078F DIAST BP <80 MM HG: CPT | Performed by: PSYCHIATRY & NEUROLOGY

## 2023-08-10 PROCEDURE — G8427 DOCREV CUR MEDS BY ELIG CLIN: HCPCS | Performed by: PSYCHIATRY & NEUROLOGY

## 2023-08-10 PROCEDURE — 3074F SYST BP LT 130 MM HG: CPT | Performed by: PSYCHIATRY & NEUROLOGY

## 2023-08-10 PROCEDURE — G8417 CALC BMI ABV UP PARAM F/U: HCPCS | Performed by: PSYCHIATRY & NEUROLOGY

## 2023-08-10 PROCEDURE — 3023F SPIROM DOC REV: CPT | Performed by: PSYCHIATRY & NEUROLOGY

## 2023-08-10 PROCEDURE — 1036F TOBACCO NON-USER: CPT | Performed by: PSYCHIATRY & NEUROLOGY

## 2023-08-10 RX ORDER — GABAPENTIN 300 MG/1
CAPSULE ORAL
Qty: 120 CAPSULE | Refills: 2 | Status: SHIPPED | OUTPATIENT
Start: 2023-08-10 | End: 2023-11-04

## 2023-08-10 RX ORDER — BUSPIRONE HYDROCHLORIDE 5 MG/1
TABLET ORAL
COMMUNITY
Start: 2023-08-02

## 2023-08-10 RX ORDER — AMMONIUM LACTATE 12 G/100G
LOTION TOPICAL
Qty: 400 G | Refills: 3 | Status: SHIPPED | OUTPATIENT
Start: 2023-08-10

## 2023-08-10 ASSESSMENT — ENCOUNTER SYMPTOMS
BACK PAIN: 0
CHEST TIGHTNESS: 0
SINUS PRESSURE: 0
WHEEZING: 0
BOWEL INCONTINENCE: 0
ABDOMINAL DISTENTION: 0
VISUAL CHANGE: 0
PHOTOPHOBIA: 0
FACIAL SWELLING: 0
APNEA: 0
DIARRHEA: 0
EYE ITCHING: 0
TROUBLE SWALLOWING: 0
BLOOD IN STOOL: 0
VOMITING: 0
SHORTNESS OF BREATH: 0
NAUSEA: 0
CONSTIPATION: 0
EYE PAIN: 0
CHOKING: 0
EYE REDNESS: 0
VOICE CHANGE: 0
COLOR CHANGE: 0
ABDOMINAL PAIN: 0
EYE DISCHARGE: 0

## 2023-08-10 NOTE — PROGRESS NOTES
Subjective:        Patient ID: Mehrdad Novak is a 65 y. o.right  handed female. Neurologic Problem  The patient's primary symptoms include clumsiness, focal sensory loss, focal weakness, a loss of balance, memory loss and weakness. The patient's pertinent negatives include no altered mental status, near-syncope, slurred speech, syncope or visual change. Primary symptoms comment: RESTLESS LEG  SYNDROME. This is a chronic problem. Episode onset: MORE   THAN    5-6 YEARS. The neurological problem developed insidiously. The problem is unchanged. There was left-sided, lower extremity and right-sided focality noted. Pertinent negatives include no abdominal pain, auditory change, aura, back pain, bladder incontinence, bowel incontinence, chest pain, confusion, diaphoresis, dizziness, fatigue, fever, headaches, light-headedness, nausea, neck pain, palpitations, shortness of breath, vertigo or vomiting. Past treatments include medication. The treatment provided moderate relief. Her past medical history is significant for mood changes. There is no history of a bleeding disorder, a clotting disorder, a CVA, dementia, head trauma, liver disease or seizures. History obtained from  The patient     and other  available medical records were  Also  reviewed.                 1)      H/O    CHRONIC    DIABETIC   PERIPHERAL  NEUROPATHY                                                      -  ON  NEURONTIN                          TOLERATING  THE  SAME                     WITH   SYMPTOMATIC      IMPROVEMENT                        2)     H/O    CHRONIC      RESTLESS  LEG  SYNDROME                 &  PERIODIC  LEG  MOVEMENTS                       -  IMPROVED  /   ON  SINEMET              3)    H/O    CHRONIC     SLEEP DIFFICULTIES                        -   RESOLVED              4)        H/O   CHRONIC   MILD  MEMORY PROBLEMS                     -    STABLE                   -  PATIENT  NOT  CONCERNED            5)

## 2023-08-11 DIAGNOSIS — F17.210 CIGARETTE NICOTINE DEPENDENCE WITHOUT COMPLICATION: ICD-10-CM

## 2023-08-11 NOTE — TELEPHONE ENCOUNTER
Diego Orosco called requesting a refill of the below medication which has been pended for you:     Requested Prescriptions     Pending Prescriptions Disp Refills    nicotine ([facility]) 7 MG/24HR [Pharmacy Med Name: NICOTINE 7 MG/24HR PATCH] 28 patch 0     Sig: apply topically ONTO THE SKIN every 24 hours       Last Appointment Date: 7/26/2023  Next Appointment Date: 10/26/2023    Allergies   Allergen Reactions    Lactose Intolerance (Gi) Diarrhea     Lactose Intolerant.

## 2023-08-31 ENCOUNTER — TELEPHONE (OUTPATIENT)
Dept: PODIATRY | Age: 63
End: 2023-08-31

## 2023-08-31 DIAGNOSIS — K62.89 CHRONIC RECTAL PAIN: ICD-10-CM

## 2023-08-31 DIAGNOSIS — G89.29 CHRONIC RECTAL PAIN: ICD-10-CM

## 2023-08-31 RX ORDER — TRAMADOL HYDROCHLORIDE 50 MG/1
TABLET ORAL
Qty: 90 TABLET | OUTPATIENT
Start: 2023-08-31

## 2023-09-01 ENCOUNTER — OFFICE VISIT (OUTPATIENT)
Dept: PRIMARY CARE CLINIC | Age: 63
End: 2023-09-01
Payer: MEDICAID

## 2023-09-01 VITALS
WEIGHT: 201 LBS | SYSTOLIC BLOOD PRESSURE: 118 MMHG | DIASTOLIC BLOOD PRESSURE: 74 MMHG | BODY MASS INDEX: 36.76 KG/M2 | OXYGEN SATURATION: 98 % | TEMPERATURE: 98.4 F | HEART RATE: 74 BPM

## 2023-09-01 DIAGNOSIS — L60.0 INGROWN TOENAIL: ICD-10-CM

## 2023-09-01 DIAGNOSIS — L03.031 PARONYCHIA OF GREAT TOE OF RIGHT FOOT: Primary | ICD-10-CM

## 2023-09-01 PROCEDURE — G8427 DOCREV CUR MEDS BY ELIG CLIN: HCPCS | Performed by: FAMILY MEDICINE

## 2023-09-01 PROCEDURE — 99213 OFFICE O/P EST LOW 20 MIN: CPT | Performed by: FAMILY MEDICINE

## 2023-09-01 PROCEDURE — 3078F DIAST BP <80 MM HG: CPT | Performed by: FAMILY MEDICINE

## 2023-09-01 PROCEDURE — G8417 CALC BMI ABV UP PARAM F/U: HCPCS | Performed by: FAMILY MEDICINE

## 2023-09-01 PROCEDURE — 3074F SYST BP LT 130 MM HG: CPT | Performed by: FAMILY MEDICINE

## 2023-09-01 PROCEDURE — 3017F COLORECTAL CA SCREEN DOC REV: CPT | Performed by: FAMILY MEDICINE

## 2023-09-01 PROCEDURE — 1036F TOBACCO NON-USER: CPT | Performed by: FAMILY MEDICINE

## 2023-09-01 ASSESSMENT — PATIENT HEALTH QUESTIONNAIRE - PHQ9
SUM OF ALL RESPONSES TO PHQ9 QUESTIONS 1 & 2: 0
SUM OF ALL RESPONSES TO PHQ QUESTIONS 1-9: 0
1. LITTLE INTEREST OR PLEASURE IN DOING THINGS: 0
2. FEELING DOWN, DEPRESSED OR HOPELESS: 0
SUM OF ALL RESPONSES TO PHQ QUESTIONS 1-9: 0

## 2023-09-01 ASSESSMENT — ENCOUNTER SYMPTOMS
WHEEZING: 0
SHORTNESS OF BREATH: 0
COUGH: 0
CHEST TIGHTNESS: 0

## 2023-09-01 NOTE — PROGRESS NOTES
[facility] [address] [facility] OF [address]  [address] [facility] [address]  Dept: [phone]  Dept Fax: [phone]    Benigno Pitt is a 59 y.o. female who presents today for her medical conditions/complaints as noted below. Benigno Pitt is c/o of   Chief Complaint   Patient presents with    Ingrown Toenail     Right 1 st digit        HPI:     HPI Here today for right toe pain. Pt presents with ingrown toenail of the R 1st digit with pain that began 3 days ago. States it only hurts when she hits it on something or presses on it. The pain does not radiate. Has noticed it swelling and getting more red than when the pain began. Reports a light yellow puss came out of it yesterday. Covered it with a Band-Aid and put prescription ointment (Mupirocin) on it. The ointment improved the pain and states she feels it is better today. Has not had an ingrown toenail removed before. Past Medical History:   Diagnosis Date    Alcoholism ([facility])     Anxiety     Astigmatism with presbyopia     Balance problem     Bipolar disorder ([facility])     schizoaffective disorder (Dr. Mica Reza)    Chronic lumbar radiculopathy     Chronic rectal pain     due to fissures    Chronic shoulder pain     bilaterally, due to osteoarthritis    CTS (carpal tunnel syndrome)     Depression     Fatigue     Hyperlipidemia     Hypertension     Obesity     Peripheral neuropathy     Pica     eats bar soap    Restless leg syndrome     Schizoaffective disorder ([address] )     Sleeping difficulty     Tobacco abuse     Type 2 diabetes mellitus ([facility])           Social History     Tobacco Use    Smoking status: Former     Packs/day: 1.00     Years: 26.00     Pack years: 26.00     Types: Cigarettes     Start date: 1975     Quit date: 2018     Years since quittin.7    Smokeless tobacco: Never   Substance Use Topics    Alcohol use:  Yes

## 2023-09-05 DIAGNOSIS — G89.29 CHRONIC RECTAL PAIN: Primary | ICD-10-CM

## 2023-09-05 DIAGNOSIS — K62.89 CHRONIC RECTAL PAIN: Primary | ICD-10-CM

## 2023-09-05 DIAGNOSIS — L29.9 PRURITUS: ICD-10-CM

## 2023-09-05 RX ORDER — DIPHENHYDRAMINE HCL 25 MG
CAPSULE ORAL
Qty: 60 CAPSULE | Refills: 1 | Status: SHIPPED | OUTPATIENT
Start: 2023-09-05

## 2023-09-05 RX ORDER — TRAMADOL HYDROCHLORIDE 50 MG/1
TABLET ORAL
Qty: 90 TABLET | Refills: 1 | Status: SHIPPED | OUTPATIENT
Start: 2023-09-05 | End: 2023-10-05

## 2023-09-05 NOTE — TELEPHONE ENCOUNTER
Gilberto Berg called requesting a refill of the below medication which has been pended for you:     OARRS from [place], [place], and [place] reviewed. Tramadol 50 mg last filled 8/7/23 #90/30 days    Requested Prescriptions     Pending Prescriptions Disp Refills    traMADol (ULTRAM) 50 MG tablet [Pharmacy Med Name: TRAMADOL HCL 50 MG TABLET] 90 tablet 0     Sig: take 1 tablet by mouth every 8 hours AS NEEDED FOR PAIN       Last Appointment Date: 7/26/2023  Next Appointment Date: 10/26/2023    Allergies   Allergen Reactions    Lactose Intolerance (Gi) Diarrhea     Lactose Intolerant.

## 2023-09-05 NOTE — TELEPHONE ENCOUNTER
Marcia Acron called requesting a refill of the below medication which has been pended for you:     Requested Prescriptions     Pending Prescriptions Disp Refills    diphenhydrAMINE (BANOPHEN) 25 MG capsule [Pharmacy Med Name: BANOPHEN 25 MG CAPSULE] 60 capsule 1     Sig: take 1 capsule by mouth twice a day if needed for itching or allergies       Last Appointment Date: 7/26/2023  Next Appointment Date: 10/26/2023    Allergies   Allergen Reactions    Lactose Intolerance (Gi) Diarrhea     Lactose Intolerant.

## 2023-09-06 NOTE — TELEPHONE ENCOUNTER
Sooner appointment offered (9/7/23) patient states she needs a 48 hr notice for transportation. Patient declined the offer for the appointment on 9/7/23.

## 2023-09-13 ENCOUNTER — HOSPITAL ENCOUNTER (OUTPATIENT)
Age: 63
Discharge: HOME | End: 2023-09-13
Payer: MEDICAID

## 2023-09-13 ENCOUNTER — PROCEDURE VISIT (OUTPATIENT)
Dept: PODIATRY | Age: 63
End: 2023-09-13
Payer: MEDICAID

## 2023-09-13 VITALS
HEIGHT: 62 IN | WEIGHT: 202 LBS | BODY MASS INDEX: 37.17 KG/M2 | HEART RATE: 81 BPM | DIASTOLIC BLOOD PRESSURE: 74 MMHG | SYSTOLIC BLOOD PRESSURE: 122 MMHG

## 2023-09-13 DIAGNOSIS — E66.9 OBESITY (BMI 30-39.9): ICD-10-CM

## 2023-09-13 DIAGNOSIS — E78.2 MIXED HYPERLIPIDEMIA: ICD-10-CM

## 2023-09-13 DIAGNOSIS — E83.42 HYPOMAGNESEMIA: ICD-10-CM

## 2023-09-13 DIAGNOSIS — E11.40 TYPE 2 DIABETES MELLITUS WITH DIABETIC NEUROPATHY, WITHOUT LONG-TERM CURRENT USE OF INSULIN (HCC): ICD-10-CM

## 2023-09-13 DIAGNOSIS — L60.0 OC (ONYCHOCRYPTOSIS): Primary | ICD-10-CM

## 2023-09-13 DIAGNOSIS — I10 ESSENTIAL HYPERTENSION: ICD-10-CM

## 2023-09-13 LAB
25(OH)D3 SERPL-MCNC: 36.2 NG/ML (ref 30–100)
ALBUMIN SERPL-MCNC: 4.2 G/DL (ref 3.5–5.2)
ALBUMIN/GLOB SERPL: 1.5 {RATIO} (ref 1–2.5)
ALP SERPL-CCNC: 104 U/L (ref 35–104)
ALT SERPL-CCNC: 20 U/L (ref 5–33)
ANION GAP SERPL CALCULATED.3IONS-SCNC: 9 MMOL/L (ref 9–17)
AST SERPL-CCNC: 46 U/L
BILIRUB SERPL-MCNC: 0.5 MG/DL (ref 0.3–1.2)
BUN SERPL-MCNC: 4 MG/DL (ref 8–23)
BUN/CREAT SERPL: 5 (ref 9–20)
CALCIUM SERPL-MCNC: 9.5 MG/DL (ref 8.6–10.4)
CHLORIDE SERPL-SCNC: 98 MMOL/L (ref 98–107)
CHOLEST SERPL-MCNC: 152 MG/DL (ref 0–199)
CHOLESTEROL/HDL RATIO: 3
CO2 SERPL-SCNC: 30 MMOL/L (ref 20–31)
CREAT SERPL-MCNC: 0.8 MG/DL (ref 0.5–0.9)
EST. AVERAGE GLUCOSE BLD GHB EST-MCNC: 143 MG/DL
GFR SERPL CREATININE-BSD FRML MDRD: >60 ML/MIN/1.73M2
GLUCOSE SERPL-MCNC: 204 MG/DL (ref 70–99)
HBA1C MFR BLD: 6.6 % (ref 4–6)
HDLC SERPL-MCNC: 60 MG/DL (ref 0–40)
LDLC SERPL CALC-MCNC: 56 MG/DL (ref 0–100)
MAGNESIUM SERPL-MCNC: 1.7 MG/DL (ref 1.6–2.6)
POTASSIUM SERPL-SCNC: 4.7 MMOL/L (ref 3.7–5.3)
PROT SERPL-MCNC: 7 G/DL (ref 6.4–8.3)
SODIUM SERPL-SCNC: 137 MMOL/L (ref 135–144)
TRIGL SERPL-MCNC: 181 MG/DL (ref 0–149)
VLDLC SERPL CALC-MCNC: 36 MG/DL

## 2023-09-13 PROCEDURE — 3046F HEMOGLOBIN A1C LEVEL >9.0%: CPT | Performed by: PODIATRIST

## 2023-09-13 PROCEDURE — 83735 ASSAY OF MAGNESIUM: CPT

## 2023-09-13 PROCEDURE — 3074F SYST BP LT 130 MM HG: CPT | Performed by: PODIATRIST

## 2023-09-13 PROCEDURE — 80053 COMPREHEN METABOLIC PANEL: CPT

## 2023-09-13 PROCEDURE — 99214 OFFICE O/P EST MOD 30 MIN: CPT | Performed by: PODIATRIST

## 2023-09-13 PROCEDURE — 83036 HEMOGLOBIN GLYCOSYLATED A1C: CPT

## 2023-09-13 PROCEDURE — 3078F DIAST BP <80 MM HG: CPT | Performed by: PODIATRIST

## 2023-09-13 PROCEDURE — 36415 COLL VENOUS BLD VENIPUNCTURE: CPT

## 2023-09-13 PROCEDURE — 82306 VITAMIN D 25 HYDROXY: CPT

## 2023-09-13 PROCEDURE — 80061 LIPID PANEL: CPT

## 2023-09-13 PROCEDURE — G8417 CALC BMI ABV UP PARAM F/U: HCPCS | Performed by: PODIATRIST

## 2023-09-13 PROCEDURE — 99213 OFFICE O/P EST LOW 20 MIN: CPT | Performed by: PODIATRIST

## 2023-09-13 PROCEDURE — 1036F TOBACCO NON-USER: CPT | Performed by: PODIATRIST

## 2023-09-13 PROCEDURE — G8427 DOCREV CUR MEDS BY ELIG CLIN: HCPCS | Performed by: PODIATRIST

## 2023-09-13 PROCEDURE — 2022F DILAT RTA XM EVC RTNOPTHY: CPT | Performed by: PODIATRIST

## 2023-09-13 PROCEDURE — 3017F COLORECTAL CA SCREEN DOC REV: CPT | Performed by: PODIATRIST

## 2023-09-13 NOTE — PROGRESS NOTES
Subjective:  Mejia Lau is a 59 y.o. female who presents to the office today complaining of an ingrown nail. Symptoms began 2 week(s) ago. Patient relates pain is Present. Pain is rated 1 out of 10 and is described as mild. Treatments prior to today's visit include: ointment at home. Currently denies F/C/N/V. Allergies   Allergen Reactions    Lactose Intolerance (Gi) Diarrhea     Lactose Intolerant. Past Medical History:   Diagnosis Date    Alcoholism ([address])     Anxiety     Astigmatism with presbyopia     Balance problem     Bipolar disorder ([facility])     schizoaffective disorder (Dr. Pasquale Gayle)    Chronic lumbar radiculopathy     Chronic rectal pain     due to fissures    Chronic shoulder pain     bilaterally, due to osteoarthritis    CTS (carpal tunnel syndrome)     Depression     Fatigue     Hyperlipidemia     Hypertension     Obesity     Peripheral neuropathy     Pica     eats bar soap    Restless leg syndrome     Schizoaffective disorder ([address])     Sleeping difficulty     Tobacco abuse     Type 2 diabetes mellitus ([address])        Prior to Admission medications    Medication Sig Start Date End Date Taking? Authorizing Provider   diphenhydrAMINE (BANOPHEN) 25 MG capsule take 1 capsule by mouth twice a day if needed for itching or allergies 9/5/23  Yes Cony Corona MD   traMADol (ULTRAM) 50 MG tablet take 1 tablet by mouth every 8 hours AS NEEDED FOR PAIN 9/5/23 10/5/23 Yes Cony Corona MD   nicotine (NICODERM CQ) 7 MG/24HR apply topically ONTO THE SKIN every 24 hours 8/11/23  Yes Cony Corona MD   busPIRone (BUSPAR) 5 MG tablet take 1 tablet by mouth once daily at bedtime for anxiety 8/2/23  Yes Historical Provider, MD   carbidopa-levodopa (SINEMET)  MG per tablet Taking 3  tablets at bedtime 8/10/23  Yes Rocío Gomes MD   ammonium lactate (LAC-HYDRIN) 12 % lotion Apply topically as needed.  8/10/23  Yes Rocío Gomes MD   gabapentin (NEURONTIN) 300 MG capsule take 1 capsule by

## 2023-09-15 ENCOUNTER — TELEPHONE (OUTPATIENT)
Dept: FAMILY MEDICINE CLINIC | Age: 63
End: 2023-09-15

## 2023-09-21 DIAGNOSIS — F10.20 ALCOHOLISM (HCC): ICD-10-CM

## 2023-09-21 RX ORDER — MULTIVITAMIN WITH FOLIC ACID 400 MCG
TABLET ORAL
Qty: 30 TABLET | Refills: 5 | Status: SHIPPED | OUTPATIENT
Start: 2023-09-21

## 2023-09-21 NOTE — TELEPHONE ENCOUNTER
Last Appt:  8/10/2023  Next Appt:   11/9/2023  Med verified in Epic     Patient needs refill on daily iram tablet

## 2023-10-01 DIAGNOSIS — L29.9 PRURITUS: ICD-10-CM

## 2023-10-02 ENCOUNTER — OFFICE VISIT (OUTPATIENT)
Dept: PRIMARY CARE CLINIC | Age: 63
End: 2023-10-02
Payer: MEDICAID

## 2023-10-02 VITALS
TEMPERATURE: 98 F | DIASTOLIC BLOOD PRESSURE: 80 MMHG | HEART RATE: 85 BPM | OXYGEN SATURATION: 91 % | BODY MASS INDEX: 37.08 KG/M2 | HEIGHT: 62 IN | RESPIRATION RATE: 15 BRPM | SYSTOLIC BLOOD PRESSURE: 126 MMHG | WEIGHT: 201.5 LBS

## 2023-10-02 DIAGNOSIS — H61.23 BILATERAL IMPACTED CERUMEN: Primary | ICD-10-CM

## 2023-10-02 PROCEDURE — 99213 OFFICE O/P EST LOW 20 MIN: CPT | Performed by: NURSE PRACTITIONER

## 2023-10-02 PROCEDURE — G8417 CALC BMI ABV UP PARAM F/U: HCPCS | Performed by: NURSE PRACTITIONER

## 2023-10-02 PROCEDURE — 3074F SYST BP LT 130 MM HG: CPT | Performed by: NURSE PRACTITIONER

## 2023-10-02 PROCEDURE — 3079F DIAST BP 80-89 MM HG: CPT | Performed by: NURSE PRACTITIONER

## 2023-10-02 PROCEDURE — 1036F TOBACCO NON-USER: CPT | Performed by: NURSE PRACTITIONER

## 2023-10-02 PROCEDURE — 69209 REMOVE IMPACTED EAR WAX UNI: CPT | Performed by: NURSE PRACTITIONER

## 2023-10-02 PROCEDURE — PBSHW PR REMOVAL IMPACTED CERUMEN IRRIGATION/LVG UNILAT: Performed by: NURSE PRACTITIONER

## 2023-10-02 PROCEDURE — G8427 DOCREV CUR MEDS BY ELIG CLIN: HCPCS | Performed by: NURSE PRACTITIONER

## 2023-10-02 PROCEDURE — G8484 FLU IMMUNIZE NO ADMIN: HCPCS | Performed by: NURSE PRACTITIONER

## 2023-10-02 PROCEDURE — 3017F COLORECTAL CA SCREEN DOC REV: CPT | Performed by: NURSE PRACTITIONER

## 2023-10-02 ASSESSMENT — ENCOUNTER SYMPTOMS
COUGH: 0
VOMITING: 0
RHINORRHEA: 0
SORE THROAT: 0
DIARRHEA: 0
RESPIRATORY NEGATIVE: 1
ABDOMINAL PAIN: 0

## 2023-10-02 NOTE — TELEPHONE ENCOUNTER
Niranjan Urbina called requesting a refill of the below medication which has been pended for you:     Requested Prescriptions     Pending Prescriptions Disp Refills    triamcinolone (KENALOG) 0.1 % cream [Pharmacy Med Name: TRIAMCINOLONE 0.1% CREAM] 80 g 0     Sig: apply topically twice a day if needed       Last Appointment Date: 7/26/2023  Next Appointment Date: 10/25/2023    Allergies   Allergen Reactions    Lactose Intolerance (Gi) Diarrhea     Lactose Intolerant.

## 2023-10-02 NOTE — PROGRESS NOTES
[facility]             [address], [address]                        Telephone [phone]             Fax [phone]     Melanie Paiz  1960  ZVP:[id number]   Date of visit:  10/2/2023    Subjective:    Melanie Paiz is a 59 y.o.  female who presents to [facility] today (10/2/2023) for evaluation of:    Chief Complaint   Patient presents with    Otalgia     Left ear pain started about a week or so ago, she reports its getting worse. Otalgia   There is pain in the left ear. This is a new problem. The current episode started in the past 7 days (X 1 week). The problem occurs every few hours. The problem has been gradually worsening. There has been no fever. The pain is at a severity of 9/10. Associated symptoms include hearing loss (muffled). Pertinent negatives include no abdominal pain, coughing, diarrhea, ear discharge, headaches, neck pain, rash, rhinorrhea, sore throat or vomiting. Associated symptoms comments: Left ear fullness. Intermittent left ear sharp poke while laying on right side. . She has tried nothing for the symptoms. The treatment provided no relief. She has the following problem list:  Patient Active Problem List   Diagnosis    Anxiety    Depression    Schizoaffective disorder ([address])    History of tobacco use    Restless leg syndrome    Pica    Essential hypertension    Bipolar disorder ([facility])    Alcoholism ([address])    Sleeping difficulty    Peripheral neuropathy    Balance problem    CTS (carpal tunnel syndrome)    Chronic lumbar radiculopathy    GERD (gastroesophageal reflux disease)    COPD (chronic obstructive pulmonary disease) ([facility])    Fatigue    Chronic rectal pain    Type 2 diabetes mellitus with diabetic neuropathy, without long-term current use of insulin ([facility])    Pruritus    Obesity (BMI 30.0-34. 9)    Mixed hyperlipidemia    Chronic right shoulder pain

## 2023-10-03 DIAGNOSIS — F17.210 CIGARETTE NICOTINE DEPENDENCE WITHOUT COMPLICATION: ICD-10-CM

## 2023-10-03 NOTE — TELEPHONE ENCOUNTER
Kala Reyes called requesting a refill of the below medication which has been pended for you:     Requested Prescriptions     Pending Prescriptions Disp Refills    nicotine ([facility]) 7 MG/24HR [Pharmacy Med Name: NICOTINE 7 MG/24HR PATCH] 28 patch 1     Sig: apply topically ONTO THE SKIN every 24 hours       Last Appointment Date: 7/26/2023  Next Appointment Date: 10/25/2023    Allergies   Allergen Reactions    Lactose Intolerance (Gi) Diarrhea     Lactose Intolerant.

## 2023-10-04 RX ORDER — TRIAMCINOLONE ACETONIDE 1 MG/G
CREAM TOPICAL
Qty: 80 G | Refills: 0 | Status: SHIPPED | OUTPATIENT
Start: 2023-10-04

## 2023-10-25 ENCOUNTER — IMMUNIZATION (OUTPATIENT)
Dept: LAB | Age: 63
End: 2023-10-25
Payer: MEDICAID

## 2023-10-25 ENCOUNTER — HOSPITAL ENCOUNTER (OUTPATIENT)
Age: 63
Discharge: HOME | End: 2023-10-25
Payer: MEDICAID

## 2023-10-25 ENCOUNTER — OFFICE VISIT (OUTPATIENT)
Dept: FAMILY MEDICINE CLINIC | Age: 63
End: 2023-10-25
Payer: MEDICAID

## 2023-10-25 VITALS
WEIGHT: 200 LBS | HEART RATE: 82 BPM | OXYGEN SATURATION: 92 % | DIASTOLIC BLOOD PRESSURE: 78 MMHG | HEIGHT: 62 IN | BODY MASS INDEX: 36.8 KG/M2 | SYSTOLIC BLOOD PRESSURE: 124 MMHG | TEMPERATURE: 98.8 F

## 2023-10-25 DIAGNOSIS — R74.8 ELEVATED LIVER ENZYMES: ICD-10-CM

## 2023-10-25 DIAGNOSIS — K62.89 CHRONIC RECTAL PAIN: Primary | ICD-10-CM

## 2023-10-25 DIAGNOSIS — G89.29 CHRONIC RECTAL PAIN: Primary | ICD-10-CM

## 2023-10-25 LAB
ALBUMIN SERPL-MCNC: 4.1 G/DL (ref 3.5–5.2)
ALBUMIN/GLOB SERPL: 1.3 {RATIO} (ref 1–2.5)
ALP SERPL-CCNC: 99 U/L (ref 35–104)
ALT SERPL-CCNC: 31 U/L (ref 5–33)
AST SERPL-CCNC: 47 U/L
BILIRUB DIRECT SERPL-MCNC: 0.2 MG/DL
BILIRUB INDIRECT SERPL-MCNC: 0.3 MG/DL (ref 0–1)
BILIRUB SERPL-MCNC: 0.5 MG/DL (ref 0.3–1.2)
GLOBULIN SER CALC-MCNC: 3.1 G/DL (ref 1.5–3.8)
PROT SERPL-MCNC: 7.2 G/DL (ref 6.4–8.3)

## 2023-10-25 PROCEDURE — 3017F COLORECTAL CA SCREEN DOC REV: CPT | Performed by: FAMILY MEDICINE

## 2023-10-25 PROCEDURE — G8417 CALC BMI ABV UP PARAM F/U: HCPCS | Performed by: FAMILY MEDICINE

## 2023-10-25 PROCEDURE — 3078F DIAST BP <80 MM HG: CPT | Performed by: FAMILY MEDICINE

## 2023-10-25 PROCEDURE — PBSHW INFLUENZA, FLUARIX, (AGE 6 MO+),  IM, PF, 0.5 ML: Performed by: FAMILY MEDICINE

## 2023-10-25 PROCEDURE — G8482 FLU IMMUNIZE ORDER/ADMIN: HCPCS | Performed by: FAMILY MEDICINE

## 2023-10-25 PROCEDURE — 36415 COLL VENOUS BLD VENIPUNCTURE: CPT

## 2023-10-25 PROCEDURE — 1036F TOBACCO NON-USER: CPT | Performed by: FAMILY MEDICINE

## 2023-10-25 PROCEDURE — 80076 HEPATIC FUNCTION PANEL: CPT

## 2023-10-25 PROCEDURE — 90686 IIV4 VACC NO PRSV 0.5 ML IM: CPT | Performed by: FAMILY MEDICINE

## 2023-10-25 PROCEDURE — G8427 DOCREV CUR MEDS BY ELIG CLIN: HCPCS | Performed by: FAMILY MEDICINE

## 2023-10-25 PROCEDURE — 3074F SYST BP LT 130 MM HG: CPT | Performed by: FAMILY MEDICINE

## 2023-10-25 PROCEDURE — 99213 OFFICE O/P EST LOW 20 MIN: CPT | Performed by: FAMILY MEDICINE

## 2023-10-25 RX ORDER — TRAMADOL HYDROCHLORIDE 50 MG/1
50 TABLET ORAL EVERY 8 HOURS PRN
Qty: 90 TABLET | Refills: 1 | Status: SHIPPED | OUTPATIENT
Start: 2023-11-05 | End: 2024-01-04

## 2023-10-25 RX ORDER — TRAMADOL HYDROCHLORIDE 50 MG/1
50 TABLET ORAL EVERY 8 HOURS PRN
COMMUNITY
Start: 2023-10-05 | End: 2023-10-25 | Stop reason: SDUPTHER

## 2023-10-25 NOTE — PROGRESS NOTES
Pt declines Covid, and Tdap vaccine at this time. Pt declines colonoscopy, pap smear, and diabetic foot exam at this time. Diabetic eye exam with Dr. Romi Ardon May 2023.

## 2023-10-25 NOTE — PROGRESS NOTES
[facility] ([facility])             [address] [facility], [address] [facility]                        Telephone [phone]             Fax [phone]       Vani Rincon  :  1960  Age:  59 y.o. MRN:  [id number]  Date of visit:  10/25/2023       Assessment and Plan:    1. Chronic rectal pain  Controlled substances monitoring: No signs of potential drug abuse or diversion identified when the OARRS report from [place], [place], and [place] was reviewed today. The activity on the report was consistent with the treatment plan. She appears to be taking Tramadol as prescribed. Tramadol was refilled:  - traMADol (ULTRAM) 50 MG tablet; Take 1 tablet by mouth every 8 hours as needed for Pain for up to 60 days. Max Daily Amount: 150 mg  Dispense: 90 tablet; Refill: 1    2. Elevated liver enzymes  Alcoholic liver disease vs. fatty liver disease vs. other  She was advised to cut down or quit drinking alcohol. Weight loss was recommended. - Hepatic Function Panel; Future was ordered. She will be contacted when the results are available. 3.  Routine health maintenance  Health maintenance was reviewed with the patient. She was advised that there is an updated Covid vaccine available this . Covid vaccination was recommended. RSV vaccination was recommended. She has received an influenza vaccine this influenza season. Tdap was recommended. Colon cancer screening was recommended. Cervical cancer screening was recommended. Follow up instructions were given to the patient:  Return in about 3 months (around 2024) for diabetes, COPD. Subjective:    Vani Rincon is a 59 y.o. female who presents to [facility] ([facility]) today (10/25/2023) for follow up/evaluation of:  Pain (Pain level is the same, rectum, lower back)      She states that she has been doing about the same. She continues to take Tramadol daily.   She is

## 2023-10-31 DIAGNOSIS — F17.210 CIGARETTE NICOTINE DEPENDENCE WITHOUT COMPLICATION: ICD-10-CM

## 2023-10-31 NOTE — TELEPHONE ENCOUNTER
Zelalem Quintero called requesting a refill of the below medication which has been pended for you:     Requested Prescriptions     Pending Prescriptions Disp Refills    nicotine ([facility]tt[facility]) 7 MG/24HR [Pharmacy Med Name: NICOTINE 7 MG/24HR PATCH] 28 patch 0     Sig: apply topically ONTO THE SKIN every 24 hours       Last Appointment Date: 10/25/2023  Next Appointment Date: 1/25/2024    Allergies   Allergen Reactions    Lactose Intolerance (Gi) Diarrhea     Lactose Intolerant.

## 2023-11-02 DIAGNOSIS — L29.9 PRURITUS: ICD-10-CM

## 2023-11-02 DIAGNOSIS — E83.42 HYPOMAGNESEMIA: ICD-10-CM

## 2023-11-03 ENCOUNTER — TELEPHONE (OUTPATIENT)
Dept: FAMILY MEDICINE CLINIC | Age: 63
End: 2023-11-03

## 2023-11-03 DIAGNOSIS — R79.89 ELEVATED LIVER FUNCTION TESTS: Primary | ICD-10-CM

## 2023-11-03 RX ORDER — TRIAMCINOLONE ACETONIDE 1 MG/G
CREAM TOPICAL
Qty: 80 G | Refills: 0 | Status: SHIPPED | OUTPATIENT
Start: 2023-11-03

## 2023-11-03 RX ORDER — LANOLIN ALCOHOL/MO/W.PET/CERES
CREAM (GRAM) TOPICAL
Qty: 30 TABLET | Refills: 2 | Status: SHIPPED | OUTPATIENT
Start: 2023-11-03

## 2023-11-03 RX ORDER — DIPHENHYDRAMINE HCL 25 MG
CAPSULE ORAL
Qty: 60 CAPSULE | Refills: 2 | Status: SHIPPED | OUTPATIENT
Start: 2023-11-03

## 2023-11-03 NOTE — TELEPHONE ENCOUNTER
----- Message from John Carrion MD sent at 11/2/2023 11:46 PM EDT -----  Please advise Gallo Dejesus that the liver tests remain mildly elevated. I recommend a liver ultrasound next. Please order.

## 2023-11-03 NOTE — TELEPHONE ENCOUNTER
Patient notified of results and agreeable to liver ultrasound. Order placed and patient call transferred to schedule.

## 2023-11-03 NOTE — TELEPHONE ENCOUNTER
Dea called requesting a refill of the below medication which has been pended for you:     Requested Prescriptions     Pending Prescriptions Disp Refills    triamcinolone (KENALOG) 0.1 % cream [Pharmacy Med Name: TRIAMCINOLONE 0.1% CREAM] 80 g 1     Sig: apply topically twice a day if needed    diphenhydrAMINE (BANOPHEN) 25 MG capsule [Pharmacy Med Name: BANOPHEN 25 MG CAPSULE] 60 capsule 2     Sig: take 1 capsule by mouth twice a day if needed for itching or allergies    magnesium oxide (MAG-OX) 400 (240 Mg) MG tablet [Pharmacy Med Name: MAGNESIUM OXIDE 400 MG TABLET] 30 tablet 2     Sig: take 1 tablet by mouth once daily       Last Appointment Date: 10/25/2023  Next Appointment Date: 1/25/2024    Allergies   Allergen Reactions    Lactose Intolerance (Gi) Diarrhea     Lactose Intolerant.

## 2023-11-09 ENCOUNTER — OFFICE VISIT (OUTPATIENT)
Dept: NEUROLOGY | Age: 63
End: 2023-11-09
Payer: MEDICAID

## 2023-11-09 VITALS
BODY MASS INDEX: 36.18 KG/M2 | OXYGEN SATURATION: 96 % | SYSTOLIC BLOOD PRESSURE: 126 MMHG | RESPIRATION RATE: 16 BRPM | WEIGHT: 197.8 LBS | DIASTOLIC BLOOD PRESSURE: 74 MMHG | HEART RATE: 83 BPM

## 2023-11-09 DIAGNOSIS — F25.9 SCHIZOAFFECTIVE DISORDER, UNSPECIFIED TYPE (HCC): ICD-10-CM

## 2023-11-09 DIAGNOSIS — F31.0 BIPOLAR AFFECTIVE DISORDER, CURRENT EPISODE HYPOMANIC (HCC): ICD-10-CM

## 2023-11-09 DIAGNOSIS — G63 POLYNEUROPATHY ASSOCIATED WITH UNDERLYING DISEASE (HCC): ICD-10-CM

## 2023-11-09 DIAGNOSIS — G62.9 PERIPHERAL POLYNEUROPATHY: ICD-10-CM

## 2023-11-09 DIAGNOSIS — E66.9 OBESITY (BMI 30.0-34.9): ICD-10-CM

## 2023-11-09 DIAGNOSIS — F25.0 SCHIZOAFFECTIVE DISORDER, BIPOLAR TYPE (HCC): ICD-10-CM

## 2023-11-09 DIAGNOSIS — G47.9 SLEEPING DIFFICULTY: ICD-10-CM

## 2023-11-09 DIAGNOSIS — R26.89 BALANCE PROBLEM: ICD-10-CM

## 2023-11-09 DIAGNOSIS — G25.81 RESTLESS LEG SYNDROME: ICD-10-CM

## 2023-11-09 DIAGNOSIS — F10.20 ALCOHOLISM (HCC): ICD-10-CM

## 2023-11-09 DIAGNOSIS — E66.01 MORBIDLY OBESE (HCC): ICD-10-CM

## 2023-11-09 DIAGNOSIS — M79.2 NEUROPATHIC PAIN: Primary | ICD-10-CM

## 2023-11-09 DIAGNOSIS — F41.9 ANXIETY: ICD-10-CM

## 2023-11-09 DIAGNOSIS — G56.03 BILATERAL CARPAL TUNNEL SYNDROME: ICD-10-CM

## 2023-11-09 DIAGNOSIS — F34.1 DYSTHYMIA: ICD-10-CM

## 2023-11-09 DIAGNOSIS — E53.8 VITAMIN B12 DEFICIENCY: ICD-10-CM

## 2023-11-09 PROCEDURE — 99214 OFFICE O/P EST MOD 30 MIN: CPT | Performed by: PSYCHIATRY & NEUROLOGY

## 2023-11-09 RX ORDER — AMMONIUM LACTATE 12 G/100G
LOTION TOPICAL
Qty: 400 G | Refills: 3 | Status: SHIPPED | OUTPATIENT
Start: 2023-11-09

## 2023-11-09 RX ORDER — GABAPENTIN 300 MG/1
CAPSULE ORAL
Qty: 120 CAPSULE | Refills: 2 | Status: SHIPPED | OUTPATIENT
Start: 2023-11-09 | End: 2024-02-08

## 2023-11-09 ASSESSMENT — ENCOUNTER SYMPTOMS
VOICE CHANGE: 0
VOMITING: 0
ABDOMINAL PAIN: 0
EYE DISCHARGE: 0
COLOR CHANGE: 0
BLOOD IN STOOL: 0
NAUSEA: 0
CHOKING: 0
CHEST TIGHTNESS: 0
SINUS PRESSURE: 0
APNEA: 0
VISUAL CHANGE: 0
BACK PAIN: 0
SHORTNESS OF BREATH: 0
BOWEL INCONTINENCE: 0
EYE REDNESS: 0
EYE ITCHING: 0
FACIAL SWELLING: 0
PHOTOPHOBIA: 0
WHEEZING: 0
ABDOMINAL DISTENTION: 0
TROUBLE SWALLOWING: 0
DIARRHEA: 0
EYE PAIN: 0
CONSTIPATION: 0

## 2023-11-09 ASSESSMENT — PATIENT HEALTH QUESTIONNAIRE - PHQ9
1. LITTLE INTEREST OR PLEASURE IN DOING THINGS: 0
SUM OF ALL RESPONSES TO PHQ QUESTIONS 1-9: 0
SUM OF ALL RESPONSES TO PHQ QUESTIONS 1-9: 0
2. FEELING DOWN, DEPRESSED OR HOPELESS: 0
SUM OF ALL RESPONSES TO PHQ QUESTIONS 1-9: 0
SUM OF ALL RESPONSES TO PHQ9 QUESTIONS 1 & 2: 0
SUM OF ALL RESPONSES TO PHQ QUESTIONS 1-9: 0

## 2023-11-09 NOTE — PROGRESS NOTES
Subjective:        Patient ID: Carlton Sánchez is a 59 y. o.right  handed female. Neurologic Problem  The patient's primary symptoms include clumsiness, focal sensory loss, focal weakness, a loss of balance, memory loss and weakness. The patient's pertinent negatives include no altered mental status, near-syncope, slurred speech, syncope or visual change. Primary symptoms comment: RESTLESS LEG  SYNDROME. This is a chronic problem. Episode onset: MORE   THAN    5-6 YEARS. The neurological problem developed insidiously. The problem is unchanged. There was left-sided, lower extremity and right-sided focality noted. Pertinent negatives include no abdominal pain, auditory change, aura, back pain, bladder incontinence, bowel incontinence, chest pain, confusion, diaphoresis, dizziness, fatigue, fever, headaches, light-headedness, nausea, neck pain, palpitations, shortness of breath, vertigo or vomiting. Past treatments include medication. The treatment provided moderate relief. Her past medical history is significant for mood changes. There is no history of a bleeding disorder, a clotting disorder, a CVA, dementia, head trauma, liver disease or seizures. History obtained from  The patient     and other  available medical records were  Also  reviewed.                 1)      H/O    CHRONIC    DIABETIC   PERIPHERAL  NEUROPATHY                                                      -  ON  NEURONTIN                          TOLERATING  THE  SAME                     WITH   SYMPTOMATIC      IMPROVEMENT                        2)     H/O    CHRONIC      RESTLESS  LEG  SYNDROME                 &  PERIODIC  LEG  MOVEMENTS                       -  IMPROVED  /   ON  SINEMET              3)    H/O    CHRONIC     SLEEP DIFFICULTIES                        -   RESOLVED              4)        H/O   CHRONIC   MILD  MEMORY PROBLEMS                     -    STABLE                   -  PATIENT  NOT  CONCERNED            5)

## 2023-11-10 ENCOUNTER — HOSPITAL ENCOUNTER (OUTPATIENT)
Dept: ULTRASOUND IMAGING | Age: 63
End: 2023-11-10
Attending: FAMILY MEDICINE
Payer: MEDICAID

## 2023-11-10 DIAGNOSIS — R79.89 ELEVATED LIVER FUNCTION TESTS: ICD-10-CM

## 2023-11-10 PROCEDURE — 76705 ECHO EXAM OF ABDOMEN: CPT

## 2023-12-13 ENCOUNTER — TELEPHONE (OUTPATIENT)
Dept: FAMILY MEDICINE CLINIC | Age: 63
End: 2023-12-13

## 2023-12-13 DIAGNOSIS — E11.40 TYPE 2 DIABETES MELLITUS WITH DIABETIC NEUROPATHY, WITHOUT LONG-TERM CURRENT USE OF INSULIN (HCC): ICD-10-CM

## 2023-12-13 RX ORDER — BLOOD-GLUCOSE METER
EACH MISCELLANEOUS
Qty: 1 KIT | Refills: 0 | Status: SHIPPED | OUTPATIENT
Start: 2023-12-13

## 2023-12-13 RX ORDER — BLOOD SUGAR DIAGNOSTIC
STRIP MISCELLANEOUS
Qty: 300 STRIP | Refills: 1 | Status: SHIPPED | OUTPATIENT
Start: 2023-12-13

## 2023-12-13 RX ORDER — LANCETS
1 EACH MISCELLANEOUS 3 TIMES DAILY
Qty: 100 EACH | Refills: 5 | Status: SHIPPED | OUTPATIENT
Start: 2023-12-13

## 2023-12-13 NOTE — TELEPHONE ENCOUNTER
Pt calling stating she's had her glucometer for quite a while and thinks she's due for a new one, can you send script for glucometer and supplies to pended pharmacy.

## 2023-12-13 NOTE — TELEPHONE ENCOUNTER
Mendel Safe called requesting a refill of the below medication which has been pended for you:     Requested Prescriptions     Pending Prescriptions Disp Refills    Blood Glucose Monitoring Suppl (ONE TOUCH ULTRA 2) w/Device KIT 1 kit 0     Sig: use as directed    blood glucose test strips (ONETOUCH ULTRA) strip 300 strip 1     Sig: As needed. ONE TOUCH CLUB LANCETS MISC 100 each 5     Si strip by Does not apply route in the morning, at noon, and at bedtime       Last Appointment Date: 10/25/2023  Next Appointment Date: 2024    Allergies   Allergen Reactions    Lactose Intolerance (Gi) Diarrhea     Lactose Intolerant.

## 2023-12-27 DIAGNOSIS — K62.89 CHRONIC RECTAL PAIN: Primary | ICD-10-CM

## 2023-12-27 DIAGNOSIS — G89.29 CHRONIC RECTAL PAIN: Primary | ICD-10-CM

## 2023-12-27 RX ORDER — TRAMADOL HYDROCHLORIDE 50 MG/1
50 TABLET ORAL EVERY 8 HOURS PRN
Qty: 90 TABLET | Refills: 1 | Status: SHIPPED | OUTPATIENT
Start: 2023-12-27 | End: 2024-02-25

## 2023-12-27 NOTE — TELEPHONE ENCOUNTER
Joan Gutierrez called requesting a refill of the below medication which has been pended for you:     Per [facility], last fill 12/04/2023, quantity 90 for 30 days. Patient just wants to get this refill request in before the holiday. She knows that her last refill was December 4. Requested Prescriptions     Pending Prescriptions Disp Refills    traMADol (ULTRAM) 50 MG tablet 90 tablet 1     Sig: Take 1 tablet by mouth every 8 hours as needed for Pain for up to 60 days. Max Daily Amount: 150 mg       Last Appointment Date: 10/25/2023  Next Appointment Date: 1/25/2024    Allergies   Allergen Reactions    Lactose Intolerance (Gi) Diarrhea     Lactose Intolerant.

## 2023-12-27 NOTE — TELEPHONE ENCOUNTER
Patient just wants to get this refill request in before the holiday. She knows that her last refill was December 4.

## 2024-01-09 ENCOUNTER — TELEPHONE (OUTPATIENT)
Dept: FAMILY MEDICINE CLINIC | Age: 64
End: 2024-01-09

## 2024-01-09 DIAGNOSIS — K21.9 GASTROESOPHAGEAL REFLUX DISEASE, UNSPECIFIED WHETHER ESOPHAGITIS PRESENT: ICD-10-CM

## 2024-01-09 DIAGNOSIS — R09.81 NASAL CONGESTION: Primary | ICD-10-CM

## 2024-01-09 RX ORDER — FLUTICASONE PROPIONATE 50 MCG
1 SPRAY, SUSPENSION (ML) NASAL DAILY
Qty: 16 G | Refills: 0 | Status: SHIPPED | OUTPATIENT
Start: 2024-01-09

## 2024-01-09 RX ORDER — OMEPRAZOLE 20 MG/1
CAPSULE, DELAYED RELEASE ORAL
Qty: 30 CAPSULE | Refills: 0 | Status: SHIPPED | OUTPATIENT
Start: 2024-01-09

## 2024-01-09 NOTE — TELEPHONE ENCOUNTER
Flonase nasal spray was sent to [facility] on [address].

## 2024-01-09 NOTE — TELEPHONE ENCOUNTER
Dea called requesting a refill of the below medication which has been pended for you:     Requested Prescriptions     Pending Prescriptions Disp Refills    omeprazole (PRILOSEC) 20 MG delayed release capsule [Pharmacy Med Name: OMEPRAZOLE DR 20 MG CAPSULE] 30 capsule 0     Sig: take 1 capsule by mouth once daily       Last Appointment Date: 10/25/2023  Next Appointment Date: 1/25/2024    Allergies   Allergen Reactions    Lactose Intolerance (Gi) Diarrhea     Lactose Intolerant.

## 2024-01-09 NOTE — TELEPHONE ENCOUNTER
Pt calling stating she's been sick for a few weeks, not sure if it was Covid, never tested, pt states she is much better but has a lot of phlegm and congestion and questions if you could call in some type of decongestant to pended pharmacy, please advise.

## 2024-01-28 DIAGNOSIS — L29.9 PRURITUS: ICD-10-CM

## 2024-01-29 RX ORDER — DIPHENHYDRAMINE HCL 25 MG
CAPSULE ORAL
Qty: 60 CAPSULE | Refills: 0 | Status: SHIPPED | OUTPATIENT
Start: 2024-01-29

## 2024-01-29 NOTE — TELEPHONE ENCOUNTER
Dea called requesting a refill of the below medication which has been pended for you:     Requested Prescriptions     Pending Prescriptions Disp Refills    diphenhydrAMINE (BANOPHEN) 25 MG capsule [Pharmacy Med Name: BANOPHEN 25 MG CAPSULE] 60 capsule 2     Sig: take 1 capsule by mouth twice a day if needed for itching or allergies       Last Appointment Date: 10/25/2023  Next Appointment Date: 2/15/2024    Allergies   Allergen Reactions    Lactose Intolerance (Gi) Diarrhea     Lactose Intolerant.

## 2024-02-07 ENCOUNTER — TELEPHONE (OUTPATIENT)
Dept: ONCOLOGY | Age: 64
End: 2024-02-07

## 2024-02-07 DIAGNOSIS — Z87.891 PERSONAL HISTORY OF NICOTINE DEPENDENCE: Primary | ICD-10-CM

## 2024-02-07 NOTE — TELEPHONE ENCOUNTER
Our records indicate that your patient is coming due for their annual lung cancer screening follow up testing.     For your convenience, we have pended the order for the scan for you. If you do not agree with the need for the test, please cancel the order and let us know.     Sincerely,    [facility]   Lung Cancer Screening Program    Auto printed reminder letter sent to patient.

## 2024-02-09 DIAGNOSIS — Z12.31 VISIT FOR SCREENING MAMMOGRAM: Primary | ICD-10-CM

## 2024-02-15 ENCOUNTER — OFFICE VISIT (OUTPATIENT)
Dept: FAMILY MEDICINE CLINIC | Age: 64
End: 2024-02-15

## 2024-02-15 VITALS
WEIGHT: 199 LBS | DIASTOLIC BLOOD PRESSURE: 80 MMHG | RESPIRATION RATE: 20 BRPM | BODY MASS INDEX: 36.62 KG/M2 | HEART RATE: 84 BPM | OXYGEN SATURATION: 95 % | SYSTOLIC BLOOD PRESSURE: 130 MMHG | HEIGHT: 62 IN

## 2024-02-15 DIAGNOSIS — E83.42 HYPOMAGNESEMIA: ICD-10-CM

## 2024-02-15 DIAGNOSIS — R05.9 COUGH, UNSPECIFIED TYPE: ICD-10-CM

## 2024-02-15 DIAGNOSIS — L29.9 PRURITUS: ICD-10-CM

## 2024-02-15 DIAGNOSIS — E66.9 OBESITY (BMI 30-39.9): ICD-10-CM

## 2024-02-15 DIAGNOSIS — E11.40 TYPE 2 DIABETES MELLITUS WITH DIABETIC NEUROPATHY, WITHOUT LONG-TERM CURRENT USE OF INSULIN (HCC): Primary | ICD-10-CM

## 2024-02-15 DIAGNOSIS — E78.2 MIXED HYPERLIPIDEMIA: ICD-10-CM

## 2024-02-15 DIAGNOSIS — K21.9 GASTROESOPHAGEAL REFLUX DISEASE, UNSPECIFIED WHETHER ESOPHAGITIS PRESENT: ICD-10-CM

## 2024-02-15 DIAGNOSIS — G63 POLYNEUROPATHY ASSOCIATED WITH UNDERLYING DISEASE (HCC): ICD-10-CM

## 2024-02-15 DIAGNOSIS — J44.9 CHRONIC OBSTRUCTIVE PULMONARY DISEASE, UNSPECIFIED COPD TYPE (HCC): ICD-10-CM

## 2024-02-15 DIAGNOSIS — I10 ESSENTIAL HYPERTENSION: ICD-10-CM

## 2024-02-15 RX ORDER — LOSARTAN POTASSIUM 100 MG/1
TABLET ORAL
Qty: 90 TABLET | Refills: 1 | Status: SHIPPED | OUTPATIENT
Start: 2024-02-15

## 2024-02-15 RX ORDER — FLUTICASONE PROPIONATE 110 UG/1
2 AEROSOL, METERED RESPIRATORY (INHALATION) 2 TIMES DAILY
Qty: 12 G | Refills: 3 | Status: SHIPPED | OUTPATIENT
Start: 2024-02-15 | End: 2025-02-14

## 2024-02-15 RX ORDER — OMEPRAZOLE 20 MG/1
20 CAPSULE, DELAYED RELEASE ORAL DAILY
Qty: 30 CAPSULE | Refills: 0 | Status: SHIPPED | OUTPATIENT
Start: 2024-02-15

## 2024-02-15 RX ORDER — PRAVASTATIN SODIUM 40 MG
TABLET ORAL
Qty: 90 TABLET | Refills: 1 | OUTPATIENT
Start: 2024-02-15

## 2024-02-15 RX ORDER — PRAVASTATIN SODIUM 40 MG
TABLET ORAL
Qty: 90 TABLET | Refills: 1 | Status: SHIPPED | OUTPATIENT
Start: 2024-02-15

## 2024-02-15 RX ORDER — LANOLIN ALCOHOL/MO/W.PET/CERES
400 CREAM (GRAM) TOPICAL DAILY
Qty: 30 TABLET | Refills: 2 | Status: SHIPPED | OUTPATIENT
Start: 2024-02-15

## 2024-02-15 RX ORDER — DIPHENHYDRAMINE HCL 25 MG
CAPSULE ORAL
Qty: 60 CAPSULE | Refills: 0 | Status: SHIPPED | OUTPATIENT
Start: 2024-02-15

## 2024-02-15 RX ORDER — LOSARTAN POTASSIUM 100 MG/1
TABLET ORAL
Qty: 90 TABLET | Refills: 1 | OUTPATIENT
Start: 2024-02-15

## 2024-02-15 RX ORDER — DIPHENHYDRAMINE HCL 25 MG
CAPSULE ORAL
Qty: 60 CAPSULE | Refills: 0 | OUTPATIENT
Start: 2024-02-15

## 2024-02-15 RX ORDER — LANOLIN ALCOHOL/MO/W.PET/CERES
CREAM (GRAM) TOPICAL
Qty: 30 TABLET | Refills: 2 | OUTPATIENT
Start: 2024-02-15

## 2024-02-15 RX ORDER — OMEPRAZOLE 20 MG/1
20 CAPSULE, DELAYED RELEASE ORAL DAILY
Qty: 30 CAPSULE | Refills: 0 | OUTPATIENT
Start: 2024-02-15

## 2024-02-15 NOTE — PROGRESS NOTES
Was sick in December for 2-3 days. Was neg for covid. Had fatigue,congestion, cough. Stated still struggles with feeling like there is something stuck in her throat like mucus. It takes a lot to cough it out and some tomes feels like it clogs here throat an takes her breath away. States it happens through out the day. Stated had diabetic eye exam last may at [facility] in [place]. Declines pap and colorectal screening. Stated will update vaccines at [facility].

## 2024-02-15 NOTE — PROGRESS NOTES
[place] [address]                        Telephone [phone]             Fax [phone]       Dea Crook  :  1960  Age:  63 y.o.   MRN:  [id number]  Date of visit:  2/15/2024       Assessment and Plan:    1. Type 2 diabetes mellitus with diabetic neuropathy, without long-term current use of insulin (HCC)  She has orders for a HgbA1c.  She will be contacted when the results are available.     Labs were also ordered to be done in 6 months:    - Comprehensive Metabolic Panel, Fasting; Future  - Hemoglobin A1C; Future  - Microalbumin, Ur; Future    -  DIABETES FOOT EXAM    2. Essential hypertension  Her blood pressure is adequately-controlled today.  (BP: 130/80)   She was advised to continue current medications.  Losartan was refilled:   - losartan (COZAAR) 100 MG tablet; take 1 tablet by mouth once daily  Dispense: 90 tablet; Refill: 1    3. Mixed hyperlipidemia  She has orders for a lipid panel.  She will be contacted when the results are available.    She is tolerating Pravastatin well.  This was refilled:  - pravastatin (PRAVACHOL) 40 MG tablet; take 1 tablet by mouth once daily  Dispense: 90 tablet; Refill: 1    - Lipid Panel; Future was also ordered to be done in 6 months.     4. Obesity (BMI 30-39.9)  Her weight has been stable for the past 6 months.  - Vitamin D 25 Hydroxy; Future was ordered to be done in 6 months.    5. Hypomagnesemia  She has orders for a comprehensive panel.  She will be contacted when the results are available.    Magnesium oxide was refilled:  - magnesium oxide (MAG-OX) 400 (240 Mg) MG tablet; Take 1 tablet by mouth daily  Dispense: 30 tablet; Refill: 2    6. Gastroesophageal reflux disease, unspecified whether esophagitis present  Omeprazole was refilled:  - omeprazole (PRILOSEC) 20 MG delayed release capsule; Take 1 capsule by mouth daily  Dispense: 30 capsule;

## 2024-02-16 DIAGNOSIS — F17.210 CIGARETTE NICOTINE DEPENDENCE WITHOUT COMPLICATION: ICD-10-CM

## 2024-02-19 NOTE — TELEPHONE ENCOUNTER
Dea called requesting a refill of the below medication which has been pended for you:     Requested Prescriptions     Pending Prescriptions Disp Refills    nicotine (NICODERM CQ) 7 MG/24HR [Pharmacy Med Name: NICOTINE 7 MG/24HR PATCH] 28 patch 3     Sig: apply topically ONTO THE SKIN every 24 hours       Last Appointment Date: 2/15/2024  Next Appointment Date: 5/15/2024    Allergies   Allergen Reactions    Lactose Intolerance (Gi) Diarrhea     Lactose Intolerant.

## 2024-02-28 DIAGNOSIS — K62.89 CHRONIC RECTAL PAIN: ICD-10-CM

## 2024-02-28 DIAGNOSIS — K62.89 CHRONIC RECTAL PAIN: Primary | ICD-10-CM

## 2024-02-28 DIAGNOSIS — G89.29 CHRONIC RECTAL PAIN: ICD-10-CM

## 2024-02-28 DIAGNOSIS — G89.29 CHRONIC RECTAL PAIN: Primary | ICD-10-CM

## 2024-02-28 RX ORDER — TRAMADOL HYDROCHLORIDE 50 MG/1
50 TABLET ORAL EVERY 8 HOURS PRN
Qty: 90 TABLET | OUTPATIENT
Start: 2024-02-28

## 2024-02-28 RX ORDER — TRAMADOL HYDROCHLORIDE 50 MG/1
50 TABLET ORAL EVERY 8 HOURS PRN
Qty: 90 TABLET | Refills: 0 | Status: SHIPPED | OUTPATIENT
Start: 2024-02-28 | End: 2024-03-29

## 2024-02-28 NOTE — TELEPHONE ENCOUNTER
Dea called requesting a refill of the below medication which has been pended for you:     [place] from [place], [place], and [place] reviewed. Tramadol 50 mg last filled 1/31/24 #90/30 days    Requested Prescriptions     Pending Prescriptions Disp Refills    traMADol (ULTRAM) 50 MG tablet 90 tablet 0     Sig: Take 1 tablet by mouth every 8 hours as needed for Pain for up to 30 days.       Last Appointment Date: 2/15/2024  Next Appointment Date: 5/15/2024    Allergies   Allergen Reactions    Lactose Intolerance (Gi) Diarrhea     Lactose Intolerant.

## 2024-03-05 DIAGNOSIS — R09.81 NASAL CONGESTION: ICD-10-CM

## 2024-03-05 RX ORDER — FLUTICASONE PROPIONATE 50 MCG
1 SPRAY, SUSPENSION (ML) NASAL DAILY
Qty: 16 G | Refills: 0 | Status: SHIPPED | OUTPATIENT
Start: 2024-03-05

## 2024-03-05 NOTE — TELEPHONE ENCOUNTER
Dea called requesting a refill of the below medication which has been pended for you:     Requested Prescriptions     Pending Prescriptions Disp Refills    fluticasone (FLONASE) 50 MCG/ACT nasal spray 16 g 0     Si spray by Each Nostril route daily       Last Appointment Date: 2/15/2024  Next Appointment Date: 5/15/2024    Allergies   Allergen Reactions    Lactose Intolerance (Gi) Diarrhea     Lactose Intolerant.

## 2024-03-08 ENCOUNTER — HOSPITAL ENCOUNTER (OUTPATIENT)
Age: 64
Discharge: HOME | End: 2024-03-08
Payer: MEDICAID

## 2024-03-08 ENCOUNTER — OFFICE VISIT (OUTPATIENT)
Dept: NEUROLOGY | Age: 64
End: 2024-03-08
Payer: MEDICAID

## 2024-03-08 VITALS
SYSTOLIC BLOOD PRESSURE: 132 MMHG | OXYGEN SATURATION: 97 % | HEART RATE: 76 BPM | DIASTOLIC BLOOD PRESSURE: 72 MMHG | RESPIRATION RATE: 16 BRPM | WEIGHT: 197 LBS | BODY MASS INDEX: 36.03 KG/M2

## 2024-03-08 DIAGNOSIS — G56.03 BILATERAL CARPAL TUNNEL SYNDROME: ICD-10-CM

## 2024-03-08 DIAGNOSIS — F25.9 SCHIZOAFFECTIVE DISORDER, UNSPECIFIED TYPE (HCC): ICD-10-CM

## 2024-03-08 DIAGNOSIS — G62.9 PERIPHERAL POLYNEUROPATHY: ICD-10-CM

## 2024-03-08 DIAGNOSIS — E66.9 OBESITY (BMI 30.0-34.9): ICD-10-CM

## 2024-03-08 DIAGNOSIS — G25.81 RESTLESS LEG SYNDROME: ICD-10-CM

## 2024-03-08 DIAGNOSIS — E11.40 TYPE 2 DIABETES MELLITUS WITH DIABETIC NEUROPATHY, WITHOUT LONG-TERM CURRENT USE OF INSULIN (HCC): ICD-10-CM

## 2024-03-08 DIAGNOSIS — F41.9 ANXIETY: ICD-10-CM

## 2024-03-08 DIAGNOSIS — R53.82 CHRONIC FATIGUE: ICD-10-CM

## 2024-03-08 DIAGNOSIS — M79.2 NEUROPATHIC PAIN: ICD-10-CM

## 2024-03-08 DIAGNOSIS — R26.89 BALANCE PROBLEM: ICD-10-CM

## 2024-03-08 DIAGNOSIS — F25.0 SCHIZOAFFECTIVE DISORDER, BIPOLAR TYPE (HCC): ICD-10-CM

## 2024-03-08 DIAGNOSIS — G47.9 SLEEPING DIFFICULTY: ICD-10-CM

## 2024-03-08 DIAGNOSIS — F34.1 DYSTHYMIA: ICD-10-CM

## 2024-03-08 DIAGNOSIS — M54.16 CHRONIC LUMBAR RADICULOPATHY: ICD-10-CM

## 2024-03-08 DIAGNOSIS — I72.8 SPLENIC ARTERY ANEURYSM (HCC): ICD-10-CM

## 2024-03-08 DIAGNOSIS — I10 ESSENTIAL HYPERTENSION: ICD-10-CM

## 2024-03-08 DIAGNOSIS — E78.2 MIXED HYPERLIPIDEMIA: ICD-10-CM

## 2024-03-08 DIAGNOSIS — F10.20 ALCOHOLISM (HCC): ICD-10-CM

## 2024-03-08 DIAGNOSIS — E66.01 MORBIDLY OBESE (HCC): ICD-10-CM

## 2024-03-08 DIAGNOSIS — E53.8 VITAMIN B12 DEFICIENCY: ICD-10-CM

## 2024-03-08 DIAGNOSIS — G63 POLYNEUROPATHY ASSOCIATED WITH UNDERLYING DISEASE (HCC): Primary | ICD-10-CM

## 2024-03-08 DIAGNOSIS — F31.0 BIPOLAR AFFECTIVE DISORDER, CURRENT EPISODE HYPOMANIC (HCC): ICD-10-CM

## 2024-03-08 LAB
ALBUMIN SERPL-MCNC: 3.8 G/DL (ref 3.5–5.2)
ALBUMIN/GLOB SERPL: 1.2 {RATIO} (ref 1–2.5)
ALP SERPL-CCNC: 120 U/L (ref 35–104)
ALT SERPL-CCNC: 17 U/L (ref 5–33)
ANION GAP SERPL CALCULATED.3IONS-SCNC: 8 MMOL/L (ref 9–17)
AST SERPL-CCNC: 38 U/L
BILIRUB SERPL-MCNC: 0.3 MG/DL (ref 0.3–1.2)
BUN SERPL-MCNC: 18 MG/DL (ref 8–23)
BUN/CREAT SERPL: 23 (ref 9–20)
CALCIUM SERPL-MCNC: 9.2 MG/DL (ref 8.6–10.4)
CHLORIDE SERPL-SCNC: 99 MMOL/L (ref 98–107)
CHOLEST SERPL-MCNC: 152 MG/DL (ref 0–199)
CHOLESTEROL/HDL RATIO: 2
CO2 SERPL-SCNC: 30 MMOL/L (ref 20–31)
CREAT SERPL-MCNC: 0.8 MG/DL (ref 0.5–0.9)
EST. AVERAGE GLUCOSE BLD GHB EST-MCNC: 157 MG/DL
GFR SERPL CREATININE-BSD FRML MDRD: >60 ML/MIN/1.73M2
GLUCOSE SERPL-MCNC: 187 MG/DL (ref 70–99)
HBA1C MFR BLD: 7.1 % (ref 4–6)
HDLC SERPL-MCNC: 61 MG/DL
LDLC SERPL CALC-MCNC: 56 MG/DL (ref 0–100)
POTASSIUM SERPL-SCNC: 4.8 MMOL/L (ref 3.7–5.3)
PROT SERPL-MCNC: 6.9 G/DL (ref 6.4–8.3)
SODIUM SERPL-SCNC: 137 MMOL/L (ref 135–144)
TRIGL SERPL-MCNC: 176 MG/DL
VLDLC SERPL CALC-MCNC: 35 MG/DL

## 2024-03-08 PROCEDURE — 80053 COMPREHEN METABOLIC PANEL: CPT

## 2024-03-08 PROCEDURE — 99214 OFFICE O/P EST MOD 30 MIN: CPT | Performed by: PSYCHIATRY & NEUROLOGY

## 2024-03-08 PROCEDURE — 36415 COLL VENOUS BLD VENIPUNCTURE: CPT

## 2024-03-08 PROCEDURE — 80061 LIPID PANEL: CPT

## 2024-03-08 PROCEDURE — 83036 HEMOGLOBIN GLYCOSYLATED A1C: CPT

## 2024-03-08 RX ORDER — ASPIRIN 325 MG
325 TABLET, DELAYED RELEASE (ENTERIC COATED) ORAL DAILY
Qty: 30 TABLET | Refills: 12 | Status: SHIPPED | OUTPATIENT
Start: 2024-03-08

## 2024-03-08 RX ORDER — MULTIVITAMIN WITH FOLIC ACID 400 MCG
TABLET ORAL
Qty: 30 TABLET | Refills: 5 | Status: SHIPPED | OUTPATIENT
Start: 2024-03-08

## 2024-03-08 ASSESSMENT — ENCOUNTER SYMPTOMS
BLOOD IN STOOL: 0
VOICE CHANGE: 0
CHEST TIGHTNESS: 0
NAUSEA: 0
FACIAL SWELLING: 0
WHEEZING: 0
ABDOMINAL DISTENTION: 0
BACK PAIN: 0
CONSTIPATION: 0
ABDOMINAL PAIN: 0
CHOKING: 0
APNEA: 0
DIARRHEA: 0
EYE REDNESS: 0
VOMITING: 0
BOWEL INCONTINENCE: 0
EYE PAIN: 0
EYE ITCHING: 0
VISUAL CHANGE: 0
EYE DISCHARGE: 0
COLOR CHANGE: 0
TROUBLE SWALLOWING: 0
SHORTNESS OF BREATH: 0
PHOTOPHOBIA: 0
SINUS PRESSURE: 0

## 2024-03-08 ASSESSMENT — PATIENT HEALTH QUESTIONNAIRE - PHQ9
1. LITTLE INTEREST OR PLEASURE IN DOING THINGS: 0
SUM OF ALL RESPONSES TO PHQ QUESTIONS 1-9: 0
2. FEELING DOWN, DEPRESSED OR HOPELESS: 0
SUM OF ALL RESPONSES TO PHQ QUESTIONS 1-9: 0
SUM OF ALL RESPONSES TO PHQ9 QUESTIONS 1 & 2: 0

## 2024-03-08 NOTE — PROGRESS NOTES
Subjective:        Patient ID: Dea Crook is a 63 y.o.right  handed female.        Neurologic Problem  The patient's primary symptoms include clumsiness, focal sensory loss, focal weakness, a loss of balance, memory loss and weakness. The patient's pertinent negatives include no altered mental status, near-syncope, slurred speech, syncope or visual change. Primary symptoms comment: RESTLESS LEG  SYNDROME. This is a chronic problem. Episode onset: MORE   THAN    5-6 YEARS. The neurological problem developed insidiously. The problem is unchanged. There was left-sided, lower extremity and right-sided focality noted. Pertinent negatives include no abdominal pain, auditory change, aura, back pain, bladder incontinence, bowel incontinence, chest pain, confusion, diaphoresis, dizziness, fatigue, fever, headaches, light-headedness, nausea, neck pain, palpitations, shortness of breath, vertigo or vomiting. Past treatments include medication. The treatment provided moderate relief. Her past medical history is significant for mood changes. There is no history of a bleeding disorder, a clotting disorder, a CVA, dementia, head trauma, liver disease or seizures.            History obtained from  The patient     and other  available medical records were  Also  reviewed.                1)      H/O    CHRONIC    DIABETIC   PERIPHERAL  NEUROPATHY                                                      -  ON  NEURONTIN /   TOLERATING  THE  SAME                     WITH   SYMPTOMATIC      IMPROVEMENT                        2)     H/O    CHRONIC      RESTLESS  LEG  SYNDROME                  &  PERIODIC  LEG  MOVEMENTS                       -  IMPROVED  /   ON  SINEMET              3)    H/O    CHRONIC     SLEEP DIFFICULTIES                        -   RESOLVED              4)        H/O   CHRONIC   MILD  MEMORY PROBLEMS                     -    STABLE                   -  PATIENT  NOT  CONCERNED            5)       PREVIOUS   H/O

## 2024-03-25 DIAGNOSIS — K21.9 GASTROESOPHAGEAL REFLUX DISEASE, UNSPECIFIED WHETHER ESOPHAGITIS PRESENT: ICD-10-CM

## 2024-03-25 RX ORDER — OMEPRAZOLE 20 MG/1
20 CAPSULE, DELAYED RELEASE ORAL DAILY
Qty: 30 CAPSULE | Refills: 2 | Status: SHIPPED | OUTPATIENT
Start: 2024-03-25

## 2024-03-25 NOTE — TELEPHONE ENCOUNTER
Dea called requesting a refill of the below medication which has been pended for you:     Requested Prescriptions     Pending Prescriptions Disp Refills    omeprazole (PRILOSEC) 20 MG delayed release capsule 30 capsule 0     Sig: Take 1 capsule by mouth daily       Last Appointment Date: 2/15/2024  Next Appointment Date: 7/31/2024    Allergies   Allergen Reactions    Lactose Intolerance (Gi) Diarrhea     Lactose Intolerant.

## 2024-03-29 DIAGNOSIS — G63 POLYNEUROPATHY ASSOCIATED WITH UNDERLYING DISEASE (HCC): ICD-10-CM

## 2024-03-29 DIAGNOSIS — K62.89 CHRONIC RECTAL PAIN: Primary | ICD-10-CM

## 2024-03-29 DIAGNOSIS — G89.29 CHRONIC RECTAL PAIN: Primary | ICD-10-CM

## 2024-03-29 DIAGNOSIS — G56.03 BILATERAL CARPAL TUNNEL SYNDROME: ICD-10-CM

## 2024-03-29 RX ORDER — GABAPENTIN 300 MG/1
CAPSULE ORAL
Qty: 120 CAPSULE | Refills: 2 | Status: SHIPPED | OUTPATIENT
Start: 2024-03-29 | End: 2024-07-27

## 2024-03-29 RX ORDER — TRAMADOL HYDROCHLORIDE 50 MG/1
50 TABLET ORAL EVERY 8 HOURS PRN
Qty: 90 TABLET | Refills: 0 | Status: SHIPPED | OUTPATIENT
Start: 2024-03-29 | End: 2024-04-28

## 2024-03-29 NOTE — TELEPHONE ENCOUNTER
Dea called requesting a refill of the below medication which has been pended for you: O[place] from [place], [place], and [place] reviewed. Tramadol 50 mg last filled 2/29/24 #90/30 days    Requested Prescriptions     Pending Prescriptions Disp Refills    traMADol (ULTRAM) 50 MG tablet 90 tablet 0     Sig: Take 1 tablet by mouth every 8 hours as needed for Pain for up to 30 days.       Last Appointment Date: 2/15/2024  Next Appointment Date: 5/1/2024    Allergies   Allergen Reactions    Lactose Intolerance (Gi) Diarrhea     Lactose Intolerant.         
Controlled substances monitoring: No signs of potential drug abuse or diversion identified when the OAR[place] report from [place], [place], and [place] was reviewed today.  The activity on the report was consistent with the treatment plan.   
Fax received for refill   
English

## 2024-03-29 NOTE — TELEPHONE ENCOUNTER
Last Appt:  3/8/2024  Next Appt:   6/12/2024  Med verified in Epic     Patient needs refill on gabapentin

## 2024-04-02 DIAGNOSIS — L29.9 PRURITUS: ICD-10-CM

## 2024-04-02 RX ORDER — DIPHENHYDRAMINE HCL 25 MG
CAPSULE ORAL
Qty: 60 CAPSULE | Refills: 0 | Status: SHIPPED | OUTPATIENT
Start: 2024-04-02

## 2024-04-02 NOTE — TELEPHONE ENCOUNTER
Dea called requesting a refill of the below medication which has been pended for you:     Requested Prescriptions     Pending Prescriptions Disp Refills    diphenhydrAMINE (BANOPHEN) 25 MG capsule 60 capsule 0     Sig: take 1 capsule by mouth twice a day if needed for itching or allergies       Last Appointment Date: 2/15/2024  Next Appointment Date: 5/1/2024    Allergies   Allergen Reactions    Lactose Intolerance (Gi) Diarrhea     Lactose Intolerant.

## 2024-04-22 DIAGNOSIS — G63 POLYNEUROPATHY ASSOCIATED WITH UNDERLYING DISEASE (HCC): ICD-10-CM

## 2024-04-22 NOTE — TELEPHONE ENCOUNTER
Last Appt:  3/8/2024  Next Appt:   6/12/2024  Med verified in Epic     Patient needs refill on Ammonium Lactate

## 2024-04-23 RX ORDER — AMMONIUM LACTATE 12 G/100G
LOTION TOPICAL
Qty: 400 G | Refills: 3 | Status: SHIPPED | OUTPATIENT
Start: 2024-04-23

## 2024-04-25 DIAGNOSIS — G89.29 CHRONIC RECTAL PAIN: Primary | ICD-10-CM

## 2024-04-25 DIAGNOSIS — K62.89 CHRONIC RECTAL PAIN: Primary | ICD-10-CM

## 2024-04-25 RX ORDER — TRAMADOL HYDROCHLORIDE 50 MG/1
50 TABLET ORAL EVERY 8 HOURS PRN
Qty: 90 TABLET | Refills: 0 | Status: SHIPPED | OUTPATIENT
Start: 2024-04-25 | End: 2024-05-25

## 2024-04-25 RX ORDER — TRAMADOL HYDROCHLORIDE 50 MG/1
50 TABLET ORAL EVERY 8 HOURS PRN
Qty: 90 TABLET | Refills: 0 | Status: SHIPPED | OUTPATIENT
Start: 2024-05-25 | End: 2024-06-24

## 2024-04-25 NOTE — TELEPHONE ENCOUNTER
Dea called requesting a refill of the below medication which has been pended for you:     O[place] from [place], [place], and [place] reviewed. Tramadol 50 mg last filled 3/29/24 #90/30 days    Requested Prescriptions     Pending Prescriptions Disp Refills    traMADol (ULTRAM) 50 MG tablet [Pharmacy Med Name: TRAMADOL HCL 50 MG TABLET] 90 tablet 0     Sig: Take 1 tablet by mouth every 8 hours as needed for Pain for up to 30 days. Max Daily Amount: 150 mg       Last Appointment Date: 2/15/2024  Next Appointment Date: 5/1/2024    Allergies   Allergen Reactions    Lactose Intolerance (Gi) Diarrhea     Lactose Intolerant.

## 2024-04-29 DIAGNOSIS — L29.9 PRURITUS: ICD-10-CM

## 2024-04-30 NOTE — TELEPHONE ENCOUNTER
Dea called requesting a refill of the below medication which has been pended for you:     Patient has OV tomorrow to discuss    Requested Prescriptions     Pending Prescriptions Disp Refills    diphenhydrAMINE (BANOPHEN) 25 MG capsule [Pharmacy Med Name: BANOPHEN 25 MG CAPSULE] 60 capsule 0     Sig: take 1 capsule by mouth twice a day if needed for itching or allergies       Last Appointment Date: 2/15/2024  Next Appointment Date: 5/1/2024    Allergies   Allergen Reactions    Lactose Intolerance (Gi) Diarrhea     Lactose Intolerant.

## 2024-05-01 ENCOUNTER — TELEPHONE (OUTPATIENT)
Dept: FAMILY MEDICINE CLINIC | Age: 64
End: 2024-05-01

## 2024-05-01 NOTE — TELEPHONE ENCOUNTER
Dea called requesting a refill of the below medication which has been pended for you:     Requested Prescriptions     Pending Prescriptions Disp Refills    diphenhydrAMINE (BANOPHEN) 25 MG capsule [Pharmacy Med Name: BANOPHEN 25 MG CAPSULE] 60 capsule 0     Sig: take 1 capsule by mouth twice a day if needed for itching or allergies       Last Appointment Date: 2/15/2024  Next Appointment Date: 8/1/24    Allergies   Allergen Reactions    Lactose Intolerance (Gi) Diarrhea     Lactose Intolerant.

## 2024-05-01 NOTE — TELEPHONE ENCOUNTER
Last OV 2/15/24    Patient due for 3 month follow up for pain medication.     LM for patient to return call to see if can schedule before 8/1/24

## 2024-05-01 NOTE — TELEPHONE ENCOUNTER
Pt stated that medical transport was stuck in traffic and did not arrive on time. Pt stated that she has been trying to call the office and kept getting disconnected. Pt sees an orthopedic doctor at [facility] Degenerative Disc Disease Lumbar, wanted to make sure Dr Duffy was aware.

## 2024-05-02 ENCOUNTER — TELEPHONE (OUTPATIENT)
Dept: FAMILY MEDICINE CLINIC | Age: 64
End: 2024-05-02

## 2024-05-02 NOTE — TELEPHONE ENCOUNTER
Please clarify what she is referring to.  Is she having chest pain or shortness of breath?    If yes, I recommend that she go to the emergency department now.   If no, I recommend that she go to [facility] tomorrow for evaluation.

## 2024-05-02 NOTE — TELEPHONE ENCOUNTER
Pt calling stating she is having swelling in her ankles, the tops of her feet and calves and questions if this could be from her recent diagnosis of DDD, please advise

## 2024-05-02 NOTE — TELEPHONE ENCOUNTER
Spoke to patient and she declines any shortness of breath or chest pain.  She states the swelling comes and goes.    She will come into Urgent Care tomorrow to be evaluated.    She states she was diagnosed with Degenerative Disc Disease by her orthopedic provider at [facility].

## 2024-05-03 ENCOUNTER — OFFICE VISIT (OUTPATIENT)
Dept: PRIMARY CARE CLINIC | Age: 64
End: 2024-05-03
Payer: MEDICAID

## 2024-05-03 VITALS
HEART RATE: 89 BPM | BODY MASS INDEX: 37.54 KG/M2 | TEMPERATURE: 98.9 F | SYSTOLIC BLOOD PRESSURE: 130 MMHG | WEIGHT: 204 LBS | HEIGHT: 62 IN | DIASTOLIC BLOOD PRESSURE: 80 MMHG | OXYGEN SATURATION: 97 %

## 2024-05-03 DIAGNOSIS — L29.9 PRURITUS: ICD-10-CM

## 2024-05-03 DIAGNOSIS — R60.0 BILATERAL LOWER EXTREMITY EDEMA: Primary | ICD-10-CM

## 2024-05-03 DIAGNOSIS — M54.16 CHRONIC LUMBAR RADICULOPATHY: ICD-10-CM

## 2024-05-03 PROCEDURE — 3017F COLORECTAL CA SCREEN DOC REV: CPT | Performed by: FAMILY MEDICINE

## 2024-05-03 PROCEDURE — 3079F DIAST BP 80-89 MM HG: CPT | Performed by: FAMILY MEDICINE

## 2024-05-03 PROCEDURE — G8427 DOCREV CUR MEDS BY ELIG CLIN: HCPCS | Performed by: FAMILY MEDICINE

## 2024-05-03 PROCEDURE — 3075F SYST BP GE 130 - 139MM HG: CPT | Performed by: FAMILY MEDICINE

## 2024-05-03 PROCEDURE — G8417 CALC BMI ABV UP PARAM F/U: HCPCS | Performed by: FAMILY MEDICINE

## 2024-05-03 PROCEDURE — G2211 COMPLEX E/M VISIT ADD ON: HCPCS | Performed by: FAMILY MEDICINE

## 2024-05-03 PROCEDURE — 1036F TOBACCO NON-USER: CPT | Performed by: FAMILY MEDICINE

## 2024-05-03 PROCEDURE — 99213 OFFICE O/P EST LOW 20 MIN: CPT | Performed by: FAMILY MEDICINE

## 2024-05-03 RX ORDER — DIPHENHYDRAMINE HYDROCHLORIDE 25 MG/1
CAPSULE ORAL
Qty: 60 CAPSULE | Refills: 0 | OUTPATIENT
Start: 2024-05-03

## 2024-05-03 RX ORDER — MELOXICAM 15 MG/1
15 TABLET ORAL EVERY MORNING
COMMUNITY
Start: 2024-04-22

## 2024-05-03 RX ORDER — DIPHENHYDRAMINE HCL 25 MG
CAPSULE ORAL
Qty: 60 CAPSULE | Refills: 3 | Status: SHIPPED | OUTPATIENT
Start: 2024-05-03

## 2024-05-03 SDOH — ECONOMIC STABILITY: FOOD INSECURITY: WITHIN THE PAST 12 MONTHS, THE FOOD YOU BOUGHT JUST DIDN'T LAST AND YOU DIDN'T HAVE MONEY TO GET MORE.: PATIENT DECLINED

## 2024-05-03 SDOH — ECONOMIC STABILITY: FOOD INSECURITY: WITHIN THE PAST 12 MONTHS, YOU WORRIED THAT YOUR FOOD WOULD RUN OUT BEFORE YOU GOT MONEY TO BUY MORE.: PATIENT DECLINED

## 2024-05-03 SDOH — ECONOMIC STABILITY: INCOME INSECURITY: HOW HARD IS IT FOR YOU TO PAY FOR THE VERY BASICS LIKE FOOD, HOUSING, MEDICAL CARE, AND HEATING?: PATIENT DECLINED

## 2024-05-03 SDOH — ECONOMIC STABILITY: HOUSING INSECURITY
IN THE LAST 12 MONTHS, WAS THERE A TIME WHEN YOU DID NOT HAVE A STEADY PLACE TO SLEEP OR SLEPT IN A SHELTER (INCLUDING NOW)?: PATIENT DECLINED

## 2024-05-03 NOTE — PROGRESS NOTES
[facility]             [address] [address]                        Telephone [phone]             Fax [phone]       Dea Crook  :  1960  Age:  63 y.o.   MRN:  [id number]  Date of visit:  5/3/2024     Assessment and Plan:    1. Bilateral lower extremity edema  I discussed with the patient that Meloxicam my cause edema.  I recommended that she discuss this with her orthopedic surgeon.  Compression stockings were prescribed:  - Compression Stockings MISC; by Does not apply route 20-30 mm Hg  Dispense: 1 each; Refill: 0    I also recommended that she avoid sodium in her diet, drink adequate fluids, and avoid prolonged sitting.    Printed information regarding Leg and Ankle Edema was provided to the patient with the after visit summary.      2. Pruritus  Banophen was rd:  - diphenhydrAMINE (BANOPHEN) 25 MG capsule; take 1 capsule by mouth twice a day if needed for itching or allergies  Dispense: 60 capsule; Refill: 3    3. Chronic lumbar radiculopathy  She takes Tramadol daily.  She has an appointment scheduled for a spinal injection.    She was advised to follow up if symptoms worsen or do not resolve.         Subjective:    Dea Crook is a 63 y.o. female who presents to [facility] today (5/3/2024) for evaluation of:  Lower Back Pain and Leg Swelling (BILATERAL lower leg/feet swelling)      She has been seen by orthopedic surgery at [facility] for low back pain.   She was prescribed Meloxicam, which has helped the pain.   She continues to take Tramadol also.  She has recently noticed swelling in her feet and ankles.  She denies chest pain or shortness of breath.   She is scheduled for an injection in her back.  She was unable to keep the appointment that she had scheduled with me earlier this week due to transportation problems.     She has the following problem list:  Patient Active

## 2024-06-04 DIAGNOSIS — E83.42 HYPOMAGNESEMIA: ICD-10-CM

## 2024-06-04 RX ORDER — LANOLIN ALCOHOL/MO/W.PET/CERES
400 CREAM (GRAM) TOPICAL DAILY
Qty: 30 TABLET | Refills: 0 | Status: SHIPPED | OUTPATIENT
Start: 2024-06-04

## 2024-06-04 NOTE — TELEPHONE ENCOUNTER
Dea called requesting a refill of the below medication which has been pended for you:     Dr. Duffy is out of the office    Requested Prescriptions     Pending Prescriptions Disp Refills    magnesium oxide (MAG-OX) 400 (240 Mg) MG tablet [Pharmacy Med Name: MAGNESIUM OXIDE 400 MG TABLET] 30 tablet 2     Sig: take 1 tablet by mouth once daily       Last Appointment Date: 5/3/2024  Next Appointment Date: 8/1/2024    Allergies   Allergen Reactions    Lactose Intolerance (Gi) Diarrhea     Lactose Intolerant.

## 2024-06-20 DIAGNOSIS — F17.210 CIGARETTE NICOTINE DEPENDENCE WITHOUT COMPLICATION: ICD-10-CM

## 2024-06-20 DIAGNOSIS — K62.89 CHRONIC RECTAL PAIN: Primary | ICD-10-CM

## 2024-06-20 DIAGNOSIS — G89.29 CHRONIC RECTAL PAIN: Primary | ICD-10-CM

## 2024-06-20 PROBLEM — M25.551 RIGHT HIP PAIN: Status: ACTIVE | Noted: 2024-04-22

## 2024-06-20 PROBLEM — F31.9 BIPOLAR DISORDER (HCC): Status: ACTIVE | Noted: 2017-11-15

## 2024-06-20 PROBLEM — I10 PRIMARY HYPERTENSION: Status: ACTIVE | Noted: 2017-11-15

## 2024-06-20 PROBLEM — K02.9 DENTAL CAVITIES: Status: ACTIVE | Noted: 2017-01-24

## 2024-06-20 PROBLEM — N20.0 CALCULUS OF KIDNEY: Status: ACTIVE | Noted: 2018-11-29

## 2024-06-20 PROBLEM — M46.1 SACROILIITIS, NOT ELSEWHERE CLASSIFIED (HCC): Status: ACTIVE | Noted: 2024-04-22

## 2024-06-20 PROBLEM — M25.9 DISORDER OF SHOULDER: Status: ACTIVE | Noted: 2024-06-20

## 2024-06-20 PROBLEM — M25.512 PAIN OF LEFT SHOULDER REGION: Status: ACTIVE | Noted: 2017-11-15

## 2024-06-20 PROBLEM — R52 PAIN: Status: ACTIVE | Noted: 2024-04-22

## 2024-06-20 PROBLEM — G62.9 PERIPHERAL NERVE DISEASE: Status: ACTIVE | Noted: 2018-11-20

## 2024-06-20 PROBLEM — M17.12 OSTEOARTHRITIS OF LEFT KNEE: Status: ACTIVE | Noted: 2024-03-11

## 2024-06-20 RX ORDER — METHOCARBAMOL 500 MG/1
500 TABLET, FILM COATED ORAL 3 TIMES DAILY
COMMUNITY
Start: 2024-05-11

## 2024-06-21 DIAGNOSIS — G56.03 BILATERAL CARPAL TUNNEL SYNDROME: ICD-10-CM

## 2024-06-21 DIAGNOSIS — G63 POLYNEUROPATHY ASSOCIATED WITH UNDERLYING DISEASE (HCC): ICD-10-CM

## 2024-06-21 RX ORDER — GABAPENTIN 300 MG/1
CAPSULE ORAL
Qty: 120 CAPSULE | Refills: 2 | Status: SHIPPED | OUTPATIENT
Start: 2024-06-21 | End: 2024-10-19

## 2024-06-21 NOTE — TELEPHONE ENCOUNTER
LM for patient to return call with clarification on if she is still using this & actually needs a refill.

## 2024-06-21 NOTE — TELEPHONE ENCOUNTER
Last Appt:  3/8/2024  Next Appt:   7/18/2024  Med verified in Epic     Patient needs refill on Gabapentin    Oarrs printed 06/21/24

## 2024-06-21 NOTE — TELEPHONE ENCOUNTER
Dea called requesting a refill of the below medication which has been pended for you:     Per [facility], last fill 5/26, quantity 90 for 30 days.      Requested Prescriptions     Pending Prescriptions Disp Refills    traMADol (ULTRAM) 50 MG tablet [Pharmacy Med Name: TRAMADOL HCL 50 MG TABLET] 90 tablet 0     Sig: Take 1 tablet by mouth every 8 hours as needed for Pain for up to 30 days. Max Daily Amount: 150 mg       Last Appointment Date: 5/3/2024  Next Appointment Date: 8/1/2024    Allergies   Allergen Reactions    Lactose Intolerance (Gi) Diarrhea     Lactose Intolerant.

## 2024-06-25 DIAGNOSIS — K62.89 CHRONIC RECTAL PAIN: ICD-10-CM

## 2024-06-25 DIAGNOSIS — G89.29 CHRONIC RECTAL PAIN: ICD-10-CM

## 2024-06-25 RX ORDER — TRAMADOL HYDROCHLORIDE 50 MG/1
50 TABLET ORAL EVERY 8 HOURS PRN
Qty: 90 TABLET | Refills: 0 | OUTPATIENT
Start: 2024-06-25 | End: 2024-07-25

## 2024-06-25 RX ORDER — TRAMADOL HYDROCHLORIDE 50 MG/1
50 TABLET ORAL EVERY 8 HOURS PRN
Qty: 90 TABLET | Refills: 1 | Status: SHIPPED | OUTPATIENT
Start: 2024-06-25 | End: 2024-08-24

## 2024-06-26 NOTE — TELEPHONE ENCOUNTER
Dea called requesting a refill of the below medication which has been pended for you:     Requested Prescriptions     Pending Prescriptions Disp Refills    nicotine (NICODERM CQ) 7 MG/24HR [Pharmacy Med Name: NICOTINE 7 MG/24HR PATCH] 28 patch 0     Sig: apply topically ONTO THE SKIN every 24 hours       Last Appointment Date: 5/3/2024  Next Appointment Date: 8/1/24    Allergies   Allergen Reactions    Lactose Intolerance (Gi) Diarrhea     Lactose Intolerant.

## 2024-07-15 DIAGNOSIS — K21.9 GASTROESOPHAGEAL REFLUX DISEASE, UNSPECIFIED WHETHER ESOPHAGITIS PRESENT: ICD-10-CM

## 2024-07-15 NOTE — TELEPHONE ENCOUNTER
Dea called requesting a refill of the below medication which has been pended for you:     Requested Prescriptions     Pending Prescriptions Disp Refills    omeprazole (PRILOSEC) 20 MG delayed release capsule 30 capsule 2     Sig: Take 1 capsule by mouth daily       Last Appointment Date: 5/3/2024  Next Appointment Date: 8/1/2024    Allergies   Allergen Reactions    Lactose Intolerance (Gi) Diarrhea     Lactose Intolerant.

## 2024-07-16 RX ORDER — OMEPRAZOLE 20 MG/1
20 CAPSULE, DELAYED RELEASE ORAL DAILY
Qty: 30 CAPSULE | Refills: 0 | Status: SHIPPED | OUTPATIENT
Start: 2024-07-16

## 2024-07-18 ENCOUNTER — OFFICE VISIT (OUTPATIENT)
Dept: NEUROLOGY | Age: 64
End: 2024-07-18
Payer: MEDICAID

## 2024-07-18 VITALS
BODY MASS INDEX: 36.25 KG/M2 | OXYGEN SATURATION: 96 % | HEIGHT: 62 IN | TEMPERATURE: 97.2 F | SYSTOLIC BLOOD PRESSURE: 132 MMHG | DIASTOLIC BLOOD PRESSURE: 78 MMHG | HEART RATE: 81 BPM | RESPIRATION RATE: 12 BRPM | WEIGHT: 197 LBS

## 2024-07-18 DIAGNOSIS — G63 POLYNEUROPATHY ASSOCIATED WITH UNDERLYING DISEASE (HCC): ICD-10-CM

## 2024-07-18 DIAGNOSIS — G62.9 PERIPHERAL POLYNEUROPATHY: ICD-10-CM

## 2024-07-18 DIAGNOSIS — E11.40 TYPE 2 DIABETES MELLITUS WITH DIABETIC NEUROPATHY, WITHOUT LONG-TERM CURRENT USE OF INSULIN (HCC): ICD-10-CM

## 2024-07-18 DIAGNOSIS — G47.9 SLEEPING DIFFICULTY: ICD-10-CM

## 2024-07-18 DIAGNOSIS — Z87.891 EX-SMOKER: ICD-10-CM

## 2024-07-18 DIAGNOSIS — E53.8 VITAMIN B12 DEFICIENCY: ICD-10-CM

## 2024-07-18 DIAGNOSIS — F34.1 DYSTHYMIA: ICD-10-CM

## 2024-07-18 DIAGNOSIS — E66.9 OBESITY (BMI 30.0-34.9): ICD-10-CM

## 2024-07-18 DIAGNOSIS — G25.81 RESTLESS LEG SYNDROME: Primary | ICD-10-CM

## 2024-07-18 DIAGNOSIS — E66.01 MORBIDLY OBESE (HCC): ICD-10-CM

## 2024-07-18 DIAGNOSIS — M79.2 NEUROPATHIC PAIN: ICD-10-CM

## 2024-07-18 DIAGNOSIS — R53.82 CHRONIC FATIGUE: ICD-10-CM

## 2024-07-18 DIAGNOSIS — I10 ESSENTIAL HYPERTENSION: ICD-10-CM

## 2024-07-18 DIAGNOSIS — F25.9 SCHIZOAFFECTIVE DISORDER, UNSPECIFIED TYPE (HCC): ICD-10-CM

## 2024-07-18 DIAGNOSIS — G56.03 BILATERAL CARPAL TUNNEL SYNDROME: ICD-10-CM

## 2024-07-18 DIAGNOSIS — R26.89 BALANCE PROBLEM: ICD-10-CM

## 2024-07-18 DIAGNOSIS — F31.0 BIPOLAR AFFECTIVE DISORDER, CURRENT EPISODE HYPOMANIC (HCC): ICD-10-CM

## 2024-07-18 PROCEDURE — G8427 DOCREV CUR MEDS BY ELIG CLIN: HCPCS | Performed by: PSYCHIATRY & NEUROLOGY

## 2024-07-18 PROCEDURE — 3017F COLORECTAL CA SCREEN DOC REV: CPT | Performed by: PSYCHIATRY & NEUROLOGY

## 2024-07-18 PROCEDURE — 99214 OFFICE O/P EST MOD 30 MIN: CPT | Performed by: PSYCHIATRY & NEUROLOGY

## 2024-07-18 PROCEDURE — G8417 CALC BMI ABV UP PARAM F/U: HCPCS | Performed by: PSYCHIATRY & NEUROLOGY

## 2024-07-18 PROCEDURE — 3051F HG A1C>EQUAL 7.0%<8.0%: CPT | Performed by: PSYCHIATRY & NEUROLOGY

## 2024-07-18 PROCEDURE — 3078F DIAST BP <80 MM HG: CPT | Performed by: PSYCHIATRY & NEUROLOGY

## 2024-07-18 PROCEDURE — 3075F SYST BP GE 130 - 139MM HG: CPT | Performed by: PSYCHIATRY & NEUROLOGY

## 2024-07-18 PROCEDURE — 1036F TOBACCO NON-USER: CPT | Performed by: PSYCHIATRY & NEUROLOGY

## 2024-07-18 PROCEDURE — 2022F DILAT RTA XM EVC RTNOPTHY: CPT | Performed by: PSYCHIATRY & NEUROLOGY

## 2024-07-18 RX ORDER — BENZONATATE 100 MG/1
CAPSULE, LIQUID FILLED ORAL
COMMUNITY
Start: 2024-07-12

## 2024-07-18 RX ORDER — GABAPENTIN 300 MG/1
CAPSULE ORAL
Qty: 120 CAPSULE | Refills: 2 | Status: SHIPPED | OUTPATIENT
Start: 2024-07-18 | End: 2024-11-15

## 2024-07-18 ASSESSMENT — ENCOUNTER SYMPTOMS
ABDOMINAL PAIN: 0
WHEEZING: 0
CHEST TIGHTNESS: 0
BACK PAIN: 0
CHOKING: 0
DIARRHEA: 0
TROUBLE SWALLOWING: 0
BOWEL INCONTINENCE: 0
BLOOD IN STOOL: 0
PHOTOPHOBIA: 0
VISUAL CHANGE: 0
VOMITING: 0
SINUS PRESSURE: 0
FACIAL SWELLING: 0
SHORTNESS OF BREATH: 0
NAUSEA: 0
CONSTIPATION: 0
COLOR CHANGE: 0
EYE REDNESS: 0
ABDOMINAL DISTENTION: 0
VOICE CHANGE: 0
EYE PAIN: 0
EYE ITCHING: 0
EYE DISCHARGE: 0
APNEA: 0

## 2024-07-18 NOTE — PROGRESS NOTES
more fruits and vegetables.  Eat a healthy amount of food. A 3-ounce serving of meat, for example, is about the size of a deck of cards. Fill the rest of your plate with vegetables and whole grains.  Limit the amount of soda and sports drinks you have every day. Drink more water when you are thirsty.  Eat at least 5 servings of fruits and vegetables every day. It may seem like a lot, but it is not hard to reach this goal. A serving or helping is 1 piece of fruit, 1 cup of vegetables, or 2 cups of leafy, raw vegetables. Have an apple or some carrot sticks as an afternoon snack instead of a candy bar. Try to have fruits and/or vegetables at every meal.  Make exercise part of your daily routine. You may want to start with simple activities, such as walking, bicycling, or slow swimming. Try to be active 30 to 60 minutes every day. You do not need to do all 30 to 60 minutes all at once. For example, you can exercise 3 times a day for 10 or 20 minutes. Moderate exercise is safe for most people, but it is always a good idea to talk to your doctor before starting an exercise program.  Keep moving. Mow the lawn, work in the garden, or clean your house. Take the stairs instead of the elevator at work.  If you smoke, quit. People who smoke have an increased risk for heart attack, stroke, cancer, and other lung illnesses. Quitting is hard, but there are ways to boost your chance of quitting tobacco for good.  Use nicotine gum, patches, or lozenges.  Ask your doctor about stop-smoking programs and medicines.  Keep trying.  In addition to reducing your risk of diseases in the future, you will notice some benefits soon after you stop using tobacco. If you have shortness of breath or asthma symptoms, they will likely get better within a few weeks after you quit.  Limit how much alcohol you drink. Moderate amounts of alcohol (up to 2 drinks a day for men, 1 drink a day for women) are okay. But drinking too much can lead to liver

## 2024-07-20 PROBLEM — R52 PAIN: Status: RESOLVED | Noted: 2024-04-22 | Resolved: 2024-07-20

## 2024-07-30 ENCOUNTER — HOSPITAL ENCOUNTER (OUTPATIENT)
Age: 64
Discharge: HOME | End: 2024-07-30
Payer: MEDICAID

## 2024-07-30 DIAGNOSIS — E66.9 OBESITY (BMI 30-39.9): ICD-10-CM

## 2024-07-30 DIAGNOSIS — E11.40 TYPE 2 DIABETES MELLITUS WITH DIABETIC NEUROPATHY, WITHOUT LONG-TERM CURRENT USE OF INSULIN (HCC): ICD-10-CM

## 2024-07-30 DIAGNOSIS — E78.2 MIXED HYPERLIPIDEMIA: ICD-10-CM

## 2024-07-30 LAB
25(OH)D3 SERPL-MCNC: 33.9 NG/ML (ref 30–100)
ALBUMIN SERPL-MCNC: 3.7 G/DL (ref 3.5–5.2)
ALBUMIN/GLOB SERPL: 1.2 {RATIO} (ref 1–2.5)
ALP SERPL-CCNC: 145 U/L (ref 35–104)
ALT SERPL-CCNC: 26 U/L (ref 5–33)
ANION GAP SERPL CALCULATED.3IONS-SCNC: 11 MMOL/L (ref 9–17)
AST SERPL-CCNC: 66 U/L
BILIRUB SERPL-MCNC: 0.7 MG/DL (ref 0.3–1.2)
BUN SERPL-MCNC: 9 MG/DL (ref 8–23)
BUN/CREAT SERPL: 10 (ref 9–20)
CALCIUM SERPL-MCNC: 9.3 MG/DL (ref 8.6–10.4)
CHLORIDE SERPL-SCNC: 100 MMOL/L (ref 98–107)
CHOLEST SERPL-MCNC: 153 MG/DL (ref 0–199)
CHOLESTEROL/HDL RATIO: 3
CO2 SERPL-SCNC: 27 MMOL/L (ref 20–31)
CREAT SERPL-MCNC: 0.9 MG/DL (ref 0.5–0.9)
CREAT UR-MCNC: 266 MG/DL (ref 28–217)
EST. AVERAGE GLUCOSE BLD GHB EST-MCNC: 151 MG/DL
GFR, ESTIMATED: 72 ML/MIN/1.73M2
GLUCOSE P FAST SERPL-MCNC: 185 MG/DL (ref 70–99)
HBA1C MFR BLD: 6.9 % (ref 4–6)
HDLC SERPL-MCNC: 56 MG/DL
LDLC SERPL CALC-MCNC: 45 MG/DL (ref 0–100)
MICROALBUMIN UR-MCNC: 17 MG/L (ref 0–20)
MICROALBUMIN/CREAT UR-RTO: 6 MCG/MG CREAT (ref 0–25)
POTASSIUM SERPL-SCNC: 5 MMOL/L (ref 3.7–5.3)
PROT SERPL-MCNC: 6.8 G/DL (ref 6.4–8.3)
SODIUM SERPL-SCNC: 138 MMOL/L (ref 135–144)
TRIGL SERPL-MCNC: 261 MG/DL
VLDLC SERPL CALC-MCNC: 52 MG/DL

## 2024-07-30 PROCEDURE — 82306 VITAMIN D 25 HYDROXY: CPT

## 2024-07-30 PROCEDURE — 80061 LIPID PANEL: CPT

## 2024-07-30 PROCEDURE — 80053 COMPREHEN METABOLIC PANEL: CPT

## 2024-07-30 PROCEDURE — 82043 UR ALBUMIN QUANTITATIVE: CPT

## 2024-07-30 PROCEDURE — 82570 ASSAY OF URINE CREATININE: CPT

## 2024-07-30 PROCEDURE — 36415 COLL VENOUS BLD VENIPUNCTURE: CPT

## 2024-07-30 PROCEDURE — 83036 HEMOGLOBIN GLYCOSYLATED A1C: CPT

## 2024-07-31 DIAGNOSIS — F17.210 CIGARETTE NICOTINE DEPENDENCE WITHOUT COMPLICATION: ICD-10-CM

## 2024-07-31 NOTE — TELEPHONE ENCOUNTER
Dea called requesting a refill of the below medication which has been pended for you:     Requested Prescriptions     Pending Prescriptions Disp Refills    nicotine (NICODERM CQ) 7 MG/24HR [Pharmacy Med Name: NICOTINE 7MG/24HR PT24] 28 patch 0     Sig: APPLY ONE PATCH TO THE SKIN ONCE A DAY       Last Appointment Date: 5/3/2024  Next Appointment Date: 8/20/2024    Allergies   Allergen Reactions    Lactose Intolerance (Gi) Diarrhea     Lactose Intolerant.

## 2024-08-01 DIAGNOSIS — E83.42 HYPOMAGNESEMIA: ICD-10-CM

## 2024-08-01 RX ORDER — LANOLIN ALCOHOL/MO/W.PET/CERES
400 CREAM (GRAM) TOPICAL DAILY
Qty: 30 TABLET | Refills: 0 | Status: SHIPPED | OUTPATIENT
Start: 2024-08-01

## 2024-08-01 NOTE — TELEPHONE ENCOUNTER
Dea called requesting a refill of the below medication which has been pended for you:     Requested Prescriptions     Pending Prescriptions Disp Refills    magnesium oxide (MAG-OX) 400 (240 Mg) MG tablet 30 tablet 0     Sig: Take 1 tablet by mouth daily       Last Appointment Date: 5/3/2024  Next Appointment Date: 8/20/2024    Allergies   Allergen Reactions    Lactose Intolerance (Gi) Diarrhea     Lactose Intolerant.

## 2024-08-10 ENCOUNTER — HOSPITAL ENCOUNTER (OUTPATIENT)
Age: 64
Discharge: HOME | End: 2024-08-10
Payer: MEDICAID

## 2024-08-10 ENCOUNTER — OFFICE VISIT (OUTPATIENT)
Dept: PRIMARY CARE CLINIC | Age: 64
End: 2024-08-10
Payer: MEDICAID

## 2024-08-10 VITALS
OXYGEN SATURATION: 95 % | SYSTOLIC BLOOD PRESSURE: 114 MMHG | TEMPERATURE: 98.8 F | DIASTOLIC BLOOD PRESSURE: 70 MMHG | BODY MASS INDEX: 36.76 KG/M2 | HEART RATE: 108 BPM | WEIGHT: 201 LBS

## 2024-08-10 DIAGNOSIS — R06.83 LOUD SNORING: ICD-10-CM

## 2024-08-10 DIAGNOSIS — G47.10 HYPERSOMNIA: Primary | ICD-10-CM

## 2024-08-10 DIAGNOSIS — R42 DIZZINESS: ICD-10-CM

## 2024-08-10 DIAGNOSIS — G47.10 HYPERSOMNIA: ICD-10-CM

## 2024-08-10 LAB
BASOPHILS # BLD: <0.03 K/UL (ref 0–0.2)
BASOPHILS NFR BLD: 0 % (ref 0–2)
EOSINOPHIL # BLD: 0.06 K/UL (ref 0–0.44)
EOSINOPHILS RELATIVE PERCENT: 1 % (ref 1–4)
ERYTHROCYTE [DISTWIDTH] IN BLOOD BY AUTOMATED COUNT: 11.9 % (ref 11.8–14.4)
HCT VFR BLD AUTO: 35.5 % (ref 36.3–47.1)
HGB BLD-MCNC: 11.9 G/DL (ref 11.9–15.1)
IMM GRANULOCYTES # BLD AUTO: 0.03 K/UL (ref 0–0.3)
IMM GRANULOCYTES NFR BLD: 0 %
LYMPHOCYTES NFR BLD: 0.91 K/UL (ref 1.1–3.7)
LYMPHOCYTES RELATIVE PERCENT: 12 % (ref 24–43)
MCH RBC QN AUTO: 31.2 PG (ref 25.2–33.5)
MCHC RBC AUTO-ENTMCNC: 33.5 G/DL (ref 25.2–33.5)
MCV RBC AUTO: 93.2 FL (ref 82.6–102.9)
MONOCYTES NFR BLD: 0.53 K/UL (ref 0.1–1.2)
MONOCYTES NFR BLD: 7 % (ref 3–12)
NEUTROPHILS NFR BLD: 80 % (ref 36–65)
NEUTS SEG NFR BLD: 6.37 K/UL (ref 1.5–8.1)
NRBC BLD-RTO: 0 PER 100 WBC
PLATELET # BLD AUTO: 215 K/UL (ref 138–453)
PMV BLD AUTO: 9.8 FL (ref 8.1–13.5)
RBC # BLD AUTO: 3.81 M/UL (ref 3.95–5.11)
TSH SERPL DL<=0.05 MIU/L-ACNC: 1.79 UIU/ML (ref 0.3–5)
WBC OTHER # BLD: 7.9 K/UL (ref 3.5–11.3)

## 2024-08-10 PROCEDURE — 3074F SYST BP LT 130 MM HG: CPT | Performed by: FAMILY MEDICINE

## 2024-08-10 PROCEDURE — 84443 ASSAY THYROID STIM HORMONE: CPT

## 2024-08-10 PROCEDURE — G8417 CALC BMI ABV UP PARAM F/U: HCPCS | Performed by: FAMILY MEDICINE

## 2024-08-10 PROCEDURE — 85025 COMPLETE CBC W/AUTO DIFF WBC: CPT

## 2024-08-10 PROCEDURE — 3078F DIAST BP <80 MM HG: CPT | Performed by: FAMILY MEDICINE

## 2024-08-10 PROCEDURE — 99214 OFFICE O/P EST MOD 30 MIN: CPT | Performed by: FAMILY MEDICINE

## 2024-08-10 PROCEDURE — 36415 COLL VENOUS BLD VENIPUNCTURE: CPT

## 2024-08-10 PROCEDURE — 3017F COLORECTAL CA SCREEN DOC REV: CPT | Performed by: FAMILY MEDICINE

## 2024-08-10 PROCEDURE — G8427 DOCREV CUR MEDS BY ELIG CLIN: HCPCS | Performed by: FAMILY MEDICINE

## 2024-08-10 PROCEDURE — 1036F TOBACCO NON-USER: CPT | Performed by: FAMILY MEDICINE

## 2024-08-10 RX ORDER — MECLIZINE HCL 12.5 MG/1
12.5 TABLET ORAL 3 TIMES DAILY PRN
Qty: 30 TABLET | Refills: 0 | Status: SHIPPED | OUTPATIENT
Start: 2024-08-10 | End: 2024-08-11

## 2024-08-10 ASSESSMENT — ENCOUNTER SYMPTOMS
WHEEZING: 0
CHEST TIGHTNESS: 0
COUGH: 0
SHORTNESS OF BREATH: 0

## 2024-08-10 NOTE — PROGRESS NOTES
with presbyopia     Balance problem     Bipolar disorder ([facility])     schizoaffective disorder (Dr. Baez)    Chronic lumbar radiculopathy     Chronic rectal pain     due to fissures    Chronic shoulder pain     bilaterally, due to osteoarthritis    CTS (carpal tunnel syndrome)     Depression     Fatigue     Hyperlipidemia     Hypertension     Obesity     Peripheral neuropathy     Pica     eats bar soap    Restless leg syndrome     Schizoaffective disorder ([facility])     Sleeping difficulty     Tobacco abuse     Type 2 diabetes mellitus ([facility])           Social History     Tobacco Use    Smoking status: Former     Current packs/day: 0.00     Average packs/day: 1 pack/day for 44.0 years (44.0 ttl pk-yrs)     Types: Cigarettes     Start date: 1975     Quit date: 2018     Years since quittin.6    Smokeless tobacco: Never   Substance Use Topics    Alcohol use: Yes     Comment: daily; \"I'm down, two 16oz cans per day\"     Current Outpatient Medications   Medication Sig Dispense Refill    meclizine (ANTIVERT) 12.5 MG tablet Take 1 tablet by mouth 3 times daily as needed for Dizziness 30 tablet 0    magnesium oxide (MAG-OX) 400 (240 Mg) MG tablet Take 1 tablet by mouth daily 30 tablet 0    nicotine (NICODERM CQ) 7 MG/24HR APPLY ONE PATCH TO THE SKIN ONCE A DAY 28 patch 0    Multiple Vitamins-Iron (TAB-A-BTETY/IRON/BETA CAROTENE) TABS       carbidopa-levodopa (SINEMET)  MG per tablet Taking 3  tablets at bedtime 270 tablet 1    gabapentin (NEURONTIN) 300 MG capsule take 1 capsule by mouth four times a day 120 capsule 2    omeprazole (PRILOSEC) 20 MG delayed release capsule Take 1 capsule by mouth daily 30 capsule 0    traMADol (ULTRAM) 50 MG tablet Take 1 tablet by mouth every 8 hours as needed for Pain for up to 60 days. Max Daily Amount: 150 mg 90 tablet 1    diphenhydrAMINE (BANOPHEN) 25 MG capsule take 1 capsule by mouth twice a day if needed for itching or allergies 60 capsule 3    ammonium lactate (LAC-HYDRIN)

## 2024-08-11 ENCOUNTER — TELEPHONE (OUTPATIENT)
Dept: PRIMARY CARE CLINIC | Age: 64
End: 2024-08-11

## 2024-08-13 ENCOUNTER — TELEPHONE (OUTPATIENT)
Dept: NEUROLOGY | Age: 64
End: 2024-08-13

## 2024-08-13 NOTE — TELEPHONE ENCOUNTER
Last Appt:  7/18/2024  Next Appt:   11/22/2024  Med verified in Epic     Patient has been having restless leg syndrome worsen for the past 3 nights.  She has increased the dosage to 31/2 tablets a day and is wondering if he would prescribe them as such. She said it does help.    Carbidopa-levodopa  [facility]

## 2024-08-14 DIAGNOSIS — G25.81 RESTLESS LEG SYNDROME: ICD-10-CM

## 2024-08-16 DIAGNOSIS — I10 ESSENTIAL HYPERTENSION: ICD-10-CM

## 2024-08-19 DIAGNOSIS — K62.89 CHRONIC RECTAL PAIN: ICD-10-CM

## 2024-08-19 DIAGNOSIS — G89.29 CHRONIC RECTAL PAIN: ICD-10-CM

## 2024-08-19 DIAGNOSIS — I10 ESSENTIAL HYPERTENSION: ICD-10-CM

## 2024-08-20 ENCOUNTER — OFFICE VISIT (OUTPATIENT)
Dept: FAMILY MEDICINE CLINIC | Age: 64
End: 2024-08-20
Payer: MEDICAID

## 2024-08-20 VITALS
WEIGHT: 196 LBS | OXYGEN SATURATION: 97 % | DIASTOLIC BLOOD PRESSURE: 76 MMHG | HEART RATE: 78 BPM | SYSTOLIC BLOOD PRESSURE: 112 MMHG | BODY MASS INDEX: 36.07 KG/M2 | HEIGHT: 62 IN

## 2024-08-20 DIAGNOSIS — R05.9 COUGH, UNSPECIFIED TYPE: ICD-10-CM

## 2024-08-20 DIAGNOSIS — K62.89 CHRONIC RECTAL PAIN: ICD-10-CM

## 2024-08-20 DIAGNOSIS — I10 ESSENTIAL HYPERTENSION: ICD-10-CM

## 2024-08-20 DIAGNOSIS — E78.2 MIXED HYPERLIPIDEMIA: ICD-10-CM

## 2024-08-20 DIAGNOSIS — E11.40 TYPE 2 DIABETES MELLITUS WITH DIABETIC NEUROPATHY, WITHOUT LONG-TERM CURRENT USE OF INSULIN (HCC): ICD-10-CM

## 2024-08-20 DIAGNOSIS — F17.210 CIGARETTE NICOTINE DEPENDENCE WITHOUT COMPLICATION: ICD-10-CM

## 2024-08-20 DIAGNOSIS — R09.81 NASAL CONGESTION: ICD-10-CM

## 2024-08-20 DIAGNOSIS — J44.9 CHRONIC OBSTRUCTIVE PULMONARY DISEASE, UNSPECIFIED COPD TYPE (HCC): ICD-10-CM

## 2024-08-20 DIAGNOSIS — K21.9 GASTROESOPHAGEAL REFLUX DISEASE, UNSPECIFIED WHETHER ESOPHAGITIS PRESENT: ICD-10-CM

## 2024-08-20 DIAGNOSIS — L29.9 PRURITUS: ICD-10-CM

## 2024-08-20 DIAGNOSIS — G89.29 CHRONIC RECTAL PAIN: ICD-10-CM

## 2024-08-20 LAB
CHP ED QC CHECK: NORMAL
GLUCOSE BLD-MCNC: 143 MG/DL

## 2024-08-20 PROCEDURE — 82962 GLUCOSE BLOOD TEST: CPT | Performed by: FAMILY MEDICINE

## 2024-08-20 PROCEDURE — 3023F SPIROM DOC REV: CPT | Performed by: FAMILY MEDICINE

## 2024-08-20 PROCEDURE — 3044F HG A1C LEVEL LT 7.0%: CPT | Performed by: FAMILY MEDICINE

## 2024-08-20 PROCEDURE — G8427 DOCREV CUR MEDS BY ELIG CLIN: HCPCS | Performed by: FAMILY MEDICINE

## 2024-08-20 PROCEDURE — PBSHW POCT GLUCOSE: Performed by: FAMILY MEDICINE

## 2024-08-20 PROCEDURE — 99214 OFFICE O/P EST MOD 30 MIN: CPT | Performed by: FAMILY MEDICINE

## 2024-08-20 PROCEDURE — G8417 CALC BMI ABV UP PARAM F/U: HCPCS | Performed by: FAMILY MEDICINE

## 2024-08-20 PROCEDURE — 3078F DIAST BP <80 MM HG: CPT | Performed by: FAMILY MEDICINE

## 2024-08-20 PROCEDURE — 3017F COLORECTAL CA SCREEN DOC REV: CPT | Performed by: FAMILY MEDICINE

## 2024-08-20 PROCEDURE — 3074F SYST BP LT 130 MM HG: CPT | Performed by: FAMILY MEDICINE

## 2024-08-20 PROCEDURE — 2022F DILAT RTA XM EVC RTNOPTHY: CPT | Performed by: FAMILY MEDICINE

## 2024-08-20 PROCEDURE — 1036F TOBACCO NON-USER: CPT | Performed by: FAMILY MEDICINE

## 2024-08-20 RX ORDER — BLOOD SUGAR DIAGNOSTIC
STRIP MISCELLANEOUS
Qty: 300 STRIP | Refills: 1 | Status: SHIPPED | OUTPATIENT
Start: 2024-08-20

## 2024-08-20 RX ORDER — FLUTICASONE PROPIONATE 110 UG/1
2 AEROSOL, METERED RESPIRATORY (INHALATION) 2 TIMES DAILY
Qty: 12 G | Refills: 5 | Status: SHIPPED | OUTPATIENT
Start: 2024-08-20 | End: 2025-08-20

## 2024-08-20 RX ORDER — BLOOD-GLUCOSE METER
EACH MISCELLANEOUS
Qty: 1 KIT | Refills: 0 | Status: SHIPPED | OUTPATIENT
Start: 2024-08-20

## 2024-08-20 RX ORDER — FLUTICASONE PROPIONATE 50 MCG
1 SPRAY, SUSPENSION (ML) NASAL DAILY
Qty: 16 G | Refills: 0 | Status: SHIPPED | OUTPATIENT
Start: 2024-08-20

## 2024-08-20 RX ORDER — LOSARTAN POTASSIUM 100 MG/1
TABLET ORAL
Qty: 90 TABLET | Refills: 0 | OUTPATIENT
Start: 2024-08-20

## 2024-08-20 RX ORDER — PRAVASTATIN SODIUM 40 MG
TABLET ORAL
Qty: 90 TABLET | Refills: 1 | Status: SHIPPED | OUTPATIENT
Start: 2024-08-20

## 2024-08-20 RX ORDER — TRAMADOL HYDROCHLORIDE 50 MG/1
50 TABLET ORAL EVERY 8 HOURS PRN
Qty: 90 TABLET | Refills: 1 | Status: SHIPPED | OUTPATIENT
Start: 2024-08-20 | End: 2024-10-19

## 2024-08-20 RX ORDER — LOSARTAN POTASSIUM 100 MG/1
TABLET ORAL
Qty: 90 TABLET | Refills: 1 | Status: SHIPPED | OUTPATIENT
Start: 2024-08-20

## 2024-08-20 RX ORDER — LOSARTAN POTASSIUM 100 MG/1
TABLET ORAL
Qty: 90 TABLET | Refills: 1 | OUTPATIENT
Start: 2024-08-20

## 2024-08-20 RX ORDER — OMEPRAZOLE 20 MG/1
20 CAPSULE, DELAYED RELEASE ORAL DAILY
Qty: 90 CAPSULE | Refills: 1 | Status: SHIPPED | OUTPATIENT
Start: 2024-08-20

## 2024-08-20 RX ORDER — TRAMADOL HYDROCHLORIDE 50 MG/1
50 TABLET ORAL EVERY 8 HOURS PRN
Qty: 90 TABLET | Refills: 0 | OUTPATIENT
Start: 2024-08-20

## 2024-08-20 RX ORDER — DIPHENHYDRAMINE HCL 25 MG
CAPSULE ORAL
Qty: 60 CAPSULE | Refills: 3 | Status: SHIPPED | OUTPATIENT
Start: 2024-08-20

## 2024-08-20 NOTE — PROGRESS NOTES
[facility]             [address] [address]                        Telephone [phone]             Fax [phone]       Dea Crook  :  1960  Age:  63 y.o.   MRN:  [id number]  Date of visit:  2024       Assessment and Plan:    1. Chronic rectal pain  Controlled substances monitoring: No signs of potential drug abuse or diversion identified when the OAR[place] report from [place], [place], and [place] was reviewed today.  The activity on the report was consistent with the treatment plan.     Tramadol was refilled:  - traMADol (ULTRAM) 50 MG tablet; Take 1 tablet by mouth every 8 hours as needed for Pain for up to 60 days. Max Daily Amount: 150 mg  Dispense: 90 tablet; Refill: 1    2. Type 2 diabetes mellitus with diabetic neuropathy, without long-term current use of insulin (HCC)  Her HgbA1c was 6.9 %, which is at goal and improved.   She is not taking any diabetes medications currently.  She was advised to eat small frequent meals or snacks that contain protein to avoid hypoglycemia.    A glucometer and test strips were prescribed:  - Blood Glucose Monitoring Suppl (ONE TOUCH ULTRA 2) w/Device KIT; use as directed  Dispense: 1 kit; Refill: 0  - blood glucose test strips (ONETOUCH ULTRA) strip; As needed.  Dispense: 300 strip; Refill: 1    Labs were ordered to be done in 6 months:  - Hemoglobin A1C; Future  - Comprehensive Metabolic Panel; Future    - POCT Glucose was done in the office today.   Her glucose was 143.  She was given snacks to eat at the end of her visit.     3. Essential hypertension  Her blood pressure is well-controlled today.  (BP: 112/76)   She was advised to continue current medications.  Losartan was refilled:   - losartan (COZAAR) 100 MG tablet; take 1 tablet by mouth once daily  Dispense: 90 tablet; Refill: 1    4. Mixed hyperlipidemia  Her lipid profile was at goal except for elevated triglycerides on her

## 2024-08-20 NOTE — PROGRESS NOTES
Patient declines all vaccines. Patient declines cervical cancer screen and colon cancer screening

## 2024-08-20 NOTE — TELEPHONE ENCOUNTER
Pt questioning if this could be filled by 1PM today so they can deliver today, otherwise if it's after 2, then they won't deliver until tomorrow.

## 2024-08-20 NOTE — TELEPHONE ENCOUNTER
Dea called requesting a refill of the below medication which has been pended for you:     [place] from [place], [place], and [place] reviewed.  Tramadol last filled 7/23/24 #90/30 days    Requested Prescriptions     Pending Prescriptions Disp Refills    traMADol (ULTRAM) 50 MG tablet [Pharmacy Med Name: TRAMADOL 50MG TAB] 90 tablet 0     Sig: Take 1 tablet by mouth every 8 hours as needed.    losartan (COZAAR) 100 MG tablet 90 tablet 1     Sig: take 1 tablet by mouth once daily       Last Appointment Date: 5/3/2024  Next Appointment Date: 8/20/2024 (today at 1:40)    Allergies   Allergen Reactions    Lactose Intolerance (Gi) Diarrhea     Lactose Intolerant.

## 2024-09-05 DIAGNOSIS — E83.42 HYPOMAGNESEMIA: ICD-10-CM

## 2024-09-05 RX ORDER — LANOLIN ALCOHOL/MO/W.PET/CERES
400 CREAM (GRAM) TOPICAL DAILY
Qty: 90 TABLET | Refills: 0 | Status: SHIPPED | OUTPATIENT
Start: 2024-09-05

## 2024-09-05 NOTE — TELEPHONE ENCOUNTER
Dea called requesting a refill of the below medication which has been pended for you:     Requested Prescriptions     Pending Prescriptions Disp Refills    magnesium oxide (MAG-OX) 400 (240 Mg) MG tablet [Pharmacy Med Name: MAGNESIUM OXIDE 400 (240 MG) TABS] 90 tablet 0     Sig: TAKE ONE TABLET BY MOUTH ONCE A DAY       Last Appointment Date: 8/20/2024  Next Appointment Date: 11/20/2024    Allergies   Allergen Reactions    Methocarbamol Other (See Comments)     Extreme drowsiness     Lactose Intolerance (Gi) Diarrhea     Lactose Intolerant.

## 2024-09-10 ENCOUNTER — TELEPHONE (OUTPATIENT)
Dept: FAMILY MEDICINE CLINIC | Age: 64
End: 2024-09-10

## 2024-10-01 NOTE — TELEPHONE ENCOUNTER
Last Appt:  7/18/2024  Next Appt:   11/22/2024  Med verified in Epic     Patient is requesting a refill for multivitamin

## 2024-10-02 ENCOUNTER — TELEPHONE (OUTPATIENT)
Dept: SLEEP CENTER | Age: 64
End: 2024-10-02

## 2024-10-02 NOTE — TELEPHONE ENCOUNTER
8/16/24 called pt left message about sleep study and to call. 10/2/24 spoke with patient she is not going to do the sleep study.

## 2024-10-08 DIAGNOSIS — G89.29 CHRONIC RECTAL PAIN: ICD-10-CM

## 2024-10-08 DIAGNOSIS — K62.89 CHRONIC RECTAL PAIN: ICD-10-CM

## 2024-10-08 RX ORDER — BENZONATATE 100 MG/1
1 CAPSULE, LIQUID FILLED ORAL DAILY
Qty: 60 TABLET | Refills: 2 | Status: SHIPPED | OUTPATIENT
Start: 2024-10-08

## 2024-10-08 RX ORDER — TRAMADOL HYDROCHLORIDE 50 MG/1
TABLET ORAL
Qty: 90 TABLET | Refills: 1 | OUTPATIENT
Start: 2024-10-08

## 2024-10-15 DIAGNOSIS — G89.29 CHRONIC RECTAL PAIN: Primary | ICD-10-CM

## 2024-10-15 DIAGNOSIS — K62.89 CHRONIC RECTAL PAIN: Primary | ICD-10-CM

## 2024-10-15 RX ORDER — TRAMADOL HYDROCHLORIDE 50 MG/1
50 TABLET ORAL EVERY 8 HOURS PRN
Qty: 90 TABLET | Refills: 1 | Status: SHIPPED | OUTPATIENT
Start: 2024-10-15 | End: 2024-12-14

## 2024-10-15 NOTE — TELEPHONE ENCOUNTER
Dea called requesting a refill of the below medication which has been pended for you:     [place] from [place], and [place] reviewed. Tramadol 50 mg last filled 9/17/24 #90/30 days    Requested Prescriptions     Pending Prescriptions Disp Refills    traMADol (ULTRAM) 50 MG tablet [Pharmacy Med Name: TRAMADOL HCL 50MG TABS] 90 tablet 0     Sig: Take 1 tablet by mouth every 8 hours as needed for Pain for up to 30 days. Max Daily Amount: 150 mg       Last Appointment Date: 8/20/2024  Next Appointment Date: 11/20/2024    Allergies   Allergen Reactions    Methocarbamol Other (See Comments)     Extreme drowsiness     Lactose Intolerance (Gi) Diarrhea     Lactose Intolerant.

## 2024-10-28 DIAGNOSIS — G56.03 BILATERAL CARPAL TUNNEL SYNDROME: ICD-10-CM

## 2024-10-28 DIAGNOSIS — G63 POLYNEUROPATHY ASSOCIATED WITH UNDERLYING DISEASE (HCC): ICD-10-CM

## 2024-10-28 NOTE — TELEPHONE ENCOUNTER
Last Appt:  7/18/2024  Next Appt:   11/22/2024  Med verified in Epic     OARRS 10/28/24    Patient is requesting a refill of gabapentin    [facility]

## 2024-10-29 RX ORDER — GABAPENTIN 300 MG/1
CAPSULE ORAL
Qty: 120 CAPSULE | Refills: 2 | Status: SHIPPED | OUTPATIENT
Start: 2024-10-29 | End: 2025-01-30

## 2024-11-20 ENCOUNTER — NURSE ONLY (OUTPATIENT)
Dept: LAB | Age: 64
End: 2024-11-20
Payer: MEDICAID

## 2024-11-20 ENCOUNTER — OFFICE VISIT (OUTPATIENT)
Dept: FAMILY MEDICINE CLINIC | Age: 64
End: 2024-11-20
Payer: MEDICAID

## 2024-11-20 VITALS
TEMPERATURE: 97.9 F | DIASTOLIC BLOOD PRESSURE: 70 MMHG | BODY MASS INDEX: 35.88 KG/M2 | HEIGHT: 62 IN | WEIGHT: 195 LBS | OXYGEN SATURATION: 98 % | SYSTOLIC BLOOD PRESSURE: 112 MMHG | HEART RATE: 90 BPM

## 2024-11-20 DIAGNOSIS — G89.29 CHRONIC RECTAL PAIN: Primary | ICD-10-CM

## 2024-11-20 DIAGNOSIS — K62.89 CHRONIC RECTAL PAIN: Primary | ICD-10-CM

## 2024-11-20 DIAGNOSIS — Z23 NEED FOR VACCINATION: Primary | ICD-10-CM

## 2024-11-20 DIAGNOSIS — Z23 NEED FOR IMMUNIZATION AGAINST INFLUENZA: ICD-10-CM

## 2024-11-20 PROBLEM — Z72.0 TOBACCO ABUSE: Status: RESOLVED | Noted: 2021-02-04 | Resolved: 2024-11-20

## 2024-11-20 PROCEDURE — 3074F SYST BP LT 130 MM HG: CPT | Performed by: FAMILY MEDICINE

## 2024-11-20 PROCEDURE — G8417 CALC BMI ABV UP PARAM F/U: HCPCS | Performed by: FAMILY MEDICINE

## 2024-11-20 PROCEDURE — 90656 IIV3 VACC NO PRSV 0.5 ML IM: CPT | Performed by: FAMILY MEDICINE

## 2024-11-20 PROCEDURE — 1036F TOBACCO NON-USER: CPT | Performed by: FAMILY MEDICINE

## 2024-11-20 PROCEDURE — 99213 OFFICE O/P EST LOW 20 MIN: CPT | Performed by: FAMILY MEDICINE

## 2024-11-20 PROCEDURE — 90471 IMMUNIZATION ADMIN: CPT | Performed by: FAMILY MEDICINE

## 2024-11-20 PROCEDURE — 3078F DIAST BP <80 MM HG: CPT | Performed by: FAMILY MEDICINE

## 2024-11-20 PROCEDURE — G8482 FLU IMMUNIZE ORDER/ADMIN: HCPCS | Performed by: FAMILY MEDICINE

## 2024-11-20 PROCEDURE — 3017F COLORECTAL CA SCREEN DOC REV: CPT | Performed by: FAMILY MEDICINE

## 2024-11-20 PROCEDURE — G8427 DOCREV CUR MEDS BY ELIG CLIN: HCPCS | Performed by: FAMILY MEDICINE

## 2024-11-20 RX ORDER — MELOXICAM 15 MG/1
TABLET ORAL
COMMUNITY
Start: 2024-10-08

## 2024-11-20 RX ORDER — LIDOCAINE 50 MG/G
1 OINTMENT TOPICAL PRN
COMMUNITY
Start: 2024-09-05

## 2024-11-20 ASSESSMENT — PATIENT HEALTH QUESTIONNAIRE - PHQ9
SUM OF ALL RESPONSES TO PHQ QUESTIONS 1-9: 0
5. POOR APPETITE OR OVEREATING: NOT AT ALL
7. TROUBLE CONCENTRATING ON THINGS, SUCH AS READING THE NEWSPAPER OR WATCHING TELEVISION: NOT AT ALL
6. FEELING BAD ABOUT YOURSELF - OR THAT YOU ARE A FAILURE OR HAVE LET YOURSELF OR YOUR FAMILY DOWN: NOT AT ALL
SUM OF ALL RESPONSES TO PHQ QUESTIONS 1-9: 0
9. THOUGHTS THAT YOU WOULD BE BETTER OFF DEAD, OR OF HURTING YOURSELF: NOT AT ALL
2. FEELING DOWN, DEPRESSED OR HOPELESS: NOT AT ALL
10. IF YOU CHECKED OFF ANY PROBLEMS, HOW DIFFICULT HAVE THESE PROBLEMS MADE IT FOR YOU TO DO YOUR WORK, TAKE CARE OF THINGS AT HOME, OR GET ALONG WITH OTHER PEOPLE: NOT DIFFICULT AT ALL
3. TROUBLE FALLING OR STAYING ASLEEP: NOT AT ALL
8. MOVING OR SPEAKING SO SLOWLY THAT OTHER PEOPLE COULD HAVE NOTICED. OR THE OPPOSITE, BEING SO FIGETY OR RESTLESS THAT YOU HAVE BEEN MOVING AROUND A LOT MORE THAN USUAL: NOT AT ALL
SUM OF ALL RESPONSES TO PHQ QUESTIONS 1-9: 0
SUM OF ALL RESPONSES TO PHQ9 QUESTIONS 1 & 2: 0
SUM OF ALL RESPONSES TO PHQ QUESTIONS 1-9: 0
4. FEELING TIRED OR HAVING LITTLE ENERGY: NOT AT ALL
1. LITTLE INTEREST OR PLEASURE IN DOING THINGS: NOT AT ALL

## 2024-11-20 NOTE — PROGRESS NOTES
[facility]             [address] [address]                        Telephone [phone]             Fax [phone]       Dea Crook  :  1960  Age:  64 y.o.   MRN:  [id number]  Date of visit:  2024       Assessment and Plan:       1. Chronic rectal pain    Controlled substances monitoring: No signs of potential drug abuse or diversion identified when the OAR[place] report from [place], [place], and [place] was reviewed today.  The activity on the report was consistent with the treatment plan.   Tramadol was last refilled 10/15/2024 with one refill.  She states that she does not need a refill today.      2.  Routine health maintenance  Health maintenance was reviewed with the patient.   She was advised to get the updated Covid vaccine this .   Annual influenza vaccine was recommended.  Tdap was recommended.   RSV vaccination was recommended.  Colon cancer testing was recommended.  Pap test was recommended.   Screening mammogram was recommended.       Follow up instructions were given to the patient:  Return in about 3 months (around 2025) for diabetes, hypertension.             Subjective:    Dea Crook is a 64 y.o. female who presents to [facility] today (2024) for follow up/evaluation of:  Chronic Pain (3 month follow up)       History of Present Illness  The patient presents for a routine checkup.    She reports no issues with her current medications. She has been attending physical therapy sessions for her shoulder, which she finds beneficial. She monitors her blood sugar levels at home and has noticed an increase in her readings on Saturday mornings. She also mentions that consuming beer seems to elevate her blood sugar levels.   She states that she has cut down on the amount of beer that she drinks.   She no longer drinks daily.  She denies experiencing any

## 2024-11-20 NOTE — PROGRESS NOTES
Patient unsure if she would like vaccines today.   Patient declines colonoscopy and cervical cancer screening

## 2024-11-22 ENCOUNTER — OFFICE VISIT (OUTPATIENT)
Dept: NEUROLOGY | Age: 64
End: 2024-11-22
Payer: MEDICAID

## 2024-11-22 VITALS
HEART RATE: 86 BPM | OXYGEN SATURATION: 96 % | WEIGHT: 191 LBS | BODY MASS INDEX: 34.93 KG/M2 | SYSTOLIC BLOOD PRESSURE: 128 MMHG | RESPIRATION RATE: 16 BRPM | DIASTOLIC BLOOD PRESSURE: 78 MMHG

## 2024-11-22 DIAGNOSIS — F34.1 DYSTHYMIA: ICD-10-CM

## 2024-11-22 DIAGNOSIS — R26.89 BALANCE PROBLEM: ICD-10-CM

## 2024-11-22 DIAGNOSIS — G62.9 PERIPHERAL POLYNEUROPATHY: ICD-10-CM

## 2024-11-22 DIAGNOSIS — I72.8 SPLENIC ARTERY ANEURYSM (HCC): ICD-10-CM

## 2024-11-22 DIAGNOSIS — E53.8 VITAMIN B12 DEFICIENCY: ICD-10-CM

## 2024-11-22 DIAGNOSIS — F10.20 ALCOHOLISM (HCC): ICD-10-CM

## 2024-11-22 DIAGNOSIS — E66.01 MORBIDLY OBESE: ICD-10-CM

## 2024-11-22 DIAGNOSIS — I10 ESSENTIAL HYPERTENSION: ICD-10-CM

## 2024-11-22 DIAGNOSIS — G63 POLYNEUROPATHY ASSOCIATED WITH UNDERLYING DISEASE (HCC): ICD-10-CM

## 2024-11-22 DIAGNOSIS — E11.40 TYPE 2 DIABETES MELLITUS WITH DIABETIC NEUROPATHY, WITHOUT LONG-TERM CURRENT USE OF INSULIN (HCC): Primary | ICD-10-CM

## 2024-11-22 DIAGNOSIS — F25.9 SCHIZOAFFECTIVE DISORDER, UNSPECIFIED TYPE (HCC): ICD-10-CM

## 2024-11-22 DIAGNOSIS — R53.82 CHRONIC FATIGUE: ICD-10-CM

## 2024-11-22 DIAGNOSIS — G56.03 BILATERAL CARPAL TUNNEL SYNDROME: ICD-10-CM

## 2024-11-22 DIAGNOSIS — J44.9 CHRONIC OBSTRUCTIVE PULMONARY DISEASE, UNSPECIFIED COPD TYPE (HCC): ICD-10-CM

## 2024-11-22 DIAGNOSIS — G25.81 RESTLESS LEG SYNDROME: ICD-10-CM

## 2024-11-22 DIAGNOSIS — F31.0 BIPOLAR AFFECTIVE DISORDER, CURRENT EPISODE HYPOMANIC (HCC): ICD-10-CM

## 2024-11-22 DIAGNOSIS — Z87.891 EX-SMOKER: ICD-10-CM

## 2024-11-22 DIAGNOSIS — E66.811 OBESITY (BMI 30.0-34.9): ICD-10-CM

## 2024-11-22 DIAGNOSIS — M79.2 NEUROPATHIC PAIN: ICD-10-CM

## 2024-11-22 DIAGNOSIS — G47.9 SLEEPING DIFFICULTY: ICD-10-CM

## 2024-11-22 DIAGNOSIS — F41.9 ANXIETY: ICD-10-CM

## 2024-11-22 DIAGNOSIS — M54.16 CHRONIC LUMBAR RADICULOPATHY: ICD-10-CM

## 2024-11-22 PROCEDURE — 99214 OFFICE O/P EST MOD 30 MIN: CPT | Performed by: PSYCHIATRY & NEUROLOGY

## 2024-11-22 PROCEDURE — 3074F SYST BP LT 130 MM HG: CPT | Performed by: PSYCHIATRY & NEUROLOGY

## 2024-11-22 PROCEDURE — 3023F SPIROM DOC REV: CPT | Performed by: PSYCHIATRY & NEUROLOGY

## 2024-11-22 PROCEDURE — G8482 FLU IMMUNIZE ORDER/ADMIN: HCPCS | Performed by: PSYCHIATRY & NEUROLOGY

## 2024-11-22 PROCEDURE — G8417 CALC BMI ABV UP PARAM F/U: HCPCS | Performed by: PSYCHIATRY & NEUROLOGY

## 2024-11-22 PROCEDURE — 2022F DILAT RTA XM EVC RTNOPTHY: CPT | Performed by: PSYCHIATRY & NEUROLOGY

## 2024-11-22 PROCEDURE — 3017F COLORECTAL CA SCREEN DOC REV: CPT | Performed by: PSYCHIATRY & NEUROLOGY

## 2024-11-22 PROCEDURE — G8427 DOCREV CUR MEDS BY ELIG CLIN: HCPCS | Performed by: PSYCHIATRY & NEUROLOGY

## 2024-11-22 PROCEDURE — 3044F HG A1C LEVEL LT 7.0%: CPT | Performed by: PSYCHIATRY & NEUROLOGY

## 2024-11-22 PROCEDURE — 1036F TOBACCO NON-USER: CPT | Performed by: PSYCHIATRY & NEUROLOGY

## 2024-11-22 PROCEDURE — 3078F DIAST BP <80 MM HG: CPT | Performed by: PSYCHIATRY & NEUROLOGY

## 2024-11-22 RX ORDER — BENZONATATE 100 MG/1
1 CAPSULE, LIQUID FILLED ORAL DAILY
Qty: 60 TABLET | Refills: 2 | Status: CANCELLED | OUTPATIENT
Start: 2024-11-22

## 2024-11-22 RX ORDER — GABAPENTIN 300 MG/1
CAPSULE ORAL
Qty: 120 CAPSULE | Refills: 2 | Status: SHIPPED | OUTPATIENT
Start: 2024-11-22 | End: 2025-02-23

## 2024-11-22 RX ORDER — RISPERIDONE 1 MG/1
1 TABLET ORAL NIGHTLY
Qty: 60 TABLET | Refills: 2 | Status: CANCELLED | OUTPATIENT
Start: 2024-11-22

## 2024-11-22 RX ORDER — AMMONIUM LACTATE 12 G/100G
LOTION TOPICAL
Qty: 400 G | Refills: 3 | Status: SHIPPED | OUTPATIENT
Start: 2024-11-22

## 2024-11-22 RX ORDER — ASPIRIN 325 MG
325 TABLET, DELAYED RELEASE (ENTERIC COATED) ORAL DAILY
Qty: 30 TABLET | Refills: 12 | Status: SHIPPED | OUTPATIENT
Start: 2024-11-22

## 2024-11-22 RX ORDER — MULTIVITAMIN WITH FOLIC ACID 400 MCG
TABLET ORAL
Qty: 30 TABLET | Refills: 5 | Status: SHIPPED | OUTPATIENT
Start: 2024-11-22

## 2024-11-22 RX ORDER — CARBIDOPA AND LEVODOPA 25; 100 MG/1; MG/1
TABLET ORAL
Qty: 360 TABLET | Refills: 1 | Status: SHIPPED | OUTPATIENT
Start: 2024-11-22

## 2024-11-22 ASSESSMENT — ENCOUNTER SYMPTOMS
EYE REDNESS: 0
FACIAL SWELLING: 0
EYE PAIN: 0
CHEST TIGHTNESS: 0
BACK PAIN: 0
EYE ITCHING: 0
DIARRHEA: 0
BOWEL INCONTINENCE: 0
BLOOD IN STOOL: 0
ABDOMINAL PAIN: 0
CONSTIPATION: 0
NAUSEA: 0
CHOKING: 0
PHOTOPHOBIA: 0
WHEEZING: 0
VISUAL CHANGE: 0
TROUBLE SWALLOWING: 0
APNEA: 0
SINUS PRESSURE: 0
EYE DISCHARGE: 0
VOMITING: 0
VOICE CHANGE: 0
ABDOMINAL DISTENTION: 0
SHORTNESS OF BREATH: 0
COLOR CHANGE: 0

## 2024-11-22 ASSESSMENT — PATIENT HEALTH QUESTIONNAIRE - PHQ9
SUM OF ALL RESPONSES TO PHQ QUESTIONS 1-9: 0
1. LITTLE INTEREST OR PLEASURE IN DOING THINGS: NOT AT ALL
SUM OF ALL RESPONSES TO PHQ9 QUESTIONS 1 & 2: 0
SUM OF ALL RESPONSES TO PHQ QUESTIONS 1-9: 0
SUM OF ALL RESPONSES TO PHQ QUESTIONS 1-9: 0
2. FEELING DOWN, DEPRESSED OR HOPELESS: NOT AT ALL
SUM OF ALL RESPONSES TO PHQ QUESTIONS 1-9: 0

## 2024-11-22 NOTE — PROGRESS NOTES
Subjective:        Patient ID: Dea Crook is a 64 y.o.right  handed female.        Neurologic Problem  The patient's primary symptoms include clumsiness, focal sensory loss, focal weakness, a loss of balance, memory loss and weakness. The patient's pertinent negatives include no altered mental status, near-syncope, slurred speech, syncope or visual change. Primary symptoms comment: RESTLESS LEG  SYNDROME. This is a chronic problem. Episode onset: MORE   THAN    5-6 YEARS. The neurological problem developed insidiously. The problem is unchanged. There was left-sided, lower extremity and right-sided focality noted. Pertinent negatives include no abdominal pain, auditory change, aura, back pain, bladder incontinence, bowel incontinence, chest pain, confusion, diaphoresis, dizziness, fatigue, fever, headaches, light-headedness, nausea, neck pain, palpitations, shortness of breath, vertigo or vomiting. Past treatments include medication. The treatment provided moderate relief. Her past medical history is significant for mood changes. There is no history of a bleeding disorder, a clotting disorder, a CVA, dementia, head trauma, liver disease or seizures.            History obtained from  The patient     and other  available medical records were  Also  reviewed.                1)      H/O    CHRONIC    DIABETIC   PERIPHERAL  NEUROPATHY                                                      -  ON  NEURONTIN /   TOLERATING  THE  SAME                     WITH   SYMPTOMATIC      IMPROVEMENT                        2)     H/O    CHRONIC      RESTLESS  LEG  SYNDROME                  &  PERIODIC  LEG  MOVEMENTS                       -  IMPROVED  /   ON  SINEMET              3)    H/O    CHRONIC     SLEEP DIFFICULTIES                        -   RESOLVED              4)        H/O   CHRONIC   MILD  MEMORY PROBLEMS                     -    STABLE                   -  PATIENT  NOT  CONCERNED            5)       PREVIOUS   H/O

## 2024-11-22 NOTE — PATIENT INSTRUCTIONS
* FALL   PRECAUTIONS.            *   ADEQUATE   FLUID  INTAKE   AND  ELECTROLYTE  BALANCE             * KEEP  DAIRY  OF   THE  NEUROLOGICAL  SYMPTOMS          *  TO  MAINTAIN  REGULAR  SLEEP  WAKE  CYCLES.     *   TO  HAVE  ADEQUATE  REST  AND   SLEEP    HOURS.          *    AVOID  USAGE OF   TOBACCO,  EXCESSIVE  ALCOHOL                AND   ILLEGAL   SUBSTANCES,  IF  ANY          *  Maintain   Healthy  Life Style    With   Periodic  Monitoring  Of         Any  Medical  Conditions  Including   Elevated  Blood  Pressure,  Lipid  Profile,       Blood  Sugar levels  And   Heart  Disease.                *   Period   Screening  For  Cancers  Involving  Breast,  Colon,         Lungs  And  Other  Organs  As  Applicable,           In consultation   With  Your  Primary Care Providers.                *  If   There is  Any  Significant  Worsening   Of  Current  Symptoms  And             Or  If    Any additional  New  Neurological  Symptoms  and          Significant  Concerns   Should  Call  911 or  Go  To  Emergency  Department            For  Further  Immediate  Evaluation.

## 2024-11-25 DIAGNOSIS — E83.42 HYPOMAGNESEMIA: ICD-10-CM

## 2024-11-25 RX ORDER — LANOLIN ALCOHOL/MO/W.PET/CERES
400 CREAM (GRAM) TOPICAL DAILY
Qty: 90 TABLET | Refills: 0 | Status: SHIPPED | OUTPATIENT
Start: 2024-11-25

## 2024-11-25 NOTE — TELEPHONE ENCOUNTER
Dea called requesting a refill of the below medication which has been pended for you:     Requested Prescriptions     Pending Prescriptions Disp Refills    magnesium oxide (MAG-OX) 400 (240 Mg) MG tablet [Pharmacy Med Name: MAGNESIUM OXIDE 400 (240 MG) TABS] 90 tablet 0     Sig: TAKE ONE TABLET BY MOUTH ONCE A DAY       Last Appointment Date: 11/20/2024  Next Appointment Date: 2/20/2025    Allergies   Allergen Reactions    Lactose Intolerance (Gi) Diarrhea     Lactose Intolerant.        Methocarbamol Other (See Comments)     Extreme drowsiness

## 2024-12-17 DIAGNOSIS — G89.29 CHRONIC RECTAL PAIN: ICD-10-CM

## 2024-12-17 DIAGNOSIS — K62.89 CHRONIC RECTAL PAIN: ICD-10-CM

## 2024-12-18 DIAGNOSIS — K62.89 CHRONIC RECTAL PAIN: ICD-10-CM

## 2024-12-18 DIAGNOSIS — G89.29 CHRONIC RECTAL PAIN: ICD-10-CM

## 2024-12-18 RX ORDER — TRAMADOL HYDROCHLORIDE 50 MG/1
50 TABLET ORAL EVERY 8 HOURS PRN
Qty: 90 TABLET | Refills: 1 | Status: SHIPPED | OUTPATIENT
Start: 2024-12-18 | End: 2025-02-16

## 2024-12-18 RX ORDER — TRAMADOL HYDROCHLORIDE 50 MG/1
TABLET ORAL
Qty: 90 TABLET | Refills: 1 | OUTPATIENT
Start: 2024-12-18

## 2024-12-18 NOTE — TELEPHONE ENCOUNTER
Dea called requesting a refill of the below medication which has been pended for you:     OARRS from [place], [place], and [place] reviewed. Tramadol 50 mg last filled 11/12/24 #90/30 days    Requested Prescriptions     Pending Prescriptions Disp Refills    traMADol (ULTRAM) 50 MG tablet 90 tablet 0     Sig: Take 1 tablet by mouth every 8 hours as needed for Pain for up to 60 days. Max Daily Amount: 150 mg       Last Appointment Date: 11/20/2024  Next Appointment Date: 12/18/2024    Allergies   Allergen Reactions    Lactose Intolerance (Gi) Diarrhea     Lactose Intolerant.        Methocarbamol Other (See Comments)     Extreme drowsiness

## 2024-12-21 NOTE — TELEPHONE ENCOUNTER
Controlled substances monitoring: No signs of potential drug abuse or diversion identified when the OAR[place] report from [place], [place], and [place] was reviewed today.  The activity on the report was consistent with the treatment plan.    Progress Note - Behavioral Health   Name: Luba Barajas 88 y.o. female I MRN: [id number]  Unit/Bed#: [facility] 204-01 I Date of Admission: 11/26/2024   Date of Service: 12/21/2024 I Hospital Day: 25        Assessment & Plan  Generalized anxiety disorder  Continue Zoloft 100 mg daily  Continue Klonopin to 0.25mg PO TID for management of anxiety/restlessness.  Continue Abilify to 5 mg daily to help with depression and anxiety.  MDD (major depressive disorder), recurrent episode, severe (HCC)  Continue Zoloft 100 mg daily    HTN (hypertension)  Per medical  Mild neurocognitive disorder  Supportive care    All current active medications have been reviewed  Encourage group therapy, milieu therapy and occupational therapy  Possible discharge in 1 or 2 days if continues to improve    Progress Toward Goals: improving progressively, participates in milieu therapy, a little anxious today about discharge, but is otherwise making progress   Current medications:  Current Facility-Administered Medications   Medication Dose Route Frequency Provider Last Rate    acetaminophen  650 mg Oral Q4H PRN Myrna Caldwell MD      acetaminophen  650 mg Oral Q4H PRN Myrna Caldwell MD      acetaminophen  975 mg Oral Q6H PRN Myrna Caldwell MD      aluminum-magnesium hydroxide-simethicone  30 mL Oral Q4H PRN Myrna Caldwell MD      ARIPiprazole  5 mg Oral Daily Griffin Nunez PA-C      Cholecalciferol  2,000 Units Oral Daily Clare Brennan PA-C      clonazePAM  0.25 mg Oral TID Renard Keyes MD      clonazePAM  0.5 mg Oral BID PRN Sesar Phillips DO      cyanocobalamin  1,000 mcg Oral Daily Clare Brennan PA-C      donepezil  5 mg Oral HS VAL Muñiz      haloperidol lactate  5 mg Intramuscular Q4H PRN Max 4/day Myrna Caldwell MD      hydrOXYzine HCL  25 mg Oral Q6H PRN Max 4/day Myrna Caldwlel MD      hydrOXYzine HCL  50 mg Oral Q6H PRN Max 4/day Myrna Caldwell MD      lisinopril  10 mg Oral Daily Clare  "HOLLEY Brennan PA-C      menthol-zinc oxide   Topical BID Clare BABB FABIAN Brennan      nicotine polacrilex  4 mg Oral Q2H PRN Myrna Caldwell MD      nystatin   Topical BID Clare BABB FABIAN Brennan      polyethylene glycol  17 g Oral Daily PRN Royce Bailon MD      pravastatin  40 mg Oral Daily With Dinner Clare Brennan PA-C      propranolol  5 mg Oral Q8H PRN Myrna Caldwell MD      risperiDONE  0.25 mg Oral Q4H PRN Max 6/day Myrna Caldwell MD      risperiDONE  0.5 mg Oral Q4H PRN Max 3/day Myrna Caldwell MD      risperiDONE  1 mg Oral Q2H PRN Max 3/day Myrna Caldwell MD      senna-docusate sodium  1 tablet Oral HS Royce Bailon MD      sertraline  100 mg Oral Daily Renard Keyes MD      sodium chloride  2 spray Each Nare Q1H PRN Clare Brennan PA-C      traZODone  50 mg Oral Q6H PRN Max 3/day Myrna Caldwell MD             Risks / Benefits of Treatment:    Risks, benefits, and possible side effects of medications explained to patient and patient verbalizes understanding and agreement for treatment.    Subjective:    Behavior over the last 24 hours: unchanged.     During interview today, patient states she is feeling \"pretty good \", as she feels her depression has improved.  She states she is nervous about discharge on Monday, as she feels somewhat embarrassed she has been having difficulty handling her mood and feeling so dysphoric at times.  She states that she is not sure what people think of her.  Offered her emotional support, and she agrees that she feels the medications have made a difference for her.  She states that she has a lot of support from her son.  Denies any thoughts to hurt herself or anyone else at this time, denies any hallucinations.    Sleep: normal, improved  Appetite: normal  Medication side effects: No   ROS: no complaints    Mental Status Evaluation:    Appearance:  age appropriate, casually dressed   Behavior:  normal, pleasant, cooperative   Speech:  normal rate and " volume   Mood:  improved, minimally anxious   Affect:  normal range and intensity, appropriate   Thought Process:  organized, logical, coherent, goal directed, linear   Associations: intact associations   Thought Content:  no overt delusions   Perceptual Disturbances: none   Risk Potential: Suicidal ideation - None at present  Homicidal ideation - None at present  Potential for aggression - No   Sensorium:  oriented to person, place, and time/date   Memory:  recent and remote memory grossly intact   Consciousness:  alert and awake   Attention/Concentration: attention span and concentration are age appropriate   Insight:  fair   Judgment: fair   Gait/Station: uses walker   Motor Activity: no abnormal movements     Vital signs in last 24 hours:    Temp:  [97 °F (36.1 °C)-97.8 °F (36.6 °C)] 97.7 °F (36.5 °C)  HR:  [75-92] 92  BP: (122-150)/(58-67) 127/67  Resp:  [16-18] 18  SpO2:  [92 %-95 %] 95 %  O2 Device: None (Room air)         Laboratory results: I have personally reviewed all pertinent laboratory/tests results    Results from the past 24 hours: No results found for this or any previous visit (from the past 24 hours).      Counseling / Coordination of Care:    Total floor / unit time spent today 30 minutes. Greater than 50% of total time was spent with the patient and / or family counseling and / or coordination of care. A description of counseling / coordination of care:  Patient's progress discussed with staff in treatment team meeting.  Coping with anxiety reviewed with patient.  Reoriented to reality and reassured.    Administrative Statements   I have spent a total time of 30 minutes in caring for this patient on the day of the visit/encounter including Prognosis, Impressions, Counseling / Coordination of care, Documenting in the medical record, and Obtaining or reviewing history  . Topics discussed with the patient / family include symptom assessment and management, medication review, and psychosocial  support.    Alex Campbell, DO 12/21/24

## 2024-12-23 DIAGNOSIS — L29.9 PRURITUS: ICD-10-CM

## 2024-12-23 NOTE — TELEPHONE ENCOUNTER
Dea called requesting a refill of the below medication which has been pended for you:     Patient requesting refills. She has enough medication but requests to be refilled on 12/26 in the AM so medication can be delivered to her on time    Requested Prescriptions     Pending Prescriptions Disp Refills    diphenhydrAMINE (ALLERGY) 25 MG capsule [Pharmacy Med Name: ALLERGY 25MG CAPS] 60 capsule 1     Sig: TAKE ONE CAPSULE BY MOUTH TWICE A DAY AS NEEDED FOR ITCHING OR ALLERGIES       Last Appointment Date: 11/20/2024  Next Appointment Date: 2/20/2025    Allergies   Allergen Reactions    Lactose Intolerance (Gi) Diarrhea     Lactose Intolerant.        Methocarbamol Other (See Comments)     Extreme drowsiness      
unsure

## 2024-12-26 DIAGNOSIS — L29.9 PRURITUS: ICD-10-CM

## 2024-12-26 RX ORDER — DIPHENHYDRAMINE HCL 25 MG
CAPSULE ORAL
Qty: 60 CAPSULE | Refills: 1 | Status: SHIPPED | OUTPATIENT
Start: 2024-12-26

## 2024-12-26 RX ORDER — DIPHENHYDRAMINE HCL 25 MG
CAPSULE ORAL
Qty: 60 CAPSULE | Refills: 3 | OUTPATIENT
Start: 2024-12-26

## 2024-12-26 NOTE — TELEPHONE ENCOUNTER
Patient asking if med could be ordered prior to 12.30 pm since they deliver and that is the cut off time.

## 2025-01-22 ENCOUNTER — TELEMEDICINE (OUTPATIENT)
Dept: FAMILY MEDICINE CLINIC | Age: 65
End: 2025-01-22
Payer: MEDICAID

## 2025-01-22 DIAGNOSIS — E11.40 TYPE 2 DIABETES MELLITUS WITH DIABETIC NEUROPATHY, WITHOUT LONG-TERM CURRENT USE OF INSULIN (HCC): Primary | ICD-10-CM

## 2025-01-22 PROCEDURE — 99213 OFFICE O/P EST LOW 20 MIN: CPT | Performed by: FAMILY MEDICINE

## 2025-01-22 PROCEDURE — 2022F DILAT RTA XM EVC RTNOPTHY: CPT | Performed by: FAMILY MEDICINE

## 2025-01-22 PROCEDURE — 99213 OFFICE O/P EST LOW 20 MIN: CPT

## 2025-01-22 PROCEDURE — G8427 DOCREV CUR MEDS BY ELIG CLIN: HCPCS | Performed by: FAMILY MEDICINE

## 2025-01-22 PROCEDURE — 3017F COLORECTAL CA SCREEN DOC REV: CPT | Performed by: FAMILY MEDICINE

## 2025-01-22 PROCEDURE — 3046F HEMOGLOBIN A1C LEVEL >9.0%: CPT | Performed by: FAMILY MEDICINE

## 2025-01-22 SDOH — ECONOMIC STABILITY: FOOD INSECURITY: WITHIN THE PAST 12 MONTHS, THE FOOD YOU BOUGHT JUST DIDN'T LAST AND YOU DIDN'T HAVE MONEY TO GET MORE.: PATIENT DECLINED

## 2025-01-22 SDOH — ECONOMIC STABILITY: FOOD INSECURITY: WITHIN THE PAST 12 MONTHS, YOU WORRIED THAT YOUR FOOD WOULD RUN OUT BEFORE YOU GOT MONEY TO BUY MORE.: PATIENT DECLINED

## 2025-01-22 ASSESSMENT — PATIENT HEALTH QUESTIONNAIRE - PHQ9
2. FEELING DOWN, DEPRESSED OR HOPELESS: SEVERAL DAYS
10. IF YOU CHECKED OFF ANY PROBLEMS, HOW DIFFICULT HAVE THESE PROBLEMS MADE IT FOR YOU TO DO YOUR WORK, TAKE CARE OF THINGS AT HOME, OR GET ALONG WITH OTHER PEOPLE: NOT DIFFICULT AT ALL
6. FEELING BAD ABOUT YOURSELF - OR THAT YOU ARE A FAILURE OR HAVE LET YOURSELF OR YOUR FAMILY DOWN: NOT AT ALL
SUM OF ALL RESPONSES TO PHQ QUESTIONS 1-9: 1
7. TROUBLE CONCENTRATING ON THINGS, SUCH AS READING THE NEWSPAPER OR WATCHING TELEVISION: NOT AT ALL
4. FEELING TIRED OR HAVING LITTLE ENERGY: NOT AT ALL
8. MOVING OR SPEAKING SO SLOWLY THAT OTHER PEOPLE COULD HAVE NOTICED. OR THE OPPOSITE, BEING SO FIGETY OR RESTLESS THAT YOU HAVE BEEN MOVING AROUND A LOT MORE THAN USUAL: NOT AT ALL
1. LITTLE INTEREST OR PLEASURE IN DOING THINGS: NOT AT ALL
SUM OF ALL RESPONSES TO PHQ QUESTIONS 1-9: 1
5. POOR APPETITE OR OVEREATING: NOT AT ALL
SUM OF ALL RESPONSES TO PHQ QUESTIONS 1-9: 1
SUM OF ALL RESPONSES TO PHQ9 QUESTIONS 1 & 2: 1
3. TROUBLE FALLING OR STAYING ASLEEP: NOT AT ALL
9. THOUGHTS THAT YOU WOULD BE BETTER OFF DEAD, OR OF HURTING YOURSELF: NOT AT ALL
SUM OF ALL RESPONSES TO PHQ QUESTIONS 1-9: 1

## 2025-01-22 NOTE — PROGRESS NOTES
psychotic features (HCC)    Splenic artery aneurysm (HCC)    Bipolar affective disorder, currently depressed, mild (HCC)    Morbidly obese    Vitamin B12 deficiency    Polyneuropathy associated with underlying disease (HCC)    Bipolar affective disorder, current episode hypomanic (HCC)    Schizoaffective disorder, bipolar type (HCC)    Dysthymia    Neuropathic pain    Calculus of kidney    Disorder of shoulder    Osteoarthritis of left knee    Pain of left shoulder region    Primary hypertension    Right hip pain    Sacroiliitis, not elsewhere classified (HCC)    Dental cavities    Gastroesophageal reflux disease    Peripheral nerve disease    Type 2 diabetes mellitus (HCC)         PHYSICAL EXAMINATION:    Vital Signs: (As obtained by patient/caregiver or practitioner observation)        1/22/2025     3:22 PM   Patient-Reported Vitals   Patient-Reported Weight 195lbs   Patient-Reported Height 5'2\"         Mental status:  She is alert and awake.  She is oriented to person/place/time.  She is able to follow commands.                 Psychiatric:  Affect is somewhat anxious.         Other pertinent observable physical exam findings:  She responds to questions appropriately.  Speech is clear.   There is no observed dyspnea with conversation.                 Dea PIPE Crook, was evaluated through a synchronous (real-time) audio-video encounter. The patient (or guardian if applicable) is aware that this is a billable service, which includes applicable co-pays. This Virtual Visit was conducted with patient's (and/or legal guardian's) consent. The visit was conducted pursuant to the emergency declaration under the Edwards Act and the National Emergencies Act, 1135 waiver authority and the Coronavirus Preparedness and Response Supplemental Appropriations Act. Patient identification was verified, and a caregiver was present when appropriate. The patient was located in a state where the provider was licensed to provide

## 2025-01-24 ENCOUNTER — HOSPITAL ENCOUNTER (OUTPATIENT)
Age: 65
Discharge: HOME | End: 2025-01-24
Payer: MEDICAID

## 2025-01-24 DIAGNOSIS — E11.40 TYPE 2 DIABETES MELLITUS WITH DIABETIC NEUROPATHY, WITHOUT LONG-TERM CURRENT USE OF INSULIN (HCC): ICD-10-CM

## 2025-01-24 DIAGNOSIS — E78.2 MIXED HYPERLIPIDEMIA: ICD-10-CM

## 2025-01-24 LAB
ALBUMIN SERPL-MCNC: 4.2 G/DL (ref 3.5–5.2)
ALBUMIN/GLOB SERPL: 1.3 {RATIO} (ref 1–2.5)
ALP SERPL-CCNC: 137 U/L (ref 35–104)
ALT SERPL-CCNC: 23 U/L (ref 5–33)
ANION GAP SERPL CALCULATED.3IONS-SCNC: 10 MMOL/L (ref 9–17)
AST SERPL-CCNC: 38 U/L
BILIRUB SERPL-MCNC: 0.4 MG/DL (ref 0.3–1.2)
BUN SERPL-MCNC: 10 MG/DL (ref 8–23)
BUN/CREAT SERPL: 13 (ref 9–20)
CALCIUM SERPL-MCNC: 9.8 MG/DL (ref 8.6–10.4)
CHLORIDE SERPL-SCNC: 97 MMOL/L (ref 98–107)
CHOLEST SERPL-MCNC: 158 MG/DL (ref 0–199)
CHOLESTEROL/HDL RATIO: 3.5
CO2 SERPL-SCNC: 30 MMOL/L (ref 20–31)
CREAT SERPL-MCNC: 0.8 MG/DL (ref 0.5–0.9)
EST. AVERAGE GLUCOSE BLD GHB EST-MCNC: 240 MG/DL
GFR, ESTIMATED: 83 ML/MIN/1.73M2
GLUCOSE SERPL-MCNC: 295 MG/DL (ref 70–99)
HBA1C MFR BLD: 10 % (ref 4–6)
HDLC SERPL-MCNC: 45 MG/DL
LDLC SERPL CALC-MCNC: 50 MG/DL (ref 0–100)
POTASSIUM SERPL-SCNC: 4.5 MMOL/L (ref 3.7–5.3)
PROT SERPL-MCNC: 7.5 G/DL (ref 6.4–8.3)
SODIUM SERPL-SCNC: 137 MMOL/L (ref 135–144)
TRIGL SERPL-MCNC: 313 MG/DL
VLDLC SERPL CALC-MCNC: 63 MG/DL (ref 1–30)

## 2025-01-24 PROCEDURE — 36415 COLL VENOUS BLD VENIPUNCTURE: CPT

## 2025-01-24 PROCEDURE — 80061 LIPID PANEL: CPT

## 2025-01-24 PROCEDURE — 83036 HEMOGLOBIN GLYCOSYLATED A1C: CPT

## 2025-01-24 PROCEDURE — 80053 COMPREHEN METABOLIC PANEL: CPT

## 2025-01-27 ENCOUNTER — TELEPHONE (OUTPATIENT)
Dept: FAMILY MEDICINE CLINIC | Age: 65
End: 2025-01-27

## 2025-01-27 DIAGNOSIS — E11.65 TYPE 2 DIABETES MELLITUS WITH HYPERGLYCEMIA, WITHOUT LONG-TERM CURRENT USE OF INSULIN (HCC): ICD-10-CM

## 2025-01-27 DIAGNOSIS — E11.40 TYPE 2 DIABETES MELLITUS WITH DIABETIC NEUROPATHY, WITHOUT LONG-TERM CURRENT USE OF INSULIN (HCC): Primary | ICD-10-CM

## 2025-01-27 NOTE — TELEPHONE ENCOUNTER
Patient made aware. Patient also made aware that her insurance denied paying for this medication and would like patient to try preferred medication. Send to clinic pharmacy. Patient would like to be notified when we send something different in. She is willing to do oral med or a different weekly injection.     Coverage is provided when the member meets all the followin. Member has a history of at least 120 days of therapy with THREE preferred medications in this UPDL category. ONE of the 120 day trials must be with a drug in the same drug class, Byetta (5 mcg and 10 mcg), Victoza (18 MG/3 ML PEN) (Brand name is preferred by the plan) or Trulicity (0.75 mg, 1.5 mg, 3 mg, and 4.5 mg)]. Other trials may include but are not limited to: Farxiga 5 and 10 mg (Brand name is preferred by the plan), Glimepiride, Glipizide, Januvia, Jardiance 10 and 25 mg and Metformin  mg, 850 mg, 1000 mg and ER (generics of Glucophage); 2. Member has had an inadequate clinical response (the inability to reach A1C goal (less than 7%) after at least 120 days of current regimen, with use of two or more drugs concomitantly per ADA (American Diabetes Association) guidelines and; 3. Member has documented adherence and appropriate dose escalation (must achieve maximum recommended dose or document that maximum recommended dose is not tolerated or is clinically inappropriate) and; 4. Documentation includes a patient specific A1C goal if less than 7% and must include current A1C (within the last 6 months).

## 2025-01-27 NOTE — TELEPHONE ENCOUNTER
Please advise Dea that the HgbA1c and glucose are elevated.  I recommend beginning Mounjaro.   We discussed this during her virtual visit on 1/22/2025.  A prescription for Mounjaro was sent to [facility].    The triglycerides were elevated, but the rest of the labs have no significant abnormalities.

## 2025-01-28 ENCOUNTER — TELEPHONE (OUTPATIENT)
Dept: FAMILY MEDICINE CLINIC | Age: 65
End: 2025-01-28

## 2025-01-28 RX ORDER — LIRAGLUTIDE 6 MG/ML
0.6 INJECTION SUBCUTANEOUS DAILY
Qty: 3 ML | Refills: 0 | Status: SHIPPED | OUTPATIENT
Start: 2025-01-28 | End: 2025-01-28

## 2025-01-28 NOTE — TELEPHONE ENCOUNTER
Problem: Patient Care Overview  Goal: Plan of Care Review  Outcome: Ongoing (interventions implemented as appropriate)   [id number]   Coping/Psychosocial   Care Plan Reviewed With mother   Plan of Care Review   Progress no change   OTHER   Outcome Summary bradys/desats x 7 this shift. emesis several times. hob reelevated this afternoon. on zantac, vitamins and rice cereal to thicken bm     Goal: Individualization and Mutuality  Outcome: Ongoing (interventions implemented as appropriate)    Goal: Discharge Needs Assessment  Outcome: Ongoing (interventions implemented as appropriate)      Problem:  Infant, Late or Early Term  Goal: Signs and Symptoms of Listed Potential Problems Will be Absent, Minimized or Managed ( Infant, Late or Early Term)  Outcome: Ongoing (interventions implemented as appropriate)         Rybelsus 3 mg daily was prescribed.

## 2025-01-28 NOTE — TELEPHONE ENCOUNTER
Spoke to patient and she does not want to do daily injectable medications. She would like oral medications instead. Please advise.

## 2025-01-28 NOTE — TELEPHONE ENCOUNTER
Ruchi in pharmacy calling as Victoza does not come in a 3ml options, please call to discuss doses and options.

## 2025-01-29 ENCOUNTER — TELEPHONE (OUTPATIENT)
Dept: FAMILY MEDICINE CLINIC | Age: 65
End: 2025-01-29

## 2025-01-29 DIAGNOSIS — E11.40 TYPE 2 DIABETES MELLITUS WITH DIABETIC NEUROPATHY, WITHOUT LONG-TERM CURRENT USE OF INSULIN (HCC): Primary | ICD-10-CM

## 2025-01-29 DIAGNOSIS — K59.00 CONSTIPATION, UNSPECIFIED CONSTIPATION TYPE: Primary | ICD-10-CM

## 2025-01-29 DIAGNOSIS — E11.40 TYPE 2 DIABETES MELLITUS WITH DIABETIC NEUROPATHY, WITH LONG-TERM CURRENT USE OF INSULIN (HCC): ICD-10-CM

## 2025-01-29 DIAGNOSIS — Z79.4 TYPE 2 DIABETES MELLITUS WITH DIABETIC NEUROPATHY, WITH LONG-TERM CURRENT USE OF INSULIN (HCC): ICD-10-CM

## 2025-01-29 DIAGNOSIS — E11.65 TYPE 2 DIABETES MELLITUS WITH HYPERGLYCEMIA, WITHOUT LONG-TERM CURRENT USE OF INSULIN (HCC): ICD-10-CM

## 2025-01-29 RX ORDER — DOCUSATE SODIUM 100 MG/1
100 CAPSULE, LIQUID FILLED ORAL 2 TIMES DAILY PRN
Qty: 60 CAPSULE | Refills: 1 | Status: SHIPPED | OUTPATIENT
Start: 2025-01-29

## 2025-01-29 NOTE — TELEPHONE ENCOUNTER
Patient states she is having constipation. She has increased fluids and increased the fiber in her diet today.   She is requesting a prescription for colace to be sent to the clinic pharmacy.

## 2025-01-29 NOTE — TELEPHONE ENCOUNTER
Patient notified and verbalized understanding. She would like to try restarting Metformin until she is able to establish with Alis Zamorano. She states she previously experienced mild diarrhea with this however she said that was a long time ago and would like to try it again.    Please advise.

## 2025-02-03 DIAGNOSIS — I10 ESSENTIAL HYPERTENSION: ICD-10-CM

## 2025-02-03 NOTE — TELEPHONE ENCOUNTER
Dea called requesting a refill of the below medication which has been pended for you:     Requested Prescriptions     Pending Prescriptions Disp Refills    losartan (COZAAR) 100 MG tablet [Pharmacy Med Name: LOSARTAN POTASSIUM 100MG TABS] 90 tablet 1     Sig: TAKE ONE TABLET BY MOUTH ONCE A DAY       Last Appointment Date: 1/22/2025  Next Appointment Date: 2/20/2025    Allergies   Allergen Reactions    Metformin And Related Diarrhea    Lactose Intolerance (Gi) Diarrhea     Lactose Intolerant.        Methocarbamol Other (See Comments)     Extreme drowsiness

## 2025-02-04 RX ORDER — LOSARTAN POTASSIUM 100 MG/1
TABLET ORAL
Qty: 90 TABLET | Refills: 0 | Status: SHIPPED | OUTPATIENT
Start: 2025-02-04

## 2025-02-20 DIAGNOSIS — K21.9 GASTROESOPHAGEAL REFLUX DISEASE, UNSPECIFIED WHETHER ESOPHAGITIS PRESENT: ICD-10-CM

## 2025-02-20 RX ORDER — OMEPRAZOLE 20 MG/1
20 CAPSULE, DELAYED RELEASE ORAL DAILY
Qty: 90 CAPSULE | Refills: 0 | Status: SHIPPED | OUTPATIENT
Start: 2025-02-20

## 2025-02-20 NOTE — TELEPHONE ENCOUNTER
Dea called requesting a refill of the below medication which has been pended for you:     Requested Prescriptions     Pending Prescriptions Disp Refills    omeprazole (PRILOSEC) 20 MG delayed release capsule [Pharmacy Med Name: OMEPRAZOLE 20MG CPDR] 90 capsule 0     Sig: TAKE ONE CAPSULE BY MOUTH ONCE A DAY       Last Appointment Date: 1/22/2025  Next Appointment Date: 2/26/2025    Allergies   Allergen Reactions    Metformin And Related Diarrhea    Lactose Intolerance (Gi) Diarrhea     Lactose Intolerant.        Methocarbamol Other (See Comments)     Extreme drowsiness

## 2025-02-26 ENCOUNTER — OFFICE VISIT (OUTPATIENT)
Dept: FAMILY MEDICINE CLINIC | Age: 65
End: 2025-02-26
Payer: MEDICAID

## 2025-02-26 VITALS
HEART RATE: 78 BPM | OXYGEN SATURATION: 95 % | SYSTOLIC BLOOD PRESSURE: 122 MMHG | BODY MASS INDEX: 35.33 KG/M2 | WEIGHT: 192 LBS | DIASTOLIC BLOOD PRESSURE: 82 MMHG | HEIGHT: 62 IN

## 2025-02-26 DIAGNOSIS — R05.9 COUGH, UNSPECIFIED TYPE: ICD-10-CM

## 2025-02-26 DIAGNOSIS — G89.29 CHRONIC RECTAL PAIN: ICD-10-CM

## 2025-02-26 DIAGNOSIS — F17.210 CIGARETTE NICOTINE DEPENDENCE WITHOUT COMPLICATION: ICD-10-CM

## 2025-02-26 DIAGNOSIS — L29.9 PRURITUS: ICD-10-CM

## 2025-02-26 DIAGNOSIS — K62.89 CHRONIC RECTAL PAIN: ICD-10-CM

## 2025-02-26 DIAGNOSIS — I10 ESSENTIAL HYPERTENSION: ICD-10-CM

## 2025-02-26 DIAGNOSIS — R09.81 NASAL CONGESTION: ICD-10-CM

## 2025-02-26 DIAGNOSIS — E11.40 TYPE 2 DIABETES MELLITUS WITH DIABETIC NEUROPATHY, WITHOUT LONG-TERM CURRENT USE OF INSULIN (HCC): Primary | ICD-10-CM

## 2025-02-26 DIAGNOSIS — E78.2 MIXED HYPERLIPIDEMIA: ICD-10-CM

## 2025-02-26 DIAGNOSIS — J44.9 CHRONIC OBSTRUCTIVE PULMONARY DISEASE, UNSPECIFIED COPD TYPE (HCC): ICD-10-CM

## 2025-02-26 PROCEDURE — 3079F DIAST BP 80-89 MM HG: CPT | Performed by: FAMILY MEDICINE

## 2025-02-26 PROCEDURE — 3017F COLORECTAL CA SCREEN DOC REV: CPT | Performed by: FAMILY MEDICINE

## 2025-02-26 PROCEDURE — 2022F DILAT RTA XM EVC RTNOPTHY: CPT | Performed by: FAMILY MEDICINE

## 2025-02-26 PROCEDURE — 1036F TOBACCO NON-USER: CPT | Performed by: FAMILY MEDICINE

## 2025-02-26 PROCEDURE — 3074F SYST BP LT 130 MM HG: CPT | Performed by: FAMILY MEDICINE

## 2025-02-26 PROCEDURE — G8427 DOCREV CUR MEDS BY ELIG CLIN: HCPCS | Performed by: FAMILY MEDICINE

## 2025-02-26 PROCEDURE — 99214 OFFICE O/P EST MOD 30 MIN: CPT | Performed by: FAMILY MEDICINE

## 2025-02-26 PROCEDURE — G8417 CALC BMI ABV UP PARAM F/U: HCPCS | Performed by: FAMILY MEDICINE

## 2025-02-26 PROCEDURE — 3046F HEMOGLOBIN A1C LEVEL >9.0%: CPT | Performed by: FAMILY MEDICINE

## 2025-02-26 PROCEDURE — 3023F SPIROM DOC REV: CPT | Performed by: FAMILY MEDICINE

## 2025-02-26 RX ORDER — GLIMEPIRIDE 2 MG/1
2 TABLET ORAL
Qty: 30 TABLET | Refills: 3 | Status: SHIPPED | OUTPATIENT
Start: 2025-02-26

## 2025-02-26 RX ORDER — FLUTICASONE PROPIONATE 50 MCG
1 SPRAY, SUSPENSION (ML) NASAL DAILY
Qty: 1 EACH | Refills: 3 | Status: SHIPPED | OUTPATIENT
Start: 2025-02-26

## 2025-02-26 RX ORDER — DIPHENHYDRAMINE HCL 25 MG
CAPSULE ORAL
Qty: 60 CAPSULE | Refills: 1 | Status: SHIPPED | OUTPATIENT
Start: 2025-02-26

## 2025-02-26 RX ORDER — OMEPRAZOLE 20 MG/1
20 CAPSULE, DELAYED RELEASE ORAL DAILY
Qty: 90 CAPSULE | Refills: 0 | Status: CANCELLED | OUTPATIENT
Start: 2025-02-26

## 2025-02-26 RX ORDER — PRAVASTATIN SODIUM 40 MG
TABLET ORAL
Qty: 90 TABLET | Refills: 1 | Status: CANCELLED | OUTPATIENT
Start: 2025-02-26

## 2025-02-26 RX ORDER — TRAMADOL HYDROCHLORIDE 50 MG/1
50 TABLET ORAL EVERY 8 HOURS PRN
Qty: 90 TABLET | Refills: 0 | Status: SHIPPED | OUTPATIENT
Start: 2025-02-26 | End: 2025-03-28

## 2025-02-26 RX ORDER — FLUTICASONE PROPIONATE 110 UG/1
2 AEROSOL, METERED RESPIRATORY (INHALATION) 2 TIMES DAILY
Qty: 12 G | Refills: 5 | Status: SHIPPED | OUTPATIENT
Start: 2025-02-26 | End: 2026-02-26

## 2025-02-26 RX ORDER — LOSARTAN POTASSIUM 100 MG/1
TABLET ORAL
Qty: 90 TABLET | Refills: 1 | Status: SHIPPED | OUTPATIENT
Start: 2025-02-26

## 2025-02-26 RX ORDER — PRAVASTATIN SODIUM 40 MG
TABLET ORAL
Qty: 90 TABLET | Refills: 1 | Status: SHIPPED | OUTPATIENT
Start: 2025-02-26

## 2025-02-26 NOTE — PROGRESS NOTES
Patient declines all vaccines, patient declines cervical cancer screening, colon cancer screening, mammogram, and lung cancer screening at this time.

## 2025-02-26 NOTE — PROGRESS NOTES
[place] [address]                        Telephone [phone]             Fax [phone]       Dea Crook  :  1960  Age:  64 y.o.   MRN:  [id number]  Date of visit:  2025       Assessment and Plan:     1. Type 2 diabetes mellitus with diabetic neuropathy, without long-term current use of insulin (HCC)  Her HgbA1c was 10 %, which is not at goal.   She was advised to continue Metformin.   This was refilled:   - metFORMIN (GLUCOPHAGE) 500 MG tablet; Take 1 tablet by mouth daily (with breakfast)  Dispense: 30 tablet; Refill: 0    I recommended adding Amaryl.  Amaryl 2 mg daily was prescribed.  - glimepiride (AMARYL) 2 MG tablet; Take 1 tablet by mouth every morning (before breakfast)  Dispense: 30 tablet; Refill: 3    Signs and symptoms of hypoglycemia were discussed with the patient.    Labs were ordered to be done prior to her return visit in 3 months:   - Comprehensive Metabolic Panel; Future  - Hemoglobin A1C; Future    -  DIABETES FOOT EXAM    2. Essential hypertension  Her blood pressure is well-controlled today.  (BP: 122/82)   She was advised to continue current medications.  Losartan was refilled:   - losartan (COZAAR) 100 MG tablet; TAKE ONE TABLET BY MOUTH ONCE A DAY  Dispense: 90 tablet; Refill: 1    3. Mixed hyperlipidemia  Her lipid profile was very good except for elevated triglycerides on her recent lab work.      She is tolerating Pravastatin well; this was refilled:   - pravastatin (PRAVACHOL) 40 MG tablet; take 1 tablet by mouth once daily  Dispense: 90 tablet; Refill: 1    - Lipid Panel; Future was ordered to be done prior to her return visit in 3 months.     4. Chronic rectal pain  Tramadol was refilled.  - traMADol (ULTRAM) 50 MG tablet; Take 1 tablet by mouth every 8 hours as needed for Pain for up to 30 days. Max Daily Amount: 150 mg  Dispense: 90 tablet; Refill: 0    5.

## 2025-02-27 DIAGNOSIS — E83.42 HYPOMAGNESEMIA: ICD-10-CM

## 2025-02-28 RX ORDER — LANOLIN ALCOHOL/MO/W.PET/CERES
400 CREAM (GRAM) TOPICAL DAILY
Qty: 90 TABLET | Refills: 1 | Status: SHIPPED | OUTPATIENT
Start: 2025-02-28

## 2025-02-28 NOTE — TELEPHONE ENCOUNTER
Dea called requesting a refill of the below medication which has been pended for you:     Requested Prescriptions     Pending Prescriptions Disp Refills    magnesium oxide (MAG-OX) 400 (240 Mg) MG tablet [Pharmacy Med Name: MAGNESIUM OXIDE 400 (240 MG) TABS] 90 tablet 0     Sig: TAKE ONE TABLET BY MOUTH ONCE A DAY       Last Appointment Date: 2/26/2025  Next Appointment Date: 5/27/2025    Allergies   Allergen Reactions    Metformin And Related Diarrhea    Lactose Intolerance (Gi) Diarrhea     Lactose Intolerant.        Methocarbamol Other (See Comments)     Extreme drowsiness

## 2025-03-11 ENCOUNTER — TELEPHONE (OUTPATIENT)
Dept: FAMILY MEDICINE CLINIC | Age: 65
End: 2025-03-11

## 2025-03-11 NOTE — TELEPHONE ENCOUNTER
Pt calling in stating she has a cyst on her pubic area. She wants Dr Duffy to give her something for this, but without coming in. She states she thinks if she puts clothes on and walks around it will pop. Writer spoke with Fanny that she was going to have to come in to be seen for this, especially if it is close to popping. Write told pt nurse advise and she is not wanting to come in. She states there is a little drainage and a small odor. Writer advised her to come in to be seen, she denied because she needs transportation and the cyst is on a private area, she doesn't want it looked at.

## 2025-03-11 NOTE — TELEPHONE ENCOUNTER
Spoke to patient and she declines appointment at this time. She states she will use a warm compress. She feels this is an ingrown hair. She will call if she decides she would like an appointment.

## 2025-03-11 NOTE — TELEPHONE ENCOUNTER
I agree that she should come in for evaluation.  I recommend that she use warm compresses until she is able to be seen.

## 2025-03-21 ENCOUNTER — OFFICE VISIT (OUTPATIENT)
Dept: NEUROLOGY | Age: 65
End: 2025-03-21
Payer: MEDICAID

## 2025-03-21 VITALS
HEART RATE: 84 BPM | DIASTOLIC BLOOD PRESSURE: 72 MMHG | BODY MASS INDEX: 35.81 KG/M2 | OXYGEN SATURATION: 96 % | RESPIRATION RATE: 18 BRPM | SYSTOLIC BLOOD PRESSURE: 124 MMHG | WEIGHT: 195.8 LBS

## 2025-03-21 DIAGNOSIS — Z87.891 EX-SMOKER: ICD-10-CM

## 2025-03-21 DIAGNOSIS — F25.0 SCHIZOAFFECTIVE DISORDER, BIPOLAR TYPE (HCC): ICD-10-CM

## 2025-03-21 DIAGNOSIS — F31.31 BIPOLAR AFFECTIVE DISORDER, CURRENTLY DEPRESSED, MILD (HCC): ICD-10-CM

## 2025-03-21 DIAGNOSIS — E66.811 OBESITY (BMI 30.0-34.9): ICD-10-CM

## 2025-03-21 DIAGNOSIS — M79.2 NEUROPATHIC PAIN: ICD-10-CM

## 2025-03-21 DIAGNOSIS — F25.9 SCHIZOAFFECTIVE DISORDER, UNSPECIFIED TYPE: ICD-10-CM

## 2025-03-21 DIAGNOSIS — G56.03 BILATERAL CARPAL TUNNEL SYNDROME: ICD-10-CM

## 2025-03-21 DIAGNOSIS — E53.8 VITAMIN B12 DEFICIENCY: ICD-10-CM

## 2025-03-21 DIAGNOSIS — F31.0 BIPOLAR AFFECTIVE DISORDER, CURRENT EPISODE HYPOMANIC (HCC): ICD-10-CM

## 2025-03-21 DIAGNOSIS — R53.82 CHRONIC FATIGUE: ICD-10-CM

## 2025-03-21 DIAGNOSIS — G25.81 RESTLESS LEG SYNDROME: ICD-10-CM

## 2025-03-21 DIAGNOSIS — I72.8 SPLENIC ARTERY ANEURYSM: ICD-10-CM

## 2025-03-21 DIAGNOSIS — F34.1 DYSTHYMIA: ICD-10-CM

## 2025-03-21 DIAGNOSIS — G62.9 PERIPHERAL POLYNEUROPATHY: ICD-10-CM

## 2025-03-21 DIAGNOSIS — G63 POLYNEUROPATHY ASSOCIATED WITH UNDERLYING DISEASE: Primary | ICD-10-CM

## 2025-03-21 DIAGNOSIS — R26.89 BALANCE PROBLEM: ICD-10-CM

## 2025-03-21 DIAGNOSIS — E66.01 MORBIDLY OBESE: ICD-10-CM

## 2025-03-21 DIAGNOSIS — F41.9 ANXIETY: ICD-10-CM

## 2025-03-21 DIAGNOSIS — E11.40 TYPE 2 DIABETES MELLITUS WITH DIABETIC NEUROPATHY, WITHOUT LONG-TERM CURRENT USE OF INSULIN (HCC): ICD-10-CM

## 2025-03-21 DIAGNOSIS — M54.16 CHRONIC LUMBAR RADICULOPATHY: ICD-10-CM

## 2025-03-21 DIAGNOSIS — G47.9 SLEEPING DIFFICULTY: ICD-10-CM

## 2025-03-21 PROCEDURE — 99214 OFFICE O/P EST MOD 30 MIN: CPT | Performed by: PSYCHIATRY & NEUROLOGY

## 2025-03-21 RX ORDER — ASPIRIN 325 MG
325 TABLET, DELAYED RELEASE (ENTERIC COATED) ORAL DAILY
Qty: 30 TABLET | Refills: 12 | Status: SHIPPED | OUTPATIENT
Start: 2025-03-21

## 2025-03-21 RX ORDER — RISPERIDONE 1 MG/1
1 TABLET ORAL NIGHTLY
Qty: 60 TABLET | Refills: 2 | Status: SHIPPED | OUTPATIENT
Start: 2025-03-21

## 2025-03-21 RX ORDER — AMMONIUM LACTATE 12 G/100G
LOTION TOPICAL
Qty: 400 G | Refills: 3 | Status: SHIPPED | OUTPATIENT
Start: 2025-03-21

## 2025-03-21 RX ORDER — GABAPENTIN 300 MG/1
CAPSULE ORAL
Qty: 120 CAPSULE | Refills: 2 | Status: SHIPPED | OUTPATIENT
Start: 2025-03-21 | End: 2025-06-25

## 2025-03-21 RX ORDER — CARBIDOPA AND LEVODOPA 25; 100 MG/1; MG/1
TABLET ORAL
Qty: 360 TABLET | Refills: 1 | Status: SHIPPED | OUTPATIENT
Start: 2025-03-21

## 2025-03-21 RX ORDER — BENZONATATE 100 MG/1
1 CAPSULE, LIQUID FILLED ORAL DAILY
Qty: 60 TABLET | Refills: 2 | Status: SHIPPED | OUTPATIENT
Start: 2025-03-21

## 2025-03-21 ASSESSMENT — ENCOUNTER SYMPTOMS
SINUS PRESSURE: 0
CONSTIPATION: 0
CHEST TIGHTNESS: 0
VOMITING: 0
FACIAL SWELLING: 0
EYE PAIN: 0
NAUSEA: 0
VOICE CHANGE: 0
BOWEL INCONTINENCE: 0
EYE ITCHING: 0
APNEA: 0
SHORTNESS OF BREATH: 0
CHOKING: 0
PHOTOPHOBIA: 0
BLOOD IN STOOL: 0
ABDOMINAL DISTENTION: 0
EYE DISCHARGE: 0
VISUAL CHANGE: 0
BACK PAIN: 0
DIARRHEA: 0
COLOR CHANGE: 0
WHEEZING: 0
EYE REDNESS: 0
ABDOMINAL PAIN: 0
TROUBLE SWALLOWING: 0

## 2025-03-21 NOTE — PROGRESS NOTES
sign in to your [employer] account. Enter U807 in the Search Health Information box to learn more about \"A Healthy Lifestyle: Care Instructions.\"     If you do not have an account, please click on the \"Sign Up Now\" link.  Current as of: July 26, 2016  Content Version: 11.2  © 4143-1242 [employer]. Care instructions adapted under license by [employer]. If you have questions about a medical condition or this instruction, always ask your healthcare professional. [employer] disclaims any warranty or liability for your use of this information.

## 2025-03-24 DIAGNOSIS — E11.40 TYPE 2 DIABETES MELLITUS WITH DIABETIC NEUROPATHY, WITHOUT LONG-TERM CURRENT USE OF INSULIN (HCC): ICD-10-CM

## 2025-03-25 NOTE — TELEPHONE ENCOUNTER
Dea called requesting a refill of the below medication which has been pended for you:     Requested Prescriptions     Pending Prescriptions Disp Refills    metFORMIN (GLUCOPHAGE) 500 MG tablet [Pharmacy Med Name: METFORMIN HCL 500MG TABS] 30 tablet 0     Sig: TAKE ONE TABLET BY MOUTH ONCE A DAY WITH BREAKFAST       Last Appointment Date: 2/26/2025  Next Appointment Date: 5/27/2025

## 2025-03-28 DIAGNOSIS — K62.89 CHRONIC RECTAL PAIN: ICD-10-CM

## 2025-03-28 DIAGNOSIS — G89.29 CHRONIC RECTAL PAIN: ICD-10-CM

## 2025-03-28 NOTE — TELEPHONE ENCOUNTER
Dea called requesting a refill of the below medication which has been pended for you:   Per [facility] last fill date was 03/03/2025 quantity 90 for 30 days  Requested Prescriptions     Pending Prescriptions Disp Refills    traMADol (ULTRAM) 50 MG tablet [Pharmacy Med Name: TRAMADOL HCL 50MG TABS] 90 tablet 0     Sig: TAKE ONE TABLET BY MOUTH EVERY 8 HOURS AS NEEDED FOR PAIN FOR UP TO 30 DAYS. MAX DAILY AMOUNT: 3 TABLETS . REDUCE DOSES TAKEN AS PAIN BECOMES MANAGEABLE.       Last Appointment Date: 2/26/2025  Next Appointment Date: 5/27/2025    Allergies   Allergen Reactions    Lactose Intolerance (Gi) Diarrhea     Lactose Intolerant.        Metformin And Related Diarrhea    Methocarbamol Other (See Comments)     Extreme drowsiness

## 2025-03-30 RX ORDER — TRAMADOL HYDROCHLORIDE 50 MG/1
50 TABLET ORAL EVERY 8 HOURS PRN
Qty: 90 TABLET | Refills: 1 | Status: SHIPPED | OUTPATIENT
Start: 2025-04-01 | End: 2025-05-31

## 2025-04-17 ENCOUNTER — TELEPHONE (OUTPATIENT)
Dept: FAMILY MEDICINE CLINIC | Age: 65
End: 2025-04-17

## 2025-04-17 NOTE — TELEPHONE ENCOUNTER
Pt calling as she was put on metformin and glimepride for her elevated sugars but pt states now sometimes her sugars are dropping too low to 74, do you want to make changes, please advise.

## 2025-04-18 NOTE — TELEPHONE ENCOUNTER
Please advise the patient to stop Glimepiride and continue Metformin.  I also recommend that she continue to monitor her blood sugars at home, and call if her blood sugars remain low.

## 2025-04-28 DIAGNOSIS — L29.9 PRURITUS: ICD-10-CM

## 2025-04-28 RX ORDER — DIPHENHYDRAMINE HCL 25 MG
CAPSULE ORAL
Qty: 60 CAPSULE | Refills: 0 | Status: SHIPPED | OUTPATIENT
Start: 2025-04-28

## 2025-04-28 NOTE — TELEPHONE ENCOUNTER
Dea called requesting a refill of the below medication which has been pended for you:     Requested Prescriptions     Pending Prescriptions Disp Refills    diphenhydrAMINE (ALLERGY) 25 MG capsule [Pharmacy Med Name: ALLERGY 25MG CAPS] 60 capsule 1     Sig: TAKE ONE CAPSULE BY MOUTH TWICE A DAY AS NEEDED FOR ITCHING OR ALLERGIES       Last Appointment Date: 2/26/2025  Next Appointment Date: 5/27/2025    Allergies   Allergen Reactions    Lactose Intolerance (Gi) Diarrhea     Lactose Intolerant.        Metformin And Related Diarrhea    Methocarbamol Other (See Comments)     Extreme drowsiness

## 2025-05-01 ENCOUNTER — TELEPHONE (OUTPATIENT)
Dept: FAMILY MEDICINE CLINIC | Age: 65
End: 2025-05-01

## 2025-05-01 NOTE — TELEPHONE ENCOUNTER
Pt has an appt on 5-27, but has been having sciatic pain on both sides of back, pain when walking, pt questions if she could get something for the pain until her appt.   Pt states this has been going on for 5-6 days, please advise, try either number listed

## 2025-05-01 NOTE — TELEPHONE ENCOUNTER
Patient will keep scheduled appointment for Tuesday.   She states she may come into Urgent Care tomorrow

## 2025-05-01 NOTE — TELEPHONE ENCOUNTER
Advised patient that for any pain medication, a visit would be needed. She currently takes Tramadol PRN but states that is not helping her sciatica pain.   She would like to know if she is able to complete a virtual visit due to no transportation?     If not, she is also scheduled for 5/6/25 @ 320 PM

## 2025-05-06 ENCOUNTER — OFFICE VISIT (OUTPATIENT)
Dept: FAMILY MEDICINE CLINIC | Age: 65
End: 2025-05-06
Payer: MEDICAID

## 2025-05-06 VITALS
DIASTOLIC BLOOD PRESSURE: 80 MMHG | SYSTOLIC BLOOD PRESSURE: 126 MMHG | WEIGHT: 200.8 LBS | HEIGHT: 62 IN | OXYGEN SATURATION: 98 % | RESPIRATION RATE: 16 BRPM | BODY MASS INDEX: 36.95 KG/M2 | HEART RATE: 78 BPM

## 2025-05-06 DIAGNOSIS — M54.41 BILATERAL LOW BACK PAIN WITH BILATERAL SCIATICA, UNSPECIFIED CHRONICITY: ICD-10-CM

## 2025-05-06 DIAGNOSIS — M54.42 BILATERAL LOW BACK PAIN WITH BILATERAL SCIATICA, UNSPECIFIED CHRONICITY: ICD-10-CM

## 2025-05-06 DIAGNOSIS — E11.40 TYPE 2 DIABETES MELLITUS WITH DIABETIC NEUROPATHY, WITHOUT LONG-TERM CURRENT USE OF INSULIN (HCC): Primary | ICD-10-CM

## 2025-05-06 PROCEDURE — G8417 CALC BMI ABV UP PARAM F/U: HCPCS | Performed by: FAMILY MEDICINE

## 2025-05-06 PROCEDURE — 3079F DIAST BP 80-89 MM HG: CPT | Performed by: FAMILY MEDICINE

## 2025-05-06 PROCEDURE — 3017F COLORECTAL CA SCREEN DOC REV: CPT | Performed by: FAMILY MEDICINE

## 2025-05-06 PROCEDURE — 1036F TOBACCO NON-USER: CPT | Performed by: FAMILY MEDICINE

## 2025-05-06 PROCEDURE — 99213 OFFICE O/P EST LOW 20 MIN: CPT | Performed by: FAMILY MEDICINE

## 2025-05-06 PROCEDURE — G8427 DOCREV CUR MEDS BY ELIG CLIN: HCPCS | Performed by: FAMILY MEDICINE

## 2025-05-06 PROCEDURE — 3046F HEMOGLOBIN A1C LEVEL >9.0%: CPT | Performed by: FAMILY MEDICINE

## 2025-05-06 PROCEDURE — 2022F DILAT RTA XM EVC RTNOPTHY: CPT | Performed by: FAMILY MEDICINE

## 2025-05-06 PROCEDURE — 3074F SYST BP LT 130 MM HG: CPT | Performed by: FAMILY MEDICINE

## 2025-05-06 RX ORDER — METHYLPREDNISOLONE 4 MG/1
TABLET ORAL
Qty: 1 KIT | Refills: 0 | Status: SHIPPED | OUTPATIENT
Start: 2025-05-06 | End: 2025-05-12

## 2025-05-06 RX ORDER — BLOOD-GLUCOSE METER
1 KIT MISCELLANEOUS DAILY
Qty: 1 KIT | Refills: 0 | Status: SHIPPED | OUTPATIENT
Start: 2025-05-06

## 2025-05-06 NOTE — PROGRESS NOTES
[facility]             [address] [place] [address]                        Telephone [phone]             Fax [phone]       Dea Crook  :  1960  Age:  64 y.o.   MRN:  [id number]  Date of visit:  2025       Assessment and Plan:     1. Bilateral low back pain with bilateral sciatica, unspecified chronicity  A Medrol Dose Nathaniel was prescribed.  - methylPREDNISolone (MEDROL, NATHANIEL,) 4 MG tablet; Take by mouth as directed per instructions on dose pack.  Take with food.  Dispense: 1 kit; Refill: 0    2. Type 2 diabetes mellitus with diabetic neuropathy, without long-term current use of insulin ([facility])  As noted, her insurance now has a different glucometer on formulary.  A prescription for a new glucometer was sent to her pharmacy.  - glucose monitoring kit; 1 kit by Does not apply route daily Test glucose daily and as needed for signs or symptoms of high or low blood sugar.  Dispense: 1 kit; Refill: 0       Follow up instructions were given to the patient:  She was advised to keep the appointment scheduled May 27.        Subjective:    Dea Crook is a 64 y.o. female who presents to [facility] today (2025) for follow up/evaluation of:  Back Pain (Sciatica pain x1 week, pain radiating down into L leg, rates pain 10/10 at times - currently at 8/10, has tried Meloxicam - minimal relief, ) and Discuss Medications (Diabetic meter no longer covered, needing alternative)         History of Present Illness  The patient presents for evaluation of back pain and diabetes.    She reports experiencing intermittent back pain, which she attributes to sciatica. The pain is localized in her buttocks and both thighs, typically not extending beyond the mid-thigh region. The discomfort is most pronounced during ambulation and upon waking in the morning. She also reports a slight weakness in her legs, necessitating a

## 2025-05-13 ENCOUNTER — TELEPHONE (OUTPATIENT)
Dept: FAMILY MEDICINE CLINIC | Age: 65
End: 2025-05-13

## 2025-05-13 DIAGNOSIS — M54.41 BILATERAL LOW BACK PAIN WITH BILATERAL SCIATICA, UNSPECIFIED CHRONICITY: Primary | ICD-10-CM

## 2025-05-13 DIAGNOSIS — M54.42 BILATERAL LOW BACK PAIN WITH BILATERAL SCIATICA, UNSPECIFIED CHRONICITY: Primary | ICD-10-CM

## 2025-05-13 RX ORDER — PREDNISONE 10 MG/1
TABLET ORAL
Qty: 36 TABLET | Refills: 0 | Status: SHIPPED | OUTPATIENT
Start: 2025-05-13 | End: 2025-05-23

## 2025-05-13 NOTE — TELEPHONE ENCOUNTER
I sent a prescription for a 10 day Prednisone taper to the clinic pharmacy.     I can re-evaluate her at the appointment scheduled on 5/27 unless her symptoms worsen before the appointment.

## 2025-05-13 NOTE — TELEPHONE ENCOUNTER
Pt calling back to notify if anything gets sent to pharmacy she uses Clinic pharmacy as they deliver.

## 2025-05-13 NOTE — TELEPHONE ENCOUNTER
Pt calling stating the prednisone that was given to her on 5/6 helped her back pain, she was feeling better and almost back to normal. Now she is out of this medication and the pain is back, and worse than before. Do you want to see her again or can you send her in another script for methylprednisolone? Please advise.

## 2025-05-19 DIAGNOSIS — K62.89 CHRONIC RECTAL PAIN: ICD-10-CM

## 2025-05-19 DIAGNOSIS — G89.29 CHRONIC RECTAL PAIN: ICD-10-CM

## 2025-05-19 RX ORDER — TRAMADOL HYDROCHLORIDE 50 MG/1
TABLET ORAL
Qty: 90 TABLET | Refills: 1 | OUTPATIENT
Start: 2025-05-19

## 2025-05-22 DIAGNOSIS — G89.29 CHRONIC RECTAL PAIN: ICD-10-CM

## 2025-05-22 DIAGNOSIS — K62.89 CHRONIC RECTAL PAIN: ICD-10-CM

## 2025-05-22 RX ORDER — TRAMADOL HYDROCHLORIDE 50 MG/1
50 TABLET ORAL EVERY 8 HOURS PRN
Qty: 90 TABLET | Refills: 0 | OUTPATIENT
Start: 2025-05-22 | End: 2025-07-21

## 2025-05-22 NOTE — TELEPHONE ENCOUNTER
PER [facility] LAST FILL 4/28/25,  NOT DUE FOR REFILL UNTIL 5/28/25. HAS OV WITH DR. MENDEZ 5/27/25 - CAN SEND SCRIPT AT THAT TIME. PHARMACY TO NOTIFY PATIENT.

## 2025-05-23 ENCOUNTER — HOSPITAL ENCOUNTER (OUTPATIENT)
Age: 65
Discharge: HOME | End: 2025-05-23
Payer: MEDICAID

## 2025-05-23 ENCOUNTER — TELEPHONE (OUTPATIENT)
Dept: FAMILY MEDICINE CLINIC | Age: 65
End: 2025-05-23

## 2025-05-23 DIAGNOSIS — E11.40 TYPE 2 DIABETES MELLITUS WITH DIABETIC NEUROPATHY, WITHOUT LONG-TERM CURRENT USE OF INSULIN (HCC): ICD-10-CM

## 2025-05-23 DIAGNOSIS — K21.9 GASTROESOPHAGEAL REFLUX DISEASE, UNSPECIFIED WHETHER ESOPHAGITIS PRESENT: ICD-10-CM

## 2025-05-23 DIAGNOSIS — E78.2 MIXED HYPERLIPIDEMIA: ICD-10-CM

## 2025-05-23 LAB
ALBUMIN SERPL-MCNC: 4.1 G/DL (ref 3.5–5.2)
ALBUMIN/GLOB SERPL: 1.5 {RATIO} (ref 1–2.5)
ALP SERPL-CCNC: 123 U/L (ref 35–104)
ALT SERPL-CCNC: 49 U/L (ref 10–35)
ANION GAP SERPL CALCULATED.3IONS-SCNC: 11 MMOL/L (ref 9–16)
AST SERPL-CCNC: 45 U/L (ref 10–35)
BILIRUB SERPL-MCNC: 0.7 MG/DL (ref 0–1.2)
BUN SERPL-MCNC: 15 MG/DL (ref 8–23)
BUN/CREAT SERPL: 15 (ref 9–20)
CALCIUM SERPL-MCNC: 9.5 MG/DL (ref 8.6–10.4)
CHLORIDE SERPL-SCNC: 95 MMOL/L (ref 98–107)
CHOLEST SERPL-MCNC: 172 MG/DL (ref 0–199)
CHOLESTEROL/HDL RATIO: 3
CO2 SERPL-SCNC: 31 MMOL/L (ref 20–31)
CREAT SERPL-MCNC: 1 MG/DL (ref 0.6–0.9)
EST. AVERAGE GLUCOSE BLD GHB EST-MCNC: 189 MG/DL
GFR, ESTIMATED: 63 ML/MIN/1.73M2
GLUCOSE SERPL-MCNC: 205 MG/DL (ref 74–99)
HBA1C MFR BLD: 8.2 % (ref 4–6)
HDLC SERPL-MCNC: 58 MG/DL
LDLC SERPL CALC-MCNC: 54 MG/DL (ref 0–100)
POTASSIUM SERPL-SCNC: 4.6 MMOL/L (ref 3.7–5.3)
PROT SERPL-MCNC: 6.9 G/DL (ref 6.6–8.7)
SODIUM SERPL-SCNC: 137 MMOL/L (ref 136–145)
TRIGL SERPL-MCNC: 298 MG/DL
VLDLC SERPL CALC-MCNC: 60 MG/DL (ref 1–30)

## 2025-05-23 PROCEDURE — 80053 COMPREHEN METABOLIC PANEL: CPT

## 2025-05-23 PROCEDURE — 36415 COLL VENOUS BLD VENIPUNCTURE: CPT

## 2025-05-23 PROCEDURE — 80061 LIPID PANEL: CPT

## 2025-05-23 PROCEDURE — 83036 HEMOGLOBIN GLYCOSYLATED A1C: CPT

## 2025-05-23 RX ORDER — OMEPRAZOLE 20 MG/1
20 CAPSULE, DELAYED RELEASE ORAL DAILY
Qty: 90 CAPSULE | Refills: 0 | OUTPATIENT
Start: 2025-05-23

## 2025-05-23 NOTE — TELEPHONE ENCOUNTER
As I haven't seen her for this issue, would suggest coming in for a re-evaluation as it has been since 5/6/25 since she has been seen.

## 2025-05-23 NOTE — TELEPHONE ENCOUNTER
Pt calling stating her back pain is not improving. She states she finished the steroids as directed, but the pain is not getting any better. She has appt with Dr. Duffy on 5/27, but she states she cannot wait until then. Please advise.

## 2025-05-27 ENCOUNTER — HOSPITAL ENCOUNTER (OUTPATIENT)
Dept: GENERAL RADIOLOGY | Age: 65
Discharge: HOME | End: 2025-05-29
Payer: MEDICAID

## 2025-05-27 ENCOUNTER — OFFICE VISIT (OUTPATIENT)
Dept: FAMILY MEDICINE CLINIC | Age: 65
End: 2025-05-27
Payer: MEDICAID

## 2025-05-27 VITALS
HEIGHT: 62 IN | TEMPERATURE: 98.5 F | SYSTOLIC BLOOD PRESSURE: 122 MMHG | WEIGHT: 201 LBS | HEART RATE: 90 BPM | BODY MASS INDEX: 36.99 KG/M2 | RESPIRATION RATE: 16 BRPM | DIASTOLIC BLOOD PRESSURE: 70 MMHG | OXYGEN SATURATION: 98 %

## 2025-05-27 DIAGNOSIS — K21.9 GASTROESOPHAGEAL REFLUX DISEASE, UNSPECIFIED WHETHER ESOPHAGITIS PRESENT: ICD-10-CM

## 2025-05-27 DIAGNOSIS — M54.41 BILATERAL LOW BACK PAIN WITH BILATERAL SCIATICA, UNSPECIFIED CHRONICITY: ICD-10-CM

## 2025-05-27 DIAGNOSIS — G89.29 CHRONIC RECTAL PAIN: ICD-10-CM

## 2025-05-27 DIAGNOSIS — Z87.891 PERSONAL HISTORY OF TOBACCO USE: ICD-10-CM

## 2025-05-27 DIAGNOSIS — Z12.31 ENCOUNTER FOR SCREENING MAMMOGRAM FOR BREAST CANCER: ICD-10-CM

## 2025-05-27 DIAGNOSIS — M54.41 ACUTE BILATERAL LOW BACK PAIN WITH RIGHT-SIDED SCIATICA: ICD-10-CM

## 2025-05-27 DIAGNOSIS — K62.89 CHRONIC RECTAL PAIN: ICD-10-CM

## 2025-05-27 DIAGNOSIS — E11.40 TYPE 2 DIABETES MELLITUS WITH DIABETIC NEUROPATHY, WITHOUT LONG-TERM CURRENT USE OF INSULIN (HCC): Primary | ICD-10-CM

## 2025-05-27 DIAGNOSIS — E78.2 MIXED HYPERLIPIDEMIA: ICD-10-CM

## 2025-05-27 DIAGNOSIS — M54.42 BILATERAL LOW BACK PAIN WITH BILATERAL SCIATICA, UNSPECIFIED CHRONICITY: ICD-10-CM

## 2025-05-27 PROCEDURE — 3052F HG A1C>EQUAL 8.0%<EQUAL 9.0%: CPT | Performed by: FAMILY MEDICINE

## 2025-05-27 PROCEDURE — 3078F DIAST BP <80 MM HG: CPT | Performed by: FAMILY MEDICINE

## 2025-05-27 PROCEDURE — 3017F COLORECTAL CA SCREEN DOC REV: CPT | Performed by: FAMILY MEDICINE

## 2025-05-27 PROCEDURE — G8417 CALC BMI ABV UP PARAM F/U: HCPCS | Performed by: FAMILY MEDICINE

## 2025-05-27 PROCEDURE — 72100 X-RAY EXAM L-S SPINE 2/3 VWS: CPT

## 2025-05-27 PROCEDURE — 2022F DILAT RTA XM EVC RTNOPTHY: CPT | Performed by: FAMILY MEDICINE

## 2025-05-27 PROCEDURE — G8427 DOCREV CUR MEDS BY ELIG CLIN: HCPCS | Performed by: FAMILY MEDICINE

## 2025-05-27 PROCEDURE — G0296 VISIT TO DETERM LDCT ELIG: HCPCS | Performed by: FAMILY MEDICINE

## 2025-05-27 PROCEDURE — 3074F SYST BP LT 130 MM HG: CPT | Performed by: FAMILY MEDICINE

## 2025-05-27 PROCEDURE — 99214 OFFICE O/P EST MOD 30 MIN: CPT | Performed by: FAMILY MEDICINE

## 2025-05-27 PROCEDURE — 1036F TOBACCO NON-USER: CPT | Performed by: FAMILY MEDICINE

## 2025-05-27 RX ORDER — DAPAGLIFLOZIN 10 MG/1
10 TABLET, FILM COATED ORAL EVERY MORNING
Qty: 30 TABLET | Refills: 3 | Status: SHIPPED | OUTPATIENT
Start: 2025-05-27

## 2025-05-27 RX ORDER — OMEPRAZOLE 20 MG/1
20 CAPSULE, DELAYED RELEASE ORAL DAILY
Qty: 90 CAPSULE | Refills: 0 | OUTPATIENT
Start: 2025-05-27

## 2025-05-27 RX ORDER — TRAMADOL HYDROCHLORIDE 50 MG/1
TABLET ORAL
Qty: 90 TABLET | Refills: 1 | OUTPATIENT
Start: 2025-05-27

## 2025-05-27 RX ORDER — OMEPRAZOLE 20 MG/1
20 CAPSULE, DELAYED RELEASE ORAL DAILY
Qty: 90 CAPSULE | Refills: 1 | Status: SHIPPED | OUTPATIENT
Start: 2025-05-27

## 2025-05-27 RX ORDER — TRAMADOL HYDROCHLORIDE 50 MG/1
50 TABLET ORAL EVERY 8 HOURS PRN
Qty: 90 TABLET | Refills: 1 | Status: SHIPPED | OUTPATIENT
Start: 2025-05-27 | End: 2025-07-26

## 2025-05-27 NOTE — PROGRESS NOTES
Patient declines colon cancer screening and lung cancer screening.   Patient declines cervical cancer screening     Patient aware tdap and RSV vaccines are available at the pharmacy.    
11  9 - 16 mmol/L Final    Glucose 05/23/2025 205 (H)  74 - 99 mg/dL Final    BUN 05/23/2025 15  8 - 23 mg/dL Final    Creatinine 05/23/2025 1.0 (H)  0.6 - 0.9 mg/dL Final    Est, Glom Filt Rate 05/23/2025 63  >60 mL/min/1.73m2 Final    Comment:       These results are not intended for use in patients <18 years of age.        eGFR results are calculated without a race factor using the 2021 CKD-EPI equation.  Careful clinical correlation is recommended, particularly when comparing to results   calculated using previous equations.  The CKD-EPI equation is less accurate in patients with extremes of muscle mass, extra-renal   metabolism of creatine, excessive creatine ingestion, or following therapy that affects   renal tubular secretion.      BUN/Creatinine Ratio 05/23/2025 15  9 - 20 Final    Calcium 05/23/2025 9.5  8.6 - 10.4 mg/dL Final    Total Protein 05/23/2025 6.9  6.6 - 8.7 g/dL Final    Albumin 05/23/2025 4.1  3.5 - 5.2 g/dL Final    Albumin/Globulin Ratio 05/23/2025 1.5  1.0 - 2.5 Final    Total Bilirubin 05/23/2025 0.7  0.0 - 1.2 mg/dL Final    Alkaline Phosphatase 05/23/2025 123 (H)  35 - 104 U/L Final    ALT 05/23/2025 49 (H)  10 - 35 U/L Final    AST 05/23/2025 45 (H)  10 - 35 U/L Final                    The patient (or guardian, if applicable) and other individuals in attendance with the patient were advised that Artificial Intelligence will be utilized during this visit to record, process the conversation to generate a clinical note, and support improvement of the AI technology. The patient (or guardian, if applicable) and other individuals in attendance at the appointment consented to the use of AI, including the recording.           (Please note that portions of this note were completed with a voice-recognition program. Efforts were made to edit the dictation but occasionally words are mis-transcribed.)

## 2025-05-28 ENCOUNTER — TELEPHONE (OUTPATIENT)
Dept: FAMILY MEDICINE CLINIC | Age: 65
End: 2025-05-28

## 2025-05-28 DIAGNOSIS — M54.42 BILATERAL LOW BACK PAIN WITH BILATERAL SCIATICA, UNSPECIFIED CHRONICITY: Primary | ICD-10-CM

## 2025-05-28 DIAGNOSIS — M54.41 BILATERAL LOW BACK PAIN WITH BILATERAL SCIATICA, UNSPECIFIED CHRONICITY: Primary | ICD-10-CM

## 2025-05-28 RX ORDER — TIZANIDINE 2 MG/1
2 TABLET ORAL 3 TIMES DAILY PRN
Qty: 30 TABLET | Refills: 0 | Status: SHIPPED | OUTPATIENT
Start: 2025-05-28

## 2025-05-28 NOTE — TELEPHONE ENCOUNTER
Yes, she is taking meloxicam daily- it is prescribed by her orthopedic specialist at [facility].

## 2025-05-28 NOTE — TELEPHONE ENCOUNTER
Pt calling as she cannot see PT until 6-12 and pt questions if there is anything you can give her for the pain besides steroids, pt uses pended pharmacy, please advise.

## 2025-05-28 NOTE — TELEPHONE ENCOUNTER
I sent a prescription for the muscle relaxant Tizanidine to the clinic pharmacy.  Since this can cause drowsiness, she should avoid any activities that require focus, especially since some of her other medications can also cause drowsiness (Tramadol and Gabapentin).

## 2025-05-29 ENCOUNTER — RESULTS FOLLOW-UP (OUTPATIENT)
Dept: FAMILY MEDICINE CLINIC | Age: 65
End: 2025-05-29

## 2025-06-02 DIAGNOSIS — G56.03 BILATERAL CARPAL TUNNEL SYNDROME: ICD-10-CM

## 2025-06-02 DIAGNOSIS — L29.9 PRURITUS: ICD-10-CM

## 2025-06-02 DIAGNOSIS — G63 POLYNEUROPATHY ASSOCIATED WITH UNDERLYING DISEASE: ICD-10-CM

## 2025-06-02 RX ORDER — DIPHENHYDRAMINE HCL 25 MG
CAPSULE ORAL
Qty: 60 CAPSULE | Refills: 2 | Status: SHIPPED | OUTPATIENT
Start: 2025-06-02

## 2025-06-02 NOTE — TELEPHONE ENCOUNTER
Dea called requesting a refill of the below medication which has been pended for you:     Requested Prescriptions     Pending Prescriptions Disp Refills    diphenhydrAMINE (ALLERGY) 25 MG capsule [Pharmacy Med Name: ALLERGY 25MG CAPS] 60 capsule 2     Sig: TAKE ONE CAPSULE BY MOUTH TWICE A DAY AS NEEDED FOR ITCHING OR ALLERGIES       Last Appointment Date: 5/27/2025  Next Appointment Date: 8/27/2025    Allergies   Allergen Reactions    Lactose Intolerance (Gi) Diarrhea     Lactose Intolerant.        Metformin And Related Diarrhea    Methocarbamol Other (See Comments)     Extreme drowsiness

## 2025-06-02 NOTE — TELEPHONE ENCOUNTER
Last Appt:  3/21/2025  Next Appt:   6/13/2025  Med verified in Epic     [facility]RS 6/2/25    Patient is requesting a refill of gabapentin

## 2025-06-03 ENCOUNTER — TELEPHONE (OUTPATIENT)
Dept: FAMILY MEDICINE CLINIC | Age: 65
End: 2025-06-03

## 2025-06-03 DIAGNOSIS — N76.0 ACUTE VAGINITIS: ICD-10-CM

## 2025-06-03 DIAGNOSIS — E11.40 TYPE 2 DIABETES MELLITUS WITH DIABETIC NEUROPATHY, WITHOUT LONG-TERM CURRENT USE OF INSULIN (HCC): Primary | ICD-10-CM

## 2025-06-03 RX ORDER — FLUCONAZOLE 150 MG/1
TABLET ORAL
Qty: 2 TABLET | Refills: 0 | Status: SHIPPED | OUTPATIENT
Start: 2025-06-03 | End: 2025-06-06

## 2025-06-03 RX ORDER — GABAPENTIN 300 MG/1
CAPSULE ORAL
Qty: 120 CAPSULE | Refills: 2 | Status: SHIPPED | OUTPATIENT
Start: 2025-06-03 | End: 2025-09-03

## 2025-06-03 NOTE — TELEPHONE ENCOUNTER
Benadryl was refilled.    Please clarify.  Does she have symptoms of a yeast infection?  What symptoms is she having?  Has she been checking her blood sugar?

## 2025-06-03 NOTE — TELEPHONE ENCOUNTER
Patient reports vaginal itching. She states this started yesterday and feels this is being caused by recently starting Farxiga (5/27/25).  She reports her sugars are around the 200 range. She last checked her blood sugar on Sunday.    Last OV 5/27/25  Next OV 8/27/25

## 2025-06-03 NOTE — TELEPHONE ENCOUNTER
Please advise Dea that I have sent a prescription for Diflucan to the clinic pharmacy to treat the yeast infection.  I recommend that she stop Farxiga, and begin Trulicity.  Trulicity is given by injection once a week.  I sent the 0.75 mg dose to the pharmacy.  If she tolerates this dose, the dose will be increased with the next prescription.

## 2025-06-09 NOTE — PROGRESS NOTES
Patient declines low dose ct lung screening, colonoscopy, tdap, cervical cancer screening, flu vaccine. ADMIT

## 2025-06-25 ENCOUNTER — TELEPHONE (OUTPATIENT)
Dept: FAMILY MEDICINE CLINIC | Age: 65
End: 2025-06-25

## 2025-06-25 DIAGNOSIS — M54.41 ACUTE BILATERAL LOW BACK PAIN WITH RIGHT-SIDED SCIATICA: Primary | ICD-10-CM

## 2025-06-25 NOTE — TELEPHONE ENCOUNTER
Pt calling stating she's been going to PT for her sciatica pain, and it's not really helping, states she still has a lot of pain when walking and questions if you could prescribe something like a Lidocaine patch, pt uses [facility] pharmacy, please advise.

## 2025-06-29 RX ORDER — LIDOCAINE 50 MG/G
1 PATCH TOPICAL DAILY
Qty: 30 PATCH | Refills: 0 | Status: SHIPPED | OUTPATIENT
Start: 2025-06-29 | End: 2025-07-29

## 2025-06-30 DIAGNOSIS — E11.40 TYPE 2 DIABETES MELLITUS WITH DIABETIC NEUROPATHY, WITHOUT LONG-TERM CURRENT USE OF INSULIN (HCC): ICD-10-CM

## 2025-06-30 RX ORDER — DULAGLUTIDE 0.75 MG/.5ML
INJECTION, SOLUTION SUBCUTANEOUS
Qty: 2 ML | Refills: 1 | Status: SHIPPED | OUTPATIENT
Start: 2025-06-30

## 2025-06-30 NOTE — TELEPHONE ENCOUNTER
Dae called requesting a refill of the below medication which has been pended for you:     Requested Prescriptions     Pending Prescriptions Disp Refills    TRULICITY 0.75 MG/0.5ML SOAJ SC injection [Pharmacy Med Name: TRULICITY 0.75MG/0.5ML PEN] 2 mL 0     Sig: INJECT THE CONTENTS OF ONE SYRINGE UNDER THE SKIN ONCE WEEKLY       Last Appointment Date: 5/27/2025  Next Appointment Date: 8/27/2025    Allergies   Allergen Reactions    Farxiga [Dapagliflozin]      Yeast     Lactose Intolerance (Gi) Diarrhea     Lactose Intolerant.        Metformin And Related Diarrhea    Methocarbamol Other (See Comments)     Extreme drowsiness

## 2025-07-07 DIAGNOSIS — E11.40 TYPE 2 DIABETES MELLITUS WITH DIABETIC NEUROPATHY, WITHOUT LONG-TERM CURRENT USE OF INSULIN (HCC): ICD-10-CM

## 2025-07-07 NOTE — TELEPHONE ENCOUNTER
Dea called requesting a refill of the below medication which has been pended for you:     Requested Prescriptions     Pending Prescriptions Disp Refills    metFORMIN (GLUCOPHAGE) 500 MG tablet [Pharmacy Med Name: METFORMIN HCL 500MG TABS] 30 tablet 0     Sig: TAKE ONE TABLET BY MOUTH ONCE A DAY WITH BREAKFAST       Last Appointment Date: 5/27/2025  Next Appointment Date: 8/27/2025    Allergies   Allergen Reactions    Farxiga [Dapagliflozin]      Yeast     Lactose Intolerance (Gi) Diarrhea     Lactose Intolerant.        Metformin And Related Diarrhea    Methocarbamol Other (See Comments)     Extreme drowsiness

## 2025-07-09 ENCOUNTER — TELEPHONE (OUTPATIENT)
Dept: FAMILY MEDICINE CLINIC | Age: 65
End: 2025-07-09

## 2025-07-09 NOTE — TELEPHONE ENCOUNTER
Spoke to patient and she states she still has low back pain along with bilateral hip pain and bilateral thigh pain. She states she has had some improvement. She is going to follow up with PT as well.  She is scheduled for a follow up virtual visit on 7/15 with Dr. Duffy

## 2025-07-09 NOTE — TELEPHONE ENCOUNTER
Pt calling stating she's been going to PT for a while for her back pain, has an appt again on 7-11 but pt states she's still having back pain, hurts to walk, when she walks she's slouched over.   Informed pt that RR out until next week, but I would put in a note.   Also advised pt to discuss her status with PT and maybe they also would put in a note to RR with their recommendations.   Informed pt she would get a call back when RR back in office.

## 2025-07-15 ENCOUNTER — TELEMEDICINE (OUTPATIENT)
Dept: FAMILY MEDICINE CLINIC | Age: 65
End: 2025-07-15
Payer: MEDICAID

## 2025-07-15 DIAGNOSIS — E11.40 TYPE 2 DIABETES MELLITUS WITH DIABETIC NEUROPATHY, WITHOUT LONG-TERM CURRENT USE OF INSULIN (HCC): ICD-10-CM

## 2025-07-15 DIAGNOSIS — M54.40 BACK PAIN OF LUMBAR REGION WITH SCIATICA: Primary | ICD-10-CM

## 2025-07-15 PROCEDURE — 99213 OFFICE O/P EST LOW 20 MIN: CPT | Performed by: FAMILY MEDICINE

## 2025-07-15 PROCEDURE — 3052F HG A1C>EQUAL 8.0%<EQUAL 9.0%: CPT | Performed by: FAMILY MEDICINE

## 2025-07-15 PROCEDURE — G8417 CALC BMI ABV UP PARAM F/U: HCPCS | Performed by: FAMILY MEDICINE

## 2025-07-15 PROCEDURE — G8427 DOCREV CUR MEDS BY ELIG CLIN: HCPCS | Performed by: FAMILY MEDICINE

## 2025-07-15 PROCEDURE — 2022F DILAT RTA XM EVC RTNOPTHY: CPT | Performed by: FAMILY MEDICINE

## 2025-07-15 PROCEDURE — 3017F COLORECTAL CA SCREEN DOC REV: CPT | Performed by: FAMILY MEDICINE

## 2025-07-15 PROCEDURE — 1036F TOBACCO NON-USER: CPT | Performed by: FAMILY MEDICINE

## 2025-07-15 NOTE — PROGRESS NOTES
facet arthropathy.      SOFT TISSUES:  No acute abnormality.     IMPRESSION:  1. Multilevel degenerative change in the lumbar spine with anterior osteophyte formation, loss of disc space, and bilateral facet arthropathy.         Dea Crook, was evaluated through a synchronous (real-time) audio encounter. The patient (or guardian if applicable) is aware that this is a billable service, which includes applicable co-pays. This Virtual Visit was conducted with patient's (and/or legal guardian's) consent. The visit was conducted pursuant to the emergency declaration under the [place] Act and the National Emergencies Act, 1135 waiver authority and the Coronavirus Preparedness and Response Supplemental Appropriations Act. Patient identification was verified, and a caregiver was present when appropriate. The patient was located in a state where the provider was licensed to provide care.      Patient identification was verified at the start of the visit: Yes    Total time spent on this encounter:  20 minutes      Services were provided through a telephone synchronous discussion virtually to substitute for in-person clinic visit. Patient was in their home setting on their mobile device and I was in my office at [facility] on a secured telephone interface.      --Ritu Duffy MD on 7/15/2025 at 11:58 AM    An electronic signature was used to authenticate this note.       The patient (or guardian, if applicable) and other individuals in attendance with the patient were advised that Artificial Intelligence will be utilized during this visit to record, process the conversation to generate a clinical note, and support improvement of the AI technology. The patient (or guardian, if applicable) and other individuals in attendance at the appointment consented to the use of AI, including the recording.

## 2025-07-21 DIAGNOSIS — K62.89 CHRONIC RECTAL PAIN: Primary | ICD-10-CM

## 2025-07-21 DIAGNOSIS — G89.29 CHRONIC RECTAL PAIN: Primary | ICD-10-CM

## 2025-07-21 RX ORDER — TRAMADOL HYDROCHLORIDE 50 MG/1
50 TABLET ORAL EVERY 8 HOURS PRN
Qty: 90 TABLET | Refills: 1 | Status: SHIPPED | OUTPATIENT
Start: 2025-07-21 | End: 2025-09-19

## 2025-07-21 NOTE — TELEPHONE ENCOUNTER
Dea called requesting a refill of the below medication which has been pended for you:     [facility] from [place], [place], and [place] reviewed.   Tramadol 50 mg last filled 6/26/25 #90/30 days    Requested Prescriptions     Pending Prescriptions Disp Refills    traMADol (ULTRAM) 50 MG tablet [Pharmacy Med Name: TRAMADOL 50MG TAB] 90 tablet 0     Sig: Take 1 tablet by mouth every 8 hours as needed for Pain for up to 30 days. Max Daily Amount: 150 mg       Last Appointment Date: 7/15/2025  Next Appointment Date: 8/27/2025    Allergies   Allergen Reactions    Farxiga [Dapagliflozin]      Yeast     Lactose Intolerance (Gi) Diarrhea     Lactose Intolerant.        Metformin And Related Diarrhea    Methocarbamol Other (See Comments)     Extreme drowsiness

## 2025-07-25 ENCOUNTER — OFFICE VISIT (OUTPATIENT)
Dept: NEUROLOGY | Age: 65
End: 2025-07-25
Payer: MEDICAID

## 2025-07-25 VITALS
BODY MASS INDEX: 37.36 KG/M2 | SYSTOLIC BLOOD PRESSURE: 114 MMHG | DIASTOLIC BLOOD PRESSURE: 70 MMHG | WEIGHT: 203 LBS | HEART RATE: 86 BPM | HEIGHT: 62 IN | OXYGEN SATURATION: 95 %

## 2025-07-25 DIAGNOSIS — M54.16 CHRONIC LUMBAR RADICULOPATHY: Primary | ICD-10-CM

## 2025-07-25 DIAGNOSIS — R53.82 CHRONIC FATIGUE: ICD-10-CM

## 2025-07-25 DIAGNOSIS — F31.0 BIPOLAR AFFECTIVE DISORDER, CURRENT EPISODE HYPOMANIC (HCC): ICD-10-CM

## 2025-07-25 DIAGNOSIS — I72.8 SPLENIC ARTERY ANEURYSM: ICD-10-CM

## 2025-07-25 DIAGNOSIS — R26.89 BALANCE PROBLEM: ICD-10-CM

## 2025-07-25 DIAGNOSIS — E53.8 VITAMIN B12 DEFICIENCY: ICD-10-CM

## 2025-07-25 DIAGNOSIS — G63 POLYNEUROPATHY ASSOCIATED WITH UNDERLYING DISEASE: ICD-10-CM

## 2025-07-25 DIAGNOSIS — M79.2 NEUROPATHIC PAIN: ICD-10-CM

## 2025-07-25 DIAGNOSIS — F34.1 DYSTHYMIA: ICD-10-CM

## 2025-07-25 DIAGNOSIS — G56.03 BILATERAL CARPAL TUNNEL SYNDROME: ICD-10-CM

## 2025-07-25 DIAGNOSIS — F41.9 ANXIETY AND DEPRESSION: ICD-10-CM

## 2025-07-25 DIAGNOSIS — E66.01 MORBIDLY OBESE (HCC): ICD-10-CM

## 2025-07-25 DIAGNOSIS — I10 ESSENTIAL HYPERTENSION: ICD-10-CM

## 2025-07-25 DIAGNOSIS — F32.A ANXIETY AND DEPRESSION: ICD-10-CM

## 2025-07-25 DIAGNOSIS — G25.81 RESTLESS LEG SYNDROME: ICD-10-CM

## 2025-07-25 DIAGNOSIS — G47.9 SLEEPING DIFFICULTY: ICD-10-CM

## 2025-07-25 DIAGNOSIS — E11.40 TYPE 2 DIABETES MELLITUS WITH DIABETIC NEUROPATHY, WITHOUT LONG-TERM CURRENT USE OF INSULIN (HCC): ICD-10-CM

## 2025-07-25 DIAGNOSIS — F25.9 SCHIZOAFFECTIVE DISORDER, UNSPECIFIED TYPE (HCC): ICD-10-CM

## 2025-07-25 DIAGNOSIS — G62.9 PERIPHERAL POLYNEUROPATHY: ICD-10-CM

## 2025-07-25 DIAGNOSIS — F10.20 ALCOHOLISM (HCC): ICD-10-CM

## 2025-07-25 PROCEDURE — 3078F DIAST BP <80 MM HG: CPT | Performed by: PSYCHIATRY & NEUROLOGY

## 2025-07-25 PROCEDURE — 2022F DILAT RTA XM EVC RTNOPTHY: CPT | Performed by: PSYCHIATRY & NEUROLOGY

## 2025-07-25 PROCEDURE — G8428 CUR MEDS NOT DOCUMENT: HCPCS | Performed by: PSYCHIATRY & NEUROLOGY

## 2025-07-25 PROCEDURE — 1036F TOBACCO NON-USER: CPT | Performed by: PSYCHIATRY & NEUROLOGY

## 2025-07-25 PROCEDURE — 99214 OFFICE O/P EST MOD 30 MIN: CPT | Performed by: PSYCHIATRY & NEUROLOGY

## 2025-07-25 PROCEDURE — 3074F SYST BP LT 130 MM HG: CPT | Performed by: PSYCHIATRY & NEUROLOGY

## 2025-07-25 PROCEDURE — G8417 CALC BMI ABV UP PARAM F/U: HCPCS | Performed by: PSYCHIATRY & NEUROLOGY

## 2025-07-25 PROCEDURE — 3017F COLORECTAL CA SCREEN DOC REV: CPT | Performed by: PSYCHIATRY & NEUROLOGY

## 2025-07-25 PROCEDURE — 3052F HG A1C>EQUAL 8.0%<EQUAL 9.0%: CPT | Performed by: PSYCHIATRY & NEUROLOGY

## 2025-07-25 PROCEDURE — 99215 OFFICE O/P EST HI 40 MIN: CPT | Performed by: PSYCHIATRY & NEUROLOGY

## 2025-07-25 RX ORDER — RISPERIDONE 1 MG/1
1 TABLET ORAL NIGHTLY
Qty: 60 TABLET | Refills: 2 | Status: SHIPPED | OUTPATIENT
Start: 2025-07-25

## 2025-07-25 RX ORDER — CARBIDOPA AND LEVODOPA 25; 100 MG/1; MG/1
TABLET ORAL
Qty: 360 TABLET | Refills: 1 | Status: SHIPPED | OUTPATIENT
Start: 2025-07-25

## 2025-07-25 RX ORDER — SENNOSIDES 8.6 MG
325 CAPSULE ORAL DAILY
Qty: 30 TABLET | Refills: 12 | Status: SHIPPED | OUTPATIENT
Start: 2025-07-25

## 2025-07-25 RX ORDER — GABAPENTIN 300 MG/1
CAPSULE ORAL
Qty: 120 CAPSULE | Refills: 2 | Status: SHIPPED | OUTPATIENT
Start: 2025-07-25 | End: 2025-10-25

## 2025-07-25 ASSESSMENT — ENCOUNTER SYMPTOMS
ABDOMINAL DISTENTION: 0
EYE PAIN: 0
ABDOMINAL PAIN: 0
SINUS PRESSURE: 0
SHORTNESS OF BREATH: 0
BLOOD IN STOOL: 0
CHEST TIGHTNESS: 0
DIARRHEA: 0
VOICE CHANGE: 0
FACIAL SWELLING: 0
APNEA: 0
CHOKING: 0
EYE ITCHING: 0
TROUBLE SWALLOWING: 0
NAUSEA: 0
WHEEZING: 0
EYE DISCHARGE: 0
VOMITING: 0
BOWEL INCONTINENCE: 0
VISUAL CHANGE: 0
BACK PAIN: 0
CONSTIPATION: 0
PHOTOPHOBIA: 0
COLOR CHANGE: 0
EYE REDNESS: 0

## 2025-07-25 NOTE — PROGRESS NOTES
Subjective:        Patient ID: Dea Crook is a 64 y.o.right  handed female.        Neurologic Problem  The patient's primary symptoms include clumsiness, focal sensory loss, focal weakness, a loss of balance, memory loss and weakness. The patient's pertinent negatives include no altered mental status, near-syncope, slurred speech, syncope or visual change. Primary symptoms comment: RESTLESS LEG  SYNDROME,   CHRONIC  PERIPHERAL POLYNEUROPATHY;    CHRONIC  MEMORY PROBLEMS;   CHRONIC  MENTAL  HEALTH PROBLEMS. This is a chronic problem. Episode onset: MORE   THAN    5-6 YEARS. The neurological problem developed insidiously. The problem is unchanged. There was left-sided, lower extremity and right-sided focality noted. Pertinent negatives include no abdominal pain, auditory change, aura, back pain, bladder incontinence, bowel incontinence, chest pain, confusion, diaphoresis, dizziness, fatigue, fever, headaches, light-headedness, nausea, neck pain, palpitations, shortness of breath, vertigo or vomiting. Past treatments include medication. The treatment provided moderate relief. Her past medical history is significant for mood changes. There is no history of a bleeding disorder, a clotting disorder, a CVA, dementia, head trauma, liver disease or seizures.            History obtained from  The patient     and other  available medical records were  Also  reviewed.                1)      H/O    CHRONIC    DIABETIC   PERIPHERAL  NEUROPATHY                                                      -  ON  NEURONTIN /   TOLERATING  THE  SAME                              WITH   SYMPTOMATIC      IMPROVEMENT                        2)     H/O    CHRONIC      RESTLESS  LEG  SYNDROME                  &  PERIODIC  LEG  MOVEMENTS                       -  IMPROVED  /   ON  SINEMET              3)    H/O    CHRONIC     SLEEP DIFFICULTIES                        -   RESOLVED              4)        H/O   CHRONIC   MILD  MEMORY PROBLEMS

## 2025-07-31 ENCOUNTER — TELEPHONE (OUTPATIENT)
Dept: FAMILY MEDICINE CLINIC | Age: 65
End: 2025-07-31

## 2025-08-04 ENCOUNTER — TELEPHONE (OUTPATIENT)
Dept: NEUROLOGY | Age: 65
End: 2025-08-04

## 2025-08-04 DIAGNOSIS — E11.40 TYPE 2 DIABETES MELLITUS WITH DIABETIC NEUROPATHY, WITHOUT LONG-TERM CURRENT USE OF INSULIN (HCC): ICD-10-CM

## 2025-08-08 ENCOUNTER — OFFICE VISIT (OUTPATIENT)
Dept: FAMILY MEDICINE CLINIC | Age: 65
End: 2025-08-08
Payer: MEDICAID

## 2025-08-08 VITALS
SYSTOLIC BLOOD PRESSURE: 120 MMHG | TEMPERATURE: 99.2 F | DIASTOLIC BLOOD PRESSURE: 80 MMHG | WEIGHT: 207 LBS | BODY MASS INDEX: 38.09 KG/M2 | RESPIRATION RATE: 18 BRPM | HEIGHT: 62 IN | OXYGEN SATURATION: 95 % | HEART RATE: 80 BPM

## 2025-08-08 DIAGNOSIS — M54.42 CHRONIC BILATERAL LOW BACK PAIN WITH BILATERAL SCIATICA: Primary | ICD-10-CM

## 2025-08-08 DIAGNOSIS — M54.41 CHRONIC BILATERAL LOW BACK PAIN WITH BILATERAL SCIATICA: Primary | ICD-10-CM

## 2025-08-08 DIAGNOSIS — G89.29 CHRONIC BILATERAL LOW BACK PAIN WITH BILATERAL SCIATICA: Primary | ICD-10-CM

## 2025-08-08 PROCEDURE — 3079F DIAST BP 80-89 MM HG: CPT | Performed by: FAMILY MEDICINE

## 2025-08-08 PROCEDURE — G8427 DOCREV CUR MEDS BY ELIG CLIN: HCPCS | Performed by: FAMILY MEDICINE

## 2025-08-08 PROCEDURE — 3074F SYST BP LT 130 MM HG: CPT | Performed by: FAMILY MEDICINE

## 2025-08-08 PROCEDURE — 99213 OFFICE O/P EST LOW 20 MIN: CPT | Performed by: FAMILY MEDICINE

## 2025-08-08 PROCEDURE — G8417 CALC BMI ABV UP PARAM F/U: HCPCS | Performed by: FAMILY MEDICINE

## 2025-08-08 PROCEDURE — 1036F TOBACCO NON-USER: CPT | Performed by: FAMILY MEDICINE

## 2025-08-08 PROCEDURE — 3017F COLORECTAL CA SCREEN DOC REV: CPT | Performed by: FAMILY MEDICINE

## 2025-08-08 RX ORDER — DIAZEPAM 5 MG/1
5 TABLET ORAL ONCE
Qty: 1 TABLET | Refills: 0 | Status: SHIPPED | OUTPATIENT
Start: 2025-08-08 | End: 2025-08-08

## 2025-08-08 ASSESSMENT — ENCOUNTER SYMPTOMS: BACK PAIN: 1

## 2025-08-25 DIAGNOSIS — E11.40 TYPE 2 DIABETES MELLITUS WITH DIABETIC NEUROPATHY, WITHOUT LONG-TERM CURRENT USE OF INSULIN (HCC): ICD-10-CM

## 2025-08-25 RX ORDER — DULAGLUTIDE 0.75 MG/.5ML
INJECTION, SOLUTION SUBCUTANEOUS
Qty: 2 ML | Refills: 0 | Status: SHIPPED | OUTPATIENT
Start: 2025-08-25 | End: 2025-08-27 | Stop reason: SDUPTHER

## 2025-08-26 ENCOUNTER — HOSPITAL ENCOUNTER (OUTPATIENT)
Dept: MRI IMAGING | Age: 65
Discharge: HOME | End: 2025-08-28
Attending: FAMILY MEDICINE
Payer: MEDICAID

## 2025-08-26 DIAGNOSIS — E83.42 HYPOMAGNESEMIA: ICD-10-CM

## 2025-08-26 DIAGNOSIS — L29.9 PRURITUS: ICD-10-CM

## 2025-08-26 DIAGNOSIS — M54.41 CHRONIC BILATERAL LOW BACK PAIN WITH BILATERAL SCIATICA: ICD-10-CM

## 2025-08-26 DIAGNOSIS — G89.29 CHRONIC BILATERAL LOW BACK PAIN WITH BILATERAL SCIATICA: ICD-10-CM

## 2025-08-26 DIAGNOSIS — M54.42 CHRONIC BILATERAL LOW BACK PAIN WITH BILATERAL SCIATICA: ICD-10-CM

## 2025-08-26 PROCEDURE — 72148 MRI LUMBAR SPINE W/O DYE: CPT

## 2025-08-26 RX ORDER — BENZONATATE 100 MG/1
1 CAPSULE, LIQUID FILLED ORAL DAILY
Qty: 60 TABLET | Refills: 2 | Status: SHIPPED | OUTPATIENT
Start: 2025-08-26

## 2025-08-27 ENCOUNTER — OFFICE VISIT (OUTPATIENT)
Dept: FAMILY MEDICINE CLINIC | Age: 65
End: 2025-08-27
Payer: MEDICAID

## 2025-08-27 VITALS
BODY MASS INDEX: 37.17 KG/M2 | OXYGEN SATURATION: 96 % | HEART RATE: 81 BPM | TEMPERATURE: 98.1 F | HEIGHT: 62 IN | WEIGHT: 202 LBS | DIASTOLIC BLOOD PRESSURE: 78 MMHG | SYSTOLIC BLOOD PRESSURE: 120 MMHG

## 2025-08-27 DIAGNOSIS — K21.9 GASTROESOPHAGEAL REFLUX DISEASE, UNSPECIFIED WHETHER ESOPHAGITIS PRESENT: ICD-10-CM

## 2025-08-27 DIAGNOSIS — M54.42 BILATERAL LOW BACK PAIN WITH BILATERAL SCIATICA, UNSPECIFIED CHRONICITY: Primary | ICD-10-CM

## 2025-08-27 DIAGNOSIS — R05.9 COUGH, UNSPECIFIED TYPE: ICD-10-CM

## 2025-08-27 DIAGNOSIS — E83.42 HYPOMAGNESEMIA: ICD-10-CM

## 2025-08-27 DIAGNOSIS — E11.40 TYPE 2 DIABETES MELLITUS WITH DIABETIC NEUROPATHY, WITHOUT LONG-TERM CURRENT USE OF INSULIN (HCC): ICD-10-CM

## 2025-08-27 DIAGNOSIS — F17.210 CIGARETTE NICOTINE DEPENDENCE WITHOUT COMPLICATION: ICD-10-CM

## 2025-08-27 DIAGNOSIS — L29.9 PRURITUS: ICD-10-CM

## 2025-08-27 DIAGNOSIS — I10 ESSENTIAL HYPERTENSION: ICD-10-CM

## 2025-08-27 DIAGNOSIS — E78.2 MIXED HYPERLIPIDEMIA: ICD-10-CM

## 2025-08-27 DIAGNOSIS — M54.41 BILATERAL LOW BACK PAIN WITH BILATERAL SCIATICA, UNSPECIFIED CHRONICITY: Primary | ICD-10-CM

## 2025-08-27 DIAGNOSIS — J44.9 CHRONIC OBSTRUCTIVE PULMONARY DISEASE, UNSPECIFIED COPD TYPE (HCC): ICD-10-CM

## 2025-08-27 PROCEDURE — 3078F DIAST BP <80 MM HG: CPT | Performed by: FAMILY MEDICINE

## 2025-08-27 PROCEDURE — 1036F TOBACCO NON-USER: CPT | Performed by: FAMILY MEDICINE

## 2025-08-27 PROCEDURE — 99214 OFFICE O/P EST MOD 30 MIN: CPT | Performed by: FAMILY MEDICINE

## 2025-08-27 PROCEDURE — 3023F SPIROM DOC REV: CPT | Performed by: FAMILY MEDICINE

## 2025-08-27 PROCEDURE — 2022F DILAT RTA XM EVC RTNOPTHY: CPT | Performed by: FAMILY MEDICINE

## 2025-08-27 PROCEDURE — 3017F COLORECTAL CA SCREEN DOC REV: CPT | Performed by: FAMILY MEDICINE

## 2025-08-27 PROCEDURE — G8427 DOCREV CUR MEDS BY ELIG CLIN: HCPCS | Performed by: FAMILY MEDICINE

## 2025-08-27 PROCEDURE — G8417 CALC BMI ABV UP PARAM F/U: HCPCS | Performed by: FAMILY MEDICINE

## 2025-08-27 PROCEDURE — 3052F HG A1C>EQUAL 8.0%<EQUAL 9.0%: CPT | Performed by: FAMILY MEDICINE

## 2025-08-27 PROCEDURE — 3074F SYST BP LT 130 MM HG: CPT | Performed by: FAMILY MEDICINE

## 2025-08-27 RX ORDER — PRAVASTATIN SODIUM 40 MG
TABLET ORAL
Qty: 90 TABLET | Refills: 1 | Status: SHIPPED | OUTPATIENT
Start: 2025-08-27

## 2025-08-27 RX ORDER — LANOLIN ALCOHOL/MO/W.PET/CERES
400 CREAM (GRAM) TOPICAL DAILY
Qty: 90 TABLET | Refills: 1 | OUTPATIENT
Start: 2025-08-27

## 2025-08-27 RX ORDER — DULAGLUTIDE 0.75 MG/.5ML
0.75 INJECTION, SOLUTION SUBCUTANEOUS WEEKLY
Qty: 2 ML | Refills: 5 | Status: SHIPPED | OUTPATIENT
Start: 2025-08-27

## 2025-08-27 RX ORDER — LOSARTAN POTASSIUM 100 MG/1
TABLET ORAL
Qty: 90 TABLET | Refills: 1 | Status: SHIPPED | OUTPATIENT
Start: 2025-08-27

## 2025-08-27 RX ORDER — FLUTICASONE PROPIONATE 110 UG/1
2 AEROSOL, METERED RESPIRATORY (INHALATION) 2 TIMES DAILY
Qty: 12 G | Refills: 5 | Status: SHIPPED | OUTPATIENT
Start: 2025-08-27 | End: 2026-08-27

## 2025-08-27 RX ORDER — DIPHENHYDRAMINE HCL 25 MG
CAPSULE ORAL
Qty: 60 CAPSULE | Refills: 2 | Status: SHIPPED | OUTPATIENT
Start: 2025-08-27

## 2025-08-27 RX ORDER — LANOLIN ALCOHOL/MO/W.PET/CERES
400 CREAM (GRAM) TOPICAL DAILY
Qty: 90 TABLET | Refills: 1 | Status: SHIPPED | OUTPATIENT
Start: 2025-08-27

## 2025-08-27 RX ORDER — OMEPRAZOLE 20 MG/1
20 CAPSULE, DELAYED RELEASE ORAL DAILY
Qty: 90 CAPSULE | Refills: 1 | Status: SHIPPED | OUTPATIENT
Start: 2025-08-27

## 2025-08-27 RX ORDER — DIPHENHYDRAMINE HCL 25 MG
CAPSULE ORAL
Qty: 60 CAPSULE | Refills: 2 | OUTPATIENT
Start: 2025-08-27

## 2025-08-27 SDOH — ECONOMIC STABILITY: FOOD INSECURITY: WITHIN THE PAST 12 MONTHS, YOU WORRIED THAT YOUR FOOD WOULD RUN OUT BEFORE YOU GOT MONEY TO BUY MORE.: PATIENT DECLINED

## 2025-08-27 SDOH — ECONOMIC STABILITY: FOOD INSECURITY: WITHIN THE PAST 12 MONTHS, THE FOOD YOU BOUGHT JUST DIDN'T LAST AND YOU DIDN'T HAVE MONEY TO GET MORE.: PATIENT DECLINED

## 2025-08-27 ASSESSMENT — PATIENT HEALTH QUESTIONNAIRE - PHQ9
2. FEELING DOWN, DEPRESSED OR HOPELESS: NOT AT ALL
SUM OF ALL RESPONSES TO PHQ QUESTIONS 1-9: 0
6. FEELING BAD ABOUT YOURSELF - OR THAT YOU ARE A FAILURE OR HAVE LET YOURSELF OR YOUR FAMILY DOWN: NOT AT ALL
10. IF YOU CHECKED OFF ANY PROBLEMS, HOW DIFFICULT HAVE THESE PROBLEMS MADE IT FOR YOU TO DO YOUR WORK, TAKE CARE OF THINGS AT HOME, OR GET ALONG WITH OTHER PEOPLE: NOT DIFFICULT AT ALL
3. TROUBLE FALLING OR STAYING ASLEEP: NOT AT ALL
5. POOR APPETITE OR OVEREATING: NOT AT ALL
1. LITTLE INTEREST OR PLEASURE IN DOING THINGS: NOT AT ALL
7. TROUBLE CONCENTRATING ON THINGS, SUCH AS READING THE NEWSPAPER OR WATCHING TELEVISION: NOT AT ALL
9. THOUGHTS THAT YOU WOULD BE BETTER OFF DEAD, OR OF HURTING YOURSELF: NOT AT ALL
8. MOVING OR SPEAKING SO SLOWLY THAT OTHER PEOPLE COULD HAVE NOTICED. OR THE OPPOSITE, BEING SO FIGETY OR RESTLESS THAT YOU HAVE BEEN MOVING AROUND A LOT MORE THAN USUAL: NOT AT ALL
4. FEELING TIRED OR HAVING LITTLE ENERGY: NOT AT ALL
SUM OF ALL RESPONSES TO PHQ QUESTIONS 1-9: 0